# Patient Record
Sex: FEMALE | Race: WHITE | NOT HISPANIC OR LATINO | Employment: OTHER | URBAN - METROPOLITAN AREA
[De-identification: names, ages, dates, MRNs, and addresses within clinical notes are randomized per-mention and may not be internally consistent; named-entity substitution may affect disease eponyms.]

---

## 2022-06-04 DIAGNOSIS — M25.552 LEFT HIP PAIN: Primary | ICD-10-CM

## 2022-06-07 ENCOUNTER — APPOINTMENT (OUTPATIENT)
Dept: RADIOLOGY | Facility: CLINIC | Age: 72
End: 2022-06-07
Payer: MEDICARE

## 2022-06-07 ENCOUNTER — OFFICE VISIT (OUTPATIENT)
Dept: OBGYN CLINIC | Facility: CLINIC | Age: 72
End: 2022-06-07
Payer: MEDICARE

## 2022-06-07 VITALS
HEIGHT: 66 IN | DIASTOLIC BLOOD PRESSURE: 82 MMHG | SYSTOLIC BLOOD PRESSURE: 116 MMHG | WEIGHT: 263.2 LBS | HEART RATE: 98 BPM | BODY MASS INDEX: 42.3 KG/M2

## 2022-06-07 DIAGNOSIS — M16.12 PRIMARY OSTEOARTHRITIS OF ONE HIP, LEFT: Primary | ICD-10-CM

## 2022-06-07 DIAGNOSIS — M25.552 LEFT HIP PAIN: ICD-10-CM

## 2022-06-07 PROCEDURE — 99203 OFFICE O/P NEW LOW 30 MIN: CPT | Performed by: ORTHOPAEDIC SURGERY

## 2022-06-07 PROCEDURE — 73502 X-RAY EXAM HIP UNI 2-3 VIEWS: CPT

## 2022-06-07 RX ORDER — FLUTICASONE FUROATE AND VILANTEROL 200; 25 UG/1; UG/1
1 POWDER RESPIRATORY (INHALATION) DAILY
COMMUNITY

## 2022-06-07 RX ORDER — UBIDECARENONE 30 MG
1 CAPSULE ORAL DAILY
COMMUNITY

## 2022-06-07 RX ORDER — ASPIRIN 81 MG/1
81 TABLET ORAL DAILY
COMMUNITY

## 2022-06-07 RX ORDER — MONTELUKAST SODIUM 10 MG/1
10 TABLET ORAL DAILY PRN
COMMUNITY

## 2022-06-07 RX ORDER — LEVOTHYROXINE SODIUM 175 UG/1
175 TABLET ORAL
COMMUNITY
End: 2022-07-28 | Stop reason: DRUGHIGH

## 2022-06-07 RX ORDER — VERAPAMIL HYDROCHLORIDE 360 MG/1
360 CAPSULE, DELAYED RELEASE PELLETS ORAL DAILY
COMMUNITY

## 2022-06-07 RX ORDER — ALBUTEROL SULFATE 90 UG/1
2 AEROSOL, METERED RESPIRATORY (INHALATION) EVERY 4 HOURS PRN
COMMUNITY

## 2022-06-07 RX ORDER — ATORVASTATIN CALCIUM 10 MG/1
40 TABLET, FILM COATED ORAL DAILY
COMMUNITY

## 2022-06-07 RX ORDER — LOSARTAN POTASSIUM AND HYDROCHLOROTHIAZIDE 25; 100 MG/1; MG/1
1 TABLET ORAL DAILY
COMMUNITY

## 2022-06-07 RX ORDER — GABAPENTIN 100 MG/1
200 CAPSULE ORAL WEEKLY
COMMUNITY

## 2022-06-07 RX ORDER — CLOPIDOGREL BISULFATE 75 MG/1
75 TABLET ORAL DAILY
COMMUNITY

## 2022-06-07 RX ORDER — ALPRAZOLAM 1 MG/1
0.25 TABLET ORAL AS NEEDED
COMMUNITY

## 2022-06-07 RX ORDER — FLUTICASONE PROPIONATE 50 MCG
2 SPRAY, SUSPENSION (ML) NASAL DAILY PRN
COMMUNITY

## 2022-06-07 NOTE — PATIENT INSTRUCTIONS
- Discussed conservative treatment with patient at length  - Begin physical therapy as directed   - Pain management referral placed for cortisone injection under fluoro   - Over the counter analgesics as needed / directed   - Ice / heat as directed  - discussed possibility of surgical intervention in future pending symptoms     - Follow up 2 months

## 2022-06-07 NOTE — PROGRESS NOTES
Orthopaedics Office Visit - 1st Patient Visit    ASSESSMENT/PLAN:    Assessment:   Left hip primary osteoarthritis   Trochanteric Bursitis       Plan:   - Discussed conservative treatment with patient at length  - Begin physical therapy as directed   - Pain management referral placed for cortisone injection under fluoro to intraarticular L hip  - Over the counter analgesics as needed / directed   - Ice / heat as directed  - discussed possibility of surgical intervention in future pending symptoms     - Follow up 2 months       To Do Next Visit:  Evaluate left hip pain     _____________________________________________________  CHIEF COMPLAINT:  Chief Complaint   Patient presents with    Left Hip - Pain         SUBJECTIVE:  Marguerite Soares is a 81 y o  female  presenting the office for initial evaluation of her left hip  Patient states her symptoms have been ongoing for the past few years in duration  Patient states that over the past 6 months her hip pain has gotten progressively worse  Patient was seen at [facility] where a trochanteric bursa injection was administered  Patient states that she received a minimal relief of symptoms during this last injection  Patient states that the pain is predominantly on the lateral aspect of the hip does not extend into her hip  Patient states the pain comes worse with range of motion of the hip weight-bearing and direct contact  Patient states she has tried will laxity him and tramadol past which have provided minimal relief of symptoms  Patient offers no other complaints at this time  PAST MEDICAL HISTORY:  No past medical history on file  PAST SURGICAL HISTORY:  No past surgical history on file  FAMILY HISTORY:  No family history on file  SOCIAL HISTORY:       MEDICATIONS:  No current outpatient medications on file  ALLERGIES:  Not on File    REVIEW OF SYSTEMS:  MSK: Left hip pain   Neuro: WNL   Pertinent items are otherwise noted in HPI    A comprehensive review of systems was otherwise negative  LABS:  HgA1c: No results found for: HGBA1C  BMP: No results found for: GLUCOSE, CALCIUM, NA, K, CO2, CL, BUN, CREATININE  CBC: No components found for: CBC    _____________________________________________________  PHYSICAL EXAMINATION:  Vital signs: Ht 5' 6" (1 676 m)   Wt 119 kg (263 lb 3 2 oz)   BMI 42 48 kg/m²   General: No acute distress, awake and alert  Psychiatric: Mood and affect appear appropriate  HEENT: Trachea Midline, No torticollis, no apparent facial trauma  Cardiovascular: No audible murmurs; Extremities appear perfused  Pulmonary: No audible wheezing or stridor  Skin: No open lesions; see further details (if any) below      MUSCULOSKELETAL EXAMINATION:  Left hip examination:  - Patient sitting comfortably in the office in no acute distress   - no acute visible abnormalities present in the left hip  Extremity appears well-perfused overall   - tenderness to palpation noted over the greater trochanter  No other bony or soft tissue tenderness to palpation noted at this time  - extremity limited range of motion of left hip in all planes of motion secondary to pain  Special tests        -   + STITEOFILO'S   - NV intact    _____________________________________________________  STUDIES REVIEWED:  I personally reviewed the images and interpretation is as follows:  Left hip XR 2 views: Moderate osteoarthritis of the left hip with osteophyte formation present  PROCEDURES PERFORMED:  No procedures were performed at this time  Tian Sheridan PA-C - assisting    Danis Webber MD                  Portions of the record may have been created with voice recognition software  Occasional wrong word or "sound a like" substitutions may have occurred due to the inherent limitations of voice recognition software  Read the chart carefully and recognize, using context, where substitutions have occurred

## 2022-06-08 ENCOUNTER — TELEPHONE (OUTPATIENT)
Dept: OBGYN CLINIC | Facility: HOSPITAL | Age: 72
End: 2022-06-08

## 2022-06-09 ENCOUNTER — TELEPHONE (OUTPATIENT)
Dept: RADIOLOGY | Facility: CLINIC | Age: 72
End: 2022-06-09

## 2022-06-09 NOTE — TELEPHONE ENCOUNTER
S/w pt and scheduled Left hip injection for 6/16/22 230 pm  Gave pre procedure instructions and /vaccine policy, [facility]  Cxl consult for 6/29/22 since this was a direct ref for injection

## 2022-06-13 NOTE — PATIENT INSTRUCTIONS
Peripheral arterial disease with history of revascularization (HCC)  -     VAS lower limb arterial duplex, complete bilateral; Future        -Going for hip injections (unable to walk well right now)  -Check feet daily for any wounds or changes  -Follow heart healthy diet and regular daily walking 30 minutes daily  -Continue with aspirin, Plavix 75, high dose atorvastatin  -Maintain good BP and glucose control  -Heart healthy diet and weight loss  -Continue to avoid tobacco  -Check KALE and follow up in 3 months

## 2022-06-14 ENCOUNTER — OFFICE VISIT (OUTPATIENT)
Dept: VASCULAR SURGERY | Facility: CLINIC | Age: 72
End: 2022-06-14
Payer: MEDICARE

## 2022-06-14 VITALS
TEMPERATURE: 98.4 F | HEART RATE: 100 BPM | DIASTOLIC BLOOD PRESSURE: 80 MMHG | WEIGHT: 263 LBS | HEIGHT: 66 IN | BODY MASS INDEX: 42.27 KG/M2 | SYSTOLIC BLOOD PRESSURE: 110 MMHG

## 2022-06-14 DIAGNOSIS — E66.01 CLASS 3 SEVERE OBESITY WITHOUT SERIOUS COMORBIDITY WITH BODY MASS INDEX (BMI) OF 40.0 TO 44.9 IN ADULT, UNSPECIFIED OBESITY TYPE (HCC): ICD-10-CM

## 2022-06-14 DIAGNOSIS — Z98.890 PERIPHERAL ARTERIAL DISEASE WITH HISTORY OF REVASCULARIZATION (HCC): Primary | ICD-10-CM

## 2022-06-14 DIAGNOSIS — I10 PRIMARY HYPERTENSION: ICD-10-CM

## 2022-06-14 DIAGNOSIS — I73.9 PERIPHERAL ARTERIAL DISEASE WITH HISTORY OF REVASCULARIZATION (HCC): Primary | ICD-10-CM

## 2022-06-14 DIAGNOSIS — Z89.422 ACQUIRED ABSENCE OF OTHER LEFT TOE(S) (HCC): ICD-10-CM

## 2022-06-14 PROBLEM — E66.813 CLASS 3 SEVERE OBESITY WITHOUT SERIOUS COMORBIDITY WITH BODY MASS INDEX (BMI) OF 40.0 TO 44.9 IN ADULT, UNSPECIFIED OBESITY TYPE (HCC): Status: ACTIVE | Noted: 2022-06-14

## 2022-06-14 PROCEDURE — 99204 OFFICE O/P NEW MOD 45 MIN: CPT | Performed by: PHYSICIAN ASSISTANT

## 2022-06-14 NOTE — PROGRESS NOTES
Assessment/Plan:    Peripheral arterial disease with history of revascularization (HCC)  -     VAS lower limb arterial duplex, complete bilateral; Future    -S/P LLE popliteal to PT bypass (7/31/19 followed by PCI x 2)  -asymptomatic PAD; no claudication (her her level), rest pain or wounds  -Transferring vascular care from [place], [place]    Plan:  Patient presents as a new patient [facility]  She recently relocated to the [facility] from [place], [place]  she has a history of left popliteal to posterior tibial artery bypass performed in 2019 with 2 subsequent percutaneous interventions for restenoses  Her last duplex performed in [place], [place] (by report) shows a recurrent area of stenosis of the distal popliteal to posterior tibial bypass with good flow proximally  There is noted to be anterior tibial artery disease  Her examination is stable  There is no easily palpable bypass or PT artery pulse but signals are present  There is a 1+ pedal pulse  Her feet are warm and well perfused  There are no wounds  She feels limited due to left hip pain which she is having dressed separately  She has no claudication at her level of activity  There is no ischemic rest pain or wounds    We will have her enter into the surveillance PAD duplex program   Patient education regarding PAD and symptoms concerning for which she should return sooner were discussed      -Continue with aspirin, Plavix, high dose atorvastatin  -Going for hip injections (unable to walk well right now)  -Check feet daily for any wounds or changes  -Follow heart healthy diet and regular daily walking 30 minutes daily  -Maintain good BP and glucose control  -Heart healthy diet and weight loss  -Continue to avoid tobacco  -Check KALE and follow up in 3 months    Additional diagnoses:    Primary hypertension    Hyperlipidemia    Cardiac murmur    Acquired absence of other left toe(s) ([facility])    Class 3 severe obesity without serious comorbidity with body mass index (BMI) of 40 0 to 44 9 in adult, unspecified obesity type ([facility] [address] )      Subjective:      Patient ID: Jyothi Jonas is a 81 y o  female  Patient w/ known PAD and h/o multiple vascular procedures presents today to establish care with new vascular office  Most recent imaging was KALE of LLE done 1/3/22 @ [address]  Patient denies claudication symptoms/ rest pain  HPI    Jyothi Jonas 72 y/oF obesity (BMI 42 5), liver cirrhosis/JOSEPH, diverticulitis, Hx colonic polyps, asthma, Htn, HLD, hypothyroidism, peripheral arterial disease s/p L popliteal to distal PT artery bypass graft (7/31/2019) s/p bypass graft angioplasty x 2 (1/20/21, 8/12/20) who presents to  vascular for initial consultation  She has been followed by [place] and has now relocated to the [place] [id number] and she is looking to transfer her care to [place] [id number]  Mrs Bozena Davies gives history of left second toe ulceration in 2019 which developed osteo and was amputated  In that setting, she required left lower extremity bypass  Since bypass she is had 2 interventions for recurrent stenosis to the graft and was told that there is "scar tissue " Last duplex in [place] was 01/03/2022  There was noted to be recurrent area of stenosis of the distal popliteal to PT bypass graft with good flow proximally  There was also noted to be anterior tibial artery disease  She has been maintained on aspirin, clopidogrel and high-intensity statin therapy since her bypass  Patient gives no history of claudication  Currently her functional status is somewhat limited due to left hip pain which occurs with certain positions and activity  She is hoping to have her hip injected  We discussed symptoms of claudication rest pain that would be concerning for which she should be seen  We discussed the benefits of a regular progressive walking program   Hopefully, after she has her hip injected she will be able to engage in a walking program   No CP or SOB  She is a lifelong nonsmoker    Family history is noncontributory        -Hx L 2nd toe ulceration, bypass; no claudication  -L hip "bad"; plan for injections        Vascular Procedures:  S/P Left Lower Extremity Angiogram (7/30/2019)  S/P Left Popliteal-Pedal Artery Bypass with GSV and Intraoperative Angiogram (7/31/2019)  S/P I&D Left Lower Extremity Wound (10/31/19)  S/P Left Lower Extremity Angiogram with Angioplasty of bypass graft (8/12/20)  S/P Left Lower Extremity Angiogram with Angioplasty of Left Common Plantar Artery (1/20/2021)  S/P Left Lower Extremity Angioplasty with Left Common Plantar Artery (7/14/2021)      LEFT lower extremity arterial duplex 1/3/22  INDICATION: Other atherosclerosis of autologous vein bypass graft(s) of the extremities, left leg  Previous left popliteal to distal posterior tibial artery bypass graft previously angioplastied  TECHNIQUE: Grayscale, color flow and spectral duplex Doppler ultrasound was performed of the left lower extremity lower extremity  Permanently recorded images were obtained and stored  COMPARISON: 8/25/2021     FINDINGS: There is good inflow at the common femoral artery and good flow through the SFA and popliteal vessel  There is a popliteal to distal posterior tibial artery bypass graft  At the level of the distal anastomosis is is well patent with phasic waveforms  Distally we identify a recurrent stenosis at the level of the distal anastomosis  There continues to be flow with progressive distal disease of the anterior tibial artery  IMPRESSION: There appears to be a recurrent area of stenosis at the distal popliteal to posterior tibial artery bypass graft  Proximally there is good flow  Progressive disease of the anterior tibial artery from proximal to distal  This appears similar             The following portions of the patient's history were reviewed and updated as appropriate: allergies, current medications, past family history, past medical history, past social history, past surgical history and problem list     Review of Systems   Constitutional: Negative  HENT: Negative  Eyes: Negative  Respiratory: Negative  Cardiovascular: Negative  Gastrointestinal: Negative  Endocrine: Negative  Genitourinary: Negative  Musculoskeletal: Positive for arthralgias and back pain  Skin: Negative  Allergic/Immunologic: Negative  Neurological: Negative  Hematological: Negative  Psychiatric/Behavioral: Negative  Objective:    /80 (BP Location: Left arm, Patient Position: Sitting)   Pulse 100   Temp 98 4 °F (36 9 °C) (Tympanic)   Ht 5' 6" (1 676 m)   Wt 119 kg (263 lb)   BMI 42 45 kg/m²       B LE are stable  No wounds  Absent L 2nd toe  Cap refill at 2 seconds  R No pulses in foot  Monophasic to biphasic PT/DP signals  L well-healed incisions over the medial calf and PT  Bypass and PT signals are present  1+ DP pulse  No significant edema  Physical Exam  Vitals and nursing note reviewed  Constitutional:       Appearance: She is well-developed  She is obese  HENT:      Head: Normocephalic and atraumatic  Eyes:      Pupils: Pupils are equal, round, and reactive to light  Neck:      Thyroid: No thyromegaly  Vascular: No JVD  Trachea: Trachea normal    Cardiovascular:      Rate and Rhythm: Normal rate and regular rhythm  Pulses:           Carotid pulses are 2+ on the right side and 2+ on the left side  Radial pulses are 2+ on the right side and 2+ on the left side  Dorsalis pedis pulses are detected w/ Doppler on the right side and 1+ on the left side  Posterior tibial pulses are detected w/ Doppler on the right side and detected w/ Doppler on the left side  Heart sounds: Normal heart sounds, S1 normal and S2 normal  No murmur heard  No friction rub  No gallop  Pulmonary:      Effort: Pulmonary effort is normal  No accessory muscle usage or respiratory distress        Breath sounds: Normal breath sounds  No wheezing or rales  Abdominal:      General: Bowel sounds are normal  There is no distension  Palpations: Abdomen is soft  Tenderness: There is no abdominal tenderness  Musculoskeletal:         General: No deformity  Normal range of motion  Cervical back: Neck supple  Skin:     General: Skin is warm and dry  Capillary Refill: Capillary refill takes 2 to 3 seconds  Findings: No lesion or rash  Nails: There is no clubbing  Neurological:      Mental Status: She is alert and oriented to person, place, and time  Comments: Grossly normal    Psychiatric:         Behavior: Behavior is cooperative  I have reviewed and made appropriate changes to the review of systems input by the medical assistant  Vitals:    06/14/22 1100   BP: 110/80   BP Location: Left arm   Patient Position: Sitting   Pulse: 100   Temp: 98 4 °F (36 9 °C)   TempSrc: Tympanic   Weight: 119 kg (263 lb)   Height: 5' 6" (1 676 m)       Patient Active Problem List   Diagnosis    Primary osteoarthritis of one hip, left       History reviewed  No pertinent surgical history  History reviewed  No pertinent family history      Social History     Socioeconomic History    Marital status: /Civil Union     Spouse name: Not on file    Number of children: Not on file    Years of education: Not on file    Highest education level: Not on file   Occupational History    Not on file   Tobacco Use    Smoking status: Current Every Day Smoker     Types: Cigarettes    Smokeless tobacco: Never Used   Vaping Use    Vaping Use: Never used   Substance and Sexual Activity    Alcohol use: Not on file    Drug use: Not on file    Sexual activity: Not on file   Other Topics Concern    Not on file   Social History Narrative    Not on file     Social Determinants of Health     Financial Resource Strain: Not on file   Food Insecurity: Not on file   Transportation Needs: Not on file Physical Activity: Not on file   Stress: Not on file   Social Connections: Not on file   Intimate Partner Violence: Not on file   Housing Stability: Not on file       Allergies   Allergen Reactions    Sulfamethoxazole-Trimethoprim Rash         Current Outpatient Medications:     albuterol (PROVENTIL HFA,VENTOLIN HFA) 90 mcg/act inhaler, Inhale 2 puffs every 4 (four) hours as needed, Disp: , Rfl:     ALPRAZolam (XANAX) 1 mg tablet, Take 1 mg by mouth as needed, Disp: , Rfl:     aspirin (ECOTRIN LOW STRENGTH) 81 mg EC tablet, Take 81 mg by mouth in the morning, Disp: , Rfl:     atorvastatin (LIPITOR) 10 mg tablet, Take 40 mg by mouth daily, Disp: , Rfl:     clopidogrel (PLAVIX) 75 mg tablet, Take 75 mg by mouth daily, Disp: , Rfl:     fluticasone (FLONASE) 50 mcg/act nasal spray, 2 sprays into each nostril daily as needed, Disp: , Rfl:     fluticasone-vilanterol (BREO ELLIPTA) 200-25 MCG/INH inhaler, Inhale 1 puff daily, Disp: , Rfl:     levothyroxine 175 mcg tablet, Take 175 mcg by mouth, Disp: , Rfl:     losartan-hydrochlorothiazide (HYZAAR) 100-25 MG per tablet, Take 1 tablet by mouth daily, Disp: , Rfl:     montelukast (SINGULAIR) 10 mg tablet, Take 10 mg by mouth daily as needed, Disp: , Rfl:     Multiple Vitamins-Minerals (Multi For Her 50+) TABS, Take 1 tablet by mouth daily, Disp: , Rfl:     verapamil (VERELAN PM) 360 MG 24 hr capsule, Take 360 mg by mouth in the morning, Disp: , Rfl:     gabapentin (NEURONTIN) 100 mg capsule, Take 100 mg by mouth 3 (three) times a day (Patient not taking: No sig reported), Disp: , Rfl:

## 2022-06-16 ENCOUNTER — HOSPITAL ENCOUNTER (OUTPATIENT)
Dept: RADIOLOGY | Facility: CLINIC | Age: 72
Discharge: HOME | End: 2022-06-16
Attending: STUDENT IN AN ORGANIZED HEALTH CARE EDUCATION/TRAINING PROGRAM | Admitting: STUDENT IN AN ORGANIZED HEALTH CARE EDUCATION/TRAINING PROGRAM
Payer: MEDICARE

## 2022-06-16 VITALS
HEART RATE: 84 BPM | DIASTOLIC BLOOD PRESSURE: 89 MMHG | TEMPERATURE: 98.5 F | RESPIRATION RATE: 20 BRPM | OXYGEN SATURATION: 97 % | SYSTOLIC BLOOD PRESSURE: 142 MMHG

## 2022-06-16 DIAGNOSIS — M16.12 PRIMARY OSTEOARTHRITIS OF LEFT HIP: ICD-10-CM

## 2022-06-16 PROCEDURE — 77002 NEEDLE LOCALIZATION BY XRAY: CPT

## 2022-06-16 PROCEDURE — A9585 GADOBUTROL INJECTION: HCPCS | Performed by: STUDENT IN AN ORGANIZED HEALTH CARE EDUCATION/TRAINING PROGRAM

## 2022-06-16 PROCEDURE — 77002 NEEDLE LOCALIZATION BY XRAY: CPT | Performed by: STUDENT IN AN ORGANIZED HEALTH CARE EDUCATION/TRAINING PROGRAM

## 2022-06-16 PROCEDURE — 20610 DRAIN/INJ JOINT/BURSA W/O US: CPT | Performed by: STUDENT IN AN ORGANIZED HEALTH CARE EDUCATION/TRAINING PROGRAM

## 2022-06-16 RX ORDER — BUPIVACAINE HCL/PF 2.5 MG/ML
4 VIAL (ML) INJECTION ONCE
Status: COMPLETED | OUTPATIENT
Start: 2022-06-16 | End: 2022-06-16

## 2022-06-16 RX ORDER — METHYLPREDNISOLONE ACETATE 80 MG/ML
80 INJECTION, SUSPENSION INTRA-ARTICULAR; INTRALESIONAL; INTRAMUSCULAR; PARENTERAL; SOFT TISSUE ONCE
Status: COMPLETED | OUTPATIENT
Start: 2022-06-16 | End: 2022-06-16

## 2022-06-16 RX ADMIN — METHYLPREDNISOLONE ACETATE 80 MG: 80 INJECTION, SUSPENSION INTRA-ARTICULAR; INTRALESIONAL; INTRAMUSCULAR; PARENTERAL; SOFT TISSUE at 14:49

## 2022-06-16 RX ADMIN — GADOBUTROL 1 ML: 604.72 INJECTION INTRAVENOUS at 14:48

## 2022-06-16 RX ADMIN — Medication 4 ML: at 14:49

## 2022-06-16 NOTE — H&P
History of Present Illness: The patient is a 81 y o  female who presents with complaints of left hip and groin pain    Patient Active Problem List   Diagnosis    Primary osteoarthritis of one hip, left    Peripheral arterial disease with history of revascularization ([facility] )    Hypertension    Acquired absence of other left toe(s) ([facility] )    Class 3 severe obesity without serious comorbidity with body mass index (BMI) of 40 0 to 44 9 in adult, unspecified obesity type ([place] [address] )       No past medical history on file  No past surgical history on file        Current Outpatient Medications:     albuterol (PROVENTIL HFA,VENTOLIN HFA) 90 mcg/act inhaler, Inhale 2 puffs every 4 (four) hours as needed, Disp: , Rfl:     ALPRAZolam (XANAX) 1 mg tablet, Take 1 mg by mouth as needed, Disp: , Rfl:     aspirin (ECOTRIN LOW STRENGTH) 81 mg EC tablet, Take 81 mg by mouth in the morning, Disp: , Rfl:     atorvastatin (LIPITOR) 10 mg tablet, Take 40 mg by mouth daily, Disp: , Rfl:     clopidogrel (PLAVIX) 75 mg tablet, Take 75 mg by mouth daily, Disp: , Rfl:     fluticasone (FLONASE) 50 mcg/act nasal spray, 2 sprays into each nostril daily as needed, Disp: , Rfl:     fluticasone-vilanterol (BREO ELLIPTA) 200-25 MCG/INH inhaler, Inhale 1 puff daily, Disp: , Rfl:     gabapentin (NEURONTIN) 100 mg capsule, Take 100 mg by mouth 3 (three) times a day (Patient not taking: No sig reported), Disp: , Rfl:     levothyroxine 175 mcg tablet, Take 175 mcg by mouth, Disp: , Rfl:     losartan-hydrochlorothiazide (HYZAAR) 100-25 MG per tablet, Take 1 tablet by mouth daily, Disp: , Rfl:     montelukast (SINGULAIR) 10 mg tablet, Take 10 mg by mouth daily as needed, Disp: , Rfl:     Multiple Vitamins-Minerals (Multi For Her 50+) TABS, Take 1 tablet by mouth daily, Disp: , Rfl:     verapamil (VERELAN PM) 360 MG 24 hr capsule, Take 360 mg by mouth in the morning, Disp: , Rfl:     Current Facility-Administered Medications:     bupivacaine (PF) (MARCAINE) 0 25 % injection 4 mL, 4 mL, Intra-articular, Once, Radha Bundy MD    Gadobutrol injection (SINGLE-DOSE) SOLN 1 mL, 1 mL, Intravenous, Once, Radha Bundy MD    methylPREDNISolone acetate (DEPO-MEDROL) injection 80 mg, 80 mg, Intra-articular, Once, Radha Bundy MD    Allergies   Allergen Reactions    Sulfamethoxazole-Trimethoprim Rash       Physical Exam:   Vitals:    06/16/22 1426   BP: 134/83   Pulse: 88   Resp: 20   Temp: 98 5 °F (36 9 °C)   SpO2: 98%     General: Awake, Alert, Oriented x 3, Mood and affect appropriate  Respiratory: Respirations even and unlabored  Cardiovascular: Peripheral pulses intact; no edema  Musculoskeletal Exam: left hip pain    ASA Score: 3    Patient/Chart Verification  Patient ID Verified: Verbal  ID Band Applied: No  Consents Confirmed: To be obtained in the Pre-Procedure area  H&P( within 30 days) Verified: To be obtained in the Pre-Procedure area  Interval H&P(within 24 hr) Complete (required for Outpatients and Surgery Admit only): To be obtained in the Pre-Procedure area  Allergies Reviewed: Yes  Anticoag/NSAID held?: No (on plavix/aspirin)  Currently on antibiotics?: No  Pregnancy denied?: NA    Assessment:   1   Primary osteoarthritis of left hip        Plan: Left hip cortisone injection under fluoro per Dr Chaudhari Axon

## 2022-06-16 NOTE — DISCHARGE INSTR - LAB
Steroid Joint Injection   WHAT YOU NEED TO KNOW:   A steroid joint injection is a procedure to inject steroid medicine into a joint  Steroid medicine decreases pain and inflammation  The injection may also contain an anesthetic (numbing medicine) to decrease pain  It may be done to treat conditions such as arthritis, gout, or carpal tunnel syndrome  The injections may be given in your knee, ankle, shoulder, elbow, wrist, ankle or sacroiliac joint  Do not apply heat to any area that is numb  If you have discomfort or soreness at the injection site, you may apply ice today, 20 minutes on and 20 minutes off  Tomorrow you may use ice or warm, moist heat  Do not apply ice or heat directly to the skin  You may have an increase or change in the discomfort for 36-48 hours after your treatment  Apply ice and continue with any pain medicine you have been prescribed  Do not do anything strenuous today  You may shower, but no tub baths or hot tubs today  You may resume your normal activities tomorrow, but do not overdo it  Resume normal activities slowly when you are feeling better  If you experience redness, drainage or swelling at the injection site, or if you develop a fever above 100 degrees, please call [facility] at [phone] or go to the Emergency Room  Continue to take all routine medicines prescribed by your primary care physician unless otherwise instructed by our staff  Most blood thinners should be started again according to your regularly scheduled dosing  If you have any questions, please give our office a call  As no general anesthesia was used in today's procedure, you should not experience any side effects related to anesthesia  If you have a problem specifically related to your procedure, please call our office at [phone]  Problems not related to your procedure should be directed to your primary care physician

## 2022-06-29 ENCOUNTER — EVALUATION (OUTPATIENT)
Dept: PHYSICAL THERAPY | Facility: CLINIC | Age: 72
End: 2022-06-29
Payer: MEDICARE

## 2022-06-29 DIAGNOSIS — M16.12 PRIMARY OSTEOARTHRITIS OF ONE HIP, LEFT: Primary | ICD-10-CM

## 2022-06-29 PROCEDURE — 97162 PT EVAL MOD COMPLEX 30 MIN: CPT

## 2022-06-29 PROCEDURE — 97110 THERAPEUTIC EXERCISES: CPT

## 2022-06-29 NOTE — PROGRESS NOTES
PT Evaluation     Today's date: 2022  Patient name: Daniel Morin  : 1950  MRN: [id number]  Referring provider: MARCELINA Fulton*  Dx:   Encounter Diagnosis     ICD-10-CM    1  Primary osteoarthritis of one hip, left  M16 12 Ambulatory Referral to Physical Therapy                  Assessment  Assessment details: Daniel Morin is a 81 y o  female who presents with signs and symptoms consistent of referring diagnosis with no noted referral from the lumbar spine  Patient presents with pain, decreased strength, decreased ROM, decreased joint mobility and ambulatory dysfunction  Noted difficulty during terminal stance on the LLE indicating diminished mobility  Due to these impairments, Patient has difficulty performing a/iadls, recreational activities and engaging in social activities  Of note, patient is most limited with LE mobility and strength which has led to increased reliance on the hip and is her most likely cause for pain and discomfort  Patient would benefit from a comprehensive exercise program and HEP focusing on improving LE strength, motor control, and functional mobility  Patient would benefit from skilled physical therapy to address the impairments, improve their level of function, and to improve their overall quality of life  PT POC 2x/wk 6 weeks  Thank you for this referral      Impairments: abnormal gait, abnormal muscle firing, abnormal or restricted ROM, activity intolerance, impaired physical strength, lacks appropriate home exercise program, pain with function and weight-bearing intolerance    Symptom irritability: moderateUnderstanding of Dx/Px/POC: good   Prognosis: good    Goals  Short Term Goals: to be achieved by 3 weeks  1) Patient to be independent with basic HEP  2) Decrease pain to 3/10 at its worst   3)  Increase LE strength by 1/2 MMT grade in all deficient planes      Long Term Goals: to be achieved by discharge  1) FOTO equal to or greater than 49   2) Ambulation to improve to maximal level of function  3) Stair negotiation will improve to reciprocal   4) Sit to stand transfers will improve to maximal level of function   5) Patient will be independent with comprehensive HEP  6) Patient will be able to return to gardening without pain  Plan  Plan details: PT POC 2x/wk for  6 weeks  Patient would benefit from: skilled physical therapy  Planned modality interventions: cryotherapy, thermotherapy: hydrocollator packs and TENS  Planned therapy interventions: activity modification, joint mobilization, manual therapy, massage, stretching, strengthening, therapeutic activities, therapeutic exercise, home exercise program, gait training and functional ROM exercises  Frequency: 2x week  Duration in weeks: 6  Plan of Care beginning date: 2022  Plan of Care expiration date: 8/10/2022  Treatment plan discussed with: patient        Subjective Evaluation    History of Present Illness  Mechanism of injury: History of Current Injury: Patient reports that her L hip has been bothersome since the end of   Patient notes that it has gradually gotten worse  She had been seeing an orthopedic group in [place], prior to moving, where she had some shots and medication prescribed which did not offer relief  After patient moved she went to see Dr Isadora Sky where she was referred for a shot deeper "in the joint" which offered a "30%" improvement  Patient was then referred to OPPT  Pain location/Descriptors: Side of the L hip and sporadically around the lower back   Aggravating factors: Standing for long periods of time, walking up stairs, walking longer distances, twisting   Easing factors: Injections, rest, and ice   Imaging: Yes  Special Questions: Jerris Doing denies a new onset of Bladder incontinence, Bowel dysfunction, Tingling, Numbness and Saddle anesthesia     Patient goals:  "walk around comfortably"  Hobbies/Interest: gardening   Occupation: Retired     Pain  Current pain ratin  At best pain ratin  At worst pain ratin  Quality: sharp and dull ache    Patient Goals  Patient goals for therapy: decreased pain, independence with ADLs/IADLs, increased strength, return to sport/leisure activities and increased motion          Objective     Palpation   Left   No palpable tenderness to the erector spinae and quadratus lumborum  Right   No palpable tenderness to the erector spinae and quadratus lumborum  Tenderness     Lumbar Spine  No tenderness in the spinous process, left transverse process and right transverse process  Left Hip   Tenderness in the PSIS  Right Hip   No tenderness in the PSIS  Active Range of Motion     Lumbar   Flexion: Active lumbar flexion: 75   with pain  Extension: Active lumbar extension: 50   with pain  Left lateral flexion: Active left lumbar lateral flexion: 50  Right lateral flexion: Active right lumbar lateral flexion: 50   with pain  Left rotation: Active left lumbar rotation: 50   with pain  Right rotation: Active right lumbar rotation: 50  Strength/Myotome Testing     Left Hip   Planes of Motion   Flexion: 3+  Extension: 4  Abduction: 3+  External rotation: 4+  Internal rotation: 4+    Right Hip   Planes of Motion   Flexion: 4-  Extension: 4  Abduction: 4-  External rotation: 5  Internal rotation: 5    Left Knee   Flexion: 4  Extension: 5    Right Knee   Flexion: 5  Extension: 5    Tests     Lumbar     Left   Negative passive SLR  Right   Negative passive SLR  Left Hip   Positive MAX and scour  Negative FADIR  Right Hip   Negative MAX, FADIR and scour  Ambulation     Observational Gait   Gait: antalgic   Decreased left stance time  Additional Observational Gait Details  Pain noted during hip extension on LLE     Functional Assessment      Squat    Left within functional limits and right within functional limits  Forward Step Up 6"   Left Leg  Ipsilateral trunk side bending  No pain       Right Leg  Within functional limits  No pain  Forward Step Down 6"   Left Leg  Within functional limits  Right Leg  Within functional limits                Diagnosis: L hip OA    Precautions: PAD, diminished sensation    POC Expires: 7/27   Re-evaluation Date: 8/10   FOTO Scores/Date: 52 (6/29)   Visit Count 1       Manuals 6/29       LAD        Distraction with IR/ER                                Ther Ex        Bike        Treadmill        Repeated hip extensions        Glute bridges        Clamshells         Piriformis stretch        MAX stretch        Modified uriel stretch        Side steps        Hip 3 way                 Prone quad stretch        Neuro Re-Ed        Bent over hip extensions         SLR w TrA brace                                                                                                       Ther Act              Step ups  Knee drive            Resisted fwd walking          Retro walking             Modalities

## 2022-07-06 ENCOUNTER — APPOINTMENT (OUTPATIENT)
Dept: PHYSICAL THERAPY | Facility: CLINIC | Age: 72
End: 2022-07-06
Payer: MEDICARE

## 2022-07-11 ENCOUNTER — APPOINTMENT (OUTPATIENT)
Dept: PHYSICAL THERAPY | Facility: CLINIC | Age: 72
End: 2022-07-11
Payer: MEDICARE

## 2022-07-14 ENCOUNTER — APPOINTMENT (OUTPATIENT)
Dept: PHYSICAL THERAPY | Facility: CLINIC | Age: 72
End: 2022-07-14
Payer: MEDICARE

## 2022-07-18 ENCOUNTER — APPOINTMENT (OUTPATIENT)
Dept: PHYSICAL THERAPY | Facility: CLINIC | Age: 72
End: 2022-07-18
Payer: MEDICARE

## 2022-07-20 ENCOUNTER — HOSPITAL ENCOUNTER (OUTPATIENT)
Dept: NON INVASIVE DIAGNOSTICS | Facility: CLINIC | Age: 72
Discharge: HOME | End: 2022-07-20
Payer: MEDICARE

## 2022-07-20 DIAGNOSIS — I73.9 PERIPHERAL ARTERIAL DISEASE WITH HISTORY OF REVASCULARIZATION (HCC): ICD-10-CM

## 2022-07-20 DIAGNOSIS — Z98.890 PERIPHERAL ARTERIAL DISEASE WITH HISTORY OF REVASCULARIZATION (HCC): ICD-10-CM

## 2022-07-20 PROCEDURE — 93922 UPR/L XTREMITY ART 2 LEVELS: CPT | Performed by: SURGERY

## 2022-07-20 PROCEDURE — 93925 LOWER EXTREMITY STUDY: CPT

## 2022-07-20 PROCEDURE — 93925 LOWER EXTREMITY STUDY: CPT | Performed by: SURGERY

## 2022-07-20 PROCEDURE — 93923 UPR/LXTR ART STDY 3+ LVLS: CPT

## 2022-07-21 ENCOUNTER — OFFICE VISIT (OUTPATIENT)
Dept: PHYSICAL THERAPY | Facility: CLINIC | Age: 72
End: 2022-07-21
Payer: MEDICARE

## 2022-07-21 DIAGNOSIS — M16.12 PRIMARY OSTEOARTHRITIS OF ONE HIP, LEFT: Primary | ICD-10-CM

## 2022-07-21 PROCEDURE — 97110 THERAPEUTIC EXERCISES: CPT

## 2022-07-21 PROCEDURE — 97140 MANUAL THERAPY 1/> REGIONS: CPT

## 2022-07-21 NOTE — PROGRESS NOTES
Daily Note     Today's date: 2022  Patient name: Mark Nicolas  : 1950  MRN: [id number]  Referring provider: MARCELINA Edouard*  Dx:   Encounter Diagnosis     ICD-10-CM    1  Primary osteoarthritis of one hip, left  M16 12                   Subjective: Patient reports that her hip has been getting increasingly sore over the last week as she fears that her hip injection is wearing off  Objective: See treatment diary below      Assessment: Tolerated treatment well  Progressed hip mobility and strengthening this date  Noted subjective reports of relief in L hip seen after manuals performed by therapist this date  Mild increases in discomfort noted with hip 3 way activities in standing  Patient compensates with ER of hip most notably during abduction due to increased discomfort and poor muscle strength  Progress as tolerated  Patient would benefit from continued PT      Plan: Continue per plan of care        Diagnosis: L hip OA    Precautions: PAD, diminished sensation    POC Expires:    Re-evaluation Date: 8/10   FOTO Scores/Date: 52 ()   Visit Count 1 2      Manuals       LAD  EB      Distraction with IR/ER  EB                              Ther Ex        HEP creation   EB      Bike  5'       Treadmill        Repeated hip extensions HEP       Glute bridges HEP 2x10 strap around knees       Clamshells   2x15 L only       Piriformis stretch        MAX stretch        Modified uriel stretch  10x10" L only       Adduction squeezes  20x       Side steps        Hip 3 way   10x ea direction ea foot       Hamstring stretch  10x10" L only       Prone quad stretch        Neuro Re-Ed        Bent over hip extensions         SLR w TrA brace                                                                                                       Ther Act              Step ups  Knee drive            Resisted fwd walking          Retro walking             Modalities

## 2022-07-25 ENCOUNTER — OFFICE VISIT (OUTPATIENT)
Dept: PHYSICAL THERAPY | Facility: CLINIC | Age: 72
End: 2022-07-25
Payer: MEDICARE

## 2022-07-25 DIAGNOSIS — M16.12 PRIMARY OSTEOARTHRITIS OF ONE HIP, LEFT: Primary | ICD-10-CM

## 2022-07-25 PROCEDURE — 97112 NEUROMUSCULAR REEDUCATION: CPT

## 2022-07-25 PROCEDURE — 97110 THERAPEUTIC EXERCISES: CPT

## 2022-07-25 PROCEDURE — 97140 MANUAL THERAPY 1/> REGIONS: CPT

## 2022-07-25 NOTE — PROGRESS NOTES
Daily Note     Today's date: 2022  Patient name: Demetrius Vazquez  : 1950  MRN: [id number]  Referring provider: MARCELINA Parada*  Dx:   Encounter Diagnosis     ICD-10-CM    1  Primary osteoarthritis of one hip, left  M16 12                   Subjective: Patient reports that her hip was sore after the session last week  Notes that her pain is at about a "2" this date  Objective: See treatment diary below      Assessment: Tolerated treatment well  Session focused on continuing to improve patient mobility and LE strength  Able to tolerate stretching exercises better this date  After manuals and stretching performed, patient reports that pain decreased to a "1" with less stiffness  Showed patient alternative stretching techniques due to being unable to complete hamstring stretch given to patient last session  Able to perform step ups with cueing to engage glutes  No increased p! throughout standing activity  Provided patient with updated HEP  Progress as tolerated  Patient would benefit from continued PT      Plan: Continue per plan of care        Diagnosis: L hip OA    Precautions: PAD, diminished sensation    POC Expires:    Re-evaluation Date: 8/10   FOTO Scores/Date: 52 ()   Visit Count 1 2 3     Manuals      LAD  EB EB      Distraction with IR/ER  EB EB                             Ther Ex        HEP creation   EB      Bike  5'  6'     Treadmill        Repeated hip extensions HEP       Glute bridges HEP 2x10 strap around knees       Clamshells   2x15 L only       Piriformis stretch   10x10" L only      MAX stretch        Modified uriel stretch  10x10" L only       Adduction squeezes  20x       Side steps        Hip 3 way   10x ea direction ea foot       Step ups   fwd 4" 2x12 and side to side 2x12 knee drive      Hamstring stretch  10x10" L only  10x10" L only      Prone quad stretch   10x10" L only     Neuro Re-Ed        Bent over hip extensions    2x10 ea leg SLR w quad set   2x10 L only                                                                                                     Ther Act                Knee drive          Resisted fwd walking          Retro walking             Modalities

## 2022-07-26 ENCOUNTER — OFFICE VISIT (OUTPATIENT)
Dept: FAMILY MEDICINE CLINIC | Facility: CLINIC | Age: 72
End: 2022-07-26
Payer: MEDICARE

## 2022-07-26 ENCOUNTER — APPOINTMENT (OUTPATIENT)
Dept: LAB | Facility: HOSPITAL | Age: 72
End: 2022-07-26
Payer: MEDICARE

## 2022-07-26 ENCOUNTER — TELEPHONE (OUTPATIENT)
Dept: ADMINISTRATIVE | Facility: OTHER | Age: 72
End: 2022-07-26

## 2022-07-26 VITALS
HEIGHT: 66 IN | SYSTOLIC BLOOD PRESSURE: 128 MMHG | WEIGHT: 253 LBS | DIASTOLIC BLOOD PRESSURE: 74 MMHG | HEART RATE: 108 BPM | OXYGEN SATURATION: 97 % | TEMPERATURE: 98.3 F | BODY MASS INDEX: 40.66 KG/M2

## 2022-07-26 DIAGNOSIS — K75.81 LIVER CIRRHOSIS SECONDARY TO NASH (NONALCOHOLIC STEATOHEPATITIS) (HCC): ICD-10-CM

## 2022-07-26 DIAGNOSIS — Z12.31 ENCOUNTER FOR SCREENING MAMMOGRAM FOR BREAST CANCER: ICD-10-CM

## 2022-07-26 DIAGNOSIS — I73.9 PERIPHERAL ARTERIAL DISEASE WITH HISTORY OF REVASCULARIZATION (HCC): ICD-10-CM

## 2022-07-26 DIAGNOSIS — Z98.890 PERIPHERAL ARTERIAL DISEASE WITH HISTORY OF REVASCULARIZATION (HCC): ICD-10-CM

## 2022-07-26 DIAGNOSIS — R53.83 FATIGUE, UNSPECIFIED TYPE: Primary | ICD-10-CM

## 2022-07-26 DIAGNOSIS — R53.83 FATIGUE, UNSPECIFIED TYPE: ICD-10-CM

## 2022-07-26 DIAGNOSIS — Z76.89 ESTABLISHING CARE WITH NEW DOCTOR, ENCOUNTER FOR: ICD-10-CM

## 2022-07-26 DIAGNOSIS — K74.60 LIVER CIRRHOSIS SECONDARY TO NASH (NONALCOHOLIC STEATOHEPATITIS) (HCC): ICD-10-CM

## 2022-07-26 DIAGNOSIS — I10 PRIMARY HYPERTENSION: ICD-10-CM

## 2022-07-26 PROBLEM — K57.92 DIVERTICULITIS: Status: ACTIVE | Noted: 2019-09-18

## 2022-07-26 PROBLEM — E03.9 ACQUIRED HYPOTHYROIDISM: Status: ACTIVE | Noted: 2022-01-17

## 2022-07-26 PROBLEM — I70.409 ATHEROSCLEROSIS OF AUTOLOGOUS VEIN BYPASS GRAFT OF EXTREMITY (HCC): Status: ACTIVE | Noted: 2020-07-27

## 2022-07-26 PROBLEM — Z87.19 HISTORY OF DIVERTICULITIS: Status: ACTIVE | Noted: 2017-07-27

## 2022-07-26 LAB
ALBUMIN SERPL BCP-MCNC: 3.7 G/DL (ref 3.5–5)
ALP SERPL-CCNC: 73 U/L (ref 46–116)
ALT SERPL W P-5'-P-CCNC: 24 U/L (ref 12–78)
ANION GAP SERPL CALCULATED.3IONS-SCNC: 10 MMOL/L (ref 4–13)
AST SERPL W P-5'-P-CCNC: 20 U/L (ref 5–45)
BASOPHILS # BLD AUTO: 0.03 THOUSANDS/ΜL (ref 0–0.1)
BASOPHILS NFR BLD AUTO: 1 % (ref 0–1)
BILIRUB SERPL-MCNC: 0.66 MG/DL (ref 0.2–1)
BUN SERPL-MCNC: 30 MG/DL (ref 5–25)
CALCIUM SERPL-MCNC: 9.9 MG/DL (ref 8.3–10.1)
CHLORIDE SERPL-SCNC: 103 MMOL/L (ref 96–108)
CO2 SERPL-SCNC: 27 MMOL/L (ref 21–32)
CREAT SERPL-MCNC: 1.44 MG/DL (ref 0.6–1.3)
EOSINOPHIL # BLD AUTO: 0.11 THOUSAND/ΜL (ref 0–0.61)
EOSINOPHIL NFR BLD AUTO: 2 % (ref 0–6)
ERYTHROCYTE [DISTWIDTH] IN BLOOD BY AUTOMATED COUNT: 13.5 % (ref 11.6–15.1)
GFR SERPL CREATININE-BSD FRML MDRD: 36 ML/MIN/1.73SQ M
GLUCOSE SERPL-MCNC: 113 MG/DL (ref 65–140)
HCT VFR BLD AUTO: 37.4 % (ref 34.8–46.1)
HGB BLD-MCNC: 12.3 G/DL (ref 11.5–15.4)
IMM GRANULOCYTES # BLD AUTO: 0.04 THOUSAND/UL (ref 0–0.2)
IMM GRANULOCYTES NFR BLD AUTO: 1 % (ref 0–2)
LYMPHOCYTES # BLD AUTO: 0.98 THOUSANDS/ΜL (ref 0.6–4.47)
LYMPHOCYTES NFR BLD AUTO: 16 % (ref 14–44)
MCH RBC QN AUTO: 32.5 PG (ref 26.8–34.3)
MCHC RBC AUTO-ENTMCNC: 32.9 G/DL (ref 31.4–37.4)
MCV RBC AUTO: 99 FL (ref 82–98)
MONOCYTES # BLD AUTO: 0.46 THOUSAND/ΜL (ref 0.17–1.22)
MONOCYTES NFR BLD AUTO: 8 % (ref 4–12)
NEUTROPHILS # BLD AUTO: 4.46 THOUSANDS/ΜL (ref 1.85–7.62)
NEUTS SEG NFR BLD AUTO: 72 % (ref 43–75)
NRBC BLD AUTO-RTO: 0 /100 WBCS
PLATELET # BLD AUTO: 235 THOUSANDS/UL (ref 149–390)
PMV BLD AUTO: 10.2 FL (ref 8.9–12.7)
POTASSIUM SERPL-SCNC: 3.7 MMOL/L (ref 3.5–5.3)
PROT SERPL-MCNC: 8.2 G/DL (ref 6.4–8.4)
RBC # BLD AUTO: 3.78 MILLION/UL (ref 3.81–5.12)
SODIUM SERPL-SCNC: 140 MMOL/L (ref 135–147)
T4 FREE SERPL-MCNC: 1.54 NG/DL (ref 0.76–1.46)
TSH SERPL DL<=0.05 MIU/L-ACNC: 0.08 UIU/ML (ref 0.45–4.5)
WBC # BLD AUTO: 6.08 THOUSAND/UL (ref 4.31–10.16)

## 2022-07-26 PROCEDURE — 80053 COMPREHEN METABOLIC PANEL: CPT

## 2022-07-26 PROCEDURE — 84443 ASSAY THYROID STIM HORMONE: CPT

## 2022-07-26 PROCEDURE — 99204 OFFICE O/P NEW MOD 45 MIN: CPT | Performed by: NURSE PRACTITIONER

## 2022-07-26 PROCEDURE — 36415 COLL VENOUS BLD VENIPUNCTURE: CPT

## 2022-07-26 PROCEDURE — 85025 COMPLETE CBC W/AUTO DIFF WBC: CPT

## 2022-07-26 PROCEDURE — 86618 LYME DISEASE ANTIBODY: CPT

## 2022-07-26 PROCEDURE — 84439 ASSAY OF FREE THYROXINE: CPT

## 2022-07-26 RX ORDER — LIDOCAINE 5 %
KIT TOPICAL
COMMUNITY

## 2022-07-26 RX ORDER — AMMONIUM LACTATE 12 G/100G
CREAM TOPICAL AS NEEDED
COMMUNITY

## 2022-07-26 RX ORDER — OMEGA-3 FATTY ACIDS/FISH OIL 300-1000MG
400 CAPSULE ORAL AS NEEDED
COMMUNITY
End: 2022-09-21 | Stop reason: ALTCHOICE

## 2022-07-26 NOTE — ASSESSMENT & PLAN NOTE
Blood pressure managed in office today  Blood work from January from previous provider reviewed  Discussed adhering to heart-healthy diet, maintaining a healthy weight  To continue on current medications

## 2022-07-26 NOTE — PATIENT INSTRUCTIONS
Fatigue   AMBULATORY CARE:   Fatigue  is mental and physical exhaustion that does not get better with rest  Fatigue may make daily activities difficult or cause extreme sleepiness  It is normal to feel tired sometimes, but long-term fatigue may be a sign of serious illness  Seek care immediately if:   You have chest pain  You have difficulty breathing  Contact your healthcare provider if:   You have a cough that gets worse, or does not go away  You see blood in your urine or bowel movement  You have numbness or tingling around your mouth or in an arm or leg  You faint, feel dizzy, or have vision changes  You have swelling in your lymph nodes  You are a woman and have vaginal bleeding that is not normal for you, or is not expected  You lose weight without trying, or you have trouble eating  You feel weak or have muscle pain  You have pain or swelling in your joints  You have questions or concerns about your condition or care  Manage fatigue:   Keep a fatigue diary  Include anything that makes you feel more tired or less tired  Bring the diary with you to follow-up visits with your provider  Exercise as directed  Exercise can help you feel more alert  Exercise can also help you manage stress or relieve depression  Try to get at least 30 minutes of exercise most days of the week  Keep a regular sleep schedule  Go to bed and wake up at the same times every day  Limit naps to 1 hour each day  A nap can improve fatigue, but a long nap may make it harder to go to sleep at night  Plan and limit your activities  Limit the number of activities such as shopping and cleaning you do each day  If possible, try to spread out your trips throughout the week  Plan ahead so you are not rushing to get something done  Only do activities that you have the energy to complete  Take breaks between activities  Ask for help if you need it   Another person may be able to drive you or help with daily activities  Eat a variety of healthy foods  Healthy foods include fruits, vegetables, whole-grain breads, low-fat dairy products, beans, lean meats, and fish  Good nutrition can help manage fatigue  Limit caffeine and alcohol  These can make it difficult to fall or stay asleep  Women should limit alcohol to 1 drink a day  Men should limit alcohol to 2 drinks a day  A drink of alcohol is 12 ounces of beer, 5 ounces of wine, or 1½ ounces of liquor  Ask our healthcare provider how much caffeine is safe for you  Do not smoke  Nicotine and other chemicals in cigarettes and cigars can cause lung damage and increase fatigue  Ask your healthcare provider for information if you currently smoke and need help to quit  E-cigarettes or smokeless tobacco still contain nicotine  Talk to your healthcare provider before you use these products  Follow up with your healthcare provider as directed: You may need more tests  Your healthcare provider may refer you to a specialist  Write down your questions so you remember to ask them during your visits  © Copyright [employer] 2022 Information is for End User's use only and may not be sold, redistributed or otherwise used for commercial purposes  All illustrations and images included in CareNotes® are the copyrighted property of [employer]  or [facility] Kike Wells   The above information is an  only  It is not intended as medical advice for individual conditions or treatments  Talk to your doctor, nurse or pharmacist before following any medical regimen to see if it is safe and effective for you

## 2022-07-26 NOTE — PROGRESS NOTES
Assessment/Plan:    Liver cirrhosis secondary to JOSEPH (nonalcoholic steatohepatitis) ([facility] )  [facility]  Is seeking local gastroenterologist to transfer care  Advised should be avoiding acetaminophen-containing products, alcohol, and adhere to low-fat diet  Patient denies symptoms, will give referral      Hypertension  Blood pressure managed in office today  Blood work from January from previous provider reviewed  Discussed adhering to heart-healthy diet, maintaining a healthy weight  To continue on current medications  Diagnoses and all orders for this visit:    Fatigue, unspecified type  Comments:  Advised self-care with stress managment, adequate sleep and nutrition, physical activity as tolerated  Will obtain blood work, return in 2 weeks for f/u  Orders:  -     CBC and differential; Future  -     Comprehensive metabolic panel; Future  -     TSH, 3rd generation with Free T4 reflex; Future  -     Lyme Antibody Profile with reflex to WB; Future    Primary hypertension  -     Ambulatory Referral to Cardiology; Future    Liver cirrhosis secondary to JOSEPH (nonalcoholic steatohepatitis) ([facility] )  -     Ambulatory Referral to Gastroenterology; Future    Peripheral arterial disease with history of revascularization [facility])  -     Ambulatory Referral to Cardiology; Future    Establishing care with new doctor, encounter for    Encounter for screening mammogram for breast cancer  -     Mammo screening bilateral w 3d & cad; Future    Other orders  -     Turmeric (QC TUMERIC COMPLEX PO); Take by mouth  -     Lidocaine-Adhesive Sheets (LidoPure Patch) 5 % KIT; Apply topically  -     ammonium lactate (LAC-HYDRIN) 12 % cream; Apply topically as needed for dry skin  -     Ibuprofen 200 MG CAPS; Take 400 mg by mouth as needed          Subjective:      Patient ID: Michelle De La O is a 81 y o  female  Patient presents to office for establishment of care  Moved to area from [place], [place]    Previous primary provider was Dr Styles William  Last visit was via telehealth in Spring,but last in-person visit and blood work was in January  Since moving to [facility], patient has established with vascula for PAD and ortho related to hip arthritis, is about to start PT  Patient seeking referrals to GI for liver cirrhosis secondary to JOSEPH and is also seeking cardiology referral   Patient with history of hypertension and asthma, controlled with medications  Recent blood work reviewed with patient  Patient arrives with complaints of 3 episodes of fatigue, fever, body aches, headache, and neck pain that began last month  States each episode self-resolved within a few hours  Latest episode was a week ago  Patient states covid tests have been negative  Denies recent illness, denies sick contacts  Patient denies polyarthralgia, chest pain, palpitations, SOB, rash, recent travel  Denies abdominal pain, n/v, urinary symptoms  The following portions of the patient's history were reviewed and updated as appropriate: allergies, current medications, past family history, past medical history, past social history, past surgical history and problem listll  Review of Systems   Constitutional: Positive for fatigue and fever (intermittent x's 3 episodes)  Negative for chills and unexpected weight change  HENT: Negative for congestion, sore throat and trouble swallowing  Eyes: Negative for photophobia and visual disturbance  Respiratory: Negative for cough, chest tightness and shortness of breath  Cardiovascular: Negative for chest pain and palpitations  Gastrointestinal: Negative for nausea and vomiting  Genitourinary: Negative for decreased urine volume, dysuria, frequency and urgency  Musculoskeletal: Positive for neck pain (x's 3 episodes)  Negative for arthralgias, myalgias and neck stiffness  Skin: Negative for color change and rash     Neurological: Positive for light-headedness and headaches (x's 3 episodes, now resolved)  Negative for dizziness, syncope, speech difficulty, weakness and numbness  Hematological: Negative for adenopathy  Does not bruise/bleed easily  Psychiatric/Behavioral: Negative for confusion  Objective:      /74   Pulse (!) 108   Temp 98 3 °F (36 8 °C)   Ht 5' 6" (1 676 m)   Wt 115 kg (253 lb)   SpO2 97%   BMI 40 84 kg/m²          Physical Exam  Constitutional:       General: She is not in acute distress  Appearance: She is obese  She is not ill-appearing  HENT:      Head: Normocephalic and atraumatic  Mouth/Throat:      Mouth: Mucous membranes are moist       Pharynx: Oropharynx is clear  Eyes:      Extraocular Movements: Extraocular movements intact  Pupils: Pupils are equal, round, and reactive to light  Cardiovascular:      Rate and Rhythm: Regular rhythm  Tachycardia present  Heart sounds: No murmur heard  Pulmonary:      Effort: Pulmonary effort is normal  No respiratory distress  Breath sounds: Normal breath sounds  No decreased breath sounds, wheezing, rhonchi or rales  Chest:      Chest wall: No tenderness  Abdominal:      General: Bowel sounds are normal       Palpations: Abdomen is soft  Tenderness: There is no abdominal tenderness  Musculoskeletal:         General: Normal range of motion  Cervical back: Normal range of motion and neck supple  Right lower leg: No tenderness  Left lower leg: No tenderness  Left Lower Extremity: 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

## 2022-07-26 NOTE — ASSESSMENT & PLAN NOTE
[facility]  Is seeking local gastroenterologist to transfer care  Advised should be avoiding acetaminophen-containing products, alcohol, and adhere to low-fat diet    Patient denies symptoms, will give referral

## 2022-07-27 LAB — B BURGDOR IGG+IGM SER-ACNC: 0.7 AI

## 2022-07-28 ENCOUNTER — APPOINTMENT (OUTPATIENT)
Dept: PHYSICAL THERAPY | Facility: CLINIC | Age: 72
End: 2022-07-28
Payer: MEDICARE

## 2022-07-28 DIAGNOSIS — E03.9 ACQUIRED HYPOTHYROIDISM: Primary | ICD-10-CM

## 2022-07-28 RX ORDER — LEVOTHYROXINE SODIUM 0.15 MG/1
150 TABLET ORAL
Qty: 90 TABLET | Refills: 0 | Status: SHIPPED | OUTPATIENT
Start: 2022-07-28 | End: 2022-07-28 | Stop reason: SDUPTHER

## 2022-07-28 RX ORDER — LEVOTHYROXINE SODIUM 0.15 MG/1
150 TABLET ORAL
Qty: 90 TABLET | Refills: 0 | Status: SHIPPED | OUTPATIENT
Start: 2022-07-28 | End: 2022-09-29 | Stop reason: SDUPTHER

## 2022-07-28 NOTE — TELEPHONE ENCOUNTER
Patient's fever came back today  She is requesting you look at her lab results to see if that may correspond with it

## 2022-08-02 ENCOUNTER — OFFICE VISIT (OUTPATIENT)
Dept: PHYSICAL THERAPY | Facility: CLINIC | Age: 72
End: 2022-08-02
Payer: MEDICARE

## 2022-08-02 DIAGNOSIS — M16.12 PRIMARY OSTEOARTHRITIS OF ONE HIP, LEFT: Primary | ICD-10-CM

## 2022-08-02 PROCEDURE — 97112 NEUROMUSCULAR REEDUCATION: CPT

## 2022-08-02 PROCEDURE — 97110 THERAPEUTIC EXERCISES: CPT

## 2022-08-02 NOTE — PROGRESS NOTES
Daily Note     Today's date: 2022  Patient name: Raul Ramirez  : 1950  MRN: [id number]  Referring provider: MARCELINA Herndon*  Dx:   Encounter Diagnosis     ICD-10-CM    1  Primary osteoarthritis of one hip, left  M16 12                   Subjective: Pt reports her L hip has been sore  States following her LV she had increase pain/hip soreness along L ant hip/groin for roughly 5 days  Objective: See treatment diary below  Assessment: Tolerated treatment fair  Program modified slightly today, d/t subjective comments made at start of treatment  Is very challenged with supine SLR, requiring short rest breaks between each set  Slight increased pain in L hip during both fwd and lateral step ups; additional reps held once onset of increase in symptoms reported  Patient demonstrated fatigue post treatment and would benefit from continued PT to further improve strength, reduce pain, and maximize overall function  Plan: Continue per plan of care        Diagnosis: L hip OA    Precautions: PAD, diminished sensation    POC Expires:    Re-evaluation Date: 8/10   FOTO Scores/Date: 52 ()   Visit Count 1 2 3 4    Manuals     LAD  EB EB  [employer]    Distraction with IR/ER  EB EB                             Ther Ex        HEP creation   EB      Bike  5'  6' 6'    Treadmill        Repeated hip extensions HEP       Glute bridges HEP 2x10 strap around knees       Clamshells   2x15 L only       Piriformis stretch   10x10" L only      MAX stretch        Modified uriel stretch  10x10" L only       Adduction squeezes  20x       Side steps        Hip 3 way   10x ea direction ea foot       Step ups   fwd 4" 2x12 and side to side 2x12 knee drive  4" step  Fwd [id number] b/l,   1x6 b/l    Side to side  1x10      Hamstring stretch  10x10" L only  10x10" L only  10x10" L only    Prone quad stretch   10x10" L only 10x10" L only    Neuro Re-Ed        Bent over hip extensions 2x10 ea leg  2x10 ea leg    SLR w quad set   2x10 L only  3x5 L only                                                                                                   Ther Act                Knee drive          Resisted fwd walking          Retro walking             Modalities

## 2022-08-04 ENCOUNTER — EVALUATION (OUTPATIENT)
Dept: PHYSICAL THERAPY | Facility: CLINIC | Age: 72
End: 2022-08-04
Payer: MEDICARE

## 2022-08-04 ENCOUNTER — RA CDI HCC (OUTPATIENT)
Dept: OTHER | Facility: HOSPITAL | Age: 72
End: 2022-08-04

## 2022-08-04 DIAGNOSIS — M16.12 PRIMARY OSTEOARTHRITIS OF ONE HIP, LEFT: Primary | ICD-10-CM

## 2022-08-04 PROCEDURE — 97110 THERAPEUTIC EXERCISES: CPT

## 2022-08-04 PROCEDURE — 97112 NEUROMUSCULAR REEDUCATION: CPT

## 2022-08-04 PROCEDURE — 97140 MANUAL THERAPY 1/> REGIONS: CPT

## 2022-08-04 NOTE — PROGRESS NOTES
[address]  coding opportunities       Chart reviewed, no opportunity found: CHART REVIEWED, NO OPPORTUNITY FOUND        Patients Insurance     Medicare Insurance: Medicare

## 2022-08-04 NOTE — PROGRESS NOTES
PT Re-Evaluation     Today's date: 2022  Patient name: Godwin Brittle  : 1950  MRN: [id number]  Referring provider: MARCELINA Moreno*  Dx:   Encounter Diagnosis     ICD-10-CM    1  Primary osteoarthritis of one hip, left  M16 12                   Assessment  Assessment details: Godwin Brittle is a 81 y o  female who presents with signs and symptoms consistent of referring diagnosis with no noted referral from the lumbar spine  Patient has made fair progress since initiating OPPT  Patient has noted improvements in lumbar ROM and hip strength which has led to improved tolerance to exercise  After 5 visits, patient Geo Bowens has decreased since IE which is not consistent with her objective findings  Patient does however continue to present with pain, decreased strength, decreased ROM, decreased joint mobility and ambulatory dysfunction  Continued impaired terminal stance on the LLE when moving into hip extension indicating diminished mobility  Due to these impairments, Patient has difficulty performing a/iadls, recreational activities and engaging in social activities  Patient would continue to benefit from a comprehensive exercise program and HEP focusing on improving LE strength, motor control, and functional mobility  Patient would benefit from skilled physical therapy to address the impairments, improve their level of function, and to improve their overall quality of life  PT POC 2x/wk 4 weeks  Thank you for this referral      Impairments: abnormal gait, abnormal muscle firing, abnormal or restricted ROM, activity intolerance, impaired physical strength, lacks appropriate home exercise program, pain with function and weight-bearing intolerance    Symptom irritability: moderateUnderstanding of Dx/Px/POC: good   Prognosis: good    Goals  Short Term Goals: to be achieved by 3 weeks  1) Patient to be independent with basic HEP  - met  2) Decrease pain to 3/10 at its worst - not met   3)  Increase LE strength by 1/2 MMT grade in all deficient planes  - progressing    Long Term Goals: to be achieved by discharge  1) FOTO equal to or greater than 49 - not met   2) Ambulation to improve to maximal level of function- progressing  3) Stair negotiation will improve to reciprocal - progressing  4) Sit to stand transfers will improve to maximal level of function- progressing   5) Patient will be independent with comprehensive HEP  - progressing  6) Patient will be able to return to gardening without pain  - progressing      Plan  Plan details: PT POC 2x/wk for 4 weeks  Patient would benefit from: skilled physical therapy  Planned modality interventions: cryotherapy, thermotherapy: hydrocollator packs and TENS  Planned therapy interventions: activity modification, joint mobilization, manual therapy, massage, stretching, strengthening, therapeutic activities, therapeutic exercise, home exercise program, gait training and functional ROM exercises  Frequency: 2x week  Duration in weeks: 4  Plan of Care beginning date: 6/29/2022  Plan of Care expiration date: 8/25/2022  Treatment plan discussed with: patient        Subjective Evaluation    History of Present Illness  Mechanism of injury: History of Current Injury: Patient reports that her L hip has been bothersome since the end of 2021  Patient notes that it has gradually gotten worse  She had been seeing an orthopedic group in [place], prior to moving, where she had some shots and medication prescribed which did not offer relief  After patient moved she went to see Dr Carley Santamaria where she was referred for a shot deeper "in the joint" which offered a "30%" improvement  Patient was then referred to OPPT     Pain location/Descriptors: Side of the L hip and sporadically around the lower back   Aggravating factors: Standing for long periods of time, walking up stairs, walking longer distances, twisting   Easing factors: Injections, rest, and ice   Imaging: Yes  Special Questions: Jerardo grossman a new onset of Bladder incontinence, Bowel dysfunction, Tingling, Numbness and Saddle anesthesia   Patient goals:  "walk around comfortably"  Hobbies/Interest: gardening   Occupation: Retired     Pain  Current pain ratin  At best pain ratin  At worst pain ratin  Quality: sharp and dull ache    Patient Goals  Patient goals for therapy: decreased pain, independence with ADLs/IADLs, increased strength, return to sport/leisure activities and increased motion      Daphne Danya reports a perceived improvement, although not much  Functional status measure is now 38  Patient notes pain has increased since beginning outpatient PT with pain now at about a 6  Patient notes that she felt as though she has "regressed backwards " She has felt as though she has gotten stronger and more mobile although her pain has been increasing  Patient notes that walking up and down stairs and walking long distances are the most bothersome for her to complete  Overall, patient has made steady progress toward goals and would benefit from additional PT at this time  Objective     Palpation   Left   No palpable tenderness to the erector spinae and quadratus lumborum  Right   No palpable tenderness to the erector spinae and quadratus lumborum  Tenderness     Lumbar Spine  No tenderness in the spinous process, left transverse process and right transverse process  Left Hip   No tenderness in the PSIS  Right Hip   No tenderness in the PSIS  Active Range of Motion     Lumbar   Flexion: Active lumbar flexion: 100  Extension: Active lumbar extension: 100  Left lateral flexion: Active left lumbar lateral flexion: 100  Right lateral flexion: Active right lumbar lateral flexion: 100  Left rotation: Active left lumbar rotation: 100  Right rotation: Active right lumbar rotation: 100       Strength/Myotome Testing     Left Hip   Planes of Motion   Flexion: 3+  Extension: 4+  Abduction: 4-  External rotation: 4+  Internal rotation: 4+    Right Hip   Planes of Motion   Flexion: 4  Extension: 5  Abduction: 4  External rotation: 5  Internal rotation: 5    Left Knee   Flexion: 5  Extension: 5    Right Knee   Flexion: 5  Extension: 5    Tests     Lumbar     Left   Negative passive SLR  Right   Negative passive SLR  Left Hip   Positive MAX and scour  Negative FADIR  Right Hip   Negative MAX, FADIR and scour  Ambulation     Observational Gait   Gait: antalgic   Decreased left stance time  Additional Observational Gait Details  Pain noted during hip extension on LLE     Functional Assessment      Squat    Left within functional limits and right within functional limits  Forward Step Up 6"   Left Leg  No pain and no ipsilateral trunk side bending  Right Leg  Within functional limits  No pain  Forward Step Down 6"   Left Leg  Within functional limits and pain  Right Leg  Within functional limits                   Diagnosis: L hip OA    Precautions: PAD, diminished sensation    POC Expires: 7/27   Re-evaluation Date: 8/10   FOTO Scores/Date: 52 (6/29)   Visit Count 1 2 3 4 5   Manuals 6/29 7/21 7/25 8/2 8/4   LAD  EB EB  Gentle traction [employer]    Distraction with IR/ER  EB EB     Re-eval      EB                   Ther Ex        HEP creation   EB      Bike  5'  6' 6' 6'   Treadmill        Repeated hip extensions HEP    25x    Glute bridges HEP 2x10 strap around knees       Clamshells   2x15 L only       Piriformis stretch   10x10" L only      MAX stretch        Modified uriel stretch  10x10" L only       Adduction squeezes  20x       Side steps        Hip 3 way   10x ea direction ea foot       Step ups   fwd 4" 2x12 and side to side 2x12 knee drive  4" step  Fwd 8S12 b/l,   1x6 b/l    Side to side  1x10      Hamstring stretch  10x10" L only  10x10" L only  10x10" L only    Prone quad stretch   10x10" L only 10x10" L only    Neuro Re-Ed        Bent over hip extensions    2x10 ea leg  2x10 ea leg    SLR w quad set   2x10 L only  3x5 L only    SL balance      3x30" L only   Fire hydrants      2x10 L                                                                                   Ther Act                Knee drive          Resisted fwd walking          Retro walking             Modalities

## 2022-08-09 ENCOUNTER — OFFICE VISIT (OUTPATIENT)
Dept: OBGYN CLINIC | Facility: CLINIC | Age: 72
End: 2022-08-09
Payer: MEDICARE

## 2022-08-09 ENCOUNTER — APPOINTMENT (OUTPATIENT)
Dept: PHYSICAL THERAPY | Facility: CLINIC | Age: 72
End: 2022-08-09
Payer: MEDICARE

## 2022-08-09 VITALS
HEIGHT: 66 IN | SYSTOLIC BLOOD PRESSURE: 109 MMHG | WEIGHT: 253.2 LBS | BODY MASS INDEX: 40.69 KG/M2 | HEART RATE: 88 BPM | DIASTOLIC BLOOD PRESSURE: 71 MMHG

## 2022-08-09 DIAGNOSIS — Z01.818 PRE-OP TESTING: Primary | ICD-10-CM

## 2022-08-09 DIAGNOSIS — M16.12 PRIMARY OSTEOARTHRITIS OF ONE HIP, LEFT: Primary | ICD-10-CM

## 2022-08-09 DIAGNOSIS — M25.552 LEFT HIP PAIN: ICD-10-CM

## 2022-08-09 PROCEDURE — 99213 OFFICE O/P EST LOW 20 MIN: CPT | Performed by: ORTHOPAEDIC SURGERY

## 2022-08-09 RX ORDER — CHLORHEXIDINE GLUCONATE 0.12 MG/ML
15 RINSE ORAL ONCE
Status: CANCELLED | OUTPATIENT
Start: 2022-08-09 | End: 2022-08-09

## 2022-08-09 RX ORDER — MULTIVITAMIN
1 TABLET ORAL DAILY
Qty: 30 TABLET | Refills: 0 | Status: SHIPPED | OUTPATIENT
Start: 2022-08-09

## 2022-08-09 RX ORDER — LANOLIN ALCOHOL/MO/W.PET/CERES
400 CREAM (GRAM) TOPICAL DAILY
Qty: 30 TABLET | Refills: 0 | Status: SHIPPED | OUTPATIENT
Start: 2022-08-09

## 2022-08-09 RX ORDER — ASCORBIC ACID 500 MG
500 TABLET ORAL 2 TIMES DAILY
Qty: 60 TABLET | Refills: 1 | Status: SHIPPED | OUTPATIENT
Start: 2022-08-09

## 2022-08-09 RX ORDER — ENOXAPARIN SODIUM 100 MG/ML
40 INJECTION SUBCUTANEOUS DAILY
Qty: 11.2 ML | Refills: 0 | Status: SHIPPED | OUTPATIENT
Start: 2022-08-09 | End: 2022-09-26

## 2022-08-09 RX ORDER — FERROUS SULFATE TAB EC 324 MG (65 MG FE EQUIVALENT) 324 (65 FE) MG
324 TABLET DELAYED RESPONSE ORAL
Qty: 60 TABLET | Refills: 0 | Status: SHIPPED | OUTPATIENT
Start: 2022-08-09

## 2022-08-09 NOTE — PROGRESS NOTES
Orthopaedics Office Visit - Established Patient Visit    ASSESSMENT/PLAN:    Assessment:   Left hip osteoarthritis , refractory to nonop modalities    Plan:   - discussed weight loss of 5 lbs for possible left total hip arthroplasty  - discussed cannot have surgery until 3 months after an injection, earliest date would be 9/7/2022  - risks and benefits were discussed with the patient at length  - Post surgical rehab and recovery time was discussed at length   - Cardiac, Vascular and PCP clearance needed prior to surgical intervention due to asthma, peripheral arterial disease, and cirrohis   - EKG, blood work needed for pre-surgical clearance  - Anterior approach for left total hip arthroplasty at [place] consent obtained  - Talk to PCP about weight management   - Follow-up 2 weeks before surgery for weight check     High risk for complications secondary to cirrhosis, PAD, obesity    To Do Next Visit:  weight check to see if BMI is below 40    _____________________________________________________  CHIEF COMPLAINT:  Chief Complaint   Patient presents with    Left Hip - Follow-up         SUBJECTIVE:  Mic Tierney is a 81 y o  female who presents for a follow-up of left hip osteoarthritis  Patient was last seen on 6/7/2022 where a referral was placed for pain management to perform a cortisone injection of the L hip  Injection was received on 6/16/2022 with 35% pain relief for about 3 weeks and then the injection was no longer beneficial   Pain is managed with lidocaine patch or cream and Ibuprofen 400 mg every morning and PRN following this  Patient has been attending physical therapy with pain during physical therapy but she notes improvement  Patient reports she is no longer having back pain thanks to physical therapy  Patient uses ice as needed  Patient feels as though we need to discuss surgical intervention at this time  Patient has lost 10 lbs recently and is working on losing more weight        PAST MEDICAL HISTORY:  Past Medical History:   Diagnosis Date    Allergic Childhood    Anxiety 2019    Asthma Childhood    Disease of thyroid gland Approx     Diverticulitis of colon     Heart murmur     Hypertension  approx    Obesity Childhood       PAST SURGICAL HISTORY:  Past Surgical History:   Procedure Laterality Date    APPENDECTOMY  About      SECTION   &     COLON SURGERY  About     HYSTERECTOMY  About        FAMILY HISTORY:  Family History   Problem Relation Age of Onset    Depression Mother     Thrombosis Mother    Misha Rosenberg Arthritis Mother     Cancer Mother     Anxiety disorder Mother     Heart disease Father         I    Dementia Brother     Heart disease Brother     Cancer Brother        SOCIAL HISTORY:  Social History     Tobacco Use    Smoking status: Never Smoker    Smokeless tobacco: Never Used   Vaping Use    Vaping Use: Never used   Substance Use Topics    Alcohol use: Not Currently    Drug use: Never       MEDICATIONS:    Current Outpatient Medications:     albuterol (PROVENTIL HFA,VENTOLIN HFA) 90 mcg/act inhaler, Inhale 2 puffs every 4 (four) hours as needed, Disp: , Rfl:     ALPRAZolam (XANAX) 1 mg tablet, Take 0 25 mg by mouth as needed 1/2-1 as needed tid, Disp: , Rfl:     ammonium lactate (LAC-HYDRIN) 12 % cream, Apply topically as needed for dry skin, Disp: , Rfl:     aspirin (ECOTRIN LOW STRENGTH) 81 mg EC tablet, Take 81 mg by mouth in the morning, Disp: , Rfl:     atorvastatin (LIPITOR) 10 mg tablet, Take 40 mg by mouth daily, Disp: , Rfl:     clopidogrel (PLAVIX) 75 mg tablet, Take 75 mg by mouth daily, Disp: , Rfl:     fluticasone (FLONASE) 50 mcg/act nasal spray, 2 sprays into each nostril daily as needed, Disp: , Rfl:     fluticasone-vilanterol (BREO ELLIPTA) 200-25 MCG/INH inhaler, Inhale 1 puff daily, Disp: , Rfl:     gabapentin (NEURONTIN) 100 mg capsule, Take 200 mg by mouth once a week, Disp: , Rfl:     Ibuprofen 200 MG CAPS, Take 400 mg by mouth as needed, Disp: , Rfl:     levothyroxine (Synthroid) 150 mcg tablet, Take 1 tablet (150 mcg total) by mouth daily in the early morning, Disp: 90 tablet, Rfl: 0    Lidocaine-Adhesive Sheets (LidoPure Patch) 5 % KIT, Apply topically, Disp: , Rfl:     losartan-hydrochlorothiazide (HYZAAR) 100-25 MG per tablet, Take 1 tablet by mouth daily, Disp: , Rfl:     montelukast (SINGULAIR) 10 mg tablet, Take 10 mg by mouth daily as needed, Disp: , Rfl:     Multiple Vitamins-Minerals (Multi For Her 50+) TABS, Take 1 tablet by mouth daily, Disp: , Rfl:     Turmeric (QC TUMERIC COMPLEX PO), Take by mouth, Disp: , Rfl:     verapamil (VERELAN PM) 360 MG 24 hr capsule, Take 360 mg by mouth in the morning, Disp: , Rfl:     ALLERGIES:  Allergies   Allergen Reactions    Sulfamethoxazole-Trimethoprim Rash       REVIEW OF SYSTEMS:  MSK: left hip osteoarthritis  Neuro: WNL  Pertinent items are otherwise noted in HPI  A comprehensive review of systems was otherwise negative  LABS:  HgA1c: No results found for: HGBA1C  BMP:   Lab Results   Component Value Date    CALCIUM 9 9 07/26/2022    K 3 7 07/26/2022    CO2 27 07/26/2022     07/26/2022    BUN 30 (H) 07/26/2022    CREATININE 1 44 (H) 07/26/2022     CBC: No components found for: CBC    _____________________________________________________  PHYSICAL EXAMINATION:  Vital signs: /71   Pulse 88   Ht 5' 6" (1 676 m)   Wt 115 kg (253 lb 3 2 oz)   BMI 40 87 kg/m²   General: No acute distress, awake and alert  Psychiatric: Mood and affect appear appropriate  HEENT: Trachea Midline, No torticollis, no apparent facial trauma  Cardiovascular: No audible murmurs;  Extremities appear perfused  Pulmonary: No audible wheezing or stridor  Skin: No open lesions; see further details (if any) below    MUSCULOSKELETAL EXAMINATION:  Extremities:      Left Hip:  Patient is sitting comfortably in the exam chair with hip flexed to 90 degrees  Pain with internal rotation   Maintains external rotation  TTP over greater trochanter     able to perform straight leg raise  Able to perform seated hip flexion  Unable to resist hip flexion  Sensation normal to L2-S1 dermatomes   Extremity warm and well perfused       _____________________________________________________  STUDIES REVIEWED:  I personally reviewed the images and interpretation is as follows:  None performed today    PROCEDURES PERFORMED:  None performed today     Star Menendez   Scribe Attestation    I,:  Star Menendez am acting as a scribe while in the presence of the attending physician :       I,:  Jenni Diaz MD personally performed the services described in this documentation    as scribed in my presence :

## 2022-08-10 ENCOUNTER — OFFICE VISIT (OUTPATIENT)
Dept: FAMILY MEDICINE CLINIC | Facility: CLINIC | Age: 72
End: 2022-08-10
Payer: MEDICARE

## 2022-08-10 VITALS
WEIGHT: 252 LBS | SYSTOLIC BLOOD PRESSURE: 122 MMHG | HEIGHT: 66 IN | OXYGEN SATURATION: 97 % | BODY MASS INDEX: 40.5 KG/M2 | DIASTOLIC BLOOD PRESSURE: 80 MMHG | TEMPERATURE: 99.9 F | HEART RATE: 103 BPM

## 2022-08-10 DIAGNOSIS — E03.9 ACQUIRED HYPOTHYROIDISM: ICD-10-CM

## 2022-08-10 DIAGNOSIS — N18.32 STAGE 3B CHRONIC KIDNEY DISEASE (HCC): Primary | ICD-10-CM

## 2022-08-10 DIAGNOSIS — G47.30 SLEEP APNEA, UNSPECIFIED TYPE: ICD-10-CM

## 2022-08-10 PROCEDURE — 99214 OFFICE O/P EST MOD 30 MIN: CPT | Performed by: NURSE PRACTITIONER

## 2022-08-10 NOTE — ASSESSMENT & PLAN NOTE
Lab Results   Component Value Date    EGFR 36 07/26/2022    CREATININE 1 44 (H) 07/26/2022     Recent CMPs reviewed with patient  GFR trending down form 50's to current value of 36  Patient states she was not hydrated during recent blood work  Patient to repeat CMP  Advised to stop Ibuprofen/NSAIDS, increase hydration  Will re-eval after CMP

## 2022-08-10 NOTE — ASSESSMENT & PLAN NOTE
Levothyroxine recently decreased from 175 mcg to 150 mcg  Patient denies symptoms  Advised to repeat TSH in 6-8 weeks

## 2022-08-10 NOTE — PROGRESS NOTES
Assessment/Plan:    Stage 3b chronic kidney disease [facility])  Lab Results   Component Value Date    EGFR 36 07/26/2022    CREATININE 1 44 (H) 07/26/2022     Recent CMPs reviewed with patient  GFR trending down form 50's to current value of 36  Patient states she was not hydrated during recent blood work  Patient to repeat CMP  Advised to stop Ibuprofen/NSAIDS, increase hydration  Will re-eval after CMP  Acquired hypothyroidism  Levothyroxine recently decreased from 175 mcg to 150 mcg  Patient denies symptoms  Advised to repeat TSH in 6-8 weeks  Diagnoses and all orders for this visit:    Stage 3b chronic kidney disease ([facility] [address] )    Acquired hypothyroidism  -     TSH, 3rd generation with Free T4 reflex; Future  -     T3, free; Future    Sleep apnea, unspecified type  -     Ambulatory Referral to Sleep Medicine; Future          Subjective:      Patient ID: Godwin Brittle is a 81 y o  female  Patient presents for follow-up after blood work  During previous visit, patient noted elevated temps, hot flashes, headaches  Blood work ordered  Recent TSH 0 076, advised to decrease levothyroxine from 175 mcg to 150 mcg  Patient states symptoms have decreased since starting lower dose  CMP reviewed, observed declining GFR  Patient states she was not hydrated during recent blood work  Also states she has been taking Ibuprofen daily for her left hip pain  Has surgery scheduled for 10/6  Patient denies decreased urine production, flank pain, hematuria  Denies increases in blood pressure  The following portions of the patient's history were reviewed and updated as appropriate: allergies, current medications, past family history, past medical history, past social history, past surgical history and problem list     Review of Systems   Constitutional: Negative for chills, fatigue and fever  HENT: Negative for congestion, ear pain and sore throat  Eyes: Negative for photophobia and visual disturbance  Respiratory: Negative for cough, chest tightness and shortness of breath  Cardiovascular: Negative for chest pain and palpitations  Gastrointestinal: Negative for nausea and vomiting  Genitourinary: Negative for decreased urine volume, flank pain, hematuria and urgency  Musculoskeletal: Positive for arthralgias (left hip)  Negative for myalgias  Skin: Negative for color change and rash  Neurological: Negative for dizziness, weakness, light-headedness and headaches  Hematological: Does not bruise/bleed easily  Psychiatric/Behavioral: Negative for confusion  Objective:      /80 (BP Location: Left arm, Patient Position: Sitting, Cuff Size: Large)   Pulse 103   Temp 99 9 °F (37 7 °C) (Tympanic)   Ht 5' 6" (1 676 m)   Wt 114 kg (252 lb)   SpO2 97%   BMI 40 67 kg/m²          Physical Exam  Vitals reviewed  Constitutional:       General: She is not in acute distress  Appearance: She is obese  She is not ill-appearing  HENT:      Head: Normocephalic and atraumatic  Right Ear: Tympanic membrane, ear canal and external ear normal       Left Ear: Tympanic membrane, ear canal and external ear normal       Nose: Nose normal       Mouth/Throat:      Mouth: Mucous membranes are moist       Pharynx: Oropharynx is clear  Eyes:      Conjunctiva/sclera: Conjunctivae normal       Pupils: Pupils are equal, round, and reactive to light  Neck:      Vascular: No carotid bruit  Cardiovascular:      Rate and Rhythm: Normal rate and regular rhythm  Pulses: Normal pulses  Heart sounds: Normal heart sounds  Pulmonary:      Effort: Pulmonary effort is normal       Breath sounds: Normal breath sounds  Abdominal:      General: Bowel sounds are normal       Palpations: Abdomen is soft  Tenderness: There is no abdominal tenderness  Musculoskeletal:         General: Normal range of motion  Cervical back: Normal range of motion and neck supple        Right lower leg: No edema  Left lower leg: No edema  Skin:     General: Skin is warm and dry  Capillary Refill: Capillary refill takes less than 2 seconds  Neurological:      General: No focal deficit present  Mental Status: She is alert and oriented to person, place, and time     Psychiatric:         Mood and Affect: Mood normal          Behavior: Behavior normal

## 2022-08-10 NOTE — PATIENT INSTRUCTIONS
Weight Management   AMBULATORY CARE:   Why it is important to manage your weight:  Being overweight increases your risk of health conditions such as heart disease, high blood pressure, type 2 diabetes, and certain types of cancer  It can also increase your risk for osteoarthritis, sleep apnea, and other respiratory problems  Aim for a slow, steady weight loss  Even a small amount of weight loss can lower your risk of health problems  Risks of being overweight:  Extra weight can cause many health problems, including the following:  Diabetes (high blood sugar level)    High blood pressure or high cholesterol    Heart disease    Stroke    Gallbladder or liver disease    Cancer of the colon, breast, prostate, liver, or kidney    Sleep apnea    Arthritis or gout    Screening  is done to check for health conditions before you have signs or symptoms  If you are 28to 79years old, your blood sugar level may be checked every 3 years for signs of prediabetes or diabetes  Your healthcare provider will check your blood pressure at each visit  High blood pressure can lead to a stroke or other problems  Your provider may check for signs of heart disease, cancer, or other health problems  How to lose weight safely:  A safe and healthy way to lose weight is to eat fewer calories and get regular exercise  You can lose up about 1 pound a week by decreasing the number of calories you eat by 500 calories each day  You can decrease calories by eating smaller portion sizes or by cutting out high-calorie foods  Read labels to find out how many calories are in the foods you eat  You can also burn calories with exercise such as walking, swimming, or biking  You will be more likely to keep weight off if you make these changes part of your lifestyle  Exercise at least 30 minutes per day on most days of the week   You can also fit in more physical activity by taking the stairs instead of the elevator or parking farther away from stores  Ask your healthcare provider about the best exercise plan for you  Healthy meal plan for weight management:  A healthy meal plan includes a variety of foods, contains fewer calories, and helps you stay healthy  A healthy meal plan includes the following:     Eat whole-grain foods more often  A healthy meal plan should contain fiber  Fiber is the part of grains, fruits, and vegetables that is not broken down by your body  Whole-grain foods are healthy and provide extra fiber in your diet  Some examples of whole-grain foods are whole-wheat breads and pastas, oatmeal, brown rice, and bulgur  Eat a variety of vegetables every day  Include dark, leafy greens such as spinach, kale, stephen greens, and mustard greens  Eat yellow and orange vegetables such as carrots, sweet potatoes, and winter squash  Eat a variety of fruits every day  Choose fresh or canned fruit (canned in its own juice or light syrup) instead of juice  Fruit juice has very little or no fiber  Eat low-fat dairy foods  Drink fat-free (skim) milk or 1% milk  Eat fat-free yogurt and low-fat cottage cheese  Try low-fat cheeses such as mozzarella and other reduced-fat cheeses  Choose meat and other protein foods that are low in fat  Choose beans or other legumes such as split peas or lentils  Choose fish, skinless poultry (chicken or turkey), or lean cuts of red meat (beef or pork)  Before you cook meat or poultry, cut off any visible fat  Use less fat and oil  Try baking foods instead of frying them  Add less fat, such as margarine, sour cream, regular salad dressing and mayonnaise to foods  Eat fewer high-fat foods  Some examples of high-fat foods include french fries, doughnuts, ice cream, and cakes  Eat fewer sweets  Limit foods and drinks that are high in sugar  This includes candy, cookies, regular soda, and sweetened drinks  Ways to decrease calories:   Eat smaller portions       Use a small plate with smaller servings  Do not eat second helpings  When you eat at a restaurant, ask for a box and place half of your meal in the box before you eat  Share an entrée with someone else  Replace high-calorie snacks with healthy, low-calorie snacks  Choose fresh fruit, vegetables, fat-free rice cakes, or air-popped popcorn instead of potato chips, nuts, or chocolate  Choose water or calorie-free drinks instead of soda or sweetened drinks  Do not shop for groceries when you are hungry  You may be more likely to make unhealthy food choices  Take a grocery list of healthy foods and shop after you have eaten  Eat regular meals  Do not skip meals  Skipping meals can lead to overeating later in the day  This can make it harder for you to lose weight  Eat a healthy snack in place of a meal if you do not have time to eat a regular meal  Talk with a dietitian to help you create a meal plan and schedule that is right for you  Other things to consider as you try to lose weight:   Be aware of situations that may give you the urge to overeat, such as eating while watching television  Find ways to avoid these situations  For example, read a book, go for a walk, or do crafts  Meet with a weight loss support group or friends who are also trying to lose weight  This may help you stay motivated to continue working on your weight loss goals  © Copyright [employer] 2022 Information is for End User's use only and may not be sold, redistributed or otherwise used for commercial purposes  All illustrations and images included in CareNotes® are the copyrighted property of [employer]  or [employer]   The above information is an  only  It is not intended as medical advice for individual conditions or treatments  Talk to your doctor, nurse or pharmacist before following any medical regimen to see if it is safe and effective for you

## 2022-08-11 ENCOUNTER — TELEPHONE (OUTPATIENT)
Dept: PULMONOLOGY | Facility: CLINIC | Age: 72
End: 2022-08-11

## 2022-08-11 ENCOUNTER — OFFICE VISIT (OUTPATIENT)
Dept: PHYSICAL THERAPY | Facility: CLINIC | Age: 72
End: 2022-08-11
Payer: MEDICARE

## 2022-08-11 DIAGNOSIS — M16.12 PRIMARY OSTEOARTHRITIS OF ONE HIP, LEFT: Primary | ICD-10-CM

## 2022-08-11 PROCEDURE — 97110 THERAPEUTIC EXERCISES: CPT

## 2022-08-11 PROCEDURE — 97112 NEUROMUSCULAR REEDUCATION: CPT

## 2022-08-11 NOTE — PROGRESS NOTES
Daily Note     Today's date: 2022  Patient name: Ad Sun  : 1950  MRN: [id number]  Referring provider: MARCELINA Barrios*  Dx:   Encounter Diagnosis     ICD-10-CM    1  Primary osteoarthritis of one hip, left  M16 12                   Subjective: Patient reports seeing Dr Paradise Jimenez earlier this week and scheduled a hip replacement for this coming October  Patient would still like to continue with PT in order to maintain and improve her overall strength levels prior to surgery  This date she notes her hip is sore from doing a lot of activity  Objective: See treatment diary below      Assessment: Tolerated treatment well  Positive benefit noted with manual techniques as patient reported diminished soreness and pain  Able to progress standing level activity to good tolerance this date  Mild hip discomfort noted throughout session although improved from beginning of session  Mild deficits noted in SLS and hip extensor motor control noted  Fatigue t/o session requiring rest breaks  Educated patient to continue to improve tolerance to activity through standing exercise  Progress as able  Patient would benefit from continued PT      Plan: Continue per plan of care          Diagnosis: L hip OA    Precautions: PAD, diminished sensation    POC Expires:    Re-evaluation Date: 8/10   FOTO Scores/Date: 52 ()   Visit Count 6 2 3 4 5   Manuals    LAD EB EB EB  Gentle traction [employer]    Distraction with IR/ER  EB EB     Re-eval      EB                   Ther Ex        HEP creation   EB      Bike 6' 5'  6' 6' 6'   Treadmill        Repeated hip extensions     25x    Glute bridges  2x10 strap around knees       Clamshells   2x15 L only       Piriformis stretch   10x10" L only      MAX stretch        Modified uriel stretch 10x10" L only  10x10" L only       Adduction squeezes  20x       Side steps 4 laps rtb around knees        Hip 3 way   10x ea direction ea foot       Monster walks  4 laps rtb around knees        Sit to stands 2x12 band around knees       Step ups   fwd 4" 2x12 and side to side 2x12 knee drive  4" step  Fwd 1V98 b/l,   1x6 b/l    Side to side  1x10      TRX Squat 3x10 strap        Hamstring stretch  10x10" L only  10x10" L only  10x10" L only    Prone quad stretch   10x10" L only 10x10" L only    Neuro Re-Ed        Bent over hip extensions  2x10 ea side knee bent   2x10 ea leg  2x10 ea leg    SLR w quad set   2x10 L only  3x5 L only    SL balance  4x30" L only UE support blue foam     3x30" L only   Fire hydrants      2x10 L                                                                                  Ther Act                        Resisted fwd walking          Retro walking            Modalities

## 2022-08-15 ENCOUNTER — TELEPHONE (OUTPATIENT)
Dept: FAMILY MEDICINE CLINIC | Facility: CLINIC | Age: 72
End: 2022-08-15

## 2022-08-15 ENCOUNTER — TELEPHONE (OUTPATIENT)
Dept: OTHER | Facility: OTHER | Age: 72
End: 2022-08-15

## 2022-08-15 NOTE — TELEPHONE ENCOUNTER
Patient called saying that she have to canceled her appointment because she is not feeling well and will call back the office to reschedule her appointment

## 2022-08-15 NOTE — TELEPHONE ENCOUNTER
Patient explained that she was having intermittent fevers and Janae Bob had changed her meds for thyroid and she was not having fevers but they started yesterday with 103 then this morning it was 100  She doesn't know  if her medicine needs more time  She states she has no other problems

## 2022-08-16 ENCOUNTER — TELEPHONE (OUTPATIENT)
Dept: PULMONOLOGY | Facility: CLINIC | Age: 72
End: 2022-08-16

## 2022-08-16 NOTE — TELEPHONE ENCOUNTER
Called pt to schedule a np ss consult with dr Gil Campos from referral from dr Delmy Mackay but pt stated she already had a sleep study elsewhere prescribed by her pcp and from that study she has a cpap machine    She is not sure why she would need another consult/study/etc    Pt requested delete referral and she will explain full situation to dr Delmy Mackay and send reports to her

## 2022-08-17 ENCOUNTER — APPOINTMENT (OUTPATIENT)
Dept: PHYSICAL THERAPY | Facility: CLINIC | Age: 72
End: 2022-08-17
Payer: MEDICARE

## 2022-08-19 ENCOUNTER — APPOINTMENT (OUTPATIENT)
Dept: PHYSICAL THERAPY | Facility: CLINIC | Age: 72
End: 2022-08-19
Payer: MEDICARE

## 2022-08-22 ENCOUNTER — TELEPHONE (OUTPATIENT)
Dept: VASCULAR SURGERY | Facility: CLINIC | Age: 72
End: 2022-08-22

## 2022-08-22 NOTE — TELEPHONE ENCOUNTER
Reviewed  Does ortho have a requested timeframe that they would want her to hold blood thinners prior to procedure? Do they need her to d/c both ASA and plavix?

## 2022-08-22 NOTE — TELEPHONE ENCOUNTER
Pt currently scheduled for L total hip arthroplasty 10/6/22  Pt called the office today asking for recommendations regarding plavix and ASA hold  She said the ortho office advised her to contact us for instructions  Informed her I would need to send a message to our triage provider and we will call her back

## 2022-08-23 ENCOUNTER — APPOINTMENT (OUTPATIENT)
Dept: PHYSICAL THERAPY | Facility: CLINIC | Age: 72
End: 2022-08-23
Payer: MEDICARE

## 2022-08-23 DIAGNOSIS — M16.12 PRIMARY OSTEOARTHRITIS OF ONE HIP, LEFT: Primary | ICD-10-CM

## 2022-08-23 NOTE — PROGRESS NOTES
Daily Note     Today's date: 2022  Patient name: Gabriel Jacques  : 1950  MRN: [id number]  Referring provider: MARCELINA Caballero*  Dx:   Encounter Diagnosis     ICD-10-CM    1  Primary osteoarthritis of one hip, left  M16 12                   Subjective: ***      Objective: See treatment diary below      Assessment: Tolerated treatment {Tolerated treatment :[id number]}   Patient {assessment:[id number]}      Plan: {PLAN:[id number]}       Diagnosis: L hip OA    Precautions: PAD, diminished sensation    POC Expires:    Re-evaluation Date: 8/10   FOTO Scores/Date: 52 ()   Visit Count 6 7 3 4 5   Manuals    LAD EB  EB  G[employer] traction [employer]    Distraction with IR/ER   EB     Re-eval      EB                   Ther Ex        HEP creation         Bike 6' 6'  6' 6' 6'   Treadmill        Repeated hip extensions     25x    Glute bridges        Clamshells         Piriformis stretch   10x10" L only      MAX stretch        Modified uriel stretch 10x10" L only  10x10" L only       Adduction squeezes        Side steps 4 laps rtb around knees  4 laps rtb around knees       Hip 3 way         Monster walks  4 laps rtb around knees  4 laps rtb around knees       Sit to stands 2x12 band around knees 2x12 band around knees      Step ups   fwd 4" 2x12 and side to side 2x12 knee drive  4" step  Fwd 8Z96 b/l,   1x6 b/l    Side to side  1x10      TRX Squat 3x10 strap  3x10 strap       Hamstring stretch  10x10" L only  10x10" L only  10x10" L only    Prone quad stretch   10x10" L only 10x10" L only    Neuro Re-Ed        Bent over hip extensions  2x10 ea side knee bent  2x10 ea side knee bent  2x10 ea leg  2x10 ea leg    SLR w quad set   2x10 L only  3x5 L only    SL balance  4x30" L only UE support blue foam  4x30" L only UE support blue foam    3x30" L only   Fire hydrants      2x10 L                                                                                  Ther Act Resisted fwd walking          Retro walking            Modalities

## 2022-08-23 NOTE — TELEPHONE ENCOUNTER
KEADR Colon PA-C 1 hour ago (1:37 PM)     CG    Hey there,     Typically like to have patient is on Plavix for 5 days preoperatively and aspirin 24 hours prior  Let me know what your thoughts are on the matter         Thanks     Message text

## 2022-08-23 NOTE — TELEPHONE ENCOUNTER
Spoke with pt  Made her aware to hold Plavix 5 days preoperatively and ASA 24 hours prior  Pt verbalized understanding

## 2022-08-25 ENCOUNTER — APPOINTMENT (OUTPATIENT)
Dept: PHYSICAL THERAPY | Facility: CLINIC | Age: 72
End: 2022-08-25
Payer: MEDICARE

## 2022-08-26 DIAGNOSIS — I73.9 PERIPHERAL ARTERIAL DISEASE WITH HISTORY OF REVASCULARIZATION (HCC): Primary | ICD-10-CM

## 2022-08-26 DIAGNOSIS — Z98.890 PERIPHERAL ARTERIAL DISEASE WITH HISTORY OF REVASCULARIZATION (HCC): Primary | ICD-10-CM

## 2022-08-26 RX ORDER — CLOPIDOGREL BISULFATE 75 MG/1
75 TABLET ORAL DAILY
Qty: 90 TABLET | Refills: 0 | Status: SHIPPED | OUTPATIENT
Start: 2022-08-26

## 2022-08-29 ENCOUNTER — TELEPHONE (OUTPATIENT)
Dept: OBGYN CLINIC | Facility: CLINIC | Age: 72
End: 2022-08-29

## 2022-08-29 ENCOUNTER — APPOINTMENT (OUTPATIENT)
Dept: PHYSICAL THERAPY | Facility: CLINIC | Age: 72
End: 2022-08-29
Payer: MEDICARE

## 2022-08-29 NOTE — TELEPHONE ENCOUNTER
Called patient in regards to needing to move surgery date from 10/6/22 to 10/5/22 left message for patient to please call me back

## 2022-08-29 NOTE — TELEPHONE ENCOUNTER
Patient called back I had made her aware that dr Heike Ham days have changed in [facility] and her new surgery date is 10/5/22 patient states that is fine     I will move her surgery from 10/6/22 to 10/5/22

## 2022-08-30 RX ORDER — ATORVASTATIN CALCIUM 10 MG/1
40 TABLET, FILM COATED ORAL DAILY
Status: CANCELLED | OUTPATIENT
Start: 2022-08-30

## 2022-08-31 ENCOUNTER — ANESTHESIA EVENT (OUTPATIENT)
Dept: PERIOP | Facility: HOSPITAL | Age: 72
DRG: 470 | End: 2022-08-31
Payer: MEDICARE

## 2022-08-31 ENCOUNTER — TELEPHONE (OUTPATIENT)
Dept: OBGYN CLINIC | Facility: HOSPITAL | Age: 72
End: 2022-08-31

## 2022-08-31 ENCOUNTER — APPOINTMENT (OUTPATIENT)
Dept: PHYSICAL THERAPY | Facility: CLINIC | Age: 72
End: 2022-08-31
Payer: MEDICARE

## 2022-08-31 NOTE — TELEPHONE ENCOUNTER
Dr Jayro Quiñones  RE appointment   Harsh Aguiar [phone]    Patient called in to speak with Surgical Coordinator regarding the reschedule of her appointment from 9/22 to 9/20    Please return call to patient at above number

## 2022-09-06 RX ORDER — ATORVASTATIN CALCIUM 40 MG/1
40 TABLET, FILM COATED ORAL DAILY
Qty: 7 TABLET | Refills: 0 | Status: SHIPPED | OUTPATIENT
Start: 2022-09-06 | End: 2022-09-07 | Stop reason: DRUGHIGH

## 2022-09-06 NOTE — TELEPHONE ENCOUNTER
She needs a week called in today to [facility] and tomorrow I will send you a message for a 90 day to [facility]

## 2022-09-07 ENCOUNTER — TELEPHONE (OUTPATIENT)
Dept: OBGYN CLINIC | Facility: CLINIC | Age: 72
End: 2022-09-07

## 2022-09-07 ENCOUNTER — TELEPHONE (OUTPATIENT)
Dept: OBGYN CLINIC | Facility: HOSPITAL | Age: 72
End: 2022-09-07

## 2022-09-07 DIAGNOSIS — Z98.890 PERIPHERAL ARTERIAL DISEASE WITH HISTORY OF REVASCULARIZATION (HCC): ICD-10-CM

## 2022-09-07 DIAGNOSIS — M16.12 PRIMARY OSTEOARTHRITIS OF ONE HIP, LEFT: Primary | ICD-10-CM

## 2022-09-07 DIAGNOSIS — I73.9 PERIPHERAL ARTERIAL DISEASE WITH HISTORY OF REVASCULARIZATION (HCC): ICD-10-CM

## 2022-09-07 RX ORDER — ATORVASTATIN CALCIUM 40 MG/1
40 TABLET, FILM COATED ORAL DAILY
Qty: 90 TABLET | Refills: 1 | Status: SHIPPED | OUTPATIENT
Start: 2022-09-07

## 2022-09-07 NOTE — TELEPHONE ENCOUNTER
Called patient to ask her to have her pre op testing done ASAP so we can decide if she needs nephrology clearance or not patient states she will go Friday for testing

## 2022-09-09 ENCOUNTER — OFFICE VISIT (OUTPATIENT)
Dept: LAB | Facility: HOSPITAL | Age: 72
End: 2022-09-09
Payer: MEDICARE

## 2022-09-09 ENCOUNTER — LAB REQUISITION (OUTPATIENT)
Dept: LAB | Facility: HOSPITAL | Age: 72
End: 2022-09-09
Payer: MEDICARE

## 2022-09-09 ENCOUNTER — APPOINTMENT (OUTPATIENT)
Dept: LAB | Facility: HOSPITAL | Age: 72
End: 2022-09-09
Payer: MEDICARE

## 2022-09-09 ENCOUNTER — HOSPITAL ENCOUNTER (OUTPATIENT)
Dept: RADIOLOGY | Facility: HOSPITAL | Age: 72
End: 2022-09-09
Payer: MEDICARE

## 2022-09-09 DIAGNOSIS — M16.12 PRIMARY OSTEOARTHRITIS OF ONE HIP, LEFT: ICD-10-CM

## 2022-09-09 DIAGNOSIS — E03.9 ACQUIRED HYPOTHYROIDISM: Primary | ICD-10-CM

## 2022-09-09 DIAGNOSIS — Z01.818 PRE-OP TESTING: ICD-10-CM

## 2022-09-09 DIAGNOSIS — Z01.818 ENCOUNTER FOR OTHER PREPROCEDURAL EXAMINATION: ICD-10-CM

## 2022-09-09 LAB
ABO GROUP BLD: NORMAL
ALBUMIN SERPL BCP-MCNC: 4 G/DL (ref 3.5–5)
ALP SERPL-CCNC: 68 U/L (ref 46–116)
ALT SERPL W P-5'-P-CCNC: 26 U/L (ref 12–78)
ANION GAP SERPL CALCULATED.3IONS-SCNC: 13 MMOL/L (ref 4–13)
APTT PPP: 24 SECONDS (ref 23–37)
AST SERPL W P-5'-P-CCNC: 19 U/L (ref 5–45)
BASOPHILS # BLD AUTO: 0.03 THOUSANDS/ΜL (ref 0–0.1)
BASOPHILS NFR BLD AUTO: 1 % (ref 0–1)
BILIRUB SERPL-MCNC: 0.67 MG/DL (ref 0.2–1)
BLD GP AB SCN SERPL QL: NEGATIVE
BUN SERPL-MCNC: 26 MG/DL (ref 5–25)
CALCIUM SERPL-MCNC: 10.3 MG/DL (ref 8.3–10.1)
CHLORIDE SERPL-SCNC: 102 MMOL/L (ref 96–108)
CO2 SERPL-SCNC: 25 MMOL/L (ref 21–32)
CREAT SERPL-MCNC: 1.39 MG/DL (ref 0.6–1.3)
CRP SERPL QL: <3 MG/L
EOSINOPHIL # BLD AUTO: 0.12 THOUSAND/ΜL (ref 0–0.61)
EOSINOPHIL NFR BLD AUTO: 2 % (ref 0–6)
ERYTHROCYTE [DISTWIDTH] IN BLOOD BY AUTOMATED COUNT: 15.1 % (ref 11.6–15.1)
EST. AVERAGE GLUCOSE BLD GHB EST-MCNC: 103 MG/DL
FERRITIN SERPL-MCNC: 339 NG/ML (ref 8–388)
GFR SERPL CREATININE-BSD FRML MDRD: 38 ML/MIN/1.73SQ M
GLUCOSE SERPL-MCNC: 106 MG/DL (ref 65–140)
HBA1C MFR BLD: 5.2 %
HCT VFR BLD AUTO: 35.5 % (ref 34.8–46.1)
HGB BLD-MCNC: 11.7 G/DL (ref 11.5–15.4)
IMM GRANULOCYTES # BLD AUTO: 0.01 THOUSAND/UL (ref 0–0.2)
IMM GRANULOCYTES NFR BLD AUTO: 0 % (ref 0–2)
INR PPP: 1.05 (ref 0.84–1.19)
IRON SATN MFR SERPL: 25 % (ref 15–50)
IRON SERPL-MCNC: 86 UG/DL (ref 50–170)
LYMPHOCYTES # BLD AUTO: 1.37 THOUSANDS/ΜL (ref 0.6–4.47)
LYMPHOCYTES NFR BLD AUTO: 25 % (ref 14–44)
MCH RBC QN AUTO: 31.5 PG (ref 26.8–34.3)
MCHC RBC AUTO-ENTMCNC: 33 G/DL (ref 31.4–37.4)
MCV RBC AUTO: 95 FL (ref 82–98)
MONOCYTES # BLD AUTO: 0.31 THOUSAND/ΜL (ref 0.17–1.22)
MONOCYTES NFR BLD AUTO: 6 % (ref 4–12)
NEUTROPHILS # BLD AUTO: 3.56 THOUSANDS/ΜL (ref 1.85–7.62)
NEUTS SEG NFR BLD AUTO: 66 % (ref 43–75)
NRBC BLD AUTO-RTO: 0 /100 WBCS
PLATELET # BLD AUTO: 191 THOUSANDS/UL (ref 149–390)
PMV BLD AUTO: 11.2 FL (ref 8.9–12.7)
POTASSIUM SERPL-SCNC: 3.4 MMOL/L (ref 3.5–5.3)
PROT SERPL-MCNC: 7.8 G/DL (ref 6.4–8.4)
PROTHROMBIN TIME: 13.5 SECONDS (ref 11.6–14.5)
RBC # BLD AUTO: 3.72 MILLION/UL (ref 3.81–5.12)
RETICS # AUTO: NORMAL 10*3/UL (ref 14097–95744)
RETICS # CALC: 1.82 % (ref 0.37–1.87)
RH BLD: NEGATIVE
SODIUM SERPL-SCNC: 140 MMOL/L (ref 135–147)
SPECIMEN EXPIRATION DATE: NORMAL
T3FREE SERPL-MCNC: 2.88 PG/ML (ref 2.3–4.2)
T4 FREE SERPL-MCNC: 1.75 NG/DL (ref 0.76–1.46)
TIBC SERPL-MCNC: 340 UG/DL (ref 250–450)
TSH SERPL DL<=0.05 MIU/L-ACNC: 0.05 UIU/ML (ref 0.45–4.5)
WBC # BLD AUTO: 5.4 THOUSAND/UL (ref 4.31–10.16)

## 2022-09-09 PROCEDURE — 84443 ASSAY THYROID STIM HORMONE: CPT

## 2022-09-09 PROCEDURE — 84481 FREE ASSAY (FT-3): CPT

## 2022-09-09 PROCEDURE — 71046 X-RAY EXAM CHEST 2 VIEWS: CPT

## 2022-09-09 PROCEDURE — 84439 ASSAY OF FREE THYROXINE: CPT

## 2022-09-09 PROCEDURE — 83550 IRON BINDING TEST: CPT

## 2022-09-09 PROCEDURE — 82728 ASSAY OF FERRITIN: CPT

## 2022-09-09 PROCEDURE — 85610 PROTHROMBIN TIME: CPT

## 2022-09-09 PROCEDURE — 86850 RBC ANTIBODY SCREEN: CPT | Performed by: ORTHOPAEDIC SURGERY

## 2022-09-09 PROCEDURE — 83036 HEMOGLOBIN GLYCOSYLATED A1C: CPT

## 2022-09-09 PROCEDURE — 85045 AUTOMATED RETICULOCYTE COUNT: CPT

## 2022-09-09 PROCEDURE — 83540 ASSAY OF IRON: CPT

## 2022-09-09 PROCEDURE — 80053 COMPREHEN METABOLIC PANEL: CPT

## 2022-09-09 PROCEDURE — 85025 COMPLETE CBC W/AUTO DIFF WBC: CPT

## 2022-09-09 PROCEDURE — 86900 BLOOD TYPING SEROLOGIC ABO: CPT | Performed by: ORTHOPAEDIC SURGERY

## 2022-09-09 PROCEDURE — 85730 THROMBOPLASTIN TIME PARTIAL: CPT

## 2022-09-09 PROCEDURE — 86140 C-REACTIVE PROTEIN: CPT

## 2022-09-09 PROCEDURE — 93005 ELECTROCARDIOGRAM TRACING: CPT

## 2022-09-09 PROCEDURE — 86901 BLOOD TYPING SEROLOGIC RH(D): CPT | Performed by: ORTHOPAEDIC SURGERY

## 2022-09-09 PROCEDURE — 36415 COLL VENOUS BLD VENIPUNCTURE: CPT

## 2022-09-09 NOTE — TELEPHONE ENCOUNTER
Attempted # for [facility] without success  Then called pt, she answered and provided phone to [facility] at lab  Informed that it is OK for them to draw surgeon ordered labs today as w/in 60d of surgery

## 2022-09-09 NOTE — TELEPHONE ENCOUNTER
Jorge at Lab needing call back, asking if patient does labs today is it too early for sx  [phone]

## 2022-09-11 LAB
ATRIAL RATE: 78 BPM
P AXIS: 21 DEGREES
PR INTERVAL: 196 MS
QRS AXIS: -13 DEGREES
QRSD INTERVAL: 152 MS
QT INTERVAL: 420 MS
QTC INTERVAL: 478 MS
T WAVE AXIS: -12 DEGREES
VENTRICULAR RATE: 78 BPM

## 2022-09-11 PROCEDURE — 93010 ELECTROCARDIOGRAM REPORT: CPT | Performed by: INTERNAL MEDICINE

## 2022-09-13 DIAGNOSIS — M16.12 PRIMARY OSTEOARTHRITIS OF ONE HIP, LEFT: Primary | ICD-10-CM

## 2022-09-14 NOTE — PATIENT INSTRUCTIONS
Contact surgical nurse  navigator with any questions regarding preoperative plan or schedule  Follow up visit with [facility] medical team 1 week after discharge from surgery    Stop all over the counter supplements, herbal, naturopathic  medications for 1 week prior to surgery UNLESS prescribed by your surgeon  Hold NSAIDS (i e  advil, alleve, motrin, ibuprofen, celebrex) minimum 7 days prior to surgery  Hold Asprin minimum 7 days prior to surgery  Recommend using Tylenol ( acetaminophen ) 500mg every eight hours during the first week post discharge in conjunction with any additional pain medicine prescribed by your surgeon  Use bowel medicines prescribed by your surgeon ( colace) daily post op during the first 1-2 weeks to avoid post operative constipation issues  Call [phone] with any post discharge concerns or medical issues  Hold hyzaar the morning of surgery  Hold  plavix 5 days prior to surgery with last dose being 10/1

## 2022-09-14 NOTE — PROGRESS NOTES
Internal Medicine Pre-Operative Evaluation:     Reason for Visit: Pre-operative Evaluation for Risk Stratification and Optimization    Patient ID: Brad Paz is a 81 y o  female  Surgery: Arthroplasty of left Hip  Referring Provider: Dr Hiro Mandel      Primary osteoarthritis left Hip   Failed conservative measures   Electing to undergo total joint arthroplasty    Pre-operative Optimization for planned surgery   Patient is surgically optimized for planned procedure   Patient meets preoperative quality goals as noted below   Recommendations as listed in PLAN section below  Js Carson [address] surgical nurse  navigator with any questions regarding preoperative plan or schedule     Follow up visit with [facility] medical team 1 week after discharge from surgery as scheduled    PAD  · S/p LLE popliteal bypass 2019 with 2 subsequent stent/pta  · Cont ASA/Plavix/statin  · Will hold both ASA/Plavix 5 days prior to surgery    Abnormal EKG  · Unchanged from previous EKG 2019  · Never had any cardiac symptoms or work up    JOSEPH  · Looking to establish care here with GI  · Up to date with EGD/colonoscopy  · Avoid high doses tylenol    CKD   Level 3   Baseline 1 2-1 4   gfr worse from 52's down to 29's   Has been using NSAIDS for pain   Avoid nephrotoxic medications   Avoid hypotension in the post operative setting   Monitor bmp    HTN   Stable   Cont current medications as directed   Monitor post operative BP    Hold hyzaar the day prior to surgery and the morning of surgery    Hypothyroid  · Recent decrease in synthroid from 175 to 150mcg  · F/u PCP as directed    Asthma  · Continue current inhalers  · No recent flares    H/o sigmoid diverticulitis  · S/p resection  · Then had incarcerated umbilical hernia repair 7812            Patient Active Problem List   Diagnosis    Primary osteoarthritis of one hip, left    Peripheral arterial disease with history of revascularization ([facility] [address] [facility]    Hypertension    Acquired absence of other left toe(s) (HCC)    Class 3 severe obesity without serious comorbidity with body mass index (BMI) of 40 0 to 44 9 in adult, unspecified obesity type ([facility] [address] )    Liver cirrhosis secondary to JOSEPH (nonalcoholic steatohepatitis) [facility]    Acquired hypothyroidism    Diverticulitis    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)    History of diverticulitis    Stage 3b chronic kidney disease ([facility] )        Plan:     1  Further preoperative workup as follows:   - none no further testing required may proceed with surgery    2  Medication Management/Recommendations:   - continue all current medicines through morning of surgery with sip of water except the following:  - hold ACE / ARB specifically  24 hours prior to surgery  - hold diuretic / water pill  24 hours prior to surgery  - hold aspirin 7 days prior to surgery  - Regarding anti-platelet agents: plavix last dose 10/1   - avoid use of NSAID such as motrin, advil, aleve for 7 days prior to surgery  - hold all OTC herbal or nutritional supplements 7 days before surgery    3  Patient requires further consultation with:   nephrology    4  Discharge Planning / Barriers to Discharge  none identified - patients has post discharge therapy plan in place, transportation arranged for discharge day, adequate family support at home to assist with discharge to home  Recommendations to Proceed withSurgery    No contraindications to planned surgery      Subjective:           History of Present Illness:     Noel Baldwin is a 81 y o  female who presents to the office today for a preoperative consultation at the request of surgeon  The patient understands this is an elective procedure and not emergent  They are electing to undergo planned procedure with an understanding that all surgery has inherent risk  They have worked with their surgeon and failed conservative treatment measures   Today they present for preoperative risk assessment and recommendations for optimization in preparation for surgery  Pt seen in surgical optimization center for upcoming proposed surgery  They have failed previous conservative measures and have elected surgical intervention  Pt labs were reviewed in detail  Her thyroid levels were abnormal however there is note by her PCP that an adjustment was made in her dose  Her gfr seems to have worsened from 50's to the 30's, she had been taking NSAIDS for pain as she is unable to take tylenol d/t JOSEPH  She will need nephrology consultation prior to surgery, which she has scheduled  Upon interview questioning patient is able to perform greater than 4 METs workload in daily life without any reported cardio-pulmonary symptoms  She has a h/o PAD including L popliteal bypass in 2019, d/t poor healing wound and osteomyelitis of the 2nd toe with amputation  She then required 2 more angioplasties/stents d/t recurrent instent stenosis  She remains on both ASA/Plavix and statin therapy for same  She has established here with vascular surgery in [facility]  She previously lived in [place]  She was instructed to hold her plavix x 5 days with the last dose being 10/1/2022  Her EKG was abnormal and there was none to compare to however her previous EKG is similar from 9/2021    She has a h/o JOSEPH, she is up to date with EGD  ROS: No TIA's or unusual headaches, no dysphagia  No prolonged cough  No dyspnea or chest pain on exertion  No abdominal pain, change in bowel habits, black or bloody stools  No urinary tract or BPH symptoms  Positive reported pain in arthritic joint  Positive difficulty with gait  No skin rashes or issues              Objective:      /78   Pulse 100   Ht 5' 6" (1 676 m)   Wt 107 kg (236 lb 4 8 oz)   SpO2 97%   BMI 38 14 kg/m²       General Appearance: no distress, conversive  HEENT: PERRLA, conjuctiva normal; oropharynx clear; mucous membranes moist;   Neck:  Supple, no lymphadenopathy or thyromegaly  Lungs: CTA, normal respiratory effort, no retractions, expiratory effort normal  CV: regular rate and rhythm , PMI normal   ABD: soft non tender, no masses , no hepatic or splenomegaly  EXT: DP pulses intact, no lymphadenopathy, no edema  Skin: normal turgor, normal texture, no rash  Psych: affect normal, mood normal  Neuro: AAOx3        The following portions of the patient's history were reviewed and updated as appropriate: allergies, current medications, past family history, past medical history, past social history, past surgical history and problem list      Past History:       Past Medical History:   Diagnosis Date    Allergic Childhood    Anxiety     Asthma Childhood    Disease of thyroid gland Approx     Diverticulitis of colon     Heart murmur     Hypertension  approx    Obesity Childhood    Past Surgical History:   Procedure Laterality Date    APPENDECTOMY  About      SECTION   &     COLON SURGERY  About     HYSTERECTOMY  About           Social History     Tobacco Use    Smoking status: Never Smoker    Smokeless tobacco: Never Used   Vaping Use    Vaping Use: Never used   Substance Use Topics    Alcohol use: Not Currently    Drug use: Never     Family History   Problem Relation Age of Onset    Depression Mother     Thrombosis Mother     Arthritis Mother     Cancer Mother     Anxiety disorder Mother     Heart disease Father         I    Dementia Brother     Heart disease Brother     Cancer Brother           Allergies:      Allergies   Allergen Reactions    Sulfamethoxazole-Trimethoprim Rash        Current Medications:     Current Outpatient Medications   Medication Instructions    albuterol (PROVENTIL HFA,VENTOLIN HFA) 90 mcg/act inhaler 2 puffs, Inhalation, Every 4 hours PRN    ALPRAZolam (XANAX) 0 25 mg, Oral, As needed, 1/2-1 as needed tid    ammonium lactate (LAC-HYDRIN) 12 % cream Topical, As needed  ascorbic acid (VITAMIN C) 500 mg, Oral, 2 times daily    aspirin (ECOTRIN LOW STRENGTH) 81 mg, Oral, Daily    atorvastatin (LIPITOR) 40 mg, Oral, Daily    clopidogrel (PLAVIX) 75 mg, Oral, Daily    enoxaparin (LOVENOX) 40 mg, Subcutaneous, Daily    ferrous sulfate 324 mg, Oral, 2 times daily before meals    fluticasone (FLONASE) 50 mcg/act nasal spray 2 sprays, Nasal, Daily PRN    fluticasone-vilanterol (BREO ELLIPTA) 200-25 MCG/INH inhaler 1 puff, Inhalation, Daily    folic acid (FOLVITE) 404 mcg, Oral, Daily    gabapentin (NEURONTIN) 200 mg, Oral, Weekly    Ibuprofen 400 mg, Oral, As needed    levothyroxine (SYNTHROID) 150 mcg, Oral, Daily (early morning)    Lidocaine-Adhesive Sheets (LidoPure Patch) 5 % KIT Apply externally    losartan-hydrochlorothiazide (HYZAAR) 100-25 MG per tablet 1 tablet, Oral, Daily    montelukast (SINGULAIR) 10 mg, Oral, Daily PRN    Multiple Vitamin (multivitamin) tablet 1 tablet, Oral, Daily    Multiple Vitamins-Minerals (Multi For Her 50+) TABS 1 tablet, Oral, Daily    Turmeric (QC TUMERIC COMPLEX PO) Oral    verapamil (VERELAN PM) 360 mg, Oral, Daily             PRE-OP WORKSHEET DATA    Assessment of Pre-Operative Risks     MLJ Quality Hard Stops:  BMI (<40) : Estimated body mass index is 38 14 kg/m² as calculated from the following:    Height as of this encounter: 5' 6" (1 676 m)  Weight as of this encounter: 107 kg (236 lb 4 8 oz)  Hgb ( >11): Lab Results   Component Value Date    HGB 11 7 09/09/2022     HbA1c (<7 0) :   Lab Results   Component Value Date    HGBA1C 5 2 09/09/2022     GFR (>60) (Less then 45 = Nephrology consult):  CrCl cannot be calculated (Patient's most recent lab result is older than the maximum 7 days allowed  )        Active Decompensated Chronic Conditions which would delay surgery  No acutely decompensated medical issues such as recent CVA, MI, new onset arrhythmia, severe aortic stenosis, CHF, uncontrolled COPD       Exercise Capacity: (if less the 4 mets consider functional assessment via cardiac stress testing or consultation)    · Able to walk 2 blocks without symptoms?: Yes  · Able to walk 1 flights without symptoms?: Yes    Assessment of intra and post operative respiratory, hemodynamic and thrombotic risks     Prior Anesthesia Reactions: No     Personal history of venous thromboembolic disease? No    History of steroid use > 5 mg for >2 weeks within last year? No    Cardiac Risk Estimation: per the Revised Cardiac Risk Index (Circ  100:1043, 1999),     The patient's risk factors for cardiac complications include :  none    Noel Baldwin has a in hospital cardiac risk of RCI RISK CLASS I (0 risk factors, risk of major cardiac compl  appr  0 5%) based on RCRI calculator          Pre-Op Data Reviewed:       Laboratory Results: I have personally reviewed the pertinent laboratory results/reports     EKG:I have personally reviewed pertinent reports    I personally reviewed and interpreted available tracings in the electronic medical record    OLD RECORDS: reviewed old records in the chart review section if EHR on day of visit      Previous cardiopulmonary studies within the past year:  · Echocardiogram: no  · Cardiac Catheterization: no  · Stress Test: no      Time of visit including pre-visit chart review, visit and post-visit coordination of plan and care , review of pre-surgical lab work, preparation and time spent documenting note in electronic medical record, time spent face-to-face in physical examination answering patient questions: 60 minutes             [address] [place] [address]

## 2022-09-20 ENCOUNTER — OFFICE VISIT (OUTPATIENT)
Dept: INTERNAL MEDICINE CLINIC | Facility: CLINIC | Age: 72
End: 2022-09-20
Payer: MEDICARE

## 2022-09-20 VITALS
DIASTOLIC BLOOD PRESSURE: 78 MMHG | SYSTOLIC BLOOD PRESSURE: 126 MMHG | BODY MASS INDEX: 37.97 KG/M2 | WEIGHT: 236.3 LBS | HEART RATE: 100 BPM | OXYGEN SATURATION: 97 % | HEIGHT: 66 IN

## 2022-09-20 DIAGNOSIS — I15.9 SECONDARY HYPERTENSION: ICD-10-CM

## 2022-09-20 DIAGNOSIS — M16.12 PRIMARY OSTEOARTHRITIS OF ONE HIP, LEFT: Primary | ICD-10-CM

## 2022-09-20 DIAGNOSIS — Z98.890 PERIPHERAL ARTERIAL DISEASE WITH HISTORY OF REVASCULARIZATION (HCC): ICD-10-CM

## 2022-09-20 DIAGNOSIS — E66.01 CLASS 3 SEVERE OBESITY WITHOUT SERIOUS COMORBIDITY WITH BODY MASS INDEX (BMI) OF 40.0 TO 44.9 IN ADULT, UNSPECIFIED OBESITY TYPE (HCC): ICD-10-CM

## 2022-09-20 DIAGNOSIS — K75.81 LIVER CIRRHOSIS SECONDARY TO NASH (NONALCOHOLIC STEATOHEPATITIS) (HCC): ICD-10-CM

## 2022-09-20 DIAGNOSIS — E03.9 ACQUIRED HYPOTHYROIDISM: ICD-10-CM

## 2022-09-20 DIAGNOSIS — I73.9 PERIPHERAL ARTERIAL DISEASE WITH HISTORY OF REVASCULARIZATION (HCC): ICD-10-CM

## 2022-09-20 DIAGNOSIS — N18.32 STAGE 3B CHRONIC KIDNEY DISEASE (HCC): ICD-10-CM

## 2022-09-20 DIAGNOSIS — K74.60 LIVER CIRRHOSIS SECONDARY TO NASH (NONALCOHOLIC STEATOHEPATITIS) (HCC): ICD-10-CM

## 2022-09-20 PROCEDURE — 99205 OFFICE O/P NEW HI 60 MIN: CPT | Performed by: INTERNAL MEDICINE

## 2022-09-20 NOTE — TELEPHONE ENCOUNTER
Preoperative Elective Admission Assessment    Living Situation:    Who does pt live with: relative, son and his family  What kind of home: multi-level  How do they enter the home: front  How many levels in home: 3, stays on lower level   # of steps to enter home: 8  # of steps to second floor: n/a  Are there handrails: Yes  Are there landings: No  Sleeping arrangement: first/entry floor  Where is Bathroom: entry level  Where is the tub or shower: walk in shower with grab bars and shower chair  Dogs or ther pets: n/a     First Floor Setup:   Is there a bathroom: Yes  Where would pt sleep: bed     DME: grab bars and cane     Patient's Current Level of Function: Ambulates: Independently, Ambulates with cane and ADLs: Independent    Post-op Caregiver: relative, daughter in law  Caregiver Name and phone number for Inpatient discharge needs: Clifton Daley, [phone]  Currently receive any HHC/aides/community supports: No     Post-op Transport: relative  To/from hospital: relative  To/from PT 2-3x/week: relative  Uses community transport now: No     Outpatient Physical Therapy Site:  Site: [facility] PT  pre and post-op appts scheduled? No, sent to       Medication Management: self and pillbox  Preferred Pharmacy for Post-op Medications[facility]  Blood Management Vitamin Regimen: Pt confirms taking as prescribed  Post-op anticoagulant: prescribed preoperatively - patient educated to  at pharmacy and have at home prior to surgery, ready for after surgery use only     DC Plan: Pt plans to be discharged home      Barriers to DC identified preoperatively: none identified    BMI: 40 67    [employer]: declined enrollment    Patient Education:  Pt educated on post-op pain, early mobilization (POD0), Average inpt LOS, OP PT goal   Patient educated that our goal is to appropriately discharge patient based off their post-op function while striving to maintain maximal independence   The goal is to discharge patient to home and for them to attend outpatient physical therapy      Assigned to care team? Yes

## 2022-09-21 ENCOUNTER — TELEMEDICINE (OUTPATIENT)
Dept: FAMILY MEDICINE CLINIC | Facility: CLINIC | Age: 72
End: 2022-09-21
Payer: MEDICARE

## 2022-09-21 ENCOUNTER — TELEPHONE (OUTPATIENT)
Dept: ADMINISTRATIVE | Facility: OTHER | Age: 72
End: 2022-09-21

## 2022-09-21 VITALS — BODY MASS INDEX: 37.93 KG/M2 | WEIGHT: 236 LBS | HEIGHT: 66 IN

## 2022-09-21 DIAGNOSIS — E03.9 ACQUIRED HYPOTHYROIDISM: Primary | ICD-10-CM

## 2022-09-21 DIAGNOSIS — N18.32 STAGE 3B CHRONIC KIDNEY DISEASE (HCC): ICD-10-CM

## 2022-09-21 DIAGNOSIS — M16.12 PRIMARY OSTEOARTHRITIS OF ONE HIP, LEFT: ICD-10-CM

## 2022-09-21 PROCEDURE — 99213 OFFICE O/P EST LOW 20 MIN: CPT | Performed by: NURSE PRACTITIONER

## 2022-09-21 RX ORDER — CYCLOSPORINE 0.5 MG/ML
EMULSION OPHTHALMIC
COMMUNITY
Start: 2022-08-21

## 2022-09-21 NOTE — TELEPHONE ENCOUNTER
----- Message from Mark Anthony Headley sent at 9/21/2022 10:59 AM EDT -----  Regarding: care gap request  09/21/22 10:59 AM    Hello, our patient attached above has had Medicare AWV completed/performed   Please assist in updating the patient chart by making an External outreach to Zechariah Gaspar located in [facility] The date of service is 01/2022    Thank you,  Mark Anthony YEN [address] [facility]

## 2022-09-21 NOTE — PATIENT INSTRUCTIONS
Levothyroxine (By mouth)   Levothyroxine ([id number]-BRI-[id number])  Treats hypothyroidism, an enlarged thyroid gland, and thyroid cancer  Brand Name(s): Euthyrox, Levoxyl, Synthroid, Thyquidity, Tirosint, Tirosint-Sol, Unithroid   There may be other brand names for this medicine  When This Medicine Should Not Be Used: This medicine is not right for everyone  Do not use it if you had an allergic reaction to levothyroxine or glycerol, or if you have an uncorrected adrenal gland problem or a recent heart attack  How to Use This Medicine:   Capsule, Liquid, Tablet  Take your medicine as directed  Your dose may need to be changed several times to find what works best for you  You may have to take this medicine for 4 to 8 weeks before your symptoms start to get better  Read and follow the patient instructions that come with this medicine  Talk to your doctor or pharmacist if you have any questions  Take this medicine in the morning on an empty stomach, at least 30 to 60 minutes before eating breakfast   Capsule: Swallow whole  Do not cut, chew, or crush it  Oral liquid: This medicine may be mixed with water or be given directly into the mouth  If mixed with water: Squeeze the contents of 1 single unit-dose ampule into a glass or cup containing water  Stir and drink it right away  Add some water to the glass or cup and drink the water  This will help get all of the medicine out of the glass or cup  Do not mix this medicine with any other liquid except water  Do not store the mixture for later use  If taken without water: Squeeze the medicine directly into the mouth or into a spoon and swallow it right away  Tablet: If you are giving this medicine to a baby or child who cannot swallow the tablet whole, crush the tablet and mix it in 1 to 2 teaspoons (5 to 10 milliliters) of water  Give the mixture right away using a spoon or dropper  Do not mix the tablet with any other liquid except water   Do not store the mixture for future use  Missed dose: Take a dose as soon as you remember  If it is almost time for your next dose, wait until then and take a regular dose  Do not take extra medicine to make up for a missed dose  Store the medicine in a closed container at room temperature, away from heat, moisture, and direct light  Use the oral liquid within 15 days after opening the pouch  Keep the ampules in the pouch until you are ready to use them  Drugs and Foods to Avoid:   Ask your doctor or pharmacist before using any other medicine, including over-the-counter medicines, vitamins, and herbal products  Some foods and medicines can affect how levothyroxine works  Tell your doctor if you are using any of the following:  Amiodarone, asparaginase, carbamazepine, clofibrate, dexamethasone, digoxin, 5-fluorouracil, furosemide, heroin, imatinib, ketamine, methadone, mitotane, nicotinic acid, phenobarbital, phenytoin, rifampin, tamoxifen  Beta-blocker medicine  Birth control pills (including estrogen)  Blood thinner (including heparin, warfarin)  Insulin or diabetes medicine  Medicine to treat depression (including amitriptyline, maprotiline)  NSAIDs  Salicylates  Steroids (including dexamethasone, androgens, anabolic steroids)  If you are using kayexalate, lanthanum, orlistat, sevelamer, sucralfate, antacids (including aluminum or magnesium hydroxide, simethicone), medicine to lower cholesterol (including cholestyramine, colesevelam, colestipol), stomach medicine (including lansoprazole, omeprazole, pantoprazole), or any medicine that contains calcium or iron, take them at least 4 hours before or 4 hours after you take levothyroxine  Cottonseed meal, dietary fiber, soybean flour (infant formula), or walnuts may decrease the absorption of this medicine  Talk with your doctor if you have questions  Do not eat grapefruit or drink grapefruit juice while you are using this medicine    Warnings While Using This Medicine:   Tell your doctor if you are pregnant or breastfeeding, or if you have kidney disease, liver disease, heart or blood vessel disease, anemia, blood clotting problems, diabetes, osteoporosis, pituitary gland problems, or adrenal gland problems  Tell your doctor if you have recently received radiation treatment with iodine  This medicine may cause the following problems:  Heart problems, including angina (chest pain), heart rhythm problems  Myxedema coma, which may be life-threatening  Low bone density  This medicine should not be used to treat obesity or as part of a treatment plan for a weight control program   Tell any doctor or dentist who treats you that you are taking this medicine  Do not stop using this medicine suddenly  Your doctor will need to slowly decrease your dose before you stop it completely  Your doctor will do lab tests at regular visits to check on the effects of this medicine  Keep all appointments  Keep all medicine out of the reach of children  Never share your medicine with anyone  Possible Side Effects While Using This Medicine:   Call your doctor right away if you notice any of these side effects:   Allergic reaction: Itching or hives, swelling in your face or hands, swelling or tingling in your mouth or throat, chest tightness, trouble breathing  Chest pain that may spread, trouble breathing, unusual sweating, fainting  Confusion, swelling of the face, tongue, or lower legs, feeling cold, unusual tiredness or weakness  Dizziness, lightheadedness, stomach pain  Fast, pounding, or uneven heartbeat  Seizures or tremors  Severe headache, blurred or double vision, nausea, vomiting (in children)  Walking with a limp, knee or hip pain (in children)  If you notice these less serious side effects, talk with your doctor:   Appetite or weight changes  Changes in your menstrual periods  Diarrhea  Hair loss  Muscle spasm or weakness  Nervousness, sensitivity to heat  Trouble sleeping  If you notice other side effects that you think are caused by this medicine, tell your doctor  Call your doctor for medical advice about side effects  You may report side effects to FDA at 0-923-JMU-0539    © Copyright [facility] 2022 Information is for End User's use only and may not be sold, redistributed or otherwise used for commercial purposes  The above information is an  only  It is not intended as medical advice for individual conditions or treatments  Talk to your doctor, nurse or pharmacist before following any medical regimen to see if it is safe and effective for you

## 2022-09-21 NOTE — PROGRESS NOTES
Virtual Regular Visit    Verification of patient location:    Patient is located in the following state in which I hold an active license [place]      Assessment/Plan:    Problem List Items Addressed This Visit        Endocrine    Acquired hypothyroidism - Primary     Reviewed recent TSH/T4 with patient  TSH now 0 046 from 0 076  Was discovered that patient is taking levothyroxine with other medications in the morning  Educated patient on proper way to take levothyroxine; first medication in the morning on empty stomach with a full glass of water, waiting 30-60 minutes before eating or other medications  Will not decrease dose at this time, will re-check TSH in 6-8 weeks  Relevant Orders    TSH, 3rd generation with Free T4 reflex       Musculoskeletal and Integument    Primary osteoarthritis of one hip, left     Scheduled for left hip arthroplasty on 10/5 with Dr Dyllan Rodriguez  Medical clearance completed by Dr Pancho Del Cid  Genitourinary    Stage 3b chronic kidney disease ([facility] [address] )     Lab Results   Component Value Date    EGFR 38 09/09/2022    EGFR 36 07/26/2022    CREATININE 1 39 (H) 09/09/2022    CREATININE 1 44 (H) 07/26/2022      Recent CMP reviewed  As per patient has increased hydration, stopped using NSAIDS  Denies decreased urine production  Discussed maintaining hydration, BP control, decreased sodium production  Has f/u with nephrology 9/26                    Reason for visit is   Chief Complaint   Patient presents with    Follow-up     Thyroid medication     Virtual Regular Visit        Encounter provider SLY Varela    Provider located at [facility]  [address] [facility]  [address] [place] [id number] [phone]      Recent Visits  No visits were found meeting these conditions    Showing recent visits within past 7 days and meeting all other requirements  Today's Visits  Date Type Provider Dept   09/21/22 [address] [place] [address], SLY Boyd [address] today's visits and meeting all other requirements  Future Appointments  No visits were found meeting these conditions  Showing future appointments within next 150 days and meeting all other requirements       The patient was identified by name and date of birth  Isiah Lopez was informed that this is a telemedicine visit and that the visit is being conducted through [facility] and patient was informed this is a secure, HIPAA-complaint platform  She agrees to proceed     My office door was closed  No one else was in the room  She acknowledged consent and understanding of privacy and security of the video platform  The patient has agreed to participate and understands they can discontinue the visit at any time  Patient is aware this is a billable service  Subjective  Isiah Lopez is a 81 y o  female   Patient presents via video for evaluation of recent blood work  TSH reviewed after decreasing dose from 175 mcg to 150 mcg  TSH now 0 046 from 0 076  Patient denies symptoms  Upon questioning patient, was made aware she takes her levothyroxine not on an empty stomach and with other medications  Otherwise, does not offer any complaints at this time         Past Medical History:   Diagnosis Date    Allergic Childhood    Anxiety     Asthma Childhood    Disease of thyroid gland Approx     Diverticulitis of colon     Heart murmur     Hypertension  approx    Obesity Childhood       Past Surgical History:   Procedure Laterality Date    APPENDECTOMY  About      SECTION   &     COLON SURGERY  About     HYSTERECTOMY  About        Current Outpatient Medications   Medication Sig Dispense Refill    albuterol (PROVENTIL HFA,VENTOLIN HFA) 90 mcg/act inhaler Inhale 2 puffs every 4 (four) hours as needed      ALPRAZolam (XANAX) 1 mg tablet Take 0 25 mg by mouth as needed 1/2-1 as needed tid      ammonium lactate (LAC-HYDRIN) 12 % cream Apply topically as needed for dry skin      ascorbic acid (VITAMIN C) 500 MG tablet Take 1 tablet (500 mg total) by mouth 2 (two) times a day 60 tablet 1    aspirin (ECOTRIN LOW STRENGTH) 81 mg EC tablet Take 81 mg by mouth in the morning      atorvastatin (LIPITOR) 40 mg tablet Take 1 tablet (40 mg total) by mouth daily 90 tablet 1    clopidogrel (PLAVIX) 75 mg tablet Take 1 tablet (75 mg total) by mouth daily 90 tablet 0    enoxaparin (LOVENOX) 40 mg/0 4 mL Inject 0 4 mL (40 mg total) under the skin daily for 28 days 11 2 mL 0    ferrous sulfate 324 (65 Fe) mg Take 1 tablet (324 mg total) by mouth 2 (two) times a day before meals 60 tablet 0    fluticasone (FLONASE) 50 mcg/act nasal spray 2 sprays into each nostril daily as needed      fluticasone-vilanterol (BREO ELLIPTA) 200-25 MCG/INH inhaler Inhale 1 puff daily      folic acid (FOLVITE) 247 mcg tablet Take 1 tablet (400 mcg total) by mouth daily 30 tablet 0    gabapentin (NEURONTIN) 100 mg capsule Take 200 mg by mouth once a week      levothyroxine (Synthroid) 150 mcg tablet Take 1 tablet (150 mcg total) by mouth daily in the early morning 90 tablet 0    Lidocaine-Adhesive Sheets (LidoPure Patch) 5 % KIT Apply topically      losartan-hydrochlorothiazide (HYZAAR) 100-25 MG per tablet Take 1 tablet by mouth daily      montelukast (SINGULAIR) 10 mg tablet Take 10 mg by mouth daily as needed      Multiple Vitamin (multivitamin) tablet Take 1 tablet by mouth daily 30 tablet 0    Multiple Vitamins-Minerals (Multi For Her 50+) TABS Take 1 tablet by mouth daily      verapamil (VERELAN PM) 360 MG 24 hr capsule Take 360 mg by mouth in the morning      Restasis 0 05 % ophthalmic emulsion        No current facility-administered medications for this visit          Allergies   Allergen Reactions    Sulfamethoxazole-Trimethoprim Rash       Review of Systems   Constitutional: Negative for chills, fatigue and fever  HENT: Negative for ear pain and trouble swallowing  Eyes: Negative for photophobia and visual disturbance  Respiratory: Negative for cough and shortness of breath  Cardiovascular: Negative for chest pain and palpitations  Gastrointestinal: Negative for abdominal pain, constipation and diarrhea  Genitourinary: Negative for decreased urine volume  Musculoskeletal: Negative for back pain and myalgias  Skin: Negative for color change and rash  Neurological: Negative for dizziness, weakness, light-headedness, numbness and headaches  Psychiatric/Behavioral: Negative for confusion  Video Exam    Vitals:    09/21/22 1044   Weight: 107 kg (236 lb)   Height: 5' 6" (1 676 m)       Physical Exam  Constitutional:       General: She is not in acute distress  Appearance: Normal appearance  She is not ill-appearing  HENT:      Head: Normocephalic  Right Ear: External ear normal       Left Ear: External ear normal    Pulmonary:      Effort: Pulmonary effort is normal  No respiratory distress  Musculoskeletal:         General: Normal range of motion  Cervical back: Normal range of motion  Skin:     Coloration: Skin is not pale  Neurological:      General: No focal deficit present  Mental Status: She is alert and oriented to person, place, and time     Psychiatric:         Mood and Affect: Mood normal          Behavior: Behavior normal           I spent 8 minutes directly with the patient during this visit

## 2022-09-21 NOTE — ASSESSMENT & PLAN NOTE
Lab Results   Component Value Date    EGFR 38 09/09/2022    EGFR 36 07/26/2022    CREATININE 1 39 (H) 09/09/2022    CREATININE 1 44 (H) 07/26/2022      Recent CMP reviewed  As per patient has increased hydration, stopped using NSAIDS  Denies decreased urine production  Discussed maintaining hydration, BP control, decreased sodium production    Has f/u with nephrology 9/26

## 2022-09-21 NOTE — ASSESSMENT & PLAN NOTE
Reviewed recent TSH/T4 with patient  TSH now 0 046 from 0 076  Was discovered that patient is taking levothyroxine with other medications in the morning  Educated patient on proper way to take levothyroxine; first medication in the morning on empty stomach with a full glass of water, waiting 30-60 minutes before eating or other medications  Will not decrease dose at this time, will re-check TSH in 6-8 weeks

## 2022-09-21 NOTE — ASSESSMENT & PLAN NOTE
Scheduled for left hip arthroplasty on 10/5 with Dr Rito Alejandre  Medical clearance completed by Dr Benjie Marie

## 2022-09-22 ENCOUNTER — APPOINTMENT (OUTPATIENT)
Dept: PREADMISSION TESTING | Facility: HOSPITAL | Age: 72
DRG: 470 | End: 2022-09-22
Payer: MEDICARE

## 2022-09-22 RX ORDER — POLYETHYLENE GLYCOL 3350 17 G/17G
17 POWDER, FOR SOLUTION ORAL DAILY
COMMUNITY

## 2022-09-22 NOTE — PRE-PROCEDURE INSTRUCTIONS
Pre-Surgery Instructions:   Medication Instructions   • albuterol (PROVENTIL HFA,VENTOLIN HFA) 90 mcg/act inhaler Use day of surgery if needed and bring with you     • ALPRAZolam Osie Cools) 1 mg tablet May use day of surgery if needed     • ammonium lactate (LAC-HYDRIN) 12 % cream Hold day of surgery     • ascorbic acid (VITAMIN C) 500 MG tablet Hold day of surgery     • aspirin (ECOTRIN LOW STRENGTH) 81 mg EC tablet Pt has received instructions to hold starting 5 days before procedure  • atorvastatin (LIPITOR) 40 mg tablet Normally takes at night  • clopidogrel (PLAVIX) 75 mg tablet Pt has received instructions to hold starting 5 days before procedure  • ferrous sulfate 324 (65 Fe) mg Hold day of surgery     • fluticasone (FLONASE) 50 mcg/act nasal spray May use day of surgery if needed     • fluticasone-vilanterol (BREO ELLIPTA) 200-25 MCG/INH inhaler Take this medication day of surgery if normally taken in the morning  • folic acid (FOLVITE) 119 mcg tablet Hold day of surgery     • gabapentin (NEURONTIN) 100 mg capsule Take if normally taken this day of the week  • levothyroxine (Synthroid) 150 mcg tablet Take this medication day of surgery if normally taken in the morning  • Lidocaine-Adhesive Sheets (LidoPure Patch) 5 % KIT Hold day of surgery     • losartan-hydrochlorothiazide (HYZAAR) 100-25 MG per tablet Hold day of surgery     • montelukast (SINGULAIR) 10 mg tablet Hold day of surgery     • Multiple Vitamin (multivitamin) tablet Hold day of surgery     • polyethylene glycol (MIRALAX) 17 g packet Hold day of surgery     • Restasis 0 05 % ophthalmic emulsion May use day of surgery if needed     • verapamil (VERELAN PM) 360 MG 24 hr capsule Take this medication day of surgery if normally taken in the morning  My Surgical Experience    The following information was developed to assist you to prepare for your operation      What do I need to do before coming to the hospital?  • Arrange for a responsible person to drive you to and from the hospital   • Arrange care for your children at home  Children are not allowed in the recovery areas of the hospital  • Plan to wear clothing that is easy to put on and take off  If you are having shoulder surgery, wear a shirt that buttons or zippers in the front  Bathing  o Shower the evening before and the morning of your surgery with an antibacterial soap  Please refer to the Pre Op Showering Instructions for Surgery Patients Sheet   o Remove nail polish and all body piercing jewelry  o Do not shave any body part for at least 24 hours before surgery-this includes face, arms, legs and upper body  Food  o Nothing to eat or drink after midnight the night before your surgery  This includes candy and chewing gum  o Exception: If your surgery is after 12:00pm (noon), you may have clear liquids such as 7-Up®, ginger ale, apple or cranberry juice, Jell-O®, water, or clear broth until 8:00 am  o Do not drink milk or juice with pulp on the morning before surgery  o Do not drink alcohol 24 hours before surgery  Medicine  o Follow instructions you received from your surgeon about which medicines you may take on the day of surgery  o If instructed to take medicine on the morning of surgery, take pills with just a small sip of water  Call your prescribing doctor for specific infroamtion on what to do if you take insulin    What should I bring to the hospital?    Bring:  • Crutches or a walker, if you have them, for foot or knee surgery  • A list of the daily medicines, vitamins, minerals, herbals and nutritional supplements you take   Include the dosages of medicines and the time you take them each day  • Glasses, dentures or hearing aids  • Minimal clothing; you will be wearing hospital sleepwear  • Photo ID; required to verify your identity  • If you have a Living Will or Power of , bring a copy of the documents  • If you have an ostomy, bring an extra pouch and any supplies you use    Do not bring  • Medicines or inhalers  • Money, valuables or jewelry    What other information should I know about the day of surgery? • Notify your surgeons if you develop a cold, sore throat, cough, fever, rash or any other illness  • Report to the Ambulatory Surgical/Same Day Surgery Unit  • You will be instructed to stop at Registration only if you have not been pre-registered  • Inform your  fi they do not stay that they will be asked by the staff to leave a phone number where they can be reached  • Be available to be reached before surgery  In the event the operating room schedule changes, you may be asked to come in earlier or later than expected    *It is important to tell your doctor and others involved in your health care if you are taking or have been taking any non-prescription drugs, vitamins, minerals, herbals or other nutritional supplements  Any of these may interact with some food or medicines and cause a reaction    See Geriatric Assessment below       • Cognitive Assessment:   • CAM:   • TUG <15 sec:  • Falls (last 6 months):   • Hand  score:  -Attempt 1:  -Attempt 2:  -Attempt 3:  • Rosalino Total Score:   21  • PHQ- 9 Depression Scale:   2  • Nutrition Assessment Score:   11  • METS:   9 89  • Health goals:  -What are your overall health goals? (quit smoking, wt  loss, rest, decrease stress)     "Return to walking normally, increasing speed of walking and overall improve health wise"    -What brings you strength? (family, friends, Scientologist, health)    From family and friends      -What activities are important to you? (exercise, reading, travel, work)    "Keeping a tidy house, gardening, having enough energy to keep up with my grandchild "         Pt has been losing weight on her own  Overall has lost 26lb     Pt also has increased stress due to her  being " "very ill"

## 2022-09-26 ENCOUNTER — CONSULT (OUTPATIENT)
Dept: NEPHROLOGY | Facility: CLINIC | Age: 72
End: 2022-09-26
Payer: MEDICARE

## 2022-09-26 ENCOUNTER — APPOINTMENT (OUTPATIENT)
Dept: LAB | Facility: HOSPITAL | Age: 72
End: 2022-09-26
Payer: MEDICARE

## 2022-09-26 VITALS
DIASTOLIC BLOOD PRESSURE: 80 MMHG | OXYGEN SATURATION: 98 % | WEIGHT: 238.4 LBS | SYSTOLIC BLOOD PRESSURE: 132 MMHG | TEMPERATURE: 97.7 F | BODY MASS INDEX: 38.31 KG/M2 | HEART RATE: 98 BPM | HEIGHT: 66 IN | RESPIRATION RATE: 16 BRPM

## 2022-09-26 DIAGNOSIS — N18.31 STAGE 3A CHRONIC KIDNEY DISEASE (HCC): ICD-10-CM

## 2022-09-26 DIAGNOSIS — M16.12 PRIMARY OSTEOARTHRITIS OF ONE HIP, LEFT: ICD-10-CM

## 2022-09-26 DIAGNOSIS — N18.31 STAGE 3A CHRONIC KIDNEY DISEASE (HCC): Primary | ICD-10-CM

## 2022-09-26 LAB
25(OH)D3 SERPL-MCNC: 35.3 NG/ML (ref 30–100)
ALBUMIN SERPL BCP-MCNC: 4 G/DL (ref 3.5–5)
ALP SERPL-CCNC: 67 U/L (ref 46–116)
ALT SERPL W P-5'-P-CCNC: 20 U/L (ref 12–78)
ANION GAP SERPL CALCULATED.3IONS-SCNC: 9 MMOL/L (ref 4–13)
AST SERPL W P-5'-P-CCNC: 16 U/L (ref 5–45)
BACTERIA UR QL AUTO: ABNORMAL /HPF
BASOPHILS # BLD AUTO: 0.03 THOUSANDS/ΜL (ref 0–0.1)
BASOPHILS NFR BLD AUTO: 1 % (ref 0–1)
BILIRUB SERPL-MCNC: 0.68 MG/DL (ref 0.2–1)
BILIRUB UR QL STRIP: NEGATIVE
BUN SERPL-MCNC: 21 MG/DL (ref 5–25)
CALCIUM SERPL-MCNC: 9.9 MG/DL (ref 8.3–10.1)
CHLORIDE SERPL-SCNC: 104 MMOL/L (ref 96–108)
CLARITY UR: CLEAR
CO2 SERPL-SCNC: 27 MMOL/L (ref 21–32)
COLOR UR: YELLOW
CREAT SERPL-MCNC: 1.19 MG/DL (ref 0.6–1.3)
CREAT UR-MCNC: 92.4 MG/DL
EOSINOPHIL # BLD AUTO: 0.1 THOUSAND/ΜL (ref 0–0.61)
EOSINOPHIL NFR BLD AUTO: 2 % (ref 0–6)
ERYTHROCYTE [DISTWIDTH] IN BLOOD BY AUTOMATED COUNT: 15.2 % (ref 11.6–15.1)
GFR SERPL CREATININE-BSD FRML MDRD: 46 ML/MIN/1.73SQ M
GLUCOSE SERPL-MCNC: 112 MG/DL (ref 65–140)
GLUCOSE UR STRIP-MCNC: NEGATIVE MG/DL
HCT VFR BLD AUTO: 39.5 % (ref 34.8–46.1)
HGB BLD-MCNC: 12.5 G/DL (ref 11.5–15.4)
HGB UR QL STRIP.AUTO: NEGATIVE
IMM GRANULOCYTES # BLD AUTO: 0.01 THOUSAND/UL (ref 0–0.2)
IMM GRANULOCYTES NFR BLD AUTO: 0 % (ref 0–2)
KETONES UR STRIP-MCNC: NEGATIVE MG/DL
LEUKOCYTE ESTERASE UR QL STRIP: ABNORMAL
LYMPHOCYTES # BLD AUTO: 1.56 THOUSANDS/ΜL (ref 0.6–4.47)
LYMPHOCYTES NFR BLD AUTO: 28 % (ref 14–44)
MCH RBC QN AUTO: 31.1 PG (ref 26.8–34.3)
MCHC RBC AUTO-ENTMCNC: 31.6 G/DL (ref 31.4–37.4)
MCV RBC AUTO: 98 FL (ref 82–98)
MONOCYTES # BLD AUTO: 0.35 THOUSAND/ΜL (ref 0.17–1.22)
MONOCYTES NFR BLD AUTO: 6 % (ref 4–12)
NEUTROPHILS # BLD AUTO: 3.49 THOUSANDS/ΜL (ref 1.85–7.62)
NEUTS SEG NFR BLD AUTO: 63 % (ref 43–75)
NITRITE UR QL STRIP: POSITIVE
NON-SQ EPI CELLS URNS QL MICRO: ABNORMAL /HPF
NRBC BLD AUTO-RTO: 0 /100 WBCS
PH UR STRIP.AUTO: 6 [PH]
PHOSPHATE SERPL-MCNC: 3.2 MG/DL (ref 2.3–4.1)
PLATELET # BLD AUTO: 165 THOUSANDS/UL (ref 149–390)
PMV BLD AUTO: 11 FL (ref 8.9–12.7)
POTASSIUM SERPL-SCNC: 3.9 MMOL/L (ref 3.5–5.3)
PROT SERPL-MCNC: 7.8 G/DL (ref 6.4–8.4)
PROT UR STRIP-MCNC: NEGATIVE MG/DL
PROT UR-MCNC: 9 MG/DL
PROT/CREAT UR: 0.1 MG/G{CREAT} (ref 0–0.1)
PTH-INTACT SERPL-MCNC: 51.1 PG/ML (ref 18.4–80.1)
RBC # BLD AUTO: 4.02 MILLION/UL (ref 3.81–5.12)
RBC #/AREA URNS AUTO: ABNORMAL /HPF
SODIUM SERPL-SCNC: 140 MMOL/L (ref 135–147)
SP GR UR STRIP.AUTO: 1.01 (ref 1–1.03)
UROBILINOGEN UR QL STRIP.AUTO: 0.2 E.U./DL
WBC # BLD AUTO: 5.54 THOUSAND/UL (ref 4.31–10.16)
WBC #/AREA URNS AUTO: ABNORMAL /HPF

## 2022-09-26 PROCEDURE — 80053 COMPREHEN METABOLIC PANEL: CPT | Performed by: INTERNAL MEDICINE

## 2022-09-26 PROCEDURE — 83970 ASSAY OF PARATHORMONE: CPT | Performed by: INTERNAL MEDICINE

## 2022-09-26 PROCEDURE — 85025 COMPLETE CBC W/AUTO DIFF WBC: CPT | Performed by: INTERNAL MEDICINE

## 2022-09-26 PROCEDURE — 84165 PROTEIN E-PHORESIS SERUM: CPT

## 2022-09-26 PROCEDURE — 84156 ASSAY OF PROTEIN URINE: CPT | Performed by: INTERNAL MEDICINE

## 2022-09-26 PROCEDURE — 82570 ASSAY OF URINE CREATININE: CPT | Performed by: INTERNAL MEDICINE

## 2022-09-26 PROCEDURE — 36415 COLL VENOUS BLD VENIPUNCTURE: CPT | Performed by: INTERNAL MEDICINE

## 2022-09-26 PROCEDURE — 83521 IG LIGHT CHAINS FREE EACH: CPT

## 2022-09-26 PROCEDURE — 84100 ASSAY OF PHOSPHORUS: CPT | Performed by: INTERNAL MEDICINE

## 2022-09-26 PROCEDURE — 81001 URINALYSIS AUTO W/SCOPE: CPT | Performed by: INTERNAL MEDICINE

## 2022-09-26 PROCEDURE — 99205 OFFICE O/P NEW HI 60 MIN: CPT | Performed by: INTERNAL MEDICINE

## 2022-09-26 PROCEDURE — 82306 VITAMIN D 25 HYDROXY: CPT | Performed by: INTERNAL MEDICINE

## 2022-09-26 NOTE — PATIENT INSTRUCTIONS
Do not take Hyzaar on the day of surgery or the day prior    Target fluid intake is 64 oz daily    Labs today and prior to next appointment in 4 months

## 2022-09-26 NOTE — LETTER
September 26, 2022     [address], [address] [address]    Patient: Kenney Nava   YOB: 1950   Date of Visit: 9/26/2022       Dear Dr Tiffany Roberts:    Thank you for referring Kenney Nava to me for evaluation  Below are my notes for this consultation  If you have questions, please do not hesitate to call me  I look forward to following your patient along with you  Sincerely,        Rashmi Kraft MD        CC: No Recipients  Rashmi Kraft MD  9/26/2022  2:19 PM  Incomplete  NEPHROLOGY OFFICE CONSULT  Kenney Nava 81 y o  female MRN: [id number]    Encounter: [id number] DATE: 9/26/2022    REASON FOR VISIT: Kenney Nava is a 81 y  o female who was referred by [facility] for evaluation  Luke sU HPI:    This is a 81 y o  F with PMH of CKD IIIb, chronic NSAIDs use, DJD, JOSEPH, obesity, PAD s/p fem pop bypass, dyslipidemia who is referred to Renal clinic by her Orthopedics physician for Renal clearance prior to undergoing right hip replacement  Patient was made aware elevated creatinine this past summer  However, upon review of patient's labs in the [facility], noted eGFR in 46s with S creatinine of 1 08 in Jan 2022  Patient admitted to be consuming NSAIDs on a daily basis and only stopped using them this past summer when informed on elevated renal parameters  Admits to normal BP readings when checked while on Hyzaar  Admits to 48-64 oz of water daily  Patient is scheduled for right hip replacement next week           PAST MEDICAL HISTORY:  Past Medical History:   Diagnosis Date    Allergic Childhood    Anxiety 2019    Arthritis     Asthma Childhood    CPAP (continuous positive airway pressure) dependence     pt states was tested for VILMA, and ruled that she doesnt have it, however oxygen levels drop during sleep so CPAP was recommended,is not yet using it    Disease of thyroid gland Approx 2000    Diverticulitis of colon 2000    Heart murmur     Hyperlipidemia     Hypertension  approx    Obesity Childhood    PAD (peripheral artery disease) ([facility] [address] )        PAST SURGICAL HISTORY:  Past Surgical History:   Procedure Laterality Date    APPENDECTOMY  About      SECTION   &     COLON SURGERY  About     HYSTERECTOMY  About        SOCIAL HISTORY:  Social History     Substance and Sexual Activity   Alcohol Use Not Currently     Social History     Substance and Sexual Activity   Drug Use Never     Social History     Tobacco Use   Smoking Status Never Smoker   Smokeless Tobacco Never Used       FAMILY HISTORY:  Family History   Problem Relation Age of Onset    Depression Mother     Thrombosis Mother     Arthritis Mother     Cancer Mother     Anxiety disorder Mother     Heart disease Father         I    Cancer Brother     Dementia Brother     Heart disease Brother        ALLERGY:  Allergies   Allergen Reactions    Sulfamethoxazole-Trimethoprim Rash       MEDICATIONS:    Current Outpatient Medications:     albuterol (PROVENTIL HFA,VENTOLIN HFA) 90 mcg/act inhaler, Inhale 2 puffs every 4 (four) hours as needed, Disp: , Rfl:     ALPRAZolam (XANAX) 1 mg tablet, Take 0 25 mg by mouth as needed 1/2-1 as needed tid, Disp: , Rfl:     ammonium lactate (LAC-HYDRIN) 12 % cream, Apply topically as needed for dry skin, Disp: , Rfl:     ascorbic acid (VITAMIN C) 500 MG tablet, Take 1 tablet (500 mg total) by mouth 2 (two) times a day, Disp: 60 tablet, Rfl: 1    aspirin (ECOTRIN LOW STRENGTH) 81 mg EC tablet, Take 81 mg by mouth in the morning, Disp: , Rfl:     atorvastatin (LIPITOR) 40 mg tablet, Take 1 tablet (40 mg total) by mouth daily, Disp: 90 tablet, Rfl: 1    clopidogrel (PLAVIX) 75 mg tablet, Take 1 tablet (75 mg total) by mouth daily, Disp: 90 tablet, Rfl: 0    enoxaparin (LOVENOX) 40 mg/0 4 mL, Inject 0 4 mL (40 mg total) under the skin daily for 28 days, Disp: 11 2 mL, Rfl: 0    ferrous sulfate 324 (65 Fe) mg, Take 1 tablet (324 mg total) by mouth 2 (two) times a day before meals, Disp: 60 tablet, Rfl: 0    fluticasone (FLONASE) 50 mcg/act nasal spray, 2 sprays into each nostril daily as needed, Disp: , Rfl:     fluticasone-vilanterol (BREO ELLIPTA) 200-25 MCG/INH inhaler, Inhale 1 puff daily, Disp: , Rfl:     folic acid (FOLVITE) 019 mcg tablet, Take 1 tablet (400 mcg total) by mouth daily, Disp: 30 tablet, Rfl: 0    gabapentin (NEURONTIN) 100 mg capsule, Take 200 mg by mouth once a week, Disp: , Rfl:     levothyroxine (Synthroid) 150 mcg tablet, Take 1 tablet (150 mcg total) by mouth daily in the early morning, Disp: 90 tablet, Rfl: 0    Lidocaine-Adhesive Sheets (LidoPure Patch) 5 % KIT, Apply topically, Disp: , Rfl:     losartan-hydrochlorothiazide (HYZAAR) 100-25 MG per tablet, Take 1 tablet by mouth daily, Disp: , Rfl:     montelukast (SINGULAIR) 10 mg tablet, Take 10 mg by mouth daily as needed, Disp: , Rfl:     Multiple Vitamin (multivitamin) tablet, Take 1 tablet by mouth daily, Disp: 30 tablet, Rfl: 0    polyethylene glycol (MIRALAX) 17 g packet, Take 17 g by mouth daily, Disp: , Rfl:     Restasis 0 05 % ophthalmic emulsion, , Disp: , Rfl:     verapamil (VERELAN PM) 360 MG 24 hr capsule, Take 360 mg by mouth in the morning, Disp: , Rfl:     REVIEW OF SYSTEMS:    Review of Systems   Constitutional: Negative  HENT: Negative  Eyes: Negative  Respiratory: Negative  Cardiovascular: Negative  Gastrointestinal: Negative  Endocrine: Negative  Genitourinary: Negative  Musculoskeletal: Positive for arthralgias  Skin: Negative  Allergic/Immunologic: Negative  Neurological: Negative  Hematological: Negative  All other systems reviewed and are negative        PHYSICAL EXAM:  Vitals:    09/26/22 1325   BP: 132/80   BP Location: Left arm   Patient Position: Sitting   Cuff Size: Large   Pulse: 98   Resp: 16   Temp: 97 7 °F (36 5 °C)   TempSrc: Temporal   SpO2: 98%   Weight: 108 kg (238 lb 6 4 oz) Height: 5' 6" (1 676 m)     Body mass index is 38 48 kg/m²  Physical Exam  Constitutional:       Appearance: She is well-developed  She is obese  HENT:      Head: Normocephalic and atraumatic  Eyes:      Pupils: Pupils are equal, round, and reactive to light  Cardiovascular:      Rate and Rhythm: Normal rate and regular rhythm  Heart sounds: Normal heart sounds  Pulmonary:      Effort: Pulmonary effort is normal    Abdominal:      General: Bowel sounds are normal       Palpations: Abdomen is soft  Musculoskeletal:         General: Normal range of motion  Cervical back: Neck supple  Skin:     General: Skin is warm  Neurological:      Mental Status: She is alert and oriented to person, place, and time  LAB RESULTS:  Results for orders placed or performed in visit on 09/09/22   Type and screen   Result Value Ref Range    ABO Grouping AB     Rh Factor Negative     Antibody Screen Negative     Specimen Expiration Date [id number]          ASSESSMENT and PLAN:  Debra Antoine was seen today for medical clearance  Diagnoses and all orders for this visit:    Stage 3a chronic kidney disease ([facility] [address] )  -     Albumin; Standing  -     Calcium; Standing  -     CBC and differential; Standing  -     Comprehensive metabolic panel; Standing  -     Phosphorus; Standing  -     Protein / creatinine ratio, urine; Standing  -     PTH, intact; Standing  -     Urinalysis with microscopic; Standing  -     Vitamin D 25 hydroxy; Standing  -     US kidney and bladder; Future  -     Immunoglobulin free LT chains blood; Future  -     Protein electrophoresis, serum;  Future  -     Albumin  -     Calcium  -     CBC and differential  -     Comprehensive metabolic panel  -     Phosphorus  -     Protein / creatinine ratio, urine  -     PTH, intact  -     Urinalysis with microscopic  -     Vitamin D 25 hydroxy    Primary osteoarthritis of one hip, left  -     Ambulatory Referral to Nephrology      81 F with PMH of hypertension, DJD, CKD IIIb, dyslipidemia, obesity, JOSEPH who presents to renal clinic for clearance prior to right hip replacement  1) Renal optimization prior to hip replacement: patient to undergo non cardiac surgery and hip replacement next week  Baseline Creatinine appears to be around 1 3 mg/dl with eGFR in 30s  Noted progression of CKD over the last year especially while on Losartan and NSAIDs taken together resulting in loss of autoregulation and hence ATN  Patient to hold Hyzaar for at least 24 hours before the surgery  Ingestion of 16 oz of water on the night prior to surgery  Avoidance of intra op hypotension recommended  Avoidance of IV Toradol or other PO NSAIDs in the daniel op setting  Otherwise patient is cleared from my end to undergo right hip replacement next week  2) Chronic Kidney Disease stage IIIb: Etiology likely chronic NSAIDs use, hypertension induced nephrosclerosis + age related nephron loss  Baseline Creatinine is 1 2-1 3 mg/dl  Obtain Renal US to evaluate renal parenchyma    3) Hypertension due to CKD: BP is acceptable while on Hyzaar and Verapamil  Low salt diet recommended    4) Secondary hyperparathyroidism of renal origin: PTHi, 25 OH Vitamin D, Calcium and Phosphorus levels will be checked    5) Anemia due to CKD: Hemoglobin is on target at 11 7 g/dl    I have spent 57 minutes with Patient  today in which greater than 50% of this time was spent in counseling/coordination of care regarding Diagnostic results, Prognosis, Risks and benefits of tx options, Intructions for management, Importance of tx compliance, Risk factor reductions and Impressions  Larry Anne

## 2022-09-26 NOTE — PROGRESS NOTES
NEPHROLOGY OFFICE CONSULT  Avinash Medrano 81 y o  female MRN: [id number]    Encounter: [id number] DATE: 2022    REASON FOR VISIT: Avinash Medrano is a 81 y  o female who was referred by [facility] for evaluation  Ye Welch HPI:    This is a 81 y o  F with PMH of CKD IIIb, chronic NSAIDs use, DJD, JOSEPH, obesity, PAD s/p fem pop bypass, dyslipidemia who is referred to Renal clinic by her Orthopedics physician for Renal clearance prior to undergoing right hip replacement  Patient was made aware elevated creatinine this past summer  However, upon review of patient's labs in the [facility], noted eGFR in 46s with S creatinine of 1 08 in 2022  Patient admitted to be consuming NSAIDs on a daily basis and only stopped using them this past summer when informed on elevated renal parameters  Admits to normal BP readings when checked while on Hyzaar  Admits to 48-64 oz of water daily  Patient is scheduled for right hip replacement next week           PAST MEDICAL HISTORY:  Past Medical History:   Diagnosis Date    Allergic Childhood    Anxiety 2019    Arthritis     Asthma Childhood    CPAP (continuous positive airway pressure) dependence     pt states was tested for VILMA, and ruled that she doesnt have it, however oxygen levels drop during sleep so CPAP was recommended,is not yet using it    Disease of thyroid gland Approx     Diverticulitis of colon     Heart murmur     Hyperlipidemia     Hypertension  approx    Obesity Childhood    PAD (peripheral artery disease) ([address] )        PAST SURGICAL HISTORY:  Past Surgical History:   Procedure Laterality Date    APPENDECTOMY  About      SECTION   & 18    COLON SURGERY  About     HYSTERECTOMY  About        SOCIAL HISTORY:  Social History     Substance and Sexual Activity   Alcohol Use Not Currently     Social History     Substance and Sexual Activity   Drug Use Never     Social History     Tobacco Use   Smoking Status Never Smoker Smokeless Tobacco Never Used       FAMILY HISTORY:  Family History   Problem Relation Age of Onset    Depression Mother     Thrombosis Mother     Arthritis Mother     Cancer Mother     Anxiety disorder Mother     Heart disease Father         I    Cancer Brother     Dementia Brother     Heart disease Brother        ALLERGY:  Allergies   Allergen Reactions    Sulfamethoxazole-Trimethoprim Rash       MEDICATIONS:    Current Outpatient Medications:     albuterol (PROVENTIL HFA,VENTOLIN HFA) 90 mcg/act inhaler, Inhale 2 puffs every 4 (four) hours as needed, Disp: , Rfl:     ALPRAZolam (XANAX) 1 mg tablet, Take 0 25 mg by mouth as needed 1/2-1 as needed tid, Disp: , Rfl:     ammonium lactate (LAC-HYDRIN) 12 % cream, Apply topically as needed for dry skin, Disp: , Rfl:     ascorbic acid (VITAMIN C) 500 MG tablet, Take 1 tablet (500 mg total) by mouth 2 (two) times a day, Disp: 60 tablet, Rfl: 1    aspirin (ECOTRIN LOW STRENGTH) 81 mg EC tablet, Take 81 mg by mouth in the morning, Disp: , Rfl:     atorvastatin (LIPITOR) 40 mg tablet, Take 1 tablet (40 mg total) by mouth daily, Disp: 90 tablet, Rfl: 1    clopidogrel (PLAVIX) 75 mg tablet, Take 1 tablet (75 mg total) by mouth daily, Disp: 90 tablet, Rfl: 0    enoxaparin (LOVENOX) 40 mg/0 4 mL, Inject 0 4 mL (40 mg total) under the skin daily for 28 days, Disp: 11 2 mL, Rfl: 0    ferrous sulfate 324 (65 Fe) mg, Take 1 tablet (324 mg total) by mouth 2 (two) times a day before meals, Disp: 60 tablet, Rfl: 0    fluticasone (FLONASE) 50 mcg/act nasal spray, 2 sprays into each nostril daily as needed, Disp: , Rfl:     fluticasone-vilanterol (BREO ELLIPTA) 200-25 MCG/INH inhaler, Inhale 1 puff daily, Disp: , Rfl:     folic acid (FOLVITE) 428 mcg tablet, Take 1 tablet (400 mcg total) by mouth daily, Disp: 30 tablet, Rfl: 0    gabapentin (NEURONTIN) 100 mg capsule, Take 200 mg by mouth once a week, Disp: , Rfl:     levothyroxine (Synthroid) 150 mcg tablet, Take 1 tablet (150 mcg total) by mouth daily in the early morning, Disp: 90 tablet, Rfl: 0    Lidocaine-Adhesive Sheets (LidoPure Patch) 5 % KIT, Apply topically, Disp: , Rfl:     losartan-hydrochlorothiazide (HYZAAR) 100-25 MG per tablet, Take 1 tablet by mouth daily, Disp: , Rfl:     montelukast (SINGULAIR) 10 mg tablet, Take 10 mg by mouth daily as needed, Disp: , Rfl:     Multiple Vitamin (multivitamin) tablet, Take 1 tablet by mouth daily, Disp: 30 tablet, Rfl: 0    polyethylene glycol (MIRALAX) 17 g packet, Take 17 g by mouth daily, Disp: , Rfl:     Restasis 0 05 % ophthalmic emulsion, , Disp: , Rfl:     verapamil (VERELAN PM) 360 MG 24 hr capsule, Take 360 mg by mouth in the morning, Disp: , Rfl:     REVIEW OF SYSTEMS:    Review of Systems   Constitutional: Negative  HENT: Negative  Eyes: Negative  Respiratory: Negative  Cardiovascular: Negative  Gastrointestinal: Negative  Endocrine: Negative  Genitourinary: Negative  Musculoskeletal: Positive for arthralgias  Skin: Negative  Allergic/Immunologic: Negative  Neurological: Negative  Hematological: Negative  All other systems reviewed and are negative  PHYSICAL EXAM:  Vitals:    09/26/22 1325   BP: 132/80   BP Location: Left arm   Patient Position: Sitting   Cuff Size: Large   Pulse: 98   Resp: 16   Temp: 97 7 °F (36 5 °C)   TempSrc: Temporal   SpO2: 98%   Weight: 108 kg (238 lb 6 4 oz)   Height: 5' 6" (1 676 m)     Body mass index is 38 48 kg/m²  Physical Exam  Constitutional:       Appearance: She is well-developed  She is obese  HENT:      Head: Normocephalic and atraumatic  Eyes:      Pupils: Pupils are equal, round, and reactive to light  Cardiovascular:      Rate and Rhythm: Normal rate and regular rhythm  Heart sounds: Normal heart sounds  Pulmonary:      Effort: Pulmonary effort is normal    Abdominal:      General: Bowel sounds are normal       Palpations: Abdomen is soft  Musculoskeletal:         General: Normal range of motion  Cervical back: Neck supple  Skin:     General: Skin is warm  Neurological:      Mental Status: She is alert and oriented to person, place, and time  LAB RESULTS:  Results for orders placed or performed in visit on 09/09/22   Type and screen   Result Value Ref Range    ABO Grouping AB     Rh Factor Negative     Antibody Screen Negative     Specimen Expiration Date [id number]          ASSESSMENT and PLAN:  Maricarmen Chinchilla was seen today for medical clearance  Diagnoses and all orders for this visit:    Stage 3a chronic kidney disease ([facility] [address] )  -     Albumin; Standing  -     Calcium; Standing  -     CBC and differential; Standing  -     Comprehensive metabolic panel; Standing  -     Phosphorus; Standing  -     Protein / creatinine ratio, urine; Standing  -     PTH, intact; Standing  -     Urinalysis with microscopic; Standing  -     Vitamin D 25 hydroxy; Standing  -     US kidney and bladder; Future  -     Immunoglobulin free LT chains blood; Future  -     Protein electrophoresis, serum; Future  -     Albumin  -     Calcium  -     CBC and differential  -     Comprehensive metabolic panel  -     Phosphorus  -     Protein / creatinine ratio, urine  -     PTH, intact  -     Urinalysis with microscopic  -     Vitamin D 25 hydroxy    Primary osteoarthritis of one hip, left  -     Ambulatory Referral to Nephrology      81 F with PMH of hypertension, DJD, CKD IIIb, dyslipidemia, obesity, JOSEPH who presents to renal clinic for clearance prior to right hip replacement  1) Renal optimization prior to hip replacement: patient to undergo non cardiac surgery and hip replacement next week  Baseline Creatinine appears to be around 1 3 mg/dl with eGFR in 30s  Noted progression of CKD over the last year especially while on Losartan and NSAIDs taken together resulting in loss of autoregulation and hence ATN    Patient to hold Hyzaar for at least 24 hours before the surgery  Ingestion of 16 oz of water on the night prior to surgery  Avoidance of intra op hypotension recommended  Avoidance of IV Toradol or other PO NSAIDs in the daniel op setting  Otherwise patient is cleared from my end to undergo right hip replacement next week  2) Chronic Kidney Disease stage IIIb: Etiology likely chronic NSAIDs use, hypertension induced nephrosclerosis + age related nephron loss  Baseline Creatinine is 1 2-1 3 mg/dl  Obtain Renal US to evaluate renal parenchyma    3) Hypertension due to CKD: BP is acceptable while on Hyzaar and Verapamil  Low salt diet recommended    4) Secondary hyperparathyroidism of renal origin: PTHi, 25 OH Vitamin D, Calcium and Phosphorus levels will be checked    5) Anemia due to CKD: Hemoglobin is on target at 11 7 g/dl    I have spent 57 minutes with Patient  today in which greater than 50% of this time was spent in counseling/coordination of care regarding Diagnostic results, Prognosis, Risks and benefits of tx options, Intructions for management, Importance of tx compliance, Risk factor reductions and Impressions  Laure Rico

## 2022-09-27 ENCOUNTER — TELEPHONE (OUTPATIENT)
Dept: ADMINISTRATIVE | Facility: OTHER | Age: 72
End: 2022-09-27

## 2022-09-27 DIAGNOSIS — N30.00 ACUTE CYSTITIS WITHOUT HEMATURIA: Primary | ICD-10-CM

## 2022-09-27 LAB
KAPPA LC FREE SER-MCNC: 32.7 MG/L (ref 3.3–19.4)
KAPPA LC FREE/LAMBDA FREE SER: 1.65 {RATIO} (ref 0.26–1.65)
LAMBDA LC FREE SERPL-MCNC: 19.8 MG/L (ref 5.7–26.3)

## 2022-09-27 RX ORDER — CEPHALEXIN 250 MG/1
250 CAPSULE ORAL EVERY 12 HOURS SCHEDULED
Qty: 6 CAPSULE | Refills: 0 | Status: SHIPPED | OUTPATIENT
Start: 2022-09-27 | End: 2022-09-30

## 2022-09-27 NOTE — TELEPHONE ENCOUNTER
----- Message from Hope Donohue sent at 9/27/2022  9:36 AM EDT -----  Regarding: care gap request  09/27/22 9:36 AM    Hello, our patient attached above has had Medicare AWV completed/performed   Please assist in updating the patient chart by making an External outreach to [facility] located in [facility] The date of service is 01/2022      Thank you,  Hope Else  [facility] [id number] [facility]

## 2022-09-29 DIAGNOSIS — E03.9 ACQUIRED HYPOTHYROIDISM: ICD-10-CM

## 2022-09-30 RX ORDER — LEVOTHYROXINE SODIUM 0.15 MG/1
150 TABLET ORAL
Qty: 90 TABLET | Refills: 0 | Status: SHIPPED | OUTPATIENT
Start: 2022-09-30

## 2022-10-03 DIAGNOSIS — F41.9 ANXIETY: Primary | ICD-10-CM

## 2022-10-03 NOTE — TELEPHONE ENCOUNTER
T/c from pt -   Pt Is scheduled on 10/05/2022 for a sx  PT AWARE OF YOU ARE OUT OF THE OFFICE TODAY  Does she needs to be evaluated in office for her anxiety, stress and depression ? Pt had Xanax in the past - and is asking if you could sent some refill in for her - pt is having a difficult family time (her  left her and some other family concerns)  PDMP REVIEWED : YES   PAIN MANAGEMENT ON FILE : YES  08/09/2022       1 [id number] 04/21/2022 04/19/2022 traMADol HCL (Tablet) 90 0 30 100 MG 30 0 [facility]  Medicare 00 / 0 [place]    1 [id number] 04/13/2022 04/13/2022 traMADol HCL (Tablet) 30 0 10 50 MG 15 0 [facility]  Medicare 00 / 0 [place]    1 [id number] 03/03/2022 03/03/2022 traMADol HCL (Tablet) 28 0 7 50 MG 20 0 [facility]   Medicare 00 / 0 [place]    1 [id number] 01/10/2022 01/10/2022 ALPRAZolam (Tablet) 30 0 10 0 25 MG NA [facility]             Please advise

## 2022-10-03 NOTE — TELEPHONE ENCOUNTER
Upon review of the In Basket request and the patient's chart, initial outreach has been made via telephone call, please see Contacts section for details       Thank you  Celia Pike

## 2022-10-04 ENCOUNTER — OFFICE VISIT (OUTPATIENT)
Dept: VASCULAR SURGERY | Facility: CLINIC | Age: 72
End: 2022-10-04
Payer: MEDICARE

## 2022-10-04 VITALS
DIASTOLIC BLOOD PRESSURE: 76 MMHG | WEIGHT: 225 LBS | HEART RATE: 98 BPM | HEIGHT: 66 IN | TEMPERATURE: 98.2 F | SYSTOLIC BLOOD PRESSURE: 100 MMHG | BODY MASS INDEX: 36.16 KG/M2

## 2022-10-04 DIAGNOSIS — I73.9 PERIPHERAL ARTERIAL DISEASE WITH HISTORY OF REVASCULARIZATION (HCC): Primary | ICD-10-CM

## 2022-10-04 DIAGNOSIS — N18.32 STAGE 3B CHRONIC KIDNEY DISEASE (HCC): ICD-10-CM

## 2022-10-04 DIAGNOSIS — E66.01 CLASS 3 SEVERE OBESITY WITHOUT SERIOUS COMORBIDITY WITH BODY MASS INDEX (BMI) OF 40.0 TO 44.9 IN ADULT, UNSPECIFIED OBESITY TYPE (HCC): ICD-10-CM

## 2022-10-04 DIAGNOSIS — I15.9 SECONDARY HYPERTENSION: ICD-10-CM

## 2022-10-04 DIAGNOSIS — I70.403: ICD-10-CM

## 2022-10-04 DIAGNOSIS — Z98.890 PERIPHERAL ARTERIAL DISEASE WITH HISTORY OF REVASCULARIZATION (HCC): Primary | ICD-10-CM

## 2022-10-04 PROCEDURE — 99214 OFFICE O/P EST MOD 30 MIN: CPT | Performed by: PHYSICIAN ASSISTANT

## 2022-10-04 RX ORDER — ALPRAZOLAM 0.25 MG/1
0.25 TABLET ORAL 3 TIMES DAILY PRN
Qty: 30 TABLET | Refills: 0 | Status: SHIPPED | OUTPATIENT
Start: 2022-10-04

## 2022-10-04 NOTE — TELEPHONE ENCOUNTER
If she is having increase in symptoms related to anxiety/depression, then she needs to come to office for an evaluation     Thank you

## 2022-10-04 NOTE — PROGRESS NOTES
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 (HCC)  -     VAS lower limb arterial duplex, complete bilateral; Future  -     VAS ABDOMINAL AORTA/ILIACS; WITH MICHELE'S; Future  -status post left popliteal to distal posterior tibial bypass graft (ankle) 2019 at outside hospital  -significant bilateral lower extremity arterial disease  -limited by L hip pain    -KALE 7/20/22     R 0 44/49/31; no evidence of LE arterial occlusive disease in fem-pop arteries; distal PT artery  L MICHELE not done/ 120/77; AK pop-distal posterior bypass graft patent with >75% stenosis at distal anastomosis     Plan:  Patient is planning to undergo total hip replacement tomorrow  She has no new or unstable leg or foot symptoms  She is holding aspirin and Plavix for planned surgery  After surgery, she will be on enoxaparin 40 mg subcutaneously once daily  Given her significant lower extremity arterial disease and distal left lower extremity bypass, it is recommended that she resume antiplatelets as soon as safely possible after surgery  If orthopedic surgeon has concerns about dual antiplatelet therapy, would at least resume clopidogrel as soon as possible to maintain bypass patency  Limit time off of antiplatelets     -continue with medical therapy as discussed  -activity as tolerated; cardiac healthy diet and weight loss  -wear good protective footwear; skin care to feet; check feet daily for any wounds or changes  -follow-up in 3 months for re-evaluation or sooner if needed       Additional diagnoses:     Aortic stenosis  -asymptomatic  -mild on ZACHARY 2019 at outside facility  -recommend eventual cardiology follow-up    Class 3 severe obesity without serious comorbidity with body mass index (BMI) of 40 0 to 44 9 in adult, unspecified obesity type ([address] )    Secondary hypertension    Stage 3b chronic kidney disease ([address] ) Subjective:      Patient ID: Yeyo Bates is a 81 y o  female  Patient w/ previous h/o L popliteal -> posterior tibial bypass artery bypass in 2019 at outside facility presents today to review KALE done 7/20  Patient has difficulty walking secondary to hip pain  She is scheduled for L hip replacement surgery tomorrow, 10/5/22  HPI    Yeyo Bates 72 y/oF obesity (BMI 42 5), liver cirrhosis/JOSEPH, diverticulitis, Hx colonic polyps, asthma, Htn, HLD, hypothyroidism, peripheral arterial disease s/p L popliteal to distal PT artery bypass graft (7/31/2019) s/p bypass graft angioplasty x 2 (1/20/21, 8/12/20) who presents to  vascular for initial consultation  She has been followed by [place] and has now relocated to the [place] [address] and she is looking to transfer her care to [place] [id number]  Mrs Ree Butler gives history of left second toe ulceration in 2019 which developed osteo and was amputated  In that setting, she required left lower extremity bypass  Since bypass she is had 2 interventions for recurrent stenosis to the graft and was told that there is "scar tissue " Last duplex in [place] was 01/03/2022  There was noted to be recurrent area of stenosis of the distal popliteal to PT bypass graft with good flow proximally  There was also noted to be anterior tibial artery disease  She has been maintained on aspirin, clopidogrel and high-intensity statin therapy since her bypass  Patient gives no history of claudication  Currently her functional status is somewhat limited due to left hip pain which occurs with certain positions and activity  She is hoping to have her hip injected  We discussed symptoms of claudication rest pain that would be concerning for which she should be seen  We discussed the benefits of a regular progressive walking program   Hopefully, after she has her hip injected she will be able to engage in a walking program   No CP or SOB  She is a lifelong nonsmoker  Family history is noncontributory  10/4/22:   Patient was seen 3 months ago for initial consult and transfer of care  She returns to review recent vascular testing  She is scheduled for total hip replacement tomorrow  No new symptoms since she was last seen  She reports that she is had a lot of pain in the left hip at rest and with activity which is been holding her back from walking  At her level of activity she is had no thigh or calf claudication  She has no foot pain  She is no ischemic rest pain or foot wounds  She reports chronic neuropathy affecting both heels when she has a sheet over the feet which improves by removing the sheet  She is taking gabapentin for neuropathic pain  We reviewed her KALE which shows a stenosis beyond the left femoral to distal PT bypass which was seen back in January at other facility  The right lower extremity MICHELE is low at 0 44 with metatarsal pressure 49 and great toe pressure 31 which are below the level of healing  The left metatarsal pressure is 120 great toe pressure is 77 which are within the healing range  KALE 7/20/22   FINDINGS:     Right                                   Impression  PSV (cm/s)    Common Femoral Artery                                      107    Prox Profunda                                               65    Prox SFA                                                    61    Mid SFA                                                     54    Dist SFA                                                    78    Proximal Pop                                                47    Distal Pop                                                  49    Dist Post Tibial                        Occluded             0    Dist  Ant   Tibial                                           41    Dist Peroneal                                               48       Left                                    Impression  PSV (cm/s)    Inflow Anastomosis (Pop-Distal Graft)                       88    Proximal Graft (Pop-Distal Graft)                           52    Middle Graft (Pop-Distal Graft)                             63    Distal Graft (Pop-Distal Graft)                             59    Outflow Anastomosis (Pop-Distal Graft)  >75%               311    Common Femoral Artery                                       67    Prox Profunda                                               44    Prox SFA                                                    55    Mid SFA                                                     53    Dist SFA                                                    57    Dist Post Tibial                                           119            CONCLUSION:     Impression:  RIGHT LOWER LIMB:  No evidence of significant lower extremity arterial occlusive disease in the  femoral and popliteal arteries  The distal posterior tibial artery is occluded  Ankle/Brachial index:  0 44  which is in the ischemic disease category  PVR/ PPG tracings are dampened  Metatarsal pressure of 49mmHg  Great toe pressure of 31mmHg, within the healing range  LEFT LOWER LIMB:  The above knee popliteal artery to distal posterior bypass graft is patent with  a >75% stenosis of the distal anastomosis  Ankle/Brachial index:  deferred due to distal graft placement  PVR/ PPG tracings are dampened  Metatarsal pressure of 120mmHg  Great toe pressure of 77mmHg, within the healing range  KALE L LE 1/3/22 (outside facility, report only)  FINDINGS: There is good inflow at the common femoral artery and good flow through the SFA and popliteal vessel  There is a popliteal to distal posterior tibial artery bypass graft  At the level of the distal anastomosis is is well patent with phasic waveforms  Distally we identify a recurrent stenosis at the level of the distal anastomosis  There continues to be flow with progressive distal disease of the anterior tibial artery     IMPRESSION: There appears to be a recurrent area of stenosis at the distal popliteal to posterior tibial artery bypass graft  Proximally there is good flow  Progressive disease of the anterior tibial artery from proximal to distal  This appears similar  KALE 8/25/21 (outside facility, report only)  FINDINGS: There is good multiphasic inflow through the common femoral and femoral arteries  There is a graft from the popliteal artery to the distal posterior tibial artery  Previous imaging had shown a developing stenosis at the distal aspect  Currently there are good waveforms and velocities throughout the graft with no evidence of stenosis identified  Significant trifurcation disease persists  The following portions of the patient's history were reviewed and updated as appropriate: allergies, current medications, past family history, past medical history, past social history, past surgical history and problem list     Review of Systems   Constitutional: Negative  HENT: Negative  Eyes: Negative  Respiratory: Negative  Cardiovascular: Negative  Gastrointestinal: Negative  Endocrine: Negative  Genitourinary: Negative  Musculoskeletal: Positive for arthralgias and gait problem  Skin: Negative  Allergic/Immunologic: Negative  Hematological: Negative  Psychiatric/Behavioral: Negative  Objective:    /76 (BP Location: Right arm, Patient Position: Sitting)   Pulse 98   Temp 98 2 °F (36 8 °C) (Tympanic)   Ht 5' 6" (1 676 m)   Wt 102 kg (225 lb)   BMI 36 32 kg/m²      Physical Exam  Vitals and nursing note reviewed  Constitutional:       Appearance: She is well-developed  She is obese  HENT:      Head: Normocephalic and atraumatic  Eyes:      Pupils: Pupils are equal, round, and reactive to light  Neck:      Thyroid: No thyromegaly  Vascular: No JVD  Trachea: Trachea normal    Cardiovascular:      Rate and Rhythm: Normal rate and regular rhythm        Pulses:           Carotid pulses are 2+ on the right side and 2+ on the left side  Radial pulses are 2+ on the right side and 2+ on the left side  Dorsalis pedis pulses are detected w/ Doppler on the right side and detected w/ Doppler on the left side  Posterior tibial pulses are detected w/ Doppler on the left side  Heart sounds: Normal heart sounds, S1 normal and S2 normal  No murmur heard  No friction rub  No gallop  Comments:   Unable to palpate femoral pulses which may be body habitus  No significant LE edema  Feet are warm and well-perfused  Cap refill is about 2 secs  No wounds  L 2nd toe absent  Distal LLE bypass at the PT with good biphasic signal  Faint L DP pulse  Pulmonary:      Effort: Pulmonary effort is normal  No accessory muscle usage or respiratory distress  Breath sounds: Normal breath sounds  No wheezing or rales  Abdominal:      General: Bowel sounds are normal  There is no distension  Palpations: Abdomen is soft  Tenderness: There is no abdominal tenderness  Comments: Obese  Non-palp, non-tender Ao   Musculoskeletal:         General: No deformity  Normal range of motion  Cervical back: Neck supple  Right lower leg: Edema present  Left lower leg: Edema present  Skin:     General: Skin is warm and dry  Findings: No lesion or rash  Nails: There is no clubbing  Neurological:      Mental Status: She is alert and oriented to person, place, and time  Comments: Grossly normal    Psychiatric:         Behavior: Behavior is cooperative  I have reviewed and made appropriate changes to the review of systems input by the medical assistant      Vitals:    10/04/22 1000   BP: 100/76   BP Location: Right arm   Patient Position: Sitting   Pulse: 98   Temp: 98 2 °F (36 8 °C)   TempSrc: Tympanic   Weight: 102 kg (225 lb)   Height: 5' 6" (1 676 m)       Patient Active Problem List   Diagnosis    Primary osteoarthritis of one hip, left    Peripheral arterial disease with history of revascularization ([place] [address] )    Hypertension    Acquired absence of other left toe(s) ([place] [address] )    Class 3 severe obesity without serious comorbidity with body mass index (BMI) of 40 0 to 44 9 in adult, unspecified obesity type ([place] [address] )    Liver cirrhosis secondary to JOSEPH (nonalcoholic steatohepatitis) ([place] [address] )    Acquired hypothyroidism    Diverticulitis    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 ([place] [address] )    History of diverticulitis    Stage 3b chronic kidney disease ([place] [address] )       Past Surgical History:   Procedure Laterality Date    APPENDECTOMY  About      SECTION   &     COLON SURGERY  About     HYSTERECTOMY  About        Family History   Problem Relation Age of Onset    Depression Mother     Thrombosis Mother    Sahil Daubs Arthritis Mother     Cancer Mother     Anxiety disorder Mother     Heart disease Father         I    Cancer Brother     Dementia Brother     Heart disease Brother        Social History     Socioeconomic History    Marital status: /Civil Union     Spouse name: Not on file    Number of children: Not on file    Years of education: Not on file    Highest education level: Not on file   Occupational History    Not on file   Tobacco Use    Smoking status: Never Smoker    Smokeless tobacco: Never Used   Vaping Use    Vaping Use: Never used   Substance and Sexual Activity    Alcohol use: Not Currently    Drug use: Never    Sexual activity: Not Currently     Partners: Female     Birth control/protection: Diaphragm, None   Other Topics Concern    Not on file   Social History Narrative    Not on file     Social Determinants of Health     Financial Resource Strain: Not on file   Food Insecurity: Not on file   Transportation Needs: Not on file   Physical Activity: Not on file   Stress: Not on file   Social Connections: Not on file   Intimate Partner Violence: Not on file   Housing Stability: Not on file       Allergies Allergen Reactions    Sulfamethoxazole-Trimethoprim Rash         Current Outpatient Medications:     albuterol (PROVENTIL HFA,VENTOLIN HFA) 90 mcg/act inhaler, Inhale 2 puffs every 4 (four) hours as needed, Disp: , Rfl:     ALPRAZolam (XANAX) 0 25 mg tablet, Take 1 tablet (0 25 mg total) by mouth 3 (three) times a day as needed for anxiety 1/2-1 as needed tid, Disp: 30 tablet, Rfl: 0    ammonium lactate (LAC-HYDRIN) 12 % cream, Apply topically as needed for dry skin, Disp: , Rfl:     ascorbic acid (VITAMIN C) 500 MG tablet, Take 1 tablet (500 mg total) by mouth 2 (two) times a day, Disp: 60 tablet, Rfl: 1    aspirin (ECOTRIN LOW STRENGTH) 81 mg EC tablet, Take 81 mg by mouth in the morning, Disp: , Rfl:     atorvastatin (LIPITOR) 40 mg tablet, Take 1 tablet (40 mg total) by mouth daily, Disp: 90 tablet, Rfl: 1    clopidogrel (PLAVIX) 75 mg tablet, Take 1 tablet (75 mg total) by mouth daily, Disp: 90 tablet, Rfl: 0    ferrous sulfate 324 (65 Fe) mg, Take 1 tablet (324 mg total) by mouth 2 (two) times a day before meals, Disp: 60 tablet, Rfl: 0    fluticasone (FLONASE) 50 mcg/act nasal spray, 2 sprays into each nostril daily as needed, Disp: , Rfl:     fluticasone-vilanterol (BREO ELLIPTA) 200-25 MCG/INH inhaler, Inhale 1 puff daily, Disp: , Rfl:     folic acid (FOLVITE) 522 mcg tablet, Take 1 tablet (400 mcg total) by mouth daily, Disp: 30 tablet, Rfl: 0    gabapentin (NEURONTIN) 100 mg capsule, Take 200 mg by mouth once a week, Disp: , Rfl:     levothyroxine (Synthroid) 150 mcg tablet, Take 1 tablet (150 mcg total) by mouth daily in the early morning, Disp: 90 tablet, Rfl: 0    Lidocaine-Adhesive Sheets (LidoPure Patch) 5 % KIT, Apply topically, Disp: , Rfl:     losartan-hydrochlorothiazide (HYZAAR) 100-25 MG per tablet, Take 1 tablet by mouth daily, Disp: , Rfl:     montelukast (SINGULAIR) 10 mg tablet, Take 10 mg by mouth daily as needed, Disp: , Rfl:     Multiple Vitamin (multivitamin) tablet, Take 1 tablet by mouth daily, Disp: 30 tablet, Rfl: 0    polyethylene glycol (MIRALAX) 17 g packet, Take 17 g by mouth daily, Disp: , Rfl:     Restasis 0 05 % ophthalmic emulsion, , Disp: , Rfl:     verapamil (VERELAN PM) 360 MG 24 hr capsule, Take 360 mg by mouth in the morning, Disp: , Rfl:     enoxaparin (LOVENOX) 40 mg/0 4 mL, Inject 0 4 mL (40 mg total) under the skin daily for 28 days (Patient not taking: Reported on 10/4/2022), Disp: 11 2 mL, Rfl: 0

## 2022-10-04 NOTE — PATIENT INSTRUCTIONS
Peripheral arterial disease  -status post left popliteal to distal posterior tibial bypass graft (ankle) 2019 at outside hospital  -significant bilateral lower extremity arterial disease  -limited by L hip pain    -right MICHELE is low at 0 44 with great toe pressure below healing at 31  -left lower extremity bypass is patent with distal stenosis    Plan:     Patient is planning to undergo total hip replacement tomorrow  She is holding aspirin and Plavix for planned surgery  After surgery, she will be on enoxaparin 40 mg subcutaneously once daily  Given her lower extremity arterial disease and distal bypass, recommend resuming antiplatelets as soon as safely possible after surgery    If orthopedic surgeon has concerns about dual antiplatelet therapy, would recommend at least resuming clopidogrel as soon as possible     -continue with medical therapy as discussed  -activity as tolerated  -wear good protective footwear; skin care to feet; check feet daily for any wounds or changes  -follow-up in 3 months for re-evaluation or sooner if needed        Aortic stenosis  -asymptomatic  -mild on ZACHARY 2019 at outside facility  -recommend eventual cardiology follow-up

## 2022-10-05 ENCOUNTER — HOSPITAL ENCOUNTER (OUTPATIENT)
Dept: RADIOLOGY | Facility: HOSPITAL | Age: 72
Discharge: HOME | DRG: 470 | End: 2022-10-05
Payer: MEDICARE

## 2022-10-05 ENCOUNTER — HOSPITAL ENCOUNTER (INPATIENT)
Facility: HOSPITAL | Age: 72
LOS: 1 days | Discharge: HOME | DRG: 470 | End: 2022-10-07
Attending: ORTHOPAEDIC SURGERY | Admitting: ORTHOPAEDIC SURGERY
Payer: MEDICARE

## 2022-10-05 ENCOUNTER — ANESTHESIA (OUTPATIENT)
Dept: PERIOP | Facility: HOSPITAL | Age: 72
DRG: 470 | End: 2022-10-05
Payer: MEDICARE

## 2022-10-05 ENCOUNTER — ANESTHESIA (OUTPATIENT)
Dept: ANESTHESIOLOGY | Facility: HOSPITAL | Age: 72
End: 2022-10-05

## 2022-10-05 ENCOUNTER — ANESTHESIA EVENT (OUTPATIENT)
Dept: ANESTHESIOLOGY | Facility: HOSPITAL | Age: 72
End: 2022-10-05

## 2022-10-05 DIAGNOSIS — E03.9 ACQUIRED HYPOTHYROIDISM: ICD-10-CM

## 2022-10-05 DIAGNOSIS — I73.9 PERIPHERAL ARTERIAL DISEASE WITH HISTORY OF REVASCULARIZATION (HCC): ICD-10-CM

## 2022-10-05 DIAGNOSIS — M16.12 PRIMARY OSTEOARTHRITIS OF ONE HIP, LEFT: ICD-10-CM

## 2022-10-05 DIAGNOSIS — K74.60 LIVER CIRRHOSIS SECONDARY TO NASH (NONALCOHOLIC STEATOHEPATITIS) (HCC): ICD-10-CM

## 2022-10-05 DIAGNOSIS — Z96.642 STATUS POST TOTAL HIP REPLACEMENT, LEFT: Primary | ICD-10-CM

## 2022-10-05 DIAGNOSIS — N18.32 STAGE 3B CHRONIC KIDNEY DISEASE (HCC): ICD-10-CM

## 2022-10-05 DIAGNOSIS — Z98.890 PERIPHERAL ARTERIAL DISEASE WITH HISTORY OF REVASCULARIZATION (HCC): ICD-10-CM

## 2022-10-05 DIAGNOSIS — K75.81 LIVER CIRRHOSIS SECONDARY TO NASH (NONALCOHOLIC STEATOHEPATITIS) (HCC): ICD-10-CM

## 2022-10-05 LAB
GLUCOSE SERPL-MCNC: 160 MG/DL (ref 65–140)
PROT SERPL-MCNC: 7.3 G/DL (ref 6.4–8.2)

## 2022-10-05 PROCEDURE — 73502 X-RAY EXAM HIP UNI 2-3 VIEWS: CPT

## 2022-10-05 PROCEDURE — 99223 1ST HOSP IP/OBS HIGH 75: CPT | Performed by: INTERNAL MEDICINE

## 2022-10-05 PROCEDURE — 82948 REAGENT STRIP/BLOOD GLUCOSE: CPT

## 2022-10-05 PROCEDURE — C1776 JOINT DEVICE (IMPLANTABLE): HCPCS | Performed by: ORTHOPAEDIC SURGERY

## 2022-10-05 PROCEDURE — NC001 PR NO CHARGE: Performed by: ORTHOPAEDIC SURGERY

## 2022-10-05 PROCEDURE — NC001 PR NO CHARGE: Performed by: PHYSICIAN ASSISTANT

## 2022-10-05 PROCEDURE — 27130 TOTAL HIP ARTHROPLASTY: CPT | Performed by: ORTHOPAEDIC SURGERY

## 2022-10-05 PROCEDURE — C1713 ANCHOR/SCREW BN/BN,TIS/BN: HCPCS | Performed by: ORTHOPAEDIC SURGERY

## 2022-10-05 PROCEDURE — 27130 TOTAL HIP ARTHROPLASTY: CPT | Performed by: PHYSICIAN ASSISTANT

## 2022-10-05 PROCEDURE — 0SRB02A REPLACEMENT OF LEFT HIP JOINT WITH METAL ON POLYETHYLENE SYNTHETIC SUBSTITUTE, UNCEMENTED, OPEN APPROACH: ICD-10-PCS | Performed by: ORTHOPAEDIC SURGERY

## 2022-10-05 DEVICE — PINNACLE HIP SOLUTIONS ALTRX POLYETHYLENE ACETABULAR LINER NEUTRAL 36MM ID 52MM OD
Type: IMPLANTABLE DEVICE | Site: HIP | Status: FUNCTIONAL
Brand: PINNACLE ALTRX

## 2022-10-05 DEVICE — M-SPEC METAL FEMORAL HEAD 12/14 TAPER DIAMETER 36MM +1.5: Type: IMPLANTABLE DEVICE | Site: HIP | Status: FUNCTIONAL

## 2022-10-05 DEVICE — PINNACLE POROCOAT ACETABULAR SHELL SECTOR II 52MM OD
Type: IMPLANTABLE DEVICE | Site: HIP | Status: FUNCTIONAL
Brand: PINNACLE POROCOAT

## 2022-10-05 DEVICE — ACTIS DUOFIX HIP PROSTHESIS (FEMORAL STEM 12/14 TAPER CEMENTLESS SIZE 6 HIGH COLLAR)  CE
Type: IMPLANTABLE DEVICE | Site: HIP | Status: FUNCTIONAL
Brand: ACTIS

## 2022-10-05 DEVICE — PINNACLE CANCELLOUS BONE SCREW 6.5MM X 25MM
Type: IMPLANTABLE DEVICE | Site: HIP | Status: FUNCTIONAL
Brand: PINNACLE

## 2022-10-05 RX ORDER — GABAPENTIN 100 MG/1
200 CAPSULE ORAL WEEKLY
Status: DISCONTINUED | OUTPATIENT
Start: 2022-10-05 | End: 2022-10-07 | Stop reason: HOSPADM

## 2022-10-05 RX ORDER — AMMONIUM LACTATE 12 G/100G
CREAM TOPICAL 2 TIMES DAILY PRN
Status: DISCONTINUED | OUTPATIENT
Start: 2022-10-05 | End: 2022-10-07 | Stop reason: HOSPADM

## 2022-10-05 RX ORDER — CEFAZOLIN SODIUM 1 G/3ML
INJECTION, POWDER, FOR SOLUTION INTRAMUSCULAR; INTRAVENOUS AS NEEDED
Status: DISCONTINUED | OUTPATIENT
Start: 2022-10-05 | End: 2022-10-05

## 2022-10-05 RX ORDER — NEOSTIGMINE METHYLSULFATE 1 MG/ML
INJECTION INTRAVENOUS AS NEEDED
Status: DISCONTINUED | OUTPATIENT
Start: 2022-10-05 | End: 2022-10-05

## 2022-10-05 RX ORDER — FENTANYL CITRATE/PF 50 MCG/ML
25 SYRINGE (ML) INJECTION
Status: DISCONTINUED | OUTPATIENT
Start: 2022-10-05 | End: 2022-10-05 | Stop reason: HOSPADM

## 2022-10-05 RX ORDER — MIDAZOLAM HYDROCHLORIDE 2 MG/2ML
INJECTION, SOLUTION INTRAMUSCULAR; INTRAVENOUS AS NEEDED
Status: DISCONTINUED | OUTPATIENT
Start: 2022-10-05 | End: 2022-10-05

## 2022-10-05 RX ORDER — ENOXAPARIN SODIUM 100 MG/ML
40 INJECTION SUBCUTANEOUS DAILY
Status: DISCONTINUED | OUTPATIENT
Start: 2022-10-06 | End: 2022-10-07 | Stop reason: HOSPADM

## 2022-10-05 RX ORDER — FENTANYL CITRATE 50 UG/ML
INJECTION, SOLUTION INTRAMUSCULAR; INTRAVENOUS AS NEEDED
Status: DISCONTINUED | OUTPATIENT
Start: 2022-10-05 | End: 2022-10-05

## 2022-10-05 RX ORDER — ASPIRIN 81 MG/1
81 TABLET ORAL DAILY
Status: DISCONTINUED | OUTPATIENT
Start: 2022-10-05 | End: 2022-10-07 | Stop reason: HOSPADM

## 2022-10-05 RX ORDER — DEXAMETHASONE SODIUM PHOSPHATE 4 MG/ML
8 INJECTION, SOLUTION INTRA-ARTICULAR; INTRALESIONAL; INTRAMUSCULAR; INTRAVENOUS; SOFT TISSUE ONCE AS NEEDED
Status: DISCONTINUED | OUTPATIENT
Start: 2022-10-05 | End: 2022-10-05 | Stop reason: HOSPADM

## 2022-10-05 RX ORDER — FERROUS SULFATE 325(65) MG
325 TABLET ORAL 2 TIMES DAILY WITH MEALS
Status: DISCONTINUED | OUTPATIENT
Start: 2022-10-05 | End: 2022-10-07 | Stop reason: HOSPADM

## 2022-10-05 RX ORDER — ALPRAZOLAM 0.25 MG/1
0.25 TABLET ORAL 3 TIMES DAILY PRN
Status: DISCONTINUED | OUTPATIENT
Start: 2022-10-05 | End: 2022-10-07 | Stop reason: HOSPADM

## 2022-10-05 RX ORDER — LANOLIN ALCOHOL/MO/W.PET/CERES
400 CREAM (GRAM) TOPICAL DAILY
Status: DISCONTINUED | OUTPATIENT
Start: 2022-10-06 | End: 2022-10-07 | Stop reason: HOSPADM

## 2022-10-05 RX ORDER — ASCORBIC ACID 500 MG
500 TABLET ORAL 2 TIMES DAILY
Status: DISCONTINUED | OUTPATIENT
Start: 2022-10-05 | End: 2022-10-07 | Stop reason: HOSPADM

## 2022-10-05 RX ORDER — CYCLOSPORINE 0.5 MG/ML
1 EMULSION OPHTHALMIC 2 TIMES DAILY
Status: DISCONTINUED | OUTPATIENT
Start: 2022-10-05 | End: 2022-10-07 | Stop reason: HOSPADM

## 2022-10-05 RX ORDER — HYDROMORPHONE HCL/PF 1 MG/ML
0.5 SYRINGE (ML) INJECTION EVERY 2 HOUR PRN
Status: DISCONTINUED | OUTPATIENT
Start: 2022-10-05 | End: 2022-10-07 | Stop reason: HOSPADM

## 2022-10-05 RX ORDER — LEVOTHYROXINE SODIUM 0.07 MG/1
150 TABLET ORAL
Status: DISCONTINUED | OUTPATIENT
Start: 2022-10-06 | End: 2022-10-07 | Stop reason: HOSPADM

## 2022-10-05 RX ORDER — KETAMINE HCL IN NACL, ISO-OSM 100MG/10ML
SYRINGE (ML) INJECTION AS NEEDED
Status: DISCONTINUED | OUTPATIENT
Start: 2022-10-05 | End: 2022-10-05

## 2022-10-05 RX ORDER — POLYETHYLENE GLYCOL 3350 17 G/17G
17 POWDER, FOR SOLUTION ORAL DAILY
Status: DISCONTINUED | OUTPATIENT
Start: 2022-10-06 | End: 2022-10-07 | Stop reason: HOSPADM

## 2022-10-05 RX ORDER — ALBUTEROL SULFATE 90 UG/1
2 AEROSOL, METERED RESPIRATORY (INHALATION) EVERY 4 HOURS PRN
Status: DISCONTINUED | OUTPATIENT
Start: 2022-10-05 | End: 2022-10-07 | Stop reason: HOSPADM

## 2022-10-05 RX ORDER — CEFAZOLIN SODIUM 2 G/50ML
2000 SOLUTION INTRAVENOUS EVERY 8 HOURS
Status: COMPLETED | OUTPATIENT
Start: 2022-10-05 | End: 2022-10-06

## 2022-10-05 RX ORDER — DOCUSATE SODIUM 100 MG/1
100 CAPSULE, LIQUID FILLED ORAL 2 TIMES DAILY
Status: DISCONTINUED | OUTPATIENT
Start: 2022-10-05 | End: 2022-10-07 | Stop reason: HOSPADM

## 2022-10-05 RX ORDER — SODIUM CHLORIDE, SODIUM LACTATE, POTASSIUM CHLORIDE, CALCIUM CHLORIDE 600; 310; 30; 20 MG/100ML; MG/100ML; MG/100ML; MG/100ML
100 INJECTION, SOLUTION INTRAVENOUS CONTINUOUS
Status: DISCONTINUED | OUTPATIENT
Start: 2022-10-05 | End: 2022-10-07 | Stop reason: HOSPADM

## 2022-10-05 RX ORDER — EPHEDRINE SULFATE 50 MG/ML
INJECTION INTRAVENOUS AS NEEDED
Status: DISCONTINUED | OUTPATIENT
Start: 2022-10-05 | End: 2022-10-05

## 2022-10-05 RX ORDER — MONTELUKAST SODIUM 10 MG/1
10 TABLET ORAL DAILY PRN
Status: DISCONTINUED | OUTPATIENT
Start: 2022-10-05 | End: 2022-10-07 | Stop reason: HOSPADM

## 2022-10-05 RX ORDER — OXYCODONE HYDROCHLORIDE 10 MG/1
10 TABLET ORAL EVERY 4 HOURS PRN
Status: DISCONTINUED | OUTPATIENT
Start: 2022-10-05 | End: 2022-10-07 | Stop reason: HOSPADM

## 2022-10-05 RX ORDER — VERAPAMIL HYDROCHLORIDE 120 MG/1
360 TABLET, FILM COATED ORAL
Status: DISCONTINUED | OUTPATIENT
Start: 2022-10-06 | End: 2022-10-05

## 2022-10-05 RX ORDER — MAGNESIUM HYDROXIDE 1200 MG/15ML
LIQUID ORAL AS NEEDED
Status: DISCONTINUED | OUTPATIENT
Start: 2022-10-05 | End: 2022-10-05 | Stop reason: HOSPADM

## 2022-10-05 RX ORDER — ONDANSETRON 2 MG/ML
INJECTION INTRAMUSCULAR; INTRAVENOUS AS NEEDED
Status: DISCONTINUED | OUTPATIENT
Start: 2022-10-05 | End: 2022-10-05

## 2022-10-05 RX ORDER — VERAPAMIL HYDROCHLORIDE 120 MG/1
360 TABLET, FILM COATED ORAL
Status: DISCONTINUED | OUTPATIENT
Start: 2022-10-06 | End: 2022-10-05 | Stop reason: SDUPTHER

## 2022-10-05 RX ORDER — GLYCOPYRROLATE 0.2 MG/ML
INJECTION INTRAMUSCULAR; INTRAVENOUS AS NEEDED
Status: DISCONTINUED | OUTPATIENT
Start: 2022-10-05 | End: 2022-10-05

## 2022-10-05 RX ORDER — CEFAZOLIN SODIUM 2 G/50ML
2000 SOLUTION INTRAVENOUS ONCE
Status: DISCONTINUED | OUTPATIENT
Start: 2022-10-05 | End: 2022-10-05 | Stop reason: HOSPADM

## 2022-10-05 RX ORDER — ROCURONIUM BROMIDE 10 MG/ML
INJECTION, SOLUTION INTRAVENOUS AS NEEDED
Status: DISCONTINUED | OUTPATIENT
Start: 2022-10-05 | End: 2022-10-05

## 2022-10-05 RX ORDER — ATORVASTATIN CALCIUM 40 MG/1
40 TABLET, FILM COATED ORAL DAILY
Status: DISCONTINUED | OUTPATIENT
Start: 2022-10-06 | End: 2022-10-07 | Stop reason: HOSPADM

## 2022-10-05 RX ORDER — FLUTICASONE PROPIONATE 50 MCG
2 SPRAY, SUSPENSION (ML) NASAL DAILY PRN
Status: DISCONTINUED | OUTPATIENT
Start: 2022-10-05 | End: 2022-10-07 | Stop reason: HOSPADM

## 2022-10-05 RX ORDER — CLOPIDOGREL BISULFATE 75 MG/1
75 TABLET ORAL DAILY
Status: DISCONTINUED | OUTPATIENT
Start: 2022-10-06 | End: 2022-10-07 | Stop reason: HOSPADM

## 2022-10-05 RX ORDER — ONDANSETRON 2 MG/ML
4 INJECTION INTRAMUSCULAR; INTRAVENOUS ONCE AS NEEDED
Status: DISCONTINUED | OUTPATIENT
Start: 2022-10-05 | End: 2022-10-05 | Stop reason: HOSPADM

## 2022-10-05 RX ORDER — CHLORHEXIDINE GLUCONATE 0.12 MG/ML
15 RINSE ORAL ONCE
Status: COMPLETED | OUTPATIENT
Start: 2022-10-05 | End: 2022-10-05

## 2022-10-05 RX ORDER — ACETAMINOPHEN 325 MG/1
650 TABLET ORAL EVERY 6 HOURS PRN
Status: DISCONTINUED | OUTPATIENT
Start: 2022-10-05 | End: 2022-10-07 | Stop reason: HOSPADM

## 2022-10-05 RX ORDER — SODIUM CHLORIDE, SODIUM LACTATE, POTASSIUM CHLORIDE, CALCIUM CHLORIDE 600; 310; 30; 20 MG/100ML; MG/100ML; MG/100ML; MG/100ML
20 INJECTION, SOLUTION INTRAVENOUS CONTINUOUS
Status: DISCONTINUED | OUTPATIENT
Start: 2022-10-05 | End: 2022-10-05

## 2022-10-05 RX ORDER — HYDROMORPHONE HCL 110MG/55ML
PATIENT CONTROLLED ANALGESIA SYRINGE INTRAVENOUS AS NEEDED
Status: DISCONTINUED | OUTPATIENT
Start: 2022-10-05 | End: 2022-10-05

## 2022-10-05 RX ORDER — HYDROMORPHONE HCL/PF 1 MG/ML
0.4 SYRINGE (ML) INJECTION
Status: DISCONTINUED | OUTPATIENT
Start: 2022-10-05 | End: 2022-10-05 | Stop reason: HOSPADM

## 2022-10-05 RX ORDER — PROPOFOL 10 MG/ML
INJECTION, EMULSION INTRAVENOUS AS NEEDED
Status: DISCONTINUED | OUTPATIENT
Start: 2022-10-05 | End: 2022-10-05

## 2022-10-05 RX ORDER — OXYCODONE HYDROCHLORIDE 5 MG/1
5 TABLET ORAL EVERY 4 HOURS PRN
Status: DISCONTINUED | OUTPATIENT
Start: 2022-10-05 | End: 2022-10-07 | Stop reason: HOSPADM

## 2022-10-05 RX ADMIN — CEFAZOLIN SODIUM 2000 MG: 2 SOLUTION INTRAVENOUS at 22:54

## 2022-10-05 RX ADMIN — HYDROMORPHONE HYDROCHLORIDE 0.5 MG: 2 INJECTION, SOLUTION INTRAMUSCULAR; INTRAVENOUS; SUBCUTANEOUS at 17:01

## 2022-10-05 RX ADMIN — ONDANSETRON 4 MG: 2 INJECTION INTRAMUSCULAR; INTRAVENOUS at 13:18

## 2022-10-05 RX ADMIN — FENTANYL CITRATE 50 MCG: 50 INJECTION INTRAMUSCULAR; INTRAVENOUS at 16:21

## 2022-10-05 RX ADMIN — CHLORHEXIDINE GLUCONATE 15 ML: 1.2 SOLUTION ORAL at 10:56

## 2022-10-05 RX ADMIN — SODIUM CHLORIDE, SODIUM LACTATE, POTASSIUM CHLORIDE, AND CALCIUM CHLORIDE 20 ML/HR: .6; .31; .03; .02 INJECTION, SOLUTION INTRAVENOUS at 12:05

## 2022-10-05 RX ADMIN — EPHEDRINE SULFATE 10 MG: 50 INJECTION INTRAVENOUS at 13:54

## 2022-10-05 RX ADMIN — HYDROMORPHONE HYDROCHLORIDE 0.5 MG: 2 INJECTION, SOLUTION INTRAMUSCULAR; INTRAVENOUS; SUBCUTANEOUS at 16:38

## 2022-10-05 RX ADMIN — PROPOFOL 200 MG: 10 INJECTION, EMULSION INTRAVENOUS at 13:18

## 2022-10-05 RX ADMIN — NEOSTIGMINE METHYLSULFATE 4 MG: 1 INJECTION INTRAVENOUS at 16:38

## 2022-10-05 RX ADMIN — ROCURONIUM BROMIDE 10 MG: 50 INJECTION INTRAVENOUS at 13:44

## 2022-10-05 RX ADMIN — MIDAZOLAM 2 MG: 1 INJECTION INTRAMUSCULAR; INTRAVENOUS at 13:14

## 2022-10-05 RX ADMIN — HYDROMORPHONE HYDROCHLORIDE 0.5 MG: 1 INJECTION, SOLUTION INTRAMUSCULAR; INTRAVENOUS; SUBCUTANEOUS at 22:54

## 2022-10-05 RX ADMIN — GLYCOPYRROLATE 0.8 MG: 0.2 INJECTION, SOLUTION INTRAMUSCULAR; INTRAVENOUS at 16:38

## 2022-10-05 RX ADMIN — EPHEDRINE SULFATE 5 MG: 50 INJECTION INTRAVENOUS at 13:51

## 2022-10-05 RX ADMIN — ROCURONIUM BROMIDE 10 MG: 50 INJECTION INTRAVENOUS at 15:17

## 2022-10-05 RX ADMIN — FENTANYL CITRATE 50 MCG: 50 INJECTION INTRAMUSCULAR; INTRAVENOUS at 13:55

## 2022-10-05 RX ADMIN — TRANEXAMIC ACID 1000 MG: 100 INJECTION, SOLUTION INTRAVENOUS at 13:24

## 2022-10-05 RX ADMIN — HYDROMORPHONE HYDROCHLORIDE 0.5 MG: 1 INJECTION, SOLUTION INTRAMUSCULAR; INTRAVENOUS; SUBCUTANEOUS at 18:22

## 2022-10-05 RX ADMIN — SODIUM CHLORIDE, SODIUM LACTATE, POTASSIUM CHLORIDE, AND CALCIUM CHLORIDE: .6; .31; .03; .02 INJECTION, SOLUTION INTRAVENOUS at 13:24

## 2022-10-05 RX ADMIN — CEFAZOLIN 2000 MG: 1 INJECTION, POWDER, FOR SOLUTION INTRAMUSCULAR; INTRAVENOUS at 13:24

## 2022-10-05 RX ADMIN — Medication 25 MCG: at 17:26

## 2022-10-05 RX ADMIN — ROCURONIUM BROMIDE 10 MG: 50 INJECTION INTRAVENOUS at 14:51

## 2022-10-05 RX ADMIN — FENTANYL CITRATE 50 MCG: 50 INJECTION INTRAMUSCULAR; INTRAVENOUS at 14:51

## 2022-10-05 RX ADMIN — Medication 30 MG: at 13:30

## 2022-10-05 RX ADMIN — Medication 25 MCG: at 17:33

## 2022-10-05 RX ADMIN — Medication 25 MCG: at 17:42

## 2022-10-05 RX ADMIN — ROCURONIUM BROMIDE 50 MG: 50 INJECTION INTRAVENOUS at 13:18

## 2022-10-05 RX ADMIN — SODIUM CHLORIDE, SODIUM LACTATE, POTASSIUM CHLORIDE, AND CALCIUM CHLORIDE: .6; .31; .03; .02 INJECTION, SOLUTION INTRAVENOUS at 13:17

## 2022-10-05 RX ADMIN — FENTANYL CITRATE 50 MCG: 50 INJECTION INTRAMUSCULAR; INTRAVENOUS at 13:18

## 2022-10-05 RX ADMIN — OXYCODONE HYDROCHLORIDE 5 MG: 5 TABLET ORAL at 19:52

## 2022-10-05 RX ADMIN — ROCURONIUM BROMIDE 10 MG: 50 INJECTION INTRAVENOUS at 14:26

## 2022-10-05 RX ADMIN — PHENYLEPHRINE HYDROCHLORIDE 20 MCG/MIN: 10 INJECTION INTRAVENOUS at 13:51

## 2022-10-05 NOTE — CONSULTS
Internal Medicine  Consultation Note    Patient: Laurie Walter  Age/sex: 81 y o  female  Medical Record #: [id number]    Assessment/Plan    Status Post left Total HIP ARTHROPLASTY  • Continue post op pain control measures as prescribed  • Follow bowel regimen to help decrease narcotic induced constipation  •  Follow post operative hemoglobin with serial CBC and treat accordingly  • Monitor WBC and fever curve post op while encouraging use of incentive spirometer  • DVT prophylaxis in place and reviewed  HTN  • Hold hyzaar in the post operative setting to avoid hypotension/HELEN  • OK to resume on dc  • Add hydralazine as needed for SBP >160  • Monitor trend    CKD  • Level 3  • Baseline 1 2-1 4  • Avoid nephrotoxic medications  • Avoid hypotension in the post operative setting  • Monitor bmp    Hypothyroid  • Cont levothyroxine as prescribed    PAD  · S/p stents to LLE  · Resume ASA/Plavix  · Cont statin                  PRE-OP HGB LEVEL: 12 5    Subjective/ HPI: Laurie Walter was seen and examined  Hx of HIP pain failed out patient conservative measures  Elected to undergo total HIP arthroplasty We are asked to see patient for post op management of underlying medical co-morbidities as outlined above  Pt did well intra and post operatively with good hemodynamics  Pt currently comfortable and without any reported post op nausea  ROS:   A 10 point ROS was performed; negative except as noted above       Social History:    Substance Use History:   Social History     Substance and Sexual Activity   Alcohol Use Not Currently     Social History     Tobacco Use   Smoking Status Never Smoker   Smokeless Tobacco Never Used     Social History     Substance and Sexual Activity   Drug Use Never       Family History:    Family History   Problem Relation Age of Onset   • Depression Mother    • Thrombosis Mother    • Arthritis Mother    • Cancer Mother    • Anxiety disorder Mother    • Heart disease Father         I • Cancer Brother    • Dementia Brother    • Heart disease Brother          Review of Scheduled Meds:  Current Facility-Administered Medications   Medication Dose Route Frequency Provider Last Rate   • acetaminophen  650 mg Oral Q6H PRN James Stone PA-C     • albuterol  2 puff Inhalation Q4H PRN James Stone PA-C     • ALPRAZolam  0 25 mg Oral TID PRN James Stone PA-C     • ammonium lactate   Topical BID PRN James Stone PA-C     • ascorbic acid  500 mg Oral BID James Stone PA-C     • aspirin  81 mg Oral Daily James Stone PA-C     • [START ON 10/6/2022] atorvastatin  40 mg Oral Daily James Stone PA-C     • cefazolin  2,000 mg Intravenous Q8H Grey Blanco PA-C     • [START ON 10/6/2022] clopidogrel  75 mg Oral Daily James Stone PA-C     • cycloSPORINE  1 drop Both Eyes BID James Stone PA-C     • docusate sodium  100 mg Oral BID James Stone PA-C     • [START ON 10/6/2022] enoxaparin  40 mg Subcutaneous Daily James Stone PA-C     • ferrous sulfate  325 mg Oral BID With Meals James Stone PA-C     • fluticasone  2 spray Nasal Daily PRN James Stone PA-C     • [START ON 10/6/2022] fluticasone-vilanterol  1 puff Inhalation Daily James Stone PA-C     • [START ON 19/2/3051] folic acid  683 mcg Oral Daily James Stone PA-C     • gabapentin  200 mg Oral Weekly James Stone PA-C     • HYDROmorphone  0 5 mg Intravenous Q2H PRN James Stone PA-C     • lactated ringers  1,000 mL Intravenous Once PRN James Stone PA-C      And   • lactated ringers  1,000 mL Intravenous Once PRN James Stone PA-C     • lactated ringers  100 mL/hr Intravenous Continuous Grey Blanco PA-C 100 mL/hr (10/05/22 1827)   • [START ON 10/6/2022] levothyroxine  150 mcg Oral Early Morning Grey Blanco PA-C     • montelukast  10 mg Oral Daily PRN James Stone PA-C     • [START ON 10/6/2022] multivitamin stress formula  1 tablet Oral Daily Asmita Jefferson PA-C     • oxyCODONE  10 mg Oral Q4H PRN Asmita Jefferson PA-C     • oxyCODONE  5 mg Oral Q4H PRN Asmita Jefferson PA-C     • [START ON 10/6/2022] polyethylene glycol  17 g Oral Daily Asmita Jefferson PA-C     • sodium chloride  1,000 mL Intravenous Once PRN Asmita Jefferson PA-C      And   • sodium chloride  1,000 mL Intravenous Once PRN Asmita Jefferson PA-C     • [START ON 10/6/2022] verapamil  360 mg Oral Early Morning Asmita Jefferson PA-C         Labs: Invalid input(s): LABGLOM, CMP               Results from last 7 days   Lab Units 10/05/22  1711   POC GLUCOSE mg/dl 160*       No results found for: Andriette Fey, WOUNDCULT, SPUTUMCULTUR    Input and Output Summary (last 24 hours): Intake/Output Summary (Last 24 hours) at 10/5/2022 2027  Last data filed at 10/5/2022 1627  Gross per 24 hour   Intake 1400 ml   Output 300 ml   Net 1100 ml       Imaging:     No orders to display       *Labs /Radiology studiesLabs reviewed  *Medications reviewed and reconciled as needed  *Please refer to order section for additional ordered labs studies  *Case discussed with primary attending during morning huddle case rounds    Vitals:   Temp (24hrs), Av 7 °F (36 5 °C), Min:97 2 °F (36 2 °C), Max:98 1 °F (36 7 °C)    Temp:  [97 2 °F (36 2 °C)-98 1 °F (36 7 °C)] 97 6 °F (36 4 °C)  HR:  [] 79  Resp:  [12-17] 17  BP: (110-143)/(59-82) 124/70  SpO2:  [96 %-100 %] 99 %  Body mass index is 37 77 kg/m²       Physical Exam:   General Appearance: no distress, conversive  HEENT: PERRLA, conjuctiva normal; oropharynx clear; mucous membranes moist;   Neck:  Supple, no lymphadenopathy or thyromegaly  Lungs: CTA, normal respiratory effort, no retractions, expiratory effort normal  CV: regular rate and rhythm , PMI normal   ABD: soft non tender, no masses , no hepatic or splenomegaly  EXT: DP pulses intact, no lymphadenopathy, no edema ;  left HIP dressing in place  Skin: normal turgor, normal texture, no rash  Psych: affect normal, mood normal  Neuro: AAOx3          Invasive Devices  Report    Peripheral Intravenous Line  Duration           Peripheral IV 10/05/22 Dorsal (posterior); Right Forearm <1 day    Peripheral IV 10/05/22 Left Arm <1 day                   Code Status: No Order  Current Length of Stay: 0 day(s)    Total floor / unit time spent today 1 hour  Coordination of patient's care was performed in conjunction with primary service  Time invested included review of patient's labs, vitals, and management of their comorbidities with continued monitoring, examination of patient as well as answering patient questions, documenting her findings and creating progress note in electronic medical record,  ordering appropriate diagnostic testing  ** Please Note: Fluency Direct voice to text software may have been used in the creation of this document   Audio transcription errors may occur**

## 2022-10-05 NOTE — H&P
Orthopaedics Office Visit - Established Patient Visit     ASSESSMENT/PLAN:     Assessment:   Left hip osteoarthritis , refractory to nonop modalities       Plan:   - Discussed conservative and surgical intervention with patient at length  Patient opted for surgical intervention at this time   - Risks and benefits were discussed with patient at length  Procedure being performed:   left anterior total hip arthroplasty    informed consent was obtained at this time  - Cardiac, Vascular and PCP clearance needed prior to surgical intervention due to asthma, peripheral arterial disease, and cirrohis   - EKG, blood work needed for pre-surgical clearance  - Talk to PCP about weight management   - Follow-up 2 weeks post op, sutures out, XR       High risk for complications secondary to cirrhosis, PAD, obesity         To Do Next Visit:  weight check to see if BMI is below 40     _____________________________________________________  CHIEF COMPLAINT:      Chief Complaint   Patient presents with   • Left Hip - Follow-up            SUBJECTIVE:  Michelle East is a 81 y o  female who presents for a follow-up of left hip osteoarthritis  Patient was last seen on 6/7/2022 where a referral was placed for pain management to perform a cortisone injection of the L hip  Injection was received on 6/16/2022 with 35% pain relief for about 3 weeks and then the injection was no longer beneficial   Pain is managed with lidocaine patch or cream and Ibuprofen 400 mg every morning and PRN following this  Patient has been attending physical therapy with pain during physical therapy but she notes improvement  Patient reports she is no longer having back pain thanks to physical therapy  Patient uses ice as needed  Patient feels as though we need to discuss surgical intervention at this time   Patient has lost 10 lbs recently and is working on losing more weight        PAST MEDICAL HISTORY:  Medical History        Past Medical History:   Diagnosis Date   • Allergic Childhood   • Anxiety    • Asthma Childhood   • Disease of thyroid gland Approx    • Diverticulitis of colon    • Heart murmur     • Hypertension  approx   • Obesity Childhood            PAST SURGICAL HISTORY:  Surgical History         Past Surgical History:   Procedure Laterality Date   • APPENDECTOMY   About    •  SECTION    &    • COLON SURGERY   About    • HYSTERECTOMY   About             FAMILY HISTORY:  Family History         Family History   Problem Relation Age of Onset   • Depression Mother     • Thrombosis Mother     • Arthritis Mother     • Cancer Mother     • Anxiety disorder Mother     • Heart disease Father           I   • Dementia Brother     • Heart disease Brother     • Cancer Brother              SOCIAL HISTORY:  Social History           Tobacco Use   • Smoking status: Never Smoker   • Smokeless tobacco: Never Used   Vaping Use   • Vaping Use: Never used   Substance Use Topics   • Alcohol use: Not Currently   • Drug use: Never         MEDICATIONS:     Current Outpatient Medications:   •  albuterol (PROVENTIL HFA,VENTOLIN HFA) 90 mcg/act inhaler, Inhale 2 puffs every 4 (four) hours as needed, Disp: , Rfl:   •  ALPRAZolam (XANAX) 1 mg tablet, Take 0 25 mg by mouth as needed 1/2-1 as needed tid, Disp: , Rfl:   •  ammonium lactate (LAC-HYDRIN) 12 % cream, Apply topically as needed for dry skin, Disp: , Rfl:   •  aspirin (ECOTRIN LOW STRENGTH) 81 mg EC tablet, Take 81 mg by mouth in the morning, Disp: , Rfl:   •  atorvastatin (LIPITOR) 10 mg tablet, Take 40 mg by mouth daily, Disp: , Rfl:   •  clopidogrel (PLAVIX) 75 mg tablet, Take 75 mg by mouth daily, Disp: , Rfl:   •  fluticasone (FLONASE) 50 mcg/act nasal spray, 2 sprays into each nostril daily as needed, Disp: , Rfl:   •  fluticasone-vilanterol (BREO ELLIPTA) 200-25 MCG/INH inhaler, Inhale 1 puff daily, Disp: , Rfl:   •  gabapentin (NEURONTIN) 100 mg capsule, Take 200 mg by mouth once a week, Disp: , Rfl:   •  Ibuprofen 200 MG CAPS, Take 400 mg by mouth as needed, Disp: , Rfl:   •  levothyroxine (Synthroid) 150 mcg tablet, Take 1 tablet (150 mcg total) by mouth daily in the early morning, Disp: 90 tablet, Rfl: 0  •  Lidocaine-Adhesive Sheets (LidoPure Patch) 5 % KIT, Apply topically, Disp: , Rfl:   •  losartan-hydrochlorothiazide (HYZAAR) 100-25 MG per tablet, Take 1 tablet by mouth daily, Disp: , Rfl:   •  montelukast (SINGULAIR) 10 mg tablet, Take 10 mg by mouth daily as needed, Disp: , Rfl:   •  Multiple Vitamins-Minerals (Multi For Her 50+) TABS, Take 1 tablet by mouth daily, Disp: , Rfl:   •  Turmeric (QC TUMERIC COMPLEX PO), Take by mouth, Disp: , Rfl:   •  verapamil (VERELAN PM) 360 MG 24 hr capsule, Take 360 mg by mouth in the morning, Disp: , Rfl:      ALLERGIES:       Allergies   Allergen Reactions   • Sulfamethoxazole-Trimethoprim Rash         REVIEW OF SYSTEMS:  MSK: left hip osteoarthritis  Neuro: WNL  Pertinent items are otherwise noted in HPI  A comprehensive review of systems was otherwise negative      LABS:  HgA1c: No results found for: HGBA1C  BMP:         Lab Results   Component Value Date     CALCIUM 9 9 07/26/2022     K 3 7 07/26/2022     CO2 27 07/26/2022      07/26/2022     BUN 30 (H) 07/26/2022     CREATININE 1 44 (H) 07/26/2022      CBC: No components found for: CBC     _____________________________________________________  PHYSICAL EXAMINATION:  /82   Pulse 90   Temp 98 °F (36 7 °C) (Tympanic)   Resp 16   Ht 5' 6" (1 676 m)   Wt 106 kg (234 lb)   SpO2 98%   BMI 37 77 kg/m²   General: No acute distress, awake and alert  Psychiatric: Mood and affect appear appropriate  HEENT: Trachea Midline, No torticollis, no apparent facial trauma  Cardiovascular: No audible murmurs;  Extremities appear perfused  Pulmonary: No audible wheezing or stridor  Skin: No open lesions; see further details (if any) below     MUSCULOSKELETAL EXAMINATION:  Extremities:       Left Hip:  Patient is sitting comfortably in the exam chair with hip flexed to 90 degrees  Pain with internal rotation   Maintains external rotation  TTP over greater trochanter     able to perform straight leg raise  Able to perform seated hip flexion  Unable to resist hip flexion  Sensation normal to L2-S1 dermatomes   Extremity warm and well perfused         _____________________________________________________  STUDIES REVIEWED:  I personally reviewed the images and interpretation is as follows:  None performed today     PROCEDURES PERFORMED:

## 2022-10-05 NOTE — ANESTHESIA PREPROCEDURE EVALUATION
Procedure:  ARTHROPLASTY LEFT HIP TOTAL ANTERIOR (Left Hip)    Relevant Problems   CARDIO   (+) Hypertension      ENDO   (+) Acquired hypothyroidism      GI/HEPATIC   (+) Liver cirrhosis secondary to JOSEPH (nonalcoholic steatohepatitis) (HCC)      /RENAL   (+) Stage 3b chronic kidney disease (HCC)      MUSCULOSKELETAL   (+) Primary osteoarthritis of one hip, left      NEURO/PSYCH   (+) History of diverticulitis      HLD  Anxiety  PVD s/p LLE bypass       Physical Exam    Airway    Mallampati score:  I  TM Distance: >3 FB  Neck ROM: full     Dental   No notable dental hx     Cardiovascular      Pulmonary      Other Findings       Latest Reference Range & Units 09/26/22 15:01   Sodium 135 - 147 mmol/L 140   Potassium 3 5 - 5 3 mmol/L 3 9   Chloride 96 - 108 mmol/L 104   CO2 21 - 32 mmol/L 27   Anion Gap 4 - 13 mmol/L 9   BUN 5 - 25 mg/dL 21   Creatinine 0 60 - 1 30 mg/dL 1 19   Glucose, Random 65 - 140 mg/dL 112   Calcium 8 3 - 10 1 mg/dL 9 9   AST 5 - 45 U/L 16   ALT 12 - 78 U/L 20   Alkaline Phosphatase 46 - 116 U/L 67   Total Protein 6 4 - 8 4 g/dL 7 8   Albumin 3 5 - 5 0 g/dL 4 0   TOTAL BILIRUBIN 0 20 - 1 00 mg/dL 0 68   eGFR ml/min/1 73sq m 46   Phosphorus 2 3 - 4 1 mg/dL 3 2          Latest Reference Range & Units 09/26/22 15:01   WBC 4 31 - 10 16 Thousand/uL 5 54   Red Blood Cell Count 3 81 - 5 12 Million/uL 4 02   Hemoglobin 11 5 - 15 4 g/dL 12 5   HCT 34 8 - 46 1 % 39 5   MCV 82 - 98 fL 98   MCH 26 8 - 34 3 pg 31 1   MCHC 31 4 - 37 4 g/dL 31 6   RDW 11 6 - 15 1 % 15 2 (H)   Platelet Count 156 - 390 Thousands/uL 165   MPV 8 9 - 12 7 fL 11 0   nRBC /100 WBCs 0        Latest Reference Range & Units 09/09/22 15:45   PROTIME 11 6 - 14 5 seconds 13 5   POCT INR 0 84 - 1 19  1 05   PTT 23 - 37 seconds 24     EKG (9/2022)  Normal sinus rhythm  Possible Left atrial enlargement  Right bundle branch block  Inferior infarct , age undetermined  Cannot rule out Anterior infarct , age undetermined  No previous ECGs available      Anesthesia Plan  ASA Score- 3     Anesthesia Type- general with ASA Monitors  Additional Monitors:   Airway Plan: ETT  Comment: Npo after MN    Medical clearance reviewed: abnormal EKG but unchanged from 2019  Patient at high risk for HELEN due to decrease in GFR from 50's to 30's  Has not established GI care yet but stable and up-to-date on EGD/colonoscopy     Renal clearance reviewed  Plan Factors-Exercise tolerance (METS): <4 METS  Chart reviewed  Patient summary reviewed  Patient is not a current smoker  Obstructive sleep apnea risk education given perioperatively  Induction- intravenous  Postoperative Plan- Plan for postoperative opioid use  Planned trial extubation    Informed Consent- Anesthetic plan and risks discussed with patient  I personally reviewed this patient with the CRNA  Discussed and agreed on the Anesthesia Plan with the CRNA  Karrie Nissen

## 2022-10-05 NOTE — ANESTHESIA POSTPROCEDURE EVALUATION
Post-Op Assessment Note    CV Status:  Stable    Pain management: adequate     Mental Status:  Alert and awake   Hydration Status:  Euvolemic   PONV Controlled:  Controlled   Airway Patency:  Patent   Two or more mitigation strategies used for obstructive sleep apnea   Post Op Vitals Reviewed: Yes      Staff: Anesthesiologist, CRNA         No complications documented      BP   143/79   Temp   98   9Pulse  105   Resp   14   SpO2   98

## 2022-10-05 NOTE — OP NOTE
OPERATIVE REPORT  PATIENT NAME: Gabriel Jacques    :  1950  MRN: [id number]  Pt Location: [facility] OR ROOM 18    SURGERY DATE: 10/5/2022    Surgeon(s) and Role:     * Neto Mccormick MD - Primary     * Iveth Gomes PA-C - Assisting    Preop Diagnosis:  Primary osteoarthritis of one hip, left [M16 12]    Post-Op Diagnosis Codes:     * Primary osteoarthritis of one hip, left [M16 12]    Procedure(s) (LRB):  ARTHROPLASTY LEFT HIP TOTAL ANTERIOR (Left)    Procedure:  L anterior CAROLIN (CPT 35849)    Specimen(s):  * No specimens in log *    Estimated Blood Loss:   200 cc    Drains:  [REMOVED] Urethral Catheter Latex;Straight-tip 14 Fr  (Removed)   Number of days: 0       Anesthesia Type:   General    Operative Indications:  L hip OA refractory to nonop modalities    Operative Findings:  See below    Complications:   None    Implants: Depuy 52 mm Kake sector cup, 6 5 mm cannulated screw x1, neutral poly liner, 36 mm + 1 5 mm hi offset head/neck, Actis size 6 stem    Procedure and Technique:  Patient's operative site, laterality, procedure, consent were verified in the preoperative area and the patient was transitioned to the operating room  General anesthesia was provided by the anesthesia team, bilateral lower extremities were padded and wrapped, and the patient was transitioned to the [facility] table  Verification of preoperative images were obtained  The operative hip was prepped and draped in the usual sterile fashion  After time-out for safety, incision over the anterior aspect of the hip was made and standard anterior approach to the hip took place, taking care to protect the lateral femoral cutaneous nerve and cauterizing the lateral femoral circumflex artery branches  Once at the level of the capsule, capsule was incised  Retractors were then placed within the capsule and intracapsular tissue was excised    Using fluoroscopic imaging, the level of the neck cut was confirmed as a fingerbreadth above the lesser trochanter  The neck cut was then made with the saw and completed with osteotome, the femoral head was then removed  Labrum and pericapsular tissue was then removed with Bovie electrocautery  Acetabular reaming then took place, initially with medialization then reaming at the proper abduction angle and anteversion while position was verified under fluoroscopic imaging  The trial cup was then found to have satisfactory , irrigation with pulse lavage with sterile saline took place, and a Depuy Westerlo cup as listed above was then implanted with mallet blows and found to have satisfactory fit and stability  Drilling then took place in standard fashion at the posterior superior aspect of the hip for one screw  The neutral liner was then placed and impacted with gentle mallet blows  A tonsil was used to verify proper locking mechanism engagement, the liner was unable to be disengaged with pulling  The leg was extended and rotated to allow access to the femoral canal   Remainder of capsular release took place, taking care not to detach tendons from greater trochanter  Retractors were then placed, box osteotome was used at the native anteversion of the femur  Broaching then took place and press fit was found to be obtained at size listed above for the Actis stem  Calcar planer was then used to remove remaining bone  Trial implants were then trialed until hip stability was maintained  Limb lengths were found to be satisfactory and stability satisfactory with hip at neutral in external rotation, and in extension with external rotation  Trial components were then removed, the wound was irrigated with saline pulse lavage and final implants consisted of those listed above, the femoral stem was then implanted and advanced with gentle mallet blows with the impactor  The trunnion was cleaned and final head was placed and reduced into the acetabulum    Irrigation then took place with sterile saline and Irrisept irrigation which was eventually washed out of the wound  Available capsular tissue was repaired with ethibond suture  A portion of the rectus femoris that was released during the case was repaired with ethibond suture  The fascia was then repaired with #1 stratafix suture in a running fashion  Subcutaneous layer closure took place with 2-0 Vicryl suture in interrupted fashion  Skin layer closure took place with Monocryl, exofin dressing, steri strips, and mepilex with silver dressing  All final counts, including but not limited to instrument, sponge, needle counts were correct  I was present for the entire procedure  The patient was awakened, clinical leg lengths appeared grossly equal, and patient was transitioned to the PACU in stable condition  There were no immediate complications  There was no qualified resident available for the procedure  Critical assistance from Otilio Zeng PA-C was required for all components of the procedure including but not limited to positioning, soft tissue retraction, proper rotation of leg for anterior approach, closure  This was particularly important given the anterior approach as well as BMI of 37 77  The patient will be weight-bearing as tolerated on the operative extremity  They will perform physical therapy to restore joint function  Deep vein thrombosis prophylaxis will be in the form of Lovenox for 4 weeks    Follow-up in clinic in 2 weeks for incision check    Patient Disposition:  PACU         SIGNATURE: Chavez Carrera MD  DATE: October 5, 2022  TIME: 5:09 PM

## 2022-10-05 NOTE — ANESTHESIA PREPROCEDURE EVALUATION
Procedure:  ARTHROPLASTY LEFT HIP TOTAL ANTERIOR (Left Hip)    Relevant Problems   CARDIO   (+) Hypertension      ENDO   (+) Acquired hypothyroidism      GI/HEPATIC   (+) Liver cirrhosis secondary to JOSEPH (nonalcoholic steatohepatitis) (HCC)      /RENAL   (+) Stage 3b chronic kidney disease (HCC)      MUSCULOSKELETAL   (+) Primary osteoarthritis of one hip, left      HLD  Anxiety  PVD s/p LLE bypass           Latest Reference Range & Units 09/26/22 15:01   Sodium 135 - 147 mmol/L 140   Potassium 3 5 - 5 3 mmol/L 3 9   Chloride 96 - 108 mmol/L 104   CO2 21 - 32 mmol/L 27   Anion Gap 4 - 13 mmol/L 9   BUN 5 - 25 mg/dL 21   Creatinine 0 60 - 1 30 mg/dL 1 19   Glucose, Random 65 - 140 mg/dL 112   Calcium 8 3 - 10 1 mg/dL 9 9   AST 5 - 45 U/L 16   ALT 12 - 78 U/L 20   Alkaline Phosphatase 46 - 116 U/L 67   Total Protein 6 4 - 8 4 g/dL 7 8   Albumin 3 5 - 5 0 g/dL 4 0   TOTAL BILIRUBIN 0 20 - 1 00 mg/dL 0 68   eGFR ml/min/1 73sq m 46   Phosphorus 2 3 - 4 1 mg/dL 3 2          Latest Reference Range & Units 09/26/22 15:01   WBC 4 31 - 10 16 Thousand/uL 5 54   Red Blood Cell Count 3 81 - 5 12 Million/uL 4 02   Hemoglobin 11 5 - 15 4 g/dL 12 5   HCT 34 8 - 46 1 % 39 5   MCV 82 - 98 fL 98   MCH 26 8 - 34 3 pg 31 1   MCHC 31 4 - 37 4 g/dL 31 6   RDW 11 6 - 15 1 % 15 2 (H)   Platelet Count 442 - 390 Thousands/uL 165   MPV 8 9 - 12 7 fL 11 0   nRBC /100 WBCs 0        Latest Reference Range & Units 09/09/22 15:45   PROTIME 11 6 - 14 5 seconds 13 5   POCT INR 0 84 - 1 19  1 05   PTT 23 - 37 seconds 24     EKG (9/2022)  Normal sinus rhythm  Possible Left atrial enlargement  Right bundle branch block  Inferior infarct , age undetermined  Cannot rule out Anterior infarct , age undetermined  No previous ECGs available      Anesthesia Plan  ASA Score- 3     Anesthesia Type- general with ASA Monitors  Additional Monitors:   Airway Plan: ETT      Comment: Npo after MN    Medical clearance reviewed: abnormal EKG but unchanged from 2019  Patient at high risk for HELEN due to decrease in GFR from 50's to 30's  Has not established GI care yet but stable and up-to-date on EGD/colonoscopy     Renal clearance reviewed  Plan Factors-Exercise tolerance (METS): <4 METS  Chart reviewed  Patient summary reviewed  Induction- intravenous  Postoperative Plan-     Informed Consent- Anesthetic plan and risks discussed with patient  I personally reviewed this patient with the CRNA  Discussed and agreed on the Anesthesia Plan with the CRNA  Wanda Woodall

## 2022-10-06 LAB
ANION GAP SERPL CALCULATED.3IONS-SCNC: 5 MMOL/L (ref 4–13)
BUN SERPL-MCNC: 18 MG/DL (ref 5–25)
CALCIUM SERPL-MCNC: 8.6 MG/DL (ref 8.3–10.1)
CHLORIDE SERPL-SCNC: 105 MMOL/L (ref 96–108)
CO2 SERPL-SCNC: 26 MMOL/L (ref 21–32)
CREAT SERPL-MCNC: 1.09 MG/DL (ref 0.6–1.3)
DME PARACHUTE DELIVERY DATE REQUESTED: NORMAL
DME PARACHUTE DELIVERY NOTE: NORMAL
DME PARACHUTE ITEM DESCRIPTION: NORMAL
DME PARACHUTE ORDER STATUS: NORMAL
DME PARACHUTE SUPPLIER NAME: NORMAL
DME PARACHUTE SUPPLIER PHONE: NORMAL
ERYTHROCYTE [DISTWIDTH] IN BLOOD BY AUTOMATED COUNT: 14.7 % (ref 11.6–15.1)
GFR SERPL CREATININE-BSD FRML MDRD: 51 ML/MIN/1.73SQ M
GLUCOSE SERPL-MCNC: 145 MG/DL (ref 65–140)
HCT VFR BLD AUTO: 30 % (ref 34.8–46.1)
HGB BLD-MCNC: 9.6 G/DL (ref 11.5–15.4)
MCH RBC QN AUTO: 31.5 PG (ref 26.8–34.3)
MCHC RBC AUTO-ENTMCNC: 32 G/DL (ref 31.4–37.4)
MCV RBC AUTO: 98 FL (ref 82–98)
PLATELET # BLD AUTO: 138 THOUSANDS/UL (ref 149–390)
PMV BLD AUTO: 11.1 FL (ref 8.9–12.7)
POTASSIUM SERPL-SCNC: 4.1 MMOL/L (ref 3.5–5.3)
RBC # BLD AUTO: 3.05 MILLION/UL (ref 3.81–5.12)
SODIUM SERPL-SCNC: 136 MMOL/L (ref 135–147)
WBC # BLD AUTO: 8.57 THOUSAND/UL (ref 4.31–10.16)

## 2022-10-06 PROCEDURE — 97166 OT EVAL MOD COMPLEX 45 MIN: CPT

## 2022-10-06 PROCEDURE — 99215 OFFICE O/P EST HI 40 MIN: CPT | Performed by: INTERNAL MEDICINE

## 2022-10-06 PROCEDURE — 85027 COMPLETE CBC AUTOMATED: CPT | Performed by: PHYSICIAN ASSISTANT

## 2022-10-06 PROCEDURE — 97116 GAIT TRAINING THERAPY: CPT

## 2022-10-06 PROCEDURE — 99232 SBSQ HOSP IP/OBS MODERATE 35: CPT | Performed by: INTERNAL MEDICINE

## 2022-10-06 PROCEDURE — 97530 THERAPEUTIC ACTIVITIES: CPT

## 2022-10-06 PROCEDURE — 80048 BASIC METABOLIC PNL TOTAL CA: CPT | Performed by: PHYSICIAN ASSISTANT

## 2022-10-06 PROCEDURE — 97163 PT EVAL HIGH COMPLEX 45 MIN: CPT

## 2022-10-06 RX ADMIN — FERROUS SULFATE TAB 325 MG (65 MG ELEMENTAL FE) 325 MG: 325 (65 FE) TAB at 17:13

## 2022-10-06 RX ADMIN — CEFAZOLIN SODIUM 2000 MG: 2 SOLUTION INTRAVENOUS at 08:57

## 2022-10-06 RX ADMIN — VERAPAMIL HYDROCHLORIDE 360 MG: 180 TABLET ORAL at 08:51

## 2022-10-06 RX ADMIN — OXYCODONE HYDROCHLORIDE 10 MG: 10 TABLET ORAL at 23:22

## 2022-10-06 RX ADMIN — B-COMPLEX W/ C & FOLIC ACID TAB 1 TABLET: TAB at 08:50

## 2022-10-06 RX ADMIN — ENOXAPARIN SODIUM 40 MG: 40 INJECTION SUBCUTANEOUS at 05:49

## 2022-10-06 RX ADMIN — OXYCODONE HYDROCHLORIDE 10 MG: 10 TABLET ORAL at 06:28

## 2022-10-06 RX ADMIN — DOCUSATE SODIUM 100 MG: 100 CAPSULE, LIQUID FILLED ORAL at 17:13

## 2022-10-06 RX ADMIN — ASPIRIN 81 MG: 81 TABLET, COATED ORAL at 08:50

## 2022-10-06 RX ADMIN — OXYCODONE HYDROCHLORIDE AND ACETAMINOPHEN 500 MG: 500 TABLET ORAL at 08:50

## 2022-10-06 RX ADMIN — OXYCODONE HYDROCHLORIDE 10 MG: 10 TABLET ORAL at 17:13

## 2022-10-06 RX ADMIN — DOCUSATE SODIUM 100 MG: 100 CAPSULE, LIQUID FILLED ORAL at 08:50

## 2022-10-06 RX ADMIN — ATORVASTATIN CALCIUM 40 MG: 40 TABLET, FILM COATED ORAL at 08:50

## 2022-10-06 RX ADMIN — HYDROMORPHONE HYDROCHLORIDE 0.5 MG: 1 INJECTION, SOLUTION INTRAMUSCULAR; INTRAVENOUS; SUBCUTANEOUS at 08:57

## 2022-10-06 RX ADMIN — LEVOTHYROXINE SODIUM 150 MCG: 75 TABLET ORAL at 05:49

## 2022-10-06 RX ADMIN — OXYCODONE HYDROCHLORIDE AND ACETAMINOPHEN 500 MG: 500 TABLET ORAL at 17:12

## 2022-10-06 RX ADMIN — OXYCODONE HYDROCHLORIDE 10 MG: 10 TABLET ORAL at 13:06

## 2022-10-06 RX ADMIN — IRON SUCROSE 200 MG: 20 INJECTION, SOLUTION INTRAVENOUS at 12:08

## 2022-10-06 RX ADMIN — FERROUS SULFATE TAB 325 MG (65 MG ELEMENTAL FE) 325 MG: 325 (65 FE) TAB at 08:57

## 2022-10-06 RX ADMIN — FOLIC ACID TAB 400 MCG 400 MCG: 400 TAB at 08:50

## 2022-10-06 RX ADMIN — FLUTICASONE FUROATE AND VILANTEROL TRIFENATATE 1 PUFF: 200; 25 POWDER RESPIRATORY (INHALATION) at 08:52

## 2022-10-06 NOTE — PHYSICAL THERAPY NOTE
PHYSICAL THERAPY EVALUATION  NAME:  Michelle De La O  DATE: 10/06/22    AGE:   81 y o  Mrn:   [id number]  ADMIT DX:  Primary osteoarthritis of one hip, left [M16 12]    Past Medical History:   Diagnosis Date    Allergic Childhood    Anxiety 2019    Arthritis     Asthma Childhood    CPAP (continuous positive airway pressure) dependence     pt states was tested for VILMA, and ruled that she doesnt have it, however oxygen levels drop during sleep so CPAP was recommended,is not yet using it    Disease of thyroid gland Approx 2000    Diverticulitis of colon 2000    Heart murmur     Hyperlipidemia     Hypertension 1995 approx    Obesity Childhood    PAD (peripheral artery disease) ([address] )        Past Surgical History:   Procedure Laterality Date    APPENDECTOMY  About 2000    [address]  About 2000    HYSTERECTOMY  About 2000    HI TOTAL HIP ARTHROPLASTY Left 10/5/2022    Procedure: ARTHROPLASTY LEFT HIP TOTAL ANTERIOR;  Surgeon: Mindy Alfaro MD;  [facility];  Service: Orthopedics       Length Of Stay: 0    PHYSICAL THERAPY EVALUATION:        10/06/22 0830   Note Type   Note type Evaluation   Pain Assessment   Pain Assessment Tool 0-10   Pain Score 8   Pain Location/Orientation Orientation: Left; Location: Hip   Pain Onset/Description Onset: Ongoing;Frequency: Constant/Continuous; Descriptor: Aching   Effect of Pain on Daily Activities Increased pain with activity   Patient's Stated Pain Goal No pain   Hospital Pain Intervention(s) Ambulation/increased activity;Repositioned   Restrictions/Precautions   Weight Bearing Precautions Per Order Yes   LLE Weight Bearing Per Order WBAT   Other Precautions Chair Alarm; Bed Alarm;Multiple lines; Fall Risk;Pain   Home Living   Type of [address] One level;Stairs to enter with rails  ([address])   Home Equipment Walker;Cane   Additional Comments Patient reports living in a 1st floor apartment of her daughter's home    Patient states her family is able to assist her if needed   Prior Function   Level of [place] Independent with ADLs and functional mobility   Lives With Family   Receives Help From Family   ADL Assistance Independent   Falls in the last 6 months 0   Comments Patient reports the use of a single-point cane for ambulation prior to admission   General   Family/Caregiver Present No   Cognition   Overall Cognitive Status WFL   Arousal/Participation Alert   Orientation Level Oriented X4   Memory Within functional limits   Following Commands Follows one step commands without difficulty   RUE Assessment   RUE Assessment WFL   LUE Assessment   LUE Assessment WFL   RLE Assessment   RLE Assessment WFL   Strength RLE   RLE Overall Strength 4/5   LLE Assessment   LLE Assessment X  (AROM limited by pain)   Strength LLE   LLE Overall Strength 3-/5   Bed Mobility   Supine to Sit 4  Minimal assistance   Additional items Assist x 1; Increased time required;Verbal cues   Transfers   Sit to Stand 4  Minimal assistance   Additional items Assist x 1; Increased time required;Verbal cues   Stand to Sit 4  Minimal assistance   Additional items Assist x 1; Increased time required;Verbal cues   Additional Comments VC and TC needed for hand placement during transfers   Ambulation/Elevation   Gait pattern Short stride; Foward flexed; Inconsistent paulo; Excessively slow;Decreased L stance   Gait Assistance 4  Minimal assist   Assistive Device Rolling walker   Distance 12ft  (limited by pain, fatigue, dizziness)   Balance   Static Sitting Fair -   Static Standing Poor +   Ambulatory Poor +   Endurance Deficit   Endurance Deficit Yes   Endurance Deficit Description fatigue, pain   Activity Tolerance   Activity Tolerance Patient limited by fatigue;Patient limited by pain   Medical Staff [address] [facility] ,    Nurse Made Aware Patient appropriate to be seen and mobilized per nursing   Assessment   Prognosis Good   Problem List Decreased strength;Decreased range of motion;Decreased endurance; Impaired balance;Decreased mobility;Pain   Assessment Pt is 81 y o  female seen for PT evaluation at [facility] on 10/5/2022  Two pt identifiers were used to confirm  Pt presented for scheduled ARTHROPLASTY LEFT HIP TOTAL ANTERIOR (Left) which was performed on 10/05/2022    Pt  with a primary dx of:  Primary osteoarthritis of left hip s/p ARTHROPLASTY LEFT HIP TOTAL ANTERIOR (Left)  PT now consulted for assessment of mobility and d/c needs  Pt with Activity as tolerated orders  Pts current co morbidities affecting treatment include: Anxiety, asthma, disease of thyroid gland, HTN, and personal factors including steps to enter home  Pts current clinical presentation is Unstable/ Unpredictable (high complexity) due to Ongoing medical management for primary dx, Increased reliance on more restrictive AD compared to baseline, Decreased activity tolerance compared to baseline, Fall risk, Increased assistance needed from caregiver at current time, Continuous pulse oximetry monitoring , s/p surgical intervention    Upon evaluation, pt currently is requiring Min Ax1 for bed mobility; Min Ax1 for transfers and Min Ax1 for ambulation w/ RW   Patient reported increased dizziness with ambulation  Pt returned to seated position and BP was taken: 111/72  Dara Soulier Pt presents at PT eval functioning below baseline and currently w/ overall mobility deficits 2* to: BLE weakness, decreased ROM, impaired balance, decreased endurance, gait deviations, pain, decreased activity tolerance compared to baseline, fall risk  Pt currently at a fall risk 2* to impairments listed above  Based on the aforementioned PT evaluation, pt will continue to benefit from skilled Acute PT interventions to address stated impairments; to maximize functional mobility; for ongoing pt/ family training; and DME needs  At conclusion of PT session pt returned back in chair with phone and call bell within reach   Pt denies any further questions at this time  PT is currently recommending Home with increased family support and Outpatient PT  PT will continue to follow during hospital stay  Goals   Patient Goals " to have less pain"   STG Expiration Date 10/16/22   Short Term Goal #1 In 10 days pt will complete: 1) Bed mobility skills with mod I to increase safety and independence as well as decrease caregiver burden  2) Functional transfers with mod I to promote increased independence, safety, and QOL  3) Ambulate 150' using least restrictive AD with mod I without LOB and stable vitals so that pt can negotiate previous living environment safely and promote independence with functional mobility and return to PLOF  4) Stair training up/ down 8 step/s using rail/s with mod I so that pt can enter/negotiate previous living environment safely and decrease fall risk  5) Improve balance grades by 1/2 grade to increase safety with all mobility and decrease fall risk  6) Improve BLE strength by 1/2 grade to help increase overall functional mobility and decrease fall risk  Plan   Treatment/Interventions Functional transfer training;LE strengthening/ROM; Elevations; Therapeutic exercise; Endurance training;Patient/family training;Equipment eval/education; Bed mobility;Gait training;Spoke to nursing;OT   PT Frequency Twice a day; Other (Comment)  (7x a week, BID)   Recommendation   PT Discharge Recommendation Home with outpatient rehabilitation   Equipment Recommended [address] Recommended Wheeled walker   Additional Comments Pt requires additional gait training and stair training prior to discharge   Antonette 8 in Bed Without Bedrails 3   Lying on Back to Sitting on Edge of Flat Bed 3   Moving Bed to Chair 3   Standing Up From Chair 3   Walk in Room 3   Climb 3-5 Stairs 2   Basic Mobility Inpatient Raw Score 17   Basic Mobility Standardized Score 39 67   Highest Level Of Mobility   -[facility] Goal 5: Stand one or more mins   -HLM Achieved 6: Walk 10 steps or more   Modified [place] Scale   Modified Josselyn Scale 4   Barthel Index   Feeding 10   Bathing 0   Grooming Score 5   Dressing Score 5   Bladder Score 10   Bowels Score 10   Toilet Use Score 5   Transfers (Bed/Chair) Score 10   Mobility (Level Surface) Score 0   Stairs Score 0   Barthel Index Score 55   Portions of the documentation may have been created using voice recognition software  Occasional wrong word or sound alike substitutions may have occurred due to the inherent limitations of the voice recognition software  Read the chart carefully and recognize, using context, where substitutions have occurred      Colton Hernandes DPT

## 2022-10-06 NOTE — OCCUPATIONAL THERAPY NOTE
Occupational Therapy Evaluation     Patient Name: Leah CISNEROS Date: 10/6/2022  Problem List  Active Problems:    * No active hospital problems  *    Past Medical History  Past Medical History:   Diagnosis Date    Allergic Childhood    Anxiety 2019    Arthritis     Asthma Childhood    CPAP (continuous positive airway pressure) dependence     pt states was tested for VILMA, and ruled that she doesnt have it, however oxygen levels drop during sleep so CPAP was recommended,is not yet using it    Disease of thyroid gland Approx 2000    Diverticulitis of colon 2000    Heart murmur     Hyperlipidemia     Hypertension 1995 approx    Obesity Childhood    PAD (peripheral artery disease) ([address] )      Past Surgical History  Past Surgical History:   Procedure Laterality Date    APPENDECTOMY  About 2000    [address]  About 2000    HYSTERECTOMY  About 2000    SD TOTAL HIP ARTHROPLASTY Left 10/5/2022    Procedure: ARTHROPLASTY LEFT HIP TOTAL ANTERIOR;  Surgeon: Katina Grimes MD;  [facility];  Service: Orthopedics         10/06/22 0815   OT Last Visit   OT Visit Date 10/06/22   Note Type   Note type Evaluation   Restrictions/Precautions   Weight Bearing Precautions Per Order Yes   RUE Weight Bearing Per Order WBAT   LUE Weight Bearing Per Order WBAT   RLE Weight Bearing Per Order WBAT   LLE Weight Bearing Per Order WBAT   Other Precautions WBS;Pain   Pain Assessment   Pain Assessment Tool 0-10   Pain Score 7   Pain Location/Orientation Orientation: Left; Location: Hip   Hospital Pain Intervention(s) Repositioned; Ambulation/increased activity; Emotional support;Relaxation technique   Home Living   Type of [address] One level;Stairs to enter with rails   Bathroom Shower/Tub Tub/shower unit   Bathroom Toilet Standard   Home Equipment Walker;Cane   Additional Comments reports she lives in first level apt in families home - dtr/eddie live upstairs   Prior Function   Level of [place] Independent with ADLs and functional mobility   Lives With [facility] Help From Neighbor;Family   ADL Assistance Independent   IADLs Independent   Falls in the last 6 months 0   Vocational Retired   [address] and mobility with spc - i iadls   Reciprocal Relationships supportive family who are able to assist prn - dtr homeschools children and is available during the day   Service to Others retired   Intrinsic Gratification mostly sedentary   Subjective   Subjective c/o pain   ADL   Eating Assistance 7  Independent   Grooming Assistance 5  [address] [facility] 5  [address] [facility] 5  [address] [facility] 4  [address] [facility]  5  Supervision/Setup   Additional Comments reports she has [address] at home that she can use to assist with LB adls   Bed Mobility   Supine to Sit 4  Minimal assistance   Transfers   Sit to Stand 4  Minimal assistance   Stand to Sit 4  Minimal assistance   Stand pivot 4  Minimal assistance   Functional Mobility   Functional Mobility 4  Minimal assistance   Additional items Rolling walker   Balance   Static Sitting Fair   Dynamic Sitting Fair -   Static Standing Fair -   Dynamic Standing Poor +   Ambulatory Poor +   Activity Tolerance   Activity Tolerance Patient limited by fatigue;Patient limited by pain   Medical Staff Made Aware CHER [place]   RUE Assessment   RUE Assessment WFL   LUE Assessment   LUE Assessment WFL   Cognition   Overall Cognitive Status WFL   Assessment   Limitation Decreased ADL status; Decreased endurance;Decreased self-care trans;Decreased high-level ADLs   Prognosis Good   Assessment Pt is a 81 y o  female who was [facility] on 10/5/2022 with OA L hip s/p L anterior CAROLIN    Pt's problem list also includes PMH of HTN, obesity, previous surgery and anxiety, allergies, arthritis, asthma, CPAP dependence, diverticulitis, heart murmur, HLD, PAD  At baseline pt was completing adls and mobility independently - I iadls   Pt lives in apt on first floor of families home (son/dil live upstairs)  Currently pt requires min assist for overall ADLS and min assist for functional mobility/transfers  Pt currently presents with impairments in the following categories -steps to enter environment, difficulty performing ADLS, difficulty performing IADLS  and environment activity tolerance, endurance, standing balance/tolerance and sitting balance/tolerance  These impairments, as well as pt's fatigue, pain, hip precautions, orthopedic restricitions , WBS , risk for falls and home environment  limit pt's ability to safely engage in all baseline areas of occupation, includingbathing, dressing, toileting, functional mobility/transfers, community mobility, laundry , driving, house maintenance, meal prep, cleaning, social participation  and leisure activities however reports family is supportive and able to provide assist prn - would benefit from [facility] for home -  From OT standpoint, recommend home with family support and outpt therapy upon D/C   No further acute OT needs indicated at this time- anticipate d/c home when medically cleared and cleared by PT - d/c from caseload   Goals   Patient Goals have less pain   Plan   OT Frequency Eval only   Recommendation   OT Discharge Recommendation Home with outpatient rehabilitation   Equipment Recommended Bedside commode   Commode Type Standard   AM-PAC Daily Activity Inpatient   Lower Body Dressing 3   Bathing 3   Toileting 3   Upper Body Dressing 4   Grooming 4   Eating 4   Daily Activity Raw Score 21   Daily Activity Standardized Score (Calc for Raw Score >=11) 44 27   AM-PAC Applied Cognition Inpatient   Following a Speech/Presentation 4   Understanding Ordinary Conversation 4   Taking Medications 4   Remembering Where Things Are Placed or Put Away 4   Remembering List of 4-5 Errands 4   Taking Care of Complicated Tasks 4   Applied Cognition Raw Score 24   Applied Cognition Standardized Score 62 21     The patient's raw score on the AM-PAC Daily Activity inpatient short form is 21, standardized score is 44 27, greater than 39 4  Patients at this level are likely to benefit from discharge to home  Please refer to the recommendation of the Occupational Therapist for safe discharge planning      Documentation Completed By:    PARESH Wilhelm/L  [employer] Certified - [id number]

## 2022-10-06 NOTE — CONSULTS
Consultation - Nephrology   Noel Baldwin 81 y o  female MRN: [id number]  Unit/Bed#: -01 Encounter: [id number]      ASSESSMENT / PLAN:     CKD stage 3 a/B with fluctuating creatinine  · Secondary to nonsteroidal usage in the setting of angiotensin receptor blockade  · Creatinine has improved and remained stable postoperatively  · The patient knows to a friend from nonsteroidal usage  · Follow-up with Dr Lennox Suarez in the office    Hypertension  · Blood pressure is currently controlled  · Would resume her usual antihypertensive regimen on discharge    Volume status  · Acceptable without evidence of volume overload    Status post left CAROLIN        History of Present Illness   Physician Requesting Consult: Zoe Finney MD  Reason for Consult / Principal Problem -  CKD stage IIIB and daniel op management of this   HPI- Noel Baldwin is a 81 y o  y o  female who we are asked to see because of CKD stage IIIB and daniel up at age min of this  The patient is status post a left elective CAROLIN on 10/05  She does have a history of stage III CKD with a fluctuating creatinine  She had seen Dr Lennox Suarez in the office for evaluation of this and her elevated creatinine was felt to be due to hypertension, daily nonsteroidal use in the setting of angiotensin receptor blockade and age-related nephron loss  Her most recent creatinine prior to this admission was 1 39 and in the past her creatinine has been running 0 92-1 44 dating back to July of 2021  The patient currently is refraining from using nonsteroidals  She denies any recent difficulty urinating, dysuria, gross hematuria, peripheral edema, skin rash, sore throat  Aside from some hip pain, she otherwise has felt well postoperatively  History obtained from chart review and the patient    Consults    Review of Systems   Constitutional: Negative for appetite change, chills, fatigue and fever  HENT: Negative for sinus pressure      Eyes: Negative for redness and visual disturbance  Respiratory: Negative for cough, shortness of breath and wheezing  Cardiovascular: Negative for chest pain, palpitations and leg swelling  Gastrointestinal: Negative for abdominal pain, constipation, diarrhea, nausea and vomiting  Endocrine: Negative for polyuria  Genitourinary: Negative for decreased urine volume, difficulty urinating, dysuria, flank pain, frequency, hematuria and urgency  Musculoskeletal: Negative for arthralgias, back pain and joint swelling         + hip pain   Skin: Negative for rash  Neurological: Negative for dizziness, syncope and headaches  Hematological: Does not bruise/bleed easily  Psychiatric/Behavioral: Negative for confusion and sleep disturbance         Historical Information   Patient Active Problem List   Diagnosis   • Primary osteoarthritis of one hip, left   • Peripheral arterial disease with history of revascularization ([facility] )   • Hypertension   • Acquired absence of other left toe(s) (HCC)   • Class 3 severe obesity without serious comorbidity with body mass index (BMI) of 40 0 to 44 9 in adult, unspecified obesity type ([facility] )   • Liver cirrhosis secondary to JOSEPH (nonalcoholic steatohepatitis) ([facility] )   • Acquired hypothyroidism   • Diverticulitis   • 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 ([facility] )   • History of diverticulitis   • Stage 3b chronic kidney disease ([facility] )     Past Medical History:   Diagnosis Date   • Allergic Childhood   • Anxiety 2019   • Arthritis    • Asthma Childhood   • CPAP (continuous positive airway pressure) dependence     pt states was tested for VILMA, and ruled that she doesnt have it, however oxygen levels drop during sleep so CPAP was recommended,is not yet using it   • Disease of thyroid gland Approx 2000   • Diverticulitis of colon 2000   • Heart murmur    • Hyperlipidemia    • Hypertension 1995 approx   • Obesity Childhood   • PAD (peripheral artery disease) ([facility] )      Past Surgical History: Procedure Laterality Date   • APPENDECTOMY  About    •  SECTION   &    • COLON SURGERY  About    • HYSTERECTOMY  About      Social History   Social History     Substance and Sexual Activity   Alcohol Use Not Currently     Social History     Substance and Sexual Activity   Drug Use Never     Social History     Tobacco Use   Smoking Status Never Smoker   Smokeless Tobacco Never Used     Family History   Problem Relation Age of Onset   • Depression Mother    • Thrombosis Mother    • Arthritis Mother    • Cancer Mother    • Anxiety disorder Mother    • Heart disease Father         I   • Cancer Brother    • Dementia Brother    • Heart disease Brother        Meds/Allergies   all current active meds have been reviewed, current meds:   Current Facility-Administered Medications   Medication Dose Route Frequency   • acetaminophen (TYLENOL) tablet 650 mg  650 mg Oral Q6H PRN   • albuterol (PROVENTIL HFA,VENTOLIN HFA) inhaler 2 puff  2 puff Inhalation Q4H PRN   • ALPRAZolam (XANAX) tablet 0 25 mg  0 25 mg Oral TID PRN   • ammonium lactate (LAC-HYDRIN) 12 % cream   Topical BID PRN   • ascorbic acid (VITAMIN C) tablet 500 mg  500 mg Oral BID   • aspirin (ECOTRIN LOW STRENGTH) EC tablet 81 mg  81 mg Oral Daily   • atorvastatin (LIPITOR) tablet 40 mg  40 mg Oral Daily   • clopidogrel (PLAVIX) tablet 75 mg  75 mg Oral Daily   • cycloSPORINE (RESTASIS) 0 05 % ophthalmic emulsion 1 drop  1 drop Both Eyes BID   • docusate sodium (COLACE) capsule 100 mg  100 mg Oral BID   • enoxaparin (LOVENOX) subcutaneous injection 40 mg  40 mg Subcutaneous Daily   • ferrous sulfate tablet 325 mg  325 mg Oral BID With Meals   • fluticasone (FLONASE) 50 mcg/act nasal spray 2 spray  2 spray Nasal Daily PRN   • fluticasone-vilanterol (BREO ELLIPTA) 200-25 MCG/INH inhaler 1 puff  1 puff Inhalation Daily   • folic acid (FOLVITE) tablet 400 mcg  400 mcg Oral Daily   • gabapentin (NEURONTIN) capsule 200 mg  200 mg Oral Weekly • HYDROmorphone (DILAUDID) injection 0 5 mg  0 5 mg Intravenous Q2H PRN   • iron sucrose (VENOFER) 200 mg in sodium chloride 0 9 % 100 mL IVPB  200 mg Intravenous Daily   • lactated ringers bolus 1,000 mL  1,000 mL Intravenous Once PRN    And   • lactated ringers bolus 1,000 mL  1,000 mL Intravenous Once PRN   • lactated ringers infusion  100 mL/hr Intravenous Continuous   • levothyroxine tablet 150 mcg  150 mcg Oral Early Morning   • montelukast (SINGULAIR) tablet 10 mg  10 mg Oral Daily PRN   • multivitamin stress formula tablet 1 tablet  1 tablet Oral Daily   • oxyCODONE (ROXICODONE) immediate release tablet 10 mg  10 mg Oral Q4H PRN   • oxyCODONE (ROXICODONE) IR tablet 5 mg  5 mg Oral Q4H PRN   • polyethylene glycol (MIRALAX) packet 17 g  17 g Oral Daily   • sodium chloride 0 9 % bolus 1,000 mL  1,000 mL Intravenous Once PRN    And   • sodium chloride 0 9 % bolus 1,000 mL  1,000 mL Intravenous Once PRN   • verapamil (CALAN-SR) CR tablet 360 mg  360 mg Oral Daily    and PTA meds:   Prior to Admission Medications   Prescriptions Last Dose Informant Patient Reported? Taking?    ALPRAZolam (XANAX) 0 25 mg tablet 10/5/2022 at 0730 Self No Yes   Sig: Take 1 tablet (0 25 mg total) by mouth 3 (three) times a day as needed for anxiety 1/2-1 as needed tid   Lidocaine-Adhesive Sheets (LidoPure Patch) 5 % KIT More than a month at Unknown time Self Yes No   Sig: Apply topically   Multiple Vitamin (multivitamin) tablet 10/4/2022 at 2030 Self No Yes   Sig: Take 1 tablet by mouth daily   Restasis 0 05 % ophthalmic emulsion 10/5/2022 at 0600 Self Yes Yes   albuterol (PROVENTIL HFA,VENTOLIN HFA) 90 mcg/act inhaler 10/5/2022 at 0730 Self Yes Yes   Sig: Inhale 2 puffs every 4 (four) hours as needed   ammonium lactate (LAC-HYDRIN) 12 % cream 10/4/2022 at Unknown time Self Yes Yes   Sig: Apply topically as needed for dry skin   ascorbic acid (VITAMIN C) 500 MG tablet 10/4/2022 at 2030 Self No Yes   Sig: Take 1 tablet (500 mg total) by mouth 2 (two) times a day   aspirin (ECOTRIN LOW STRENGTH) 81 mg EC tablet 2022 Self Yes Yes   Sig: Take 81 mg by mouth in the morning   atorvastatin (LIPITOR) 40 mg tablet 10/4/2022 at 2030 Self No Yes   Sig: Take 1 tablet (40 mg total) by mouth daily   clopidogrel (PLAVIX) 75 mg tablet 2022 Self No Yes   Sig: Take 1 tablet (75 mg total) by mouth daily   enoxaparin (LOVENOX) 40 mg/0 4 mL  Self No No   Sig: Inject 0 4 mL (40 mg total) under the skin daily for 28 days   Patient not taking: Reported on 10/4/2022   ferrous sulfate 324 (65 Fe) mg 10/4/2022 at 2030 Self No Yes   Sig: Take 1 tablet (324 mg total) by mouth 2 (two) times a day before meals   fluticasone (FLONASE) 50 mcg/act nasal spray 10/5/2022 at 0730 Self Yes Yes   Si sprays into each nostril daily as needed   fluticasone-vilanterol (BREO ELLIPTA) 200-25 MCG/INH inhaler 10/5/2022 at 0730 Self Yes Yes   Sig: Inhale 1 puff daily   folic acid (FOLVITE) 165 mcg tablet 10/4/2022 at 2030 Self No Yes   Sig: Take 1 tablet (400 mcg total) by mouth daily   gabapentin (NEURONTIN) 100 mg capsule 10/4/2022 at 2030 Self Yes Yes   Sig: Take 200 mg by mouth once a week   levothyroxine (Synthroid) 150 mcg tablet 10/5/2022 at 600 Self No Yes   Sig: Take 1 tablet (150 mcg total) by mouth daily in the early morning   losartan-hydrochlorothiazide (HYZAAR) 100-25 MG per tablet 10/4/2022 at [address] Yes Yes   Sig: Take 1 tablet by mouth daily   montelukast (SINGULAIR) 10 mg tablet 10/4/2022 at 0830 Self Yes Yes   Sig: Take 10 mg by mouth daily as needed   polyethylene glycol (MIRALAX) 17 g packet 10/4/2022 at 2100 Self Yes Yes   Sig: Take 17 g by mouth daily   verapamil (VERELAN PM) 360 MG 24 hr capsule 10/5/2022 at 0730 Self Yes Yes   Sig: Take 360 mg by mouth in the morning      Facility-Administered Medications: None       Scheduled Meds:  Current Facility-Administered Medications   Medication Dose Route Frequency Provider Last Rate   • acetaminophen  650 mg Oral Q6H PRN Jessica Hammond PA-C     • albuterol  2 puff Inhalation Q4H PRN Jessica Hammond PA-C     • ALPRAZolam  0 25 mg Oral TID PRN Jessica Hammond PA-C     • ammonium lactate   Topical BID PRN Jessica Hammond PA-C     • ascorbic acid  500 mg Oral BID Jessica Hammond PA-C     • aspirin  81 mg Oral Daily Wayne Decant, CRNP     • atorvastatin  40 mg Oral Daily Jessica Hammond PA-C     • clopidogrel  75 mg Oral Daily Jessica Hammond PA-C     • cycloSPORINE  1 drop Both Eyes BID Jessica Hammond PA-C     • docusate sodium  100 mg Oral BID Jessica Hammond PA-C     • enoxaparin  40 mg Subcutaneous Daily Jessica Hammond PA-C     • ferrous sulfate  325 mg Oral BID With Meals Jessica Hammond PA-C     • fluticasone  2 spray Nasal Daily PRN Jessica Hammond PA-C     • fluticasone-vilanterol  1 puff Inhalation Daily Jessica Hammond PA-C     • folic acid  816 mcg Oral Daily Jessica Hammond PA-C     • gabapentin  200 mg Oral Weekly Jessica Hammond PA-C     • HYDROmorphone  0 5 mg Intravenous Q2H PRN Jessica Hammond PA-C     • iron sucrose  200 mg Intravenous Daily Deann Davidson PA-C     • lactated ringers  1,000 mL Intravenous Once PRN Jessica Hammond PA-C      And   • lactated ringers  1,000 mL Intravenous Once PRN Jessica Hammond PA-C     • lactated ringers  100 mL/hr Intravenous Continuous Jessica Hammond PA-C Stopped (10/06/22 0550)   • levothyroxine  150 mcg Oral Early Morning Jessica Hammond PA-C     • montelukast  10 mg Oral Daily PRN Jessica Hammond PA-C     • multivitamin stress formula  1 tablet Oral Daily Jessica Hammond PA-C     • oxyCODONE  10 mg Oral Q4H PRN Jessica Hammond PA-C     • oxyCODONE  5 mg Oral Q4H PRN Jessica Hammond PA-C     • polyethylene glycol  17 g Oral Daily Grey Blanco PA-C     • sodium chloride  1,000 mL Intravenous Once PRN Jessica Hammond PA-C      And   • sodium chloride 1,000 mL Intravenous Once PRN Jerardo Jackson PA-C     • verapamil  360 mg Oral Daily Deann Davidsno PA-C         PRN Meds: •  acetaminophen  •  albuterol  •  ALPRAZolam  •  ammonium lactate  •  fluticasone  •  HYDROmorphone  •  lactated ringers **AND** lactated ringers  •  montelukast  •  oxyCODONE  •  oxyCODONE  •  sodium chloride **AND** sodium chloride    Continuous Infusions:lactated ringers, 100 mL/hr, Last Rate: Stopped (10/06/22 0550)        Allergies   Allergen Reactions   • Sulfamethoxazole-Trimethoprim Rash             /60   Pulse 81   Temp 98 3 °F (36 8 °C)   Resp 17   Ht 5' 6" (1 676 m)   Wt 106 kg (234 lb)   SpO2 95%   BMI 37 77 kg/m²   Temp (24hrs), Av 7 °F (36 5 °C), Min:97 2 °F (36 2 °C), Max:98 3 °F (36 8 °C)      Objective     Intake/Output Summary (Last 24 hours) at 10/6/2022 1110  Last data filed at 10/6/2022 0612  Gross per 24 hour   Intake 2538 33 ml   Output 575 ml   Net 1963 33 ml       I/O last 24 hours:   In: 2538 3 [I V :2538 3]  Out: 575 [Urine:575]      Current Weight: Weight - Scale: 106 kg (234 lb)  First Weight: Weight - Scale: 107 kg (236 lb)    PHYSICAL EXAM:     General -      the patient was awake, alert, pleasant, cooperative and in no apparent distress  HENT  -        normocephalic/atraumatic, mucous membranes were moist  Eyes    -        extraocular movements were intact, no overt scleral icterus was noted  Neck    -        no overt jugular venous distention was noted, supple, no overt thyromegaly noted, trachea see midline  Chest  -         clear to auscultation percussion with adequate inspiratory effort, no rales/record wheezing noted, no respiratory accessory muscle use noted  CVS  -           S1-S2, no pericardial friction rub S3 were appreciated, there was a systolic murmur noted  Abdomen -    soft, nontender, no rebound or guarding was noted  Extremities-   no edema noted, no cyanosis noted  Skin   -           no hives noted  Neuro   - alert and oriented  Psych   -        mood affect were appropriate      Invasive Devices  Report    Peripheral Intravenous Line  Duration           Peripheral IV 10/05/22 Dorsal (posterior); Right Forearm <1 day    Peripheral IV 10/05/22 Left Arm <1 day                Lab Results:    Results from last 7 days   Lab Units 10/06/22  0620   WBC Thousand/uL 8 57   HEMOGLOBIN g/dL 9 6*   HEMATOCRIT % 30 0*   PLATELETS Thousands/uL 138*   POTASSIUM mmol/L 4 1   CHLORIDE mmol/L 105   CO2 mmol/L 26   BUN mg/dL 18   CREATININE mg/dL 1 09   CALCIUM mg/dL 8 6       CUTURES:  No results found for: Aarti Scale      Pertinent studies were reviewed          Portions of the record may have been created with voice recognition software  Occasional wrong word or "sound a like" substitutions may have occurred due to the inherent limitations of voice recognition software  Read the chart carefully and recognize, using context, where substitutions have occurred  If you have any questions, please contact the dictating provider

## 2022-10-06 NOTE — PLAN OF CARE
Problem: PHYSICAL THERAPY ADULT  Goal: Performs mobility at highest level of function for planned discharge setting  See evaluation for individualized goals  Description: Treatment/Interventions: Functional transfer training, LE strengthening/ROM, Elevations, Therapeutic exercise, Endurance training, Patient/family training, Equipment eval/education, Bed mobility, Gait training, Spoke to nursing, OT  Equipment Recommended: Erin Johnson       See flowsheet documentation for full assessment, interventions and recommendations  Note: Prognosis: Good  Problem List: Decreased strength, Decreased range of motion, Decreased endurance, Impaired balance, Decreased mobility, Pain  Assessment: Pt is 81 y o  female seen for PT evaluation at [facility] on 10/5/2022  Two pt identifiers were used to confirm  Pt presented for scheduled ARTHROPLASTY LEFT HIP TOTAL ANTERIOR (Left) which was performed on 10/05/2022    Pt  with a primary dx of:  Primary osteoarthritis of left hip s/p ARTHROPLASTY LEFT HIP TOTAL ANTERIOR (Left)  PT now consulted for assessment of mobility and d/c needs  Pt with Activity as tolerated orders  Pts current co morbidities affecting treatment include: Anxiety, asthma, disease of thyroid gland, HTN, and personal factors including steps to enter home  Pts current clinical presentation is Unstable/ Unpredictable (high complexity) due to Ongoing medical management for primary dx, Increased reliance on more restrictive AD compared to baseline, Decreased activity tolerance compared to baseline, Fall risk, Increased assistance needed from caregiver at current time, Continuous pulse oximetry monitoring , s/p surgical intervention    Upon evaluation, pt currently is requiring Min Ax1 for bed mobility; Min Ax1 for transfers and Min Ax1 for ambulation w/ RW   Patient reported increased dizziness with ambulation  Pt returned to seated position and BP was taken: 111/72  Luke Us  Pt presents at PT eval functioning below baseline and currently w/ overall mobility deficits 2* to: BLE weakness, decreased ROM, impaired balance, decreased endurance, gait deviations, pain, decreased activity tolerance compared to baseline, fall risk  Pt currently at a fall risk 2* to impairments listed above  Based on the aforementioned PT evaluation, pt will continue to benefit from skilled Acute PT interventions to address stated impairments; to maximize functional mobility; for ongoing pt/ family training; and DME needs  At conclusion of PT session pt returned back in chair with phone and call bell within reach  Pt denies any further questions at this time  PT is currently recommending Home with increased family support and Outpatient PT  PT will continue to follow during hospital stay  PT Discharge Recommendation: Home with outpatient rehabilitation    See flowsheet documentation for full assessment

## 2022-10-06 NOTE — PHYSICAL THERAPY NOTE
Physical Therapy Progress Note     10/06/22 1450   PT Last Visit   PT Visit Date 10/06/22   Note Type   Note Type BID visit/treatment   Pain Assessment   Pain Assessment Tool 0-10   Pain Score 7   Pain Location/Orientation Orientation: Left; Location: Hip   Hospital Pain Intervention(s) Cold applied;Repositioned; Ambulation/increased activity; Elevated; Rest   Restrictions/Precautions   LLE Weight Bearing Per Order WBAT   Other Precautions WBS;THR;Pain; Fall Risk   Subjective   Subjective Pt encountered seated in recliner, pleasant and agreeable to treatment  Reports feeling better overall this PM, but still endorses pain with activity as well as increasing dizziness at conclusion of each gait/stair trial requiring seated rest to recover  Transfers   Sit to Stand 5  Supervision   Additional items Assist x 1; Armrests; Increased time required   Stand to Sit 5  Supervision   Additional items Assist x 1; Armrests; Increased time required   Toilet transfer 5  Supervision   Additional items Assist x 1; Increased time required;Raised toilet seat   Ambulation/Elevation   Gait pattern Step to;Short stride; Foward flexed; Inconsistent paulo;Decreased L stance;Decreased foot clearance; Antalgic; Improper Weight shift  (excessive L toe out)   Gait Assistance 4  Minimal assist   Additional items Assist x 1   Assistive Device Rolling walker   Distance 15', 10',  50'   Stair Management Assistance 3  Moderate assist   Additional items Assist x 1;Verbal cues  (for 1st step, then min A)   Stair Management Technique Two rails; Step to pattern; Foreward   Number of Stairs 3  (trainers)   Balance   Static Sitting Fair   Static Standing Fair -   Ambulatory Poor +   Activity Tolerance   Activity Tolerance Patient tolerated treatment well;Patient limited by pain; Patient limited by fatigue   Nurse Made Aware [id number] Martha Soni RN   Assessment   Prognosis Good   Problem List Decreased strength;Decreased range of motion;Decreased endurance; Impaired balance;Decreased mobility;Pain   Assessment Pt demsontrated improved endurance this PM, but remains well below baseline due to pain, LLE weakness & complaints of dizziness with activity  Pt was able to demosntrate improved endurance, pace & LLE advancement as session progresed, but required seated rests periodically due to complaints of dizziness  She did trial steps & initially required mod A due to unexpected pain, but then completed task with improved sequencing  Unable to trial furhter repetitions or progress to unilateral rail due to onset of dizziness  Anticipate pt will be able to conintue progression in upcoming treatments so long as her subjective symptoms reside  BP did drop from baseline 110s-120s SBP down to 105 SBP when assessed after 1st onset, but furhter trials resulted in stable BPs taken slightly longer after pt seated as BP cuff not immediately present  Anticipate pt will be able to clear steps & increase ambulatory tolerance tomorrow & will be appropriate to d/c home at that time  Ortho resident Ofe Fuentes MD informed of pt status post session  Goals   Patient Goals to feel better & go home safely   STG Expiration Date 10/16/22   PT Treatment Day 1   Plan   Treatment/Interventions LE strengthening/ROM; Functional transfer training;Elevations; Therapeutic exercise; Endurance training;Equipment eval/education;Patient/family training;Gait training;Bed mobility   Progress Progressing toward goals   PT Frequency Twice a day   Recommendation   PT Discharge Recommendation Home with outpatient rehabilitation   Equipment Recommended   (pt owns RW &  SPC, BSC delivered to room prior to session)   AM-PAC Basic Mobility Inpatient   Turning in Bed Without Bedrails 3   Lying on Back to Sitting on Edge of Flat Bed 3   Moving Bed to Chair 3   Standing Up From Chair 3   Walk in Room 3   Climb 3-5 Stairs 3   Basic Mobility Inpatient Raw Score 18   Basic Mobility Standardized Score 41 05   Highest Level Of Mobility   JH-HLM Goal 6: Walk 10 steps or more   JH-HLM Achieved 7: Walk 25 feet or more     Fanta Kate    An [facility] Basic Mobility Standardized Score of 40 78 suggests pt would benefit from post acute rehab

## 2022-10-06 NOTE — PROGRESS NOTES
Progress Note - Orthopedics   Hugo Ganser 81 y o  female MRN: [id number]  Unit/Bed#: -01      Subjective:    81 y  o female POD 1 s/p L CAROLIN  No acute events, no new complaints  Patient doing well  Pain well controlled  Denies fevers, chills, CP, SOB, N/V, numbness or tingling  Patient reports no issues with urination or bowel movements  Patient states she is doing well with minor pain      Labs:  0   Lab Value Date/Time    HCT 39 5 09/26/2022 1501    HCT 35 5 09/09/2022 1545    HCT 37 4 07/26/2022 1633    HGB 12 5 09/26/2022 1501    HGB 11 7 09/09/2022 1545    HGB 12 3 07/26/2022 1633    INR 1 05 09/09/2022 1545    WBC 5 54 09/26/2022 1501    WBC 5 40 09/09/2022 1545    WBC 6 08 07/26/2022 1633    CRP <3 0 09/09/2022 1545       Meds:    Current Facility-Administered Medications:   •  acetaminophen (TYLENOL) tablet 650 mg, 650 mg, Oral, Q6H PRN, Melba Griffiths PA-C  •  albuterol (PROVENTIL HFA,VENTOLIN HFA) inhaler 2 puff, 2 puff, Inhalation, Q4H PRN, Melba Griffiths PA-C  •  ALPRAZolam [place]) tablet 0 25 mg, 0 25 mg, Oral, TID PRN, Melba Griffiths PA-C  •  ammonium lactate (LAC-HYDRIN) 12 % cream, , Topical, BID PRN, Melba Griffiths PA-C  •  ascorbic acid (VITAMIN C) tablet 500 mg, 500 mg, Oral, BID, Grey Blanco PA-C  •  aspirin (ECOTRIN LOW STRENGTH) EC tablet 81 mg, 81 mg, Oral, Daily, SLY Griffin  •  [START ON 10/6/2022] atorvastatin (LIPITOR) tablet 40 mg, 40 mg, Oral, Daily, Grey Blanco PA-C  •  ceFAZolin (ANCEF) IVPB (premix in dextrose) 2,000 mg 50 mL, 2,000 mg, Intravenous, Q8H, Melba Griffiths PA-C, Last Rate: 100 mL/hr at 10/05/22 2254, 2,000 mg at 10/05/22 2254  •  [START ON 10/6/2022] clopidogrel (PLAVIX) tablet 75 mg, 75 mg, Oral, Daily, Grey Blanco PA-C  •  cycloSPORINE (RESTASIS) 0 05 % ophthalmic emulsion 1 drop, 1 drop, Both Eyes, BID, Grey Blanco PA-C  •  docusate sodium (COLACE) capsule 100 mg, 100 mg, Oral, BID, Syliva Sine, KEDAR  •  [START ON 10/6/2022] enoxaparin (LOVENOX) subcutaneous injection 40 mg, 40 mg, Subcutaneous, Daily, German Blanco PA-C  •  ferrous sulfate tablet 325 mg, 325 mg, Oral, BID With Meals, Tushar Saeed PA-C  •  fluticasone (FLONASE) 50 mcg/act nasal spray 2 spray, 2 spray, Nasal, Daily PRN, Tushar Saeed PA-C  •  [START ON 10/6/2022] fluticasone-vilanterol (BREO ELLIPTA) 200-25 MCG/INH inhaler 1 puff, 1 puff, Inhalation, Daily, Tushar Saeed PA-C  •  [START ON 60/7/0405] folic acid (FOLVITE) tablet 400 mcg, 400 mcg, Oral, Daily, Grey Blanco PA-C  •  gabapentin (NEURONTIN) capsule 200 mg, 200 mg, Oral, Weekly, Tushar Saeed PA-C  •  HYDROmorphone (DILAUDID) injection 0 5 mg, 0 5 mg, Intravenous, Q2H PRN, Tushar Saeed PA-C, 0 5 mg at 10/05/22 2254  •  lactated ringers bolus 1,000 mL, 1,000 mL, Intravenous, Once PRN **AND** lactated ringers bolus 1,000 mL, 1,000 mL, Intravenous, Once PRN, Tushar Saeed PA-C  •  lactated ringers infusion, 100 mL/hr, Intravenous, Continuous, Tushar Saeed PA-C, Last Rate: 100 mL/hr at 10/05/22 1827, 100 mL/hr at 10/05/22 1827  •  [START ON 10/6/2022] levothyroxine tablet 150 mcg, 150 mcg, Oral, Early Morning, Grey Blanco PA-C  •  montelukast (SINGULAIR) tablet 10 mg, 10 mg, Oral, Daily PRN, Tushar Saeed PA-C  •  [START ON 10/6/2022] multivitamin stress formula tablet 1 tablet, 1 tablet, Oral, Daily, Tushar Saeed PA-C  •  oxyCODONE (ROXICODONE) immediate release tablet 10 mg, 10 mg, Oral, Q4H PRN, Tushar Saeed PA-C  •  oxyCODONE (ROXICODONE) IR tablet 5 mg, 5 mg, Oral, Q4H PRN, Tushar Saeed PA-C, 5 mg at 10/1952  •  [START ON 10/6/2022] polyethylene glycol (MIRALAX) packet 17 g, 17 g, Oral, Daily, Grey Blanco PA-C  •  sodium chloride 0 9 % bolus 1,000 mL, 1,000 mL, Intravenous, Once PRN **AND** sodium chloride 0 9 % bolus 1,000 mL, 1,000 mL, Intravenous, Once PRN, German Pisano KEDAR Blanco  •  [START ON 10/6/2022] verapamil (CALAN-SR) CR tablet 360 mg, 360 mg, Oral, Daily, SLY Drake    Blood Culture:   No results found for: BLOODCX    Wound Culture:   No results found for: WOUNDCULT    Ins and Outs:  I/O last 24 hours: In: 1400 [I V :1400]  Out: 300 [Urine:300]          Physical:  Vitals:    10/05/22 1943   BP: 124/70   Pulse: 79   Resp: 17   Temp: 97 6 °F (36 4 °C)   SpO2: 99%     Musculoskeletal: left Lower Extremity    · Skin warm, dry  No erythema or ecchymosis  · Dressing intact  · TTP daniel incisionally  · Sensation intact to saphenous, sural, tibial, superficial peroneal nerve, and deep peroneal  · Motor intact to +FHL/EHL, +ankle dorsi/plantar flexion  · 2+ DP pulse, symmetric bilaterally  · Digits warm and well perfused  · Capillary refill < 2 seconds    Assessment:    72 y  o female POD 1 s/p L CAROLIN   Patient doing well, will benefit from physical therapy       Plan:  · WBAT LLE   · PT/OT  · Pain control  · DVT ppx LVX   · Dispo: Ortho will follow    Moe aSlmon MD

## 2022-10-06 NOTE — PROGRESS NOTES
Internal Medicine Progress Note  Patient: Ad Sun  Age/sex: 81 y o  female  Medical Record #: [id number]      ASSESSMENT/PLAN: (Interval History)  Ad Sun is seen and examined and management for following issues:    Status Post left Total HIP ARTHROPLASTY  • Pain controlled  • Continue encourage incentive spirometry; monitor fever curve  • DVT prophylaxis in place and reviewed  Results from last 7 days   Lab Units 10/06/22  0620   WBC Thousand/uL 8 57   HEMOGLOBIN g/dL 9 6*   HEMATOCRIT % 30 0*   PLATELETS Thousands/uL 138*       Operative acute blood loss anemia  · Decrease in hgb levels from preop labs results; suspect due to post operation extravasation losses along with potential dilutional effects of fluids  · Initiate surgery optimization venofer protocol/transfusion  · Transfuse PRBC if hgb less then 8 0 (per ortho protocol)  or symptomatic  · Monitor follow up CBC post intervention to trend effect    HTN  · Hold hyzaar in the post operative setting to avoid hypotension/HELEN  · OK to resume on dc  · Add hydralazine as needed for SBP >160  · Monitor trend     CKD  · Level 3  · Baseline 1 2-1 4  · Avoid nephrotoxic medications  · Avoid hypotension in the post operative setting  · Monitor bmp     Hypothyroid  · Cont levothyroxine as prescribed     PAD  · S/p stents to LLE  · Resume ASA/Plavix  · Cont statin      The above assessment and plan was reviewed and updated as determined by my evaluation of the patient on 10/6/2022      Labs:   Results from last 7 days   Lab Units 10/06/22  0620   WBC Thousand/uL 8 57   HEMOGLOBIN g/dL 9 6*   HEMATOCRIT % 30 0*   PLATELETS Thousands/uL 138*     Results from last 7 days   Lab Units 10/06/22  0620   SODIUM mmol/L 136   POTASSIUM mmol/L 4 1   CHLORIDE mmol/L 105   CO2 mmol/L 26   BUN mg/dL 18   CREATININE mg/dL 1 09   CALCIUM mg/dL 8 6             Results from last 7 days   Lab Units 10/05/22  1711   POC GLUCOSE mg/dl 160*       Review of Scheduled Meds:  Current Facility-Administered Medications   Medication Dose Route Frequency Provider Last Rate   • acetaminophen  650 mg Oral Q6H PRN Chelsea Pac, PA-C     • albuterol  2 puff Inhalation Q4H PRN Chelsea Pac, PA-C     • ALPRAZolam  0 25 mg Oral TID PRN Chelsea Pac, PA-C     • ammonium lactate   Topical BID PRN Chelsea Pac, PA-C     • ascorbic acid  500 mg Oral BID Chelsea Pac, PA-C     • aspirin  81 mg Oral Daily SLY Salter     • atorvastatin  40 mg Oral Daily Chelsea Jt, PA-C     • cefazolin  2,000 mg Intravenous Q8H Grey Blanco PA-C 2,000 mg (10/05/22 8994)   • clopidogrel  75 mg Oral Daily Chelsea Jt, PA-C     • cycloSPORINE  1 drop Both Eyes BID Chelsea Waters, PA-C     • docusate sodium  100 mg Oral BID Chelsea Pac, PA-C     • enoxaparin  40 mg Subcutaneous Daily Chelsea Waters, PA-C     • ferrous sulfate  325 mg Oral BID With Meals Chelsea Waters, PA-C     • fluticasone  2 spray Nasal Daily PRN Chelsea Pac, PA-C     • fluticasone-vilanterol  1 puff Inhalation Daily Chelsea Jt, PA-C     • folic acid  280 mcg Oral Daily Chelsea Pac, PA-C     • gabapentin  200 mg Oral Weekly Chelsea Pac, PA-C     • HYDROmorphone  0 5 mg Intravenous Q2H PRN Chelsea Pac, PA-C     • lactated ringers  1,000 mL Intravenous Once PRN Chelsea Pac, PA-C      And   • lactated ringers  1,000 mL Intravenous Once PRN Chelsea Pac, PA-C     • lactated ringers  100 mL/hr Intravenous Continuous Chelsea Pac, PA-C Stopped (10/06/22 9719)   • levothyroxine  150 mcg Oral Early Morning Grey Blanco PA-C     • montelukast  10 mg Oral Daily PRN Chelsea Pac, PA-C     • multivitamin stress formula  1 tablet Oral Daily Chelsea Pac, PA-C     • oxyCODONE  10 mg Oral Q4H PRN Chelsea Pac, PA-C     • oxyCODONE  5 mg Oral Q4H PRN Chelsea Pac, PA-C     • polyethylene glycol  17 g Oral Daily Tushar Saeed PA-C     • sodium chloride  1,000 mL Intravenous Once PRN Tushar Saeed PA-C      And   • sodium chloride  1,000 mL Intravenous Once PRN Tushar Saeed PA-C     • verapamil  360 mg Oral Daily Deann Davidson PA-C         Subjective/ HPI: Patient seen and examined  Patients overnight issues or events were reviewed with nursing or staff during rounds or morning huddle session  New or overnight issues include the following:     Pt reports poor sleep  She denies any other overnight events or reported nursing issues      ROS:   A 10 point ROS was performed; negative except as noted above  Imaging:     No orders to display       *Labs /Radiology studies Reviewed  *Medications  reviewed and reconciled as needed  *Please refer to order section for additional ordered labs studies  *Case discussed with primary attending during morning huddle case rounds    Physical Examination:  Vitals:   Vitals:    10/05/22 1943 10/06/22 0008 10/06/22 0308 10/06/22 0719   BP: 124/70 116/62 112/60 112/60   Pulse: 79 79 85 81   Resp: 17      Temp: 97 6 °F (36 4 °C) 97 5 °F (36 4 °C) 97 7 °F (36 5 °C) 98 3 °F (36 8 °C)   TempSrc:       SpO2: 99% 97% 97% 95%   Weight:       Height:           General Appearance: no distress, conversive  HEENT: PERRLA, conjuctiva normal; oropharynx clear; mucous membranes moist;   Neck:  Supple, no lymphadenopathy or thyromegaly  Lungs: CTA, normal respiratory effort, no retractions, expiratory effort normal  CV: regular rate and rhythm , PMI normal   ABD: soft non tender, no masses , no hepatic or splenomegaly  EXT: DP pulses intact, no lymphadenopathy, no edema; Lt hip surgical dressing in place  Skin: normal turgor, normal texture, no rash  Psych: affect normal, mood normal  Neuro: AAOx3    : conde removed ; due to void    The above physical exam was reviewed and updated as determined by my evaluation of the patient on 10/6/2022      Invasive Devices  Report Peripheral Intravenous Line  Duration           Peripheral IV 10/05/22 Dorsal (posterior); Right Forearm <1 day    Peripheral IV 10/05/22 Left Arm <1 day                   VTE Pharmacologic Prophylaxis: Enoxaparin  Code Status: No Order  Current Length of Stay: 0 day(s)      Total floor / unit time spent today 30 minutes  Coordination of patient's care was performed in conjunction with primary service  Time invested included review of patient's labs, vitals, and management of their comorbidities with continued monitoring, examination of patient as well as answering patient questions, documenting her findings and creating progress note in electronic medical record,  ordering appropriate diagnostic testing  ** Please Note:  voice to text software may have been used in the creation of this document   Although proof errors in transcription or interpretation are a potential of such software**

## 2022-10-06 NOTE — DISCHARGE SUMMARY
ORTHOPEDICS DISCHARGE SUMMARY   Tresa Neri 81 y o  female MRN: [id number]  Unit/Bed#: -44      Attending Physician: Hollis Cerna    Admitting diagnosis: Primary osteoarthritis of one hip, left [M16 12]    Discharge diagnosis: Primary osteoarthritis of one hip, left [M16 12]    Date of admission: 10/5/2022    Date of discharge: 10/06/22    Procedure: L CAROLIN    HPI  This is a 81y o  year old female that presented to the office with signs and symptoms of left hip osteoarthritis  They tried and failed conservative treatment measures and wished to proceed with surgical intervention  The risks, benefits, and complications of the procedure were discussed with the patient and informed consent was obtained  Hospital Course: The patient was admitted to the hospital on 10/5/2022 and underwent an uncomplicated left total hip arthroplasty  They were transferred to the floor after a brief stay in the post-anesthesia care unit  Their pain was well managed with IV and oral pain medications  They began therapy on post operative day #1  Lovenox was also started for DVT prophylaxis 12 hours post op  On discharge date pt was cleared by PT and the medicine team and determined to be safe for discharge  Daily discussion was had with the patient, nursing staff, orthopaedic team, and family members if present  All questions were answered to the patients satisifaction  0   Lab Value Date/Time    HGB 9 6 (L) 10/06/2022 0620    HGB 12 5 09/26/2022 1501    HGB 11 7 09/09/2022 1545    HGB 12 3 07/26/2022 1633         Body mass index is 37 77 kg/m²  moderately obese  Recommend behavior modifications, nutrition and physical activity  Discharge Instructions: The patient was discharged weight bearing as tolerated to the left lower extremity  Lovenox will be continued for 28 days  Continue PT/OT  Take pain medications as instructed  Discharge Medications:   For the complete list of discharge medications, please refer to the patient's medication reconciliation

## 2022-10-06 NOTE — PLAN OF CARE
Problem: PHYSICAL THERAPY ADULT  Goal: Performs mobility at highest level of function for planned discharge setting  See evaluation for individualized goals  Description: Treatment/Interventions: Functional transfer training, LE strengthening/ROM, Elevations, Therapeutic exercise, Endurance training, Patient/family training, Equipment eval/education, Bed mobility, Gait training, Spoke to nursing, OT  Equipment Recommended: She Tovar       See flowsheet documentation for full assessment, interventions and recommendations  Outcome: Progressing  Note: Prognosis: Good  Problem List: Decreased strength, Decreased range of motion, Decreased endurance, Impaired balance, Decreased mobility, Pain  Assessment: Pt demsontrated improved endurance this PM, but remains well below baseline due to pain, LLE weakness & complaints of dizziness with activity  Pt was able to demosntrate improved endurance, pace & LLE advancement as session progresed, but required seated rests periodically due to complaints of dizziness  She did trial steps & initially required mod A due to unexpected pain, but then completed task with improved sequencing  Unable to trial furhter repetitions or progress to unilateral rail due to onset of dizziness  Anticipate pt will be able to conintue progression in upcoming treatments so long as her subjective symptoms reside  BP did drop from baseline 110s-120s SBP down to 105 SBP when assessed after 1st onset, but furhter trials resulted in stable BPs taken slightly longer after pt seated as BP cuff not immediately present  Anticipate pt will be able to clear steps & increase ambulatory tolerance tomorrow & will be appropriate to d/c home at that time  Ortho resident Pattie Pastrana MD informed of pt status post session  PT Discharge Recommendation: Home with outpatient rehabilitation    See flowsheet documentation for full assessment

## 2022-10-07 VITALS
RESPIRATION RATE: 15 BRPM | HEART RATE: 95 BPM | OXYGEN SATURATION: 94 % | TEMPERATURE: 97.4 F | WEIGHT: 234 LBS | BODY MASS INDEX: 37.61 KG/M2 | DIASTOLIC BLOOD PRESSURE: 63 MMHG | HEIGHT: 66 IN | SYSTOLIC BLOOD PRESSURE: 117 MMHG

## 2022-10-07 LAB
ANION GAP SERPL CALCULATED.3IONS-SCNC: 3 MMOL/L (ref 4–13)
BUN SERPL-MCNC: 17 MG/DL (ref 5–25)
CALCIUM SERPL-MCNC: 8.5 MG/DL (ref 8.3–10.1)
CHLORIDE SERPL-SCNC: 104 MMOL/L (ref 96–108)
CO2 SERPL-SCNC: 28 MMOL/L (ref 21–32)
CREAT SERPL-MCNC: 0.92 MG/DL (ref 0.6–1.3)
ERYTHROCYTE [DISTWIDTH] IN BLOOD BY AUTOMATED COUNT: 15.1 % (ref 11.6–15.1)
GFR SERPL CREATININE-BSD FRML MDRD: 63 ML/MIN/1.73SQ M
GLUCOSE SERPL-MCNC: 111 MG/DL (ref 65–140)
HCT VFR BLD AUTO: 24.5 % (ref 34.8–46.1)
HGB BLD-MCNC: 7.9 G/DL (ref 11.5–15.4)
MCH RBC QN AUTO: 31.2 PG (ref 26.8–34.3)
MCHC RBC AUTO-ENTMCNC: 32.2 G/DL (ref 31.4–37.4)
MCV RBC AUTO: 97 FL (ref 82–98)
PLATELET # BLD AUTO: 102 THOUSANDS/UL (ref 149–390)
PMV BLD AUTO: 11.4 FL (ref 8.9–12.7)
POTASSIUM SERPL-SCNC: 3.9 MMOL/L (ref 3.5–5.3)
RBC # BLD AUTO: 2.53 MILLION/UL (ref 3.81–5.12)
SODIUM SERPL-SCNC: 135 MMOL/L (ref 135–147)
WBC # BLD AUTO: 5.72 THOUSAND/UL (ref 4.31–10.16)

## 2022-10-07 PROCEDURE — 80048 BASIC METABOLIC PNL TOTAL CA: CPT | Performed by: PHYSICIAN ASSISTANT

## 2022-10-07 PROCEDURE — NC001 PR NO CHARGE: Performed by: ORTHOPAEDIC SURGERY

## 2022-10-07 PROCEDURE — 97116 GAIT TRAINING THERAPY: CPT

## 2022-10-07 PROCEDURE — 99232 SBSQ HOSP IP/OBS MODERATE 35: CPT | Performed by: INTERNAL MEDICINE

## 2022-10-07 PROCEDURE — 85027 COMPLETE CBC AUTOMATED: CPT | Performed by: PHYSICIAN ASSISTANT

## 2022-10-07 RX ORDER — OXYCODONE HYDROCHLORIDE 5 MG/1
5 TABLET ORAL EVERY 4 HOURS PRN
Qty: 30 TABLET | Refills: 0 | Status: SHIPPED | OUTPATIENT
Start: 2022-10-07

## 2022-10-07 RX ADMIN — FERROUS SULFATE TAB 325 MG (65 MG ELEMENTAL FE) 325 MG: 325 (65 FE) TAB at 07:34

## 2022-10-07 RX ADMIN — OXYCODONE HYDROCHLORIDE 10 MG: 10 TABLET ORAL at 11:44

## 2022-10-07 RX ADMIN — OXYCODONE HYDROCHLORIDE AND ACETAMINOPHEN 500 MG: 500 TABLET ORAL at 07:35

## 2022-10-07 RX ADMIN — LEVOTHYROXINE SODIUM 150 MCG: 75 TABLET ORAL at 06:18

## 2022-10-07 RX ADMIN — FOLIC ACID TAB 400 MCG 400 MCG: 400 TAB at 07:35

## 2022-10-07 RX ADMIN — ATORVASTATIN CALCIUM 40 MG: 40 TABLET, FILM COATED ORAL at 07:35

## 2022-10-07 RX ADMIN — POLYETHYLENE GLYCOL 3350 17 G: 17 POWDER, FOR SOLUTION ORAL at 07:37

## 2022-10-07 RX ADMIN — DOCUSATE SODIUM 100 MG: 100 CAPSULE, LIQUID FILLED ORAL at 07:35

## 2022-10-07 RX ADMIN — IRON SUCROSE 200 MG: 20 INJECTION, SOLUTION INTRAVENOUS at 09:44

## 2022-10-07 RX ADMIN — B-COMPLEX W/ C & FOLIC ACID TAB 1 TABLET: TAB at 07:35

## 2022-10-07 RX ADMIN — ASPIRIN 81 MG: 81 TABLET, COATED ORAL at 07:35

## 2022-10-07 RX ADMIN — FLUTICASONE FUROATE AND VILANTEROL TRIFENATATE 1 PUFF: 200; 25 POWDER RESPIRATORY (INHALATION) at 07:38

## 2022-10-07 RX ADMIN — ENOXAPARIN SODIUM 40 MG: 40 INJECTION SUBCUTANEOUS at 06:18

## 2022-10-07 RX ADMIN — OXYCODONE HYDROCHLORIDE 10 MG: 10 TABLET ORAL at 07:34

## 2022-10-07 NOTE — PLAN OF CARE
Problem: PHYSICAL THERAPY ADULT  Goal: Performs mobility at highest level of function for planned discharge setting  See evaluation for individualized goals  Description: Treatment/Interventions: Functional transfer training, LE strengthening/ROM, Elevations, Therapeutic exercise, Endurance training, Patient/family training, Equipment eval/education, Bed mobility, Gait training, Spoke to nursing, OT  Equipment Recommended: Kandice Innocent       See flowsheet documentation for full assessment, interventions and recommendations  Outcome: Progressing  Note: Prognosis: Excellent  Problem List: Decreased strength, Decreased range of motion, Decreased endurance, Impaired balance, Decreased mobility, Pain  Assessment: Pt performed all transfers & ambulatory tasks on levels in similar fashion compared to yesterday, demonstratign improved pace & LLE advancement overall that allowered her to progress to household distances prior to requiring seated rest to recover  Pt negotiated steps with good carryover of instructions for sequencing & no LOB  Does continue to have difficulty advancing LLE off steps to descend, but may require some facilitation at home, which was demonstrated post trial   Upon return to room, pt given anterior CAROLIN handout regarding precuations & instructed in the same  Pt verbalized understanding & offers no further questions or concrens at this time  She has demonstrated sufficient progress to d/c home with OPPT follow up when medically cleared to d/c home  PT Discharge Recommendation: Home with outpatient rehabilitation    See flowsheet documentation for full assessment

## 2022-10-07 NOTE — PROGRESS NOTES
Progress Note - Orthopedics   Immanuel Wood 81 y o  female MRN: [id number]  Unit/Bed#: -01      Subjective:    81 y  o female POD 2 s/p L CAROLIN  No acute events, no new complaints  Patient doing well  Pain well controlled  Denies fevers, chills, CP, SOB, N/V, numbness or tingling  Patient reports no issues with urination or bowel movements  Patient states she is doing well and working with PT      Labs:  0   Lab Value Date/Time    HCT 30 0 (L) 10/06/2022 0620    HCT 39 5 09/26/2022 1501    HCT 35 5 09/09/2022 1545    HGB 9 6 (L) 10/06/2022 0620    HGB 12 5 09/26/2022 1501    HGB 11 7 09/09/2022 1545    INR 1 05 09/09/2022 1545    WBC 8 57 10/06/2022 0620    WBC 5 54 09/26/2022 1501    WBC 5 40 09/09/2022 1545    CRP <3 0 09/09/2022 1545       Meds:    Current Facility-Administered Medications:   •  acetaminophen (TYLENOL) tablet 650 mg, 650 mg, Oral, Q6H PRN, Fabby Martinez PA-C  •  albuterol (PROVENTIL HFA,VENTOLIN HFA) inhaler 2 puff, 2 puff, Inhalation, Q4H PRN, Fabby Martinez PA-C  •  ALPRAZolam [facility]) tablet 0 25 mg, 0 25 mg, Oral, TID PRN, Fabby Martinez PA-C  •  ammonium lactate (LAC-HYDRIN) 12 % cream, , Topical, BID PRN, Fabby Martinez PA-C  •  ascorbic acid (VITAMIN C) tablet 500 mg, 500 mg, Oral, BID, Fabby Martinez PA-C, 500 mg at 10/06/22 1712  •  aspirin (ECOTRIN LOW STRENGTH) EC tablet 81 mg, 81 mg, Oral, Daily, SLY Daniel, 81 mg at 10/06/22 7291  •  atorvastatin (LIPITOR) tablet 40 mg, 40 mg, Oral, Daily, Fabby Martinez PA-C, 40 mg at 10/06/22 8320  •  clopidogrel (PLAVIX) tablet 75 mg, 75 mg, Oral, Daily, Grey Blanco PA-C  •  cycloSPORINE (RESTASIS) 0 05 % ophthalmic emulsion 1 drop, 1 drop, Both Eyes, BID, Grey Blanco PA-C  •  docusate sodium (COLACE) capsule 100 mg, 100 mg, Oral, BID, Grey Blanco PA-C, 100 mg at 10/06/22 1713  •  enoxaparin (LOVENOX) subcutaneous injection 40 mg, 40 mg, Subcutaneous, Daily, Fabby Martinez PA-C, 40 mg at 10/06/22 0740  •  ferrous sulfate tablet 325 mg, 325 mg, Oral, BID With Meals, Lonia Louder, PA-C, 325 mg at 10/06/22 1713  •  fluticasone (FLONASE) 50 mcg/act nasal spray 2 spray, 2 spray, Nasal, Daily PRN, Lonia Louder, PA-C  •  fluticasone-vilanterol (BREO ELLIPTA) 200-25 MCG/INH inhaler 1 puff, 1 puff, Inhalation, Daily, Lonia Louder, PA-C, 1 puff at 02/30/46 7548  •  folic acid (FOLVITE) tablet 400 mcg, 400 mcg, Oral, Daily, Lonia Louder, PA-C, 400 mcg at 10/06/22 3544  •  gabapentin (NEURONTIN) capsule 200 mg, 200 mg, Oral, Weekly, Lonia Louder, PA-C  •  HYDROmorphone (DILAUDID) injection 0 5 mg, 0 5 mg, Intravenous, Q2H PRN, Lonia Louder, PA-C, 0 5 mg at 10/06/22 0857  •  iron sucrose (VENOFER) 200 mg in sodium chloride 0 9 % 100 mL IVPB, 200 mg, Intravenous, Daily, Deann Davidson, PA-C, 200 mg at 10/06/22 1208  •  lactated ringers infusion, 100 mL/hr, Intravenous, Continuous, Lonia Louder, PA-C, Stopped at 10/06/22 7792  •  levothyroxine tablet 150 mcg, 150 mcg, Oral, Early Morning, Lonia Louder, PA-C, 150 mcg at 10/06/22 0549  •  montelukast (SINGULAIR) tablet 10 mg, 10 mg, Oral, Daily PRN, Lonia Louder, PA-C  •  multivitamin stress formula tablet 1 tablet, 1 tablet, Oral, Daily, Lonia Louder, PA-C, 1 tablet at 10/06/22 1482  •  oxyCODONE (ROXICODONE) immediate release tablet 10 mg, 10 mg, Oral, Q4H PRN, Lonia Louder, PA-C, 10 mg at 10/06/22 2322  •  oxyCODONE (ROXICODONE) IR tablet 5 mg, 5 mg, Oral, Q4H PRN, Lonia Louder, PA-C, 5 mg at 10/1952  •  polyethylene glycol (MIRALAX) packet 17 g, 17 g, Oral, Daily, Grey Blanco PA-C  •  verapamil (CALAN-SR) CR tablet 360 mg, 360 mg, Oral, Daily, Deann Davidson PA-C, 360 mg at 10/06/22 0851    Blood Culture:   No results found for: BLOODCX    Wound Culture:   No results found for: WOUNDCULT    Ins and Outs:  I/O last 24 hours:   In: 1138 3 [I V :1138 3]  Out: 275 [Urine:275]          Physical:  Vitals:    10/07/22 0335   BP: 108/56   Pulse: 82   Resp:    Temp: 98 6 °F (37 °C)   SpO2: 96%     Musculoskeletal: left Lower Extremity  · Skin warm, dry  No erythema or ecchymosis  · Dressing intact  · TTP daniel incisionally  · Sensation intact to saphenous, sural, tibial, superficial peroneal nerve, and deep peroneal  · Motor intact to +FHL/EHL, +ankle dorsi/plantar flexion  · 2+ DP pulse, symmetric bilaterally  · Digits warm and well perfused  · Capillary refill < 2 seconds    Assessment:    72 y  o female POD s/p L CAROLIN  Patient doing well, will benefit from continued physical therapy      Plan:  · WBAT LLE  · PT/OT  · Pain control  · DVT ppx LVX  · Dispo: Ortho will follow    Gunnar Pinzon MD

## 2022-10-07 NOTE — PHYSICAL THERAPY NOTE
Physical Therapy Progress Note     10/07/22 0835   PT Last Visit   PT Visit Date 10/07/22   Note Type   Note Type Treatment   Pain Assessment   Pain Assessment Tool 0-10   Pain Score 7   Pain Location/Orientation Orientation: Left; Location: Hip   Hospital Pain Intervention(s) Cold applied;Repositioned; Ambulation/increased activity; Elevated; Rest   Restrictions/Precautions   LLE Weight Bearing Per Order WBAT   Other Precautions WBS;THR;Pain; Fall Risk   Subjective   Subjective Pt encountered seated in recliner, pleasant and agreeable to treatment  Reports no new complaints & had no instances of dizziness today  Is eager to return home today  Transfers   Sit to Stand 5  Supervision   Additional items Assist x 1   Stand to Sit 5  Supervision   Additional items Assist x 1   Ambulation/Elevation   Gait pattern Step to;Short stride; Foward flexed; Inconsistent paulo;Decreased L stance;Decreased foot clearance; Antalgic; Improper Weight shift   Gait Assistance 5  Supervision   Additional items Assist x 1   Assistive Device Rolling walker   Distance 80'   Stair Management Assistance 4  Minimal assist   Additional items Assist x 1   Stair Management Technique One rail L;Step to pattern; Foreward; With cane   Number of Stairs 5   Balance   Static Sitting Good   Static Standing Fair   Ambulatory Fair -   Activity Tolerance   Activity Tolerance Patient tolerated treatment well;Patient limited by fatigue;Patient limited by pain   Nurse Made Aware yes   Assessment   Prognosis Excellent   Problem List Decreased strength;Decreased range of motion;Decreased endurance; Impaired balance;Decreased mobility;Pain   Assessment Pt performed all transfers & ambulatory tasks on levels in similar fashion compared to yesterday, demonstratign improved pace & LLE advancement overall that allowered her to progress to household distances prior to requiring seated rest to recover    Pt negotiated steps with good carryover of instructions for sequencing & no LOB  Does continue to have difficulty advancing LLE off steps to descend, but may require some facilitation at home, which was demonstrated post trial   Upon return to room, pt given anterior CAROLIN handout regarding precuations & instructed in the same  Pt verbalized understanding & offers no further questions or concrens at this time  She has demonstrated sufficient progress to d/c home with OPPT follow up when medically cleared to d/c home  Goals   Patient Goals to go home   STG Expiration Date 10/16/22   PT Treatment Day 2   Plan   Treatment/Interventions Functional transfer training;LE strengthening/ROM; Elevations; Therapeutic exercise; Endurance training;Patient/family training;Equipment eval/education; Bed mobility;Gait training   Progress Progressing toward goals   PT Frequency Twice a day   Recommendation   PT Discharge Recommendation Home with outpatient rehabilitation   Equipment Recommended Other (Comment)  [facility])   AM-PAC Basic Mobility Inpatient   Turning in Bed Without Bedrails 3   Lying on Back to Sitting on Edge of Flat Bed 3   Moving Bed to Chair 4   Standing Up From Chair 4   Walk in Room 4   Climb 3-5 Stairs 3   Basic Mobility Inpatient Raw Score 21   Basic Mobility Standardized Score 45 55   Highest Level Of Mobility   JH-HLM Goal 6: Walk 10 steps or more   JH-HLM Achieved 7: Walk 25 feet or more     Maggie Nail    An [facility] Basic Mobility Standardized Score of 40 78 suggests pt would benefit from post acute rehab

## 2022-10-07 NOTE — PROGRESS NOTES
Internal Medicine Progress Note  Patient: Ninfa Hinojosa  Age/sex: 81 y o  female  Medical Record #: [id number]      ASSESSMENT/PLAN: (Interval History)  Ninfa Hinojosa is seen and examined and management for following issues:    Status Post left Total HIP ARTHROPLASTY  • Pain controlled  • Continue encourage incentive spirometry; monitor fever curve  • DVT prophylaxis in place and reviewed  Results from last 7 days   Lab Units 10/07/22  0627   WBC Thousand/uL 5 72   HEMOGLOBIN g/dL 7 9*   HEMATOCRIT % 24 5*   PLATELETS Thousands/uL 102*         Operative acute blood loss anemia  · Decrease in hgb levels from preop labs results; suspect due to post operation extravasation losses along with potential dilutional effects of fluids  · S/P venofer transfusion x 2  · Transfuse PRBC if hgb less then 8 0 (per ortho protocol)  or symptomatic  · Monitor follow up CBC post intervention to trend effect    HTN  · Hold hyzaar in the post operative setting to avoid hypotension/HELEN  · OK to resume on dc  · Add hydralazine as needed for SBP >160  · Verapamil held today as pt did not meet the parameter  · Monitor trend     CKD  · Level 3  · Baseline 1 2-1 4  · Avoid nephrotoxic medications  · Avoid hypotension in the post operative setting  · Monitor bmp     Hypothyroid  · Cont levothyroxine as prescribed     PAD  · S/p stents to LLE  · Resume ASA/Plavix  · Cont statin      The above assessment and plan was reviewed and updated as determined by my evaluation of the patient on 10/7/2022      Labs:   Results from last 7 days   Lab Units 10/07/22  0627 10/06/22  0620   WBC Thousand/uL 5 72 8 57   HEMOGLOBIN g/dL 7 9* 9 6*   HEMATOCRIT % 24 5* 30 0*   PLATELETS Thousands/uL 102* 138*     Results from last 7 days   Lab Units 10/07/22  0627 10/06/22  0620   SODIUM mmol/L 135 136   POTASSIUM mmol/L 3 9 4 1   CHLORIDE mmol/L 104 105   CO2 mmol/L 28 26   BUN mg/dL 17 18   CREATININE mg/dL 0 92 1 09   CALCIUM mg/dL 8 5 8 6             Results from last 7 days   Lab Units 10/05/22  1711   POC GLUCOSE mg/dl 160*       Review of Scheduled Meds:  Current Facility-Administered Medications   Medication Dose Route Frequency Provider Last Rate   • acetaminophen  650 mg Oral Q6H PRN Rondal Kanner, PA-C     • albuterol  2 puff Inhalation Q4H PRN Rondal Kanner, PA-C     • ALPRAZolam  0 25 mg Oral TID PRN Rondal Kanner, PA-C     • ammonium lactate   Topical BID PRN Rondal Kanner, PA-C     • ascorbic acid  500 mg Oral BID Rondal Kanner, PA-C     • aspirin  81 mg Oral Daily SLY Taylor     • atorvastatin  40 mg Oral Daily Rondal Kanner, PA-C     • clopidogrel  75 mg Oral Daily Rondal Kanner, PA-C     • cycloSPORINE  1 drop Both Eyes BID Rondal Kanner, PA-C     • docusate sodium  100 mg Oral BID Rondal Kanner, PA-C     • enoxaparin  40 mg Subcutaneous Daily Rondal Kanner, PA-C     • ferrous sulfate  325 mg Oral BID With Meals Seamus Blanco PA-C     • fluticasone  2 spray Nasal Daily PRN Rondal Kanner, PA-C     • fluticasone-vilanterol  1 puff Inhalation Daily Rondal Kanner, PA-C     • folic acid  739 mcg Oral Daily Rondal Kanner, PA-C     • gabapentin  200 mg Oral Weekly Rondal Kanner, PA-C     • HYDROmorphone  0 5 mg Intravenous Q2H PRN Rondal Kanner, PA-C     • iron sucrose  200 mg Intravenous Daily Deann Davidson PA-C     • lactated ringers  100 mL/hr Intravenous Continuous Rondal Kanner, PA-C Stopped (10/06/22 0550)   • levothyroxine  150 mcg Oral Early Morning Rondal Kanner, PA-C     • montelukast  10 mg Oral Daily PRN Rondal Kanner, PA-C     • multivitamin stress formula  1 tablet Oral Daily Rondal Kanner, PA-C     • oxyCODONE  10 mg Oral Q4H PRN Rondal Kanner, PA-C     • oxyCODONE  5 mg Oral Q4H PRN Rondal Kanner, PA-C     • polyethylene glycol  17 g Oral Daily Rondal Kanner, PA-C     • verapamil  360 mg Oral Daily MERCY UnderwoodC Subjective/ HPI: Patient seen and examined  Patients overnight issues or events were reviewed with nursing or staff during rounds or morning huddle session  New or overnight issues include the following:     Pt without any overnight events or reported nursing issues      ROS:   A 10 point ROS was performed; negative except as noted above  Imaging:     No orders to display       *Labs /Radiology studies Reviewed  *Medications  reviewed and reconciled as needed  *Please refer to order section for additional ordered labs studies  *Case discussed with primary attending during morning huddle case rounds    Physical Examination:  Vitals:   Vitals:    10/06/22 1903 10/06/22 2309 10/07/22 0335 10/07/22 0737   BP: 105/57 110/56 108/56 117/63   Pulse: 76 79 82 95   Resp: 15      Temp:  97 7 °F (36 5 °C) 98 6 °F (37 °C) (!) 97 4 °F (36 3 °C)   TempSrc:       SpO2: 90% 96% 96% 94%   Weight:       Height:           General Appearance: no distress, conversive  HEENT: PERRLA, conjuctiva normal; oropharynx clear; mucous membranes moist;   Neck:  Supple, no lymphadenopathy or thyromegaly  Lungs: CTA, normal respiratory effort, no retractions, expiratory effort normal  CV: regular rate and rhythm , PMI normal   ABD: soft non tender, no masses , no hepatic or splenomegaly  EXT: DP pulses intact, no lymphadenopathy, no edema; Lt hip surgical dressing in place  Skin: normal turgor, normal texture, no rash  Psych: affect normal, mood normal  Neuro: AAOx3    : voiding    The above physical exam was reviewed and updated as determined by my evaluation of the patient on 10/7/2022  Invasive Devices  Report    Peripheral Intravenous Line  Duration           Peripheral IV 10/05/22 Dorsal (posterior); Right Forearm 1 day    Peripheral IV 10/05/22 Left Arm 1 day                   VTE Pharmacologic Prophylaxis: Enoxaparin  Code Status: No Order  Current Length of Stay: 0 day(s)      Total floor / unit time spent today 30 minutes Coordination of patient's care was performed in conjunction with primary service  Time invested included review of patient's labs, vitals, and management of their comorbidities with continued monitoring, examination of patient as well as answering patient questions, documenting her findings and creating progress note in electronic medical record,  ordering appropriate diagnostic testing  ** Please Note:  voice to text software may have been used in the creation of this document   Although proof errors in transcription or interpretation are a potential of such software**

## 2022-10-07 NOTE — TELEPHONE ENCOUNTER
As a follow-up, a second attempt has been made for outreach via fax, please see Contacts section for details      Thank you  Leah Mendez

## 2022-10-07 NOTE — PLAN OF CARE
Problem: MOBILITY - ADULT  Goal: Maintain or return to baseline ADL function  Description: INTERVENTIONS:  -  Assess patient's ability to carry out ADLs; assess patient's baseline for ADL function and identify physical deficits which impact ability to perform ADLs (bathing, care of mouth/teeth, toileting, grooming, dressing, etc )  - Assess/evaluate cause of self-care deficits   - Assess range of motion  - Assess patient's mobility; develop plan if impaired  - Assess patient's need for assistive devices and provide as appropriate  - Encourage maximum independence but intervene and supervise when necessary  - Involve family in performance of ADLs  - Assess for home care needs following discharge   - Consider OT consult to assist with ADL evaluation and planning for discharge  - Provide patient education as appropriate  Outcome: Adequate for Discharge  Goal: Maintains/Returns to pre admission functional level  Description: INTERVENTIONS:  - Perform BMAT or MOVE assessment daily    - Set and communicate daily mobility goal to care team and patient/family/caregiver  - Collaborate with rehabilitation services on mobility goals if consulted  - Perform Range of Motion  times a day  - Reposition patient every  hours    - Dangle patient  times a day  - Stand patient  times a day  - Ambulate patient  times a day  - Out of bed to chair  times a day   - Out of bed for meals  times a day  - Out of bed for toileting  - Record patient progress and toleration of activity level   Outcome: Adequate for Discharge     Problem: Potential for Falls  Goal: Patient will remain free of falls  Description: INTERVENTIONS:  - Educate patient/family on patient safety including physical limitations  - Instruct patient to call for assistance with activity   - Consult OT/PT to assist with strengthening/mobility   - Keep Call bell within reach  - Keep bed low and locked with side rails adjusted as appropriate  - Keep care items and personal belongings within reach  - Initiate and maintain comfort rounds  - Make Fall Risk Sign visible to staff  - Offer Toileting every  Hours, in advance of need  - Initiate/Maintain alarm  - Obtain necessary fall risk management equipment:   - Apply yellow socks and bracelet for high fall risk patients  - Consider moving patient to room near nurses station  Outcome: Adequate for Discharge

## 2022-10-07 NOTE — PROGRESS NOTES
Progress Note - Nephrology   Hugo Ganser 81 y o  female MRN: [id number]  Unit/Bed#: -01 Encounter: [id number]      Assessment / Plan:    CKD stage 3 a/B with fluctuating creatinine  · Secondary to nonsteroidal usage in the setting of angiotensin receptor blockade  · Creatinine has improved and remained stable postoperatively  · The patient knows to avoid nonsteroidal usage  · Follow-up with Dr Catalan in the office on discharge    Hypertension  · Blood pressure is currently controlled  · Would resume her usual antihypertensive regimen on discharge    Volume status  · Acceptable without evidence of volume overload    Status post left CAROLIN    + Systolic murmur  · The patient is aware of this and has had an echocardiogram in the past   I did ask her discuss this with her PCP to see for follow-up echocardiogram is needed in the future  + anemia with decrease in hemoglobin/thrombocytopenia  · Per primary service/internal medicine    Nothing to add from a renal standpoint  Will sign off at this time  Please call if we can be of further assistance  Subjective: The patient was seen examined  She denied any shortness of breath, chest pain or pressure, abdominal pain, vomiting, diarrhea, sweats, chills I just sent you an invite for an  meeting      Objective:     Vitals: Blood pressure 117/63, pulse 95, temperature (!) 97 4 °F (36 3 °C), resp  rate 15, height 5' 6" (1 676 m), weight 106 kg (234 lb), SpO2 94 %  ,Body mass index is 37 77 kg/m²  Temp (24hrs), Av 9 °F (36 6 °C), Min:97 4 °F (36 3 °C), Max:98 6 °F (37 °C)      Weight (last 2 days)     Date/Time Weight    10/05/22 1100 106 (234)               No intake or output data in the 24 hours ending 10/07/22 1141  No intake/output data recorded          Physical Exam    /63   Pulse 95   Temp (!) 97 4 °F (36 3 °C)   Resp 15   Ht 5' 6" (1 676 m)   Wt 106 kg (234 lb)   SpO2 94%   BMI 37 77 kg/m²   Vital signs were reviewed  Constitutional:  The patient was awake, alert, pleasant, cooperative in no apparent distress  Cardiovascular:  No overt jugular venous distention was noted, S1-S2, no pericardial friction rub or S3 were appreciated, systolic murmur noted, no peripheral edema noted  Pulmonary:  Adequate inspiratory effort, lungs were clear to auscultation and percussion with no rales/rhonchi wheezing noted  Abdominal/GI:  Soft, nontender, no rebound or guarding was noted  Skin:  No hives noted            Invasive Devices  Report    Peripheral Intravenous Line  Duration           Peripheral IV 10/05/22 Dorsal (posterior); Right Forearm 1 day    Peripheral IV 10/05/22 Left Arm 1 day                Medications:    Scheduled Meds:  Current Facility-Administered Medications   Medication Dose Route Frequency Provider Last Rate   • acetaminophen  650 mg Oral Q6H PRN Paul Bond PA-C     • albuterol  2 puff Inhalation Q4H PRN Paul Bond PA-C     • ALPRAZolam  0 25 mg Oral TID PRN Paul Bond PA-C     • ammonium lactate   Topical BID PRN Paul Bond PA-C     • ascorbic acid  500 mg Oral BID Paul Bond PA-C     • aspirin  81 mg Oral Daily SLY Cole     • atorvastatin  40 mg Oral Daily Paul Bond PA-C     • clopidogrel  75 mg Oral Daily Paul Bond PA-C     • cycloSPORINE  1 drop Both Eyes BID Paul Bond PA-C     • docusate sodium  100 mg Oral BID Paul Bond PA-C     • enoxaparin  40 mg Subcutaneous Daily Paul Bond PA-C     • ferrous sulfate  325 mg Oral BID With Meals Grey Blanco PA-C     • fluticasone  2 spray Nasal Daily PRN Paul Bond PA-C     • fluticasone-vilanterol  1 puff Inhalation Daily Paul Bond PA-C     • folic acid  591 mcg Oral Daily Paul Bond PA-C     • gabapentin  200 mg Oral Weekly Paul Bond PA-C     • HYDROmorphone  0 5 mg Intravenous Q2H PRN Paul Bond PA-C     • lactated ringers  100 mL/hr Intravenous Continuous Fuentese KEDAR Jackson Stopped (10/06/22 0550)   • levothyroxine  150 mcg Oral Early Morning Zollie OhiowaKEDAR     • montelukast  10 mg Oral Daily PRN Zollie OhiowaKEDAR     • multivitamin stress formula  1 tablet Oral Daily Zollie OhiowaKEDAR     • oxyCODONE  10 mg Oral Q4H PRN Zollie OhiowaKEDAR     • oxyCODONE  5 mg Oral Q4H PRN Zollie OhiowaKEDAR     • polyethylene glycol  17 g Oral Daily Zollie OhiowaKEDAR     • verapamil  360 mg Oral Daily Deann Davidson PA-C         PRN Meds: •  acetaminophen  •  albuterol  •  ALPRAZolam  •  ammonium lactate  •  fluticasone  •  HYDROmorphone  •  montelukast  •  oxyCODONE  •  oxyCODONE    Continuous Infusions:lactated ringers, 100 mL/hr, Last Rate: Stopped (10/06/22 0550)            LAB RESULTS:      Results from last 7 days   Lab Units 10/07/22  0627 10/06/22  0620   WBC Thousand/uL 5 72 8 57   HEMOGLOBIN g/dL 7 9* 9 6*   HEMATOCRIT % 24 5* 30 0*   PLATELETS Thousands/uL 102* 138*   POTASSIUM mmol/L 3 9 4 1   CHLORIDE mmol/L 104 105   CO2 mmol/L 28 26   BUN mg/dL 17 18   CREATININE mg/dL 0 92 1 09   CALCIUM mg/dL 8 5 8 6       CUTURES:  No results found for: Mica Sánchez              PLEASE NOTE:  This encounter was completed utilizing the [employer] Direct Speech Voice Recognition Software  Grammatical errors, random word insertions, pronoun errors and incomplete sentences are occasional consequences of the system due to software limitations, ambient noise and hardware issues  These may be missed by proof reading prior to affixing electronic signature  Any questions or concerns about the content, text or information contained within the body of this dictation should be directly addressed to the physician for clarification  Please do not hesitate to call me directly if you have any any questions or concerns

## 2022-10-07 NOTE — TELEPHONE ENCOUNTER
Upon review of the In Basket request we have found this is a duplicate request and no further action is needed  This request is currently being processed and documentation is being completed in the initial request  This message will now be closed  Any additional questions or concerns should be emailed to the Practice Liaisons via the appropriate education email address, please do not reply via In Basket      Thank you  Leah Mendez

## 2022-10-10 ENCOUNTER — TRANSITIONAL CARE MANAGEMENT (OUTPATIENT)
Dept: FAMILY MEDICINE CLINIC | Facility: CLINIC | Age: 72
End: 2022-10-10

## 2022-10-10 ENCOUNTER — TELEPHONE (OUTPATIENT)
Dept: OBGYN CLINIC | Facility: HOSPITAL | Age: 72
End: 2022-10-10

## 2022-10-10 ENCOUNTER — TELEPHONE (OUTPATIENT)
Dept: OBGYN CLINIC | Facility: MEDICAL CENTER | Age: 72
End: 2022-10-10

## 2022-10-10 DIAGNOSIS — M16.12 PRIMARY OSTEOARTHRITIS OF ONE HIP, LEFT: Primary | ICD-10-CM

## 2022-10-10 NOTE — TELEPHONE ENCOUNTER
Caller: [facility]    Doctor: Niya Verma    Reason for call: Calling for the end date of the LOvenox    Call back#:

## 2022-10-10 NOTE — TELEPHONE ENCOUNTER
Pt contacted again for a postoperative follow up call assessment  She reports being "sore, no fever, appetite is going " She states she is getting up and using the BSC, using the RW and reports worsening pain with ambulating, and decreased pain with rest  She reports swelling is the “same” and we discussed icing, she states "redness has gone away”  and she continues to ice  Her dressing is dry with no drainage  She does reports 5/10 pain currently  She did also call in regarding Tiana Ladd today, requesting a script for Home PT  She reports she   Slipped twice coming in the home with the cane and feels "Very unsafe" with heavy operative leg  She was explained how T[facility] Ladd works, and needing an order and referral from the surgeon  We reviewed her AVS medication list  She is taking   Oxycodone 5mg every 5 hours, and tylenol 1000mg TID for pain  She confirmed also taking ASA 81mg Daily , and lovenox for 28 days, she is taking them daily and has all 28 at home  She is also taking Miralax (no BM, passing gas) and will start Colace  She denies chest pain, SOB, dizziness, fever or calf pain  She is also using the incentive spirometer

## 2022-10-10 NOTE — TELEPHONE ENCOUNTER
Caller: Coleen Vela    Doctor: [facility]    Reason for call:     Patient is asking for home therapy to help her at home, she cannot get out of her place, she cannot get down 9 stairs  She is asking for a script for home therapy    (Left hip replacement)    Call back#: [phone]

## 2022-10-11 ENCOUNTER — EPISODE CHANGES (OUTPATIENT)
Dept: CASE MANAGEMENT | Facility: OTHER | Age: 72
End: 2022-10-11

## 2022-10-11 NOTE — TELEPHONE ENCOUNTER
As a final attempt, a third outreach has been made via telephone call  Please see Contacts section for details  This encounter will be closed and completed by end of day  Should we receive the requested information because of previous outreach attempts, the requested patient's chart will be updated appropriately       Thank you  Rubén Rangel

## 2022-10-12 LAB
DME PARACHUTE DELIVERY DATE ACTUAL: NORMAL
DME PARACHUTE DELIVERY DATE REQUESTED: NORMAL
DME PARACHUTE DELIVERY NOTE: NORMAL
DME PARACHUTE ITEM DESCRIPTION: NORMAL
DME PARACHUTE ORDER STATUS: NORMAL
DME PARACHUTE SUPPLIER NAME: NORMAL
DME PARACHUTE SUPPLIER PHONE: NORMAL

## 2022-10-12 NOTE — TELEPHONE ENCOUNTER
Spoke to BABITA, unable to provide service for patient due to service area  Will work with [address] [place] [address] at this time

## 2022-10-12 NOTE — TELEPHONE ENCOUNTER
Spoke to patient  Educated the [address] rehab do not service her area   Will reach out to inpatient Cm to see if they can assist

## 2022-10-12 NOTE — TELEPHONE ENCOUNTER
Spoke to [facility]  They do service the patients area, and can start PT services Friday  They need Home health order faxed (in other orders in Epic) and a face sheet to the following number  Fax# [phone]  Clinical team- can you please fax documents to above number when available today?

## 2022-10-13 ENCOUNTER — PATIENT OUTREACH (OUTPATIENT)
Dept: FAMILY MEDICINE CLINIC | Facility: CLINIC | Age: 72
End: 2022-10-13

## 2022-10-13 ENCOUNTER — TELEPHONE (OUTPATIENT)
Dept: NEPHROLOGY | Facility: CLINIC | Age: 72
End: 2022-10-13

## 2022-10-13 DIAGNOSIS — N30.00 ACUTE CYSTITIS WITHOUT HEMATURIA: Primary | ICD-10-CM

## 2022-10-13 RX ORDER — CEPHALEXIN 500 MG/1
500 CAPSULE ORAL EVERY 12 HOURS SCHEDULED
Qty: 14 CAPSULE | Refills: 0 | Status: SHIPPED | OUTPATIENT
Start: 2022-10-13 | End: 2022-10-20

## 2022-10-13 NOTE — DISCHARGE SUMMARY
ORTHOPEDICS DISCHARGE SUMMARY   Tresa Neri 81 y o  female MRN: [id number]  Unit/Bed#: -01        Attending Physician: Hollis Cerna     Admitting diagnosis: Primary osteoarthritis of one hip, left [M16 12]     Discharge diagnosis: Primary osteoarthritis of one hip, left [M16 12]     Date of admission: 10/5/2022     Date of discharge: 10/07/22     Procedure: L CAROLIN     HPI  This is a 81y o  year old female that presented to the office with signs and symptoms of left hip osteoarthritis  They tried and failed conservative treatment measures and wished to proceed with surgical intervention  The risks, benefits, and complications of the procedure were discussed with the patient and informed consent was obtained   [facility] Course: The patient was admitted to the hospital on 10/5/2022 and underwent an uncomplicated left total hip arthroplasty  They were transferred to the floor after a brief stay in the post-anesthesia care unit  Their pain was well managed with IV and oral pain medications  They began therapy on post operative day #1  Lovenox was also started for DVT prophylaxis 12 hours post op  On discharge date pt was cleared by PT and the medicine team and determined to be safe for discharge  Daily discussion was had with the patient, nursing staff, orthopaedic team, and family members if present  All questions were answered to the patients satisifaction            0   Lab Value Date/Time     HGB 9 6 (L) 10/06/2022 0620     HGB 12 5 09/26/2022 1501     HGB 11 7 09/09/2022 1545     HGB 12 3 07/26/2022 1633            Body mass index is 37 77 kg/m²  moderately obese  Recommend behavior modifications, nutrition and physical activity      Discharge Instructions: The patient was discharged weight bearing as tolerated to the left lower extremity  Lovenox will be continued for 28 days  Continue PT/OT  Take pain medications as instructed       Discharge Medications:   For the complete list of discharge medications, please refer to the patient's medication reconciliation

## 2022-10-13 NOTE — PROGRESS NOTES
OutPatient Care Management Note:    Inbasket notification received for new bundle episode DRG  Major joint replacement of lower extremity with a closure date of  01/05/23  Chart review completed  Patient hospitalized at [facility] from 10/05/22 to 10/07/22 for a planned left total hip arthroplasty done on 10/05/22  Prior to surgery, patient had attempted conservative treatment modalities for osteoarthritis which had failed  Patient was discharged to home on POD#2     Past Medical History: Significant for liver cirrhosis secondary to JOSEPH, hypothyroidism, atheroschlerosis, PAD, HTN, s/p left toe amputation, Obesity with BMI greater than 35, CKD stage 3B, aortic stenosis, and osteoarthritis of left hip  Future appointments:    POST OP with Pool Walker MD  Tuesday Oct 18, 2022 1:15 PM    Telephone outreach attempt made to introduce self and role of outpatient care management, the purpose of this phone call to assess patient's general health and wellbeing, evaluate for any needs with follow-up care or possible barriers to care that may influence adherence to treatment plan  CM explained that support would be telephonic to provide education about chronic illness, medications and identify any SDOH needs   BPCI program also explained  All questions answered to patient's satisfaction  Patient consented to future outreach of CM  Patient reports she has been unable to begin rehab after coming home as there are 9 steps to the outside of her home which patient is unable to navigate at present time  Patient states she will be starting PT/OT tomorrow with [facility]  There are phone notes in the chart from Ortho Surgery Coordinator which also confirm this  Patient requests that I call her on Monday after her PT eval and prior to her next Post Op appointment on Tues  Patient is unsure at this time how she will get out of her home to get to this appointment       Future outreach scheduled for Monday 10/17/22

## 2022-10-13 NOTE — TELEPHONE ENCOUNTER
Good Morning,    Dr Lennox Suarez patient of yours called this morning requesting to speak with you  Patient stated she had Hip Surgery a week ago and she is house bound clearance  Patient stated she did not know when she went for surgery that she had a UTI  Patient stated she is experiencing burning, pressure and urgency to go to the bathroom  Patient would like to know if you can send an RX to the pharmacy      Please send [facility] to [phone]- [facility] [address] @ [phone]    Patient contact number is [phone]

## 2022-10-14 ENCOUNTER — TELEPHONE (OUTPATIENT)
Dept: OBGYN CLINIC | Facility: HOSPITAL | Age: 72
End: 2022-10-14

## 2022-10-14 NOTE — TELEPHONE ENCOUNTER
Son looking for earlier appt for dressing change on 10/18/22  No early appts available  Son took 3:15 appt on 10/18/22

## 2022-10-16 DIAGNOSIS — M16.12 PRIMARY OSTEOARTHRITIS OF ONE HIP, LEFT: Primary | ICD-10-CM

## 2022-10-17 ENCOUNTER — PATIENT OUTREACH (OUTPATIENT)
Dept: FAMILY MEDICINE CLINIC | Facility: CLINIC | Age: 72
End: 2022-10-17

## 2022-10-17 NOTE — PROGRESS NOTES
OutPatient Care Management Note:    Phone call follow up made to patient today  Following is a summary of the call  Asha Alcocer states that she has been doing “well” and that Renae home therapist from [facility] made a home visit on Friday   Per Marbin’s recommendation the single set of steps stairs from patient’s apartment to outside are too steep and too shallow for her to navigate at this time and his recommendation is that patient remain in the main house with her son and daughter-in-law where there are two flights of carpeted stairs that patient can navigate with assist of two  Patient states she was given exercises for strength and mobility which she has been doing daily   Patient states that the exercises "have been helpful and she already sees an improvement" in this short time span  Patient has been ambulating with a walker       Patient admits to having had a fall on Friday afternoon when she was ambulating from the living room to the adjacent kitchen  She reports she felt that her leg was weak and gave out on her before she could get to a chair  She did call out for her DIL who was able to assist patient from the floor  Asha Alcocer shares that she carries her cell phone with her at all times to call to her adult children if she needs assistance  Patient has a follow up appointment tomorrow with her orthopedic provider and she states she feels confident she'll be able to get out of the house with the assistance from her son and daughter-in-law  Her son plans on driving patient to her appointment  Patient does have a folding transport wheelchair in the home and I recommended that patient bring the wheelchair to her appointment tomorrow for ease of maneuverability and ambulation from the vehicle into the office      I also made the recommendation that patient set up “sitting stations” between her living room and kitchen (or other areas of her home) where she can saely position chairs or wheelchairs and where she will be able to sit down momentarily if she feels that her legs are too weak and about to give way   Patient was appreciative of this suggestion and states she will try it  Werner Davidson inquires as to whether  OF [facility] offers a patient driven support group for patients such as herself who have undergone hip replacement   Patient feels she would benefit from talking to patients who are going through the same challenges that she is going through or who have undergone hip surgery in the past and are willing to share their insights and their experience with the procedure and subsequent recovery   I replied that I am not aware of such support group but that I would check with the orthopedic surgical coordinator and I would get back to the patient   In basket message sent to [facility] [employer] [facility]  Patient also mentions that she does not know when the therapist is scheduled to come back to the home  Phone call made to [facility] and was informed that physical therapist plans on making the visit tomorrow and Friday  I made agency aware that patient has an appointment with her orthopedist tomorrow at 3:15  Per agency staff, patient will be contacted tonight about next appointment  Patient was agreeable that I call her next Monday afternoon

## 2022-10-18 ENCOUNTER — APPOINTMENT (OUTPATIENT)
Dept: RADIOLOGY | Facility: CLINIC | Age: 72
End: 2022-10-18
Payer: MEDICARE

## 2022-10-18 ENCOUNTER — OFFICE VISIT (OUTPATIENT)
Dept: OBGYN CLINIC | Facility: CLINIC | Age: 72
End: 2022-10-18

## 2022-10-18 VITALS
BODY MASS INDEX: 37.61 KG/M2 | DIASTOLIC BLOOD PRESSURE: 70 MMHG | HEIGHT: 66 IN | WEIGHT: 234 LBS | SYSTOLIC BLOOD PRESSURE: 112 MMHG | HEART RATE: 98 BPM

## 2022-10-18 DIAGNOSIS — M16.12 PRIMARY OSTEOARTHRITIS OF ONE HIP, LEFT: ICD-10-CM

## 2022-10-18 DIAGNOSIS — Z96.642 S/P TOTAL LEFT HIP ARTHROPLASTY: Primary | ICD-10-CM

## 2022-10-18 PROCEDURE — 99024 POSTOP FOLLOW-UP VISIT: CPT | Performed by: ORTHOPAEDIC SURGERY

## 2022-10-18 PROCEDURE — 73502 X-RAY EXAM HIP UNI 2-3 VIEWS: CPT

## 2022-10-18 RX ORDER — CEPHALEXIN 500 MG/1
CAPSULE ORAL
Qty: 4 CAPSULE | Refills: 1 | Status: SHIPPED | OUTPATIENT
Start: 2022-10-18 | End: 2022-10-18

## 2022-10-18 NOTE — PATIENT INSTRUCTIONS
- Weight bearing as tolerated left lower extremity   - Sutures removed in the office  Patient tolerated well    - Okay To begin showering  Allow water to flow over the incision sites    Do not vigorously scrubbed or soak wounds until otherwise stated   - Continue  physical therapy as directed   - Over the counter analgesics as needed / directed   - Ice / heat as directed   - Please contact orthopedics office prior to dental appointment for antibiotic prescription   - Follow up 4 weeks with repeat XR left hip

## 2022-10-18 NOTE — PROGRESS NOTES
Orthopaedics Office Visit - Post-op Patient Visit    ASSESSMENT/PLAN:    Assessment:   13 days s/p left anterior total hip arthroplasty  Repair of released rectus femoris intraop  Improving hip flexion and knee extension ability but still with some weakness  Healing well overall       Plan:   - Weight bearing as tolerated left lower extremity   - Sutures removed in the office  Patient tolerated well    - Okay To begin showering  Allow water to flow over the incision sites  Do not vigorously scrubbed or soak wounds until otherwise stated   - Continue  physical therapy as directed   - Over the counter analgesics as needed / directed   - Ice / heat as directed   - Please contact orthopedics office prior to dental appointment for antibiotic prescription   - Follow up 4 weeks with repeat XR left hip       To Do Next Visit:  XR left hip     _____________________________________________________  CHIEF COMPLAINT:  Chief Complaint   Patient presents with   • Left Hip - Post-op         SUBJECTIVE:  Mark Nicolas is a 81 y o  female who presents 13 days s/p left anterior total hip arthroplasty  Patient states that her hip is doing well overall  Patient states she has minimal pain in the hip currently  Patient states that she has been participating in physical therapy at home over the past few days with good results  Patient states she has been compliant with anticoagulation  Patient denies any numbness or tingling leg  Patient denies any fevers any chills  Patient denies any shortness of breath chest tightness chest pain  Patient offers no other complaints at this time        SOCIAL HISTORY:  Social History     Tobacco Use   • Smoking status: Never Smoker   • Smokeless tobacco: Never Used   Vaping Use   • Vaping Use: Never used   Substance Use Topics   • Alcohol use: Not Currently   • Drug use: Never       MEDICATIONS:    Current Outpatient Medications:   •  albuterol (PROVENTIL HFA,VENTOLIN HFA) 90 mcg/act inhaler, Inhale 2 puffs every 4 (four) hours as needed, Disp: , Rfl:   •  ALPRAZolam (XANAX) 0 25 mg tablet, Take 1 tablet (0 25 mg total) by mouth 3 (three) times a day as needed for anxiety 1/2-1 as needed tid, Disp: 30 tablet, Rfl: 0  •  ammonium lactate (LAC-HYDRIN) 12 % cream, Apply topically as needed for dry skin, Disp: , Rfl:   •  ascorbic acid (VITAMIN C) 500 MG tablet, Take 1 tablet (500 mg total) by mouth 2 (two) times a day, Disp: 60 tablet, Rfl: 1  •  aspirin (ECOTRIN LOW STRENGTH) 81 mg EC tablet, Take 81 mg by mouth in the morning, Disp: , Rfl:   •  atorvastatin (LIPITOR) 40 mg tablet, Take 1 tablet (40 mg total) by mouth daily, Disp: 90 tablet, Rfl: 1  •  cephalexin (KEFLEX) 500 mg capsule, Take 1 capsule (500 mg total) by mouth every 12 (twelve) hours for 7 days, Disp: 14 capsule, Rfl: 0  •  clopidogrel (PLAVIX) 75 mg tablet, Take 1 tablet (75 mg total) by mouth daily, Disp: 90 tablet, Rfl: 0  •  ferrous sulfate 324 (65 Fe) mg, Take 1 tablet (324 mg total) by mouth 2 (two) times a day before meals, Disp: 60 tablet, Rfl: 0  •  fluticasone (FLONASE) 50 mcg/act nasal spray, 2 sprays into each nostril daily as needed, Disp: , Rfl:   •  fluticasone-vilanterol (BREO ELLIPTA) 200-25 MCG/INH inhaler, Inhale 1 puff daily, Disp: , Rfl:   •  folic acid (FOLVITE) 330 mcg tablet, Take 1 tablet (400 mcg total) by mouth daily, Disp: 30 tablet, Rfl: 0  •  gabapentin (NEURONTIN) 100 mg capsule, Take 200 mg by mouth once a week, Disp: , Rfl:   •  levothyroxine (Synthroid) 150 mcg tablet, Take 1 tablet (150 mcg total) by mouth daily in the early morning, Disp: 90 tablet, Rfl: 0  •  Lidocaine-Adhesive Sheets (LidoPure Patch) 5 % KIT, Apply topically, Disp: , Rfl:   •  losartan-hydrochlorothiazide (HYZAAR) 100-25 MG per tablet, Take 1 tablet by mouth daily, Disp: , Rfl:   •  montelukast (SINGULAIR) 10 mg tablet, Take 10 mg by mouth daily as needed, Disp: , Rfl:   •  Multiple Vitamin (multivitamin) tablet, Take 1 tablet by mouth daily, Disp: 30 tablet, Rfl: 0  •  oxyCODONE (Roxicodone) 5 immediate release tablet, Take 1 tablet (5 mg total) by mouth every 4 (four) hours as needed for moderate pain Max Daily Amount: 30 mg, Disp: 30 tablet, Rfl: 0  •  polyethylene glycol (MIRALAX) 17 g packet, Take 17 g by mouth daily, Disp: , Rfl:   •  Restasis 0 05 % ophthalmic emulsion, , Disp: , Rfl:   •  verapamil (VERELAN PM) 360 MG 24 hr capsule, Take 360 mg by mouth in the morning, Disp: , Rfl:   •  enoxaparin (LOVENOX) 40 mg/0 4 mL, Inject 0 4 mL (40 mg total) under the skin daily for 28 days (Patient not taking: Reported on 10/4/2022), Disp: 11 2 mL, Rfl: 0    REVIEW OF SYSTEMS:  MSK: left hip pain   Neuro: WNL   Pertinent items are otherwise noted in HPI  A comprehensive review of systems was otherwise negative     _____________________________________________________  PHYSICAL EXAMINATION:  Vital signs: /70   Pulse 98   Ht 5' 6" (1 676 m)   Wt 106 kg (234 lb)   BMI 37 77 kg/m²   General: No acute distress, awake and alert  Psychiatric: Mood and affect appear appropriate  HEENT: Trachea Midline, No torticollis, no apparent facial trauma  Cardiovascular: No audible murmurs; Extremities appear perfused  Pulmonary: No audible wheezing or stridor  Skin: No open lesions; see further details (if any) below      MUSCULOSKELETAL EXAMINATION:  Left hip examination:  - Patient sitting comfortably in the office in no acute distress   - healed incision site noted over the anterior aspect of left hip  Extremity appears well-perfused overall   - minimal tenderness palpation noted over the anterior aspect of the hip  No other bony or soft tissue tenderness to palpation noted at this time    Weakness with knee extension from seated position  - NV intact    _____________________________________________________  STUDIES REVIEWED:  I personally reviewed the images and interpretation is as follows:  Left hip XR 3 views:  Status post a left total hip arthroplasty in acceptable anatomic alignment with hardware intact       PROCEDURES PERFORMED:  No procedures were performed at this time  [address]                    Portions of the record may have been created with voice recognition software  Occasional wrong word or "sound a like" substitutions may have occurred due to the inherent limitations of voice recognition software  Read the chart carefully and recognize, using context, where substitutions have occurred

## 2022-10-26 ENCOUNTER — TELEPHONE (OUTPATIENT)
Dept: OBGYN CLINIC | Facility: HOSPITAL | Age: 72
End: 2022-10-26

## 2022-10-26 NOTE — TELEPHONE ENCOUNTER
J surgery 10/5---     Patient is using lovenox  We discussed lovenox injection areas can bruise easily  She was thinking that was the cause but wanted to discuss  pt encouraged to call me with questions, concerns or issues

## 2022-10-26 NOTE — TELEPHONE ENCOUNTER
Caller: Stacy Sanchez    Doctor: Efren Law    Reason for call: patient has noticed a 4 X 4 area of bruising on the left side of her abdomen  The area is not sore, just bruised  Please advise      Call back#: [phone]

## 2022-11-03 ENCOUNTER — PATIENT OUTREACH (OUTPATIENT)
Dept: INTERNAL MEDICINE CLINIC | Facility: CLINIC | Age: 72
End: 2022-11-03

## 2022-11-03 NOTE — PROGRESS NOTES
[facility] Care Management Follow Up Note:  Chart review completed  Patient followed up with [facility] 10/18/22 and PCP on 11/02/22  Telephone call attempted today for follow up  No answer  Left voicemail message with general contact information with name, title, call back phone number office hours when I am available and message encouraging return call to this CM  Will re-set for 30 day outreach  Edward Horta

## 2022-11-14 DIAGNOSIS — F41.9 ANXIETY: ICD-10-CM

## 2022-11-15 RX ORDER — ALPRAZOLAM 0.25 MG/1
0.25 TABLET ORAL 3 TIMES DAILY PRN
Qty: 30 TABLET | Refills: 0 | Status: SHIPPED | OUTPATIENT
Start: 2022-11-15

## 2022-11-18 DIAGNOSIS — Z96.642 S/P TOTAL LEFT HIP ARTHROPLASTY: Primary | ICD-10-CM

## 2022-11-21 DIAGNOSIS — G47.00 INSOMNIA, UNSPECIFIED TYPE: Primary | ICD-10-CM

## 2022-11-21 RX ORDER — TRAZODONE HYDROCHLORIDE 50 MG/1
50 TABLET ORAL
Qty: 30 TABLET | Refills: 0 | Status: SHIPPED | OUTPATIENT
Start: 2022-11-21 | End: 2022-11-30 | Stop reason: SDUPTHER

## 2022-11-22 ENCOUNTER — APPOINTMENT (OUTPATIENT)
Dept: RADIOLOGY | Facility: CLINIC | Age: 72
End: 2022-11-22

## 2022-11-22 ENCOUNTER — HOSPITAL ENCOUNTER (OUTPATIENT)
Dept: MAMMOGRAPHY | Facility: CLINIC | Age: 72
Discharge: HOME | End: 2022-11-22

## 2022-11-22 ENCOUNTER — OFFICE VISIT (OUTPATIENT)
Dept: OBGYN CLINIC | Facility: CLINIC | Age: 72
End: 2022-11-22

## 2022-11-22 VITALS
WEIGHT: 218.2 LBS | BODY MASS INDEX: 35.07 KG/M2 | HEIGHT: 66 IN | SYSTOLIC BLOOD PRESSURE: 122 MMHG | HEART RATE: 101 BPM | DIASTOLIC BLOOD PRESSURE: 82 MMHG

## 2022-11-22 VITALS — WEIGHT: 218 LBS | HEIGHT: 66 IN | BODY MASS INDEX: 35.03 KG/M2

## 2022-11-22 DIAGNOSIS — Z96.642 S/P TOTAL LEFT HIP ARTHROPLASTY: ICD-10-CM

## 2022-11-22 DIAGNOSIS — Z12.31 ENCOUNTER FOR SCREENING MAMMOGRAM FOR BREAST CANCER: ICD-10-CM

## 2022-11-22 DIAGNOSIS — M16.12 PRIMARY OSTEOARTHRITIS OF ONE HIP, LEFT: Primary | ICD-10-CM

## 2022-11-22 NOTE — PROGRESS NOTES
Orthopaedics Office Visit - Post-op Patient Visit    ASSESSMENT/PLAN:    Assessment:   6 weeks s/p left anterior total hip arthroplasty  Repair of released rectus femoris intraop    DOS 10/5/22   5/5 strength left hip; numbness resolving but improved       Plan:   - Weight bearing as tolerated left lower extremity   - Continue physical therapy  - Advised not to drive while actively using oxycodone  Must be able to safely break before returning to road  Recommend practice in empty parking lot to test break reaction    - Over the counter analgesics as needed / directed   - Ice / heat as directed   - Please contact orthopedics office prior to dental appointment for antibiotic prescription   - Follow up 3 months with repeat XR       To Do Next Visit:  XR left hip     _____________________________________________________  CHIEF COMPLAINT:  Chief Complaint   Patient presents with   • Left Hip - Post-op         SUBJECTIVE:  Cirilo John is a 81 y o  female who presents 6 weeks s/p left anterior total hip arthroplasty  Repair of released rectus femoris intraop    DOS 10/5/22  Patient states that her hip is doing well overall  Patient states that she has been weight-bearing as tolerated without any pain  Patient states that she still feels slightly weak in her leg  Patient does admit to having mild numbness in the anterior aspect of the hip but states that this is resolving  Patient denies any new worsening symptoms in her leg  Patient offers no other complaints at this time        SOCIAL HISTORY:  Social History     Tobacco Use   • Smoking status: Never   • Smokeless tobacco: Never   Vaping Use   • Vaping Use: Never used   Substance Use Topics   • Alcohol use: Not Currently   • Drug use: Never       MEDICATIONS:    Current Outpatient Medications:   •  albuterol (PROVENTIL HFA,VENTOLIN HFA) 90 mcg/act inhaler, Inhale 2 puffs every 4 (four) hours as needed, Disp: , Rfl:   •  ALPRAZolam (XANAX) 0 25 mg tablet, Take 1 tablet (0 25 mg total) by mouth 3 (three) times a day as needed for anxiety 1/2-1 as needed tid, Disp: 30 tablet, Rfl: 0  •  ammonium lactate (LAC-HYDRIN) 12 % cream, Apply topically as needed for dry skin, Disp: , Rfl:   •  ascorbic acid (VITAMIN C) 500 MG tablet, Take 1 tablet (500 mg total) by mouth 2 (two) times a day, Disp: 60 tablet, Rfl: 1  •  aspirin (ECOTRIN LOW STRENGTH) 81 mg EC tablet, Take 81 mg by mouth in the morning, Disp: , Rfl:   •  atorvastatin (LIPITOR) 40 mg tablet, Take 1 tablet (40 mg total) by mouth daily, Disp: 90 tablet, Rfl: 1  •  clopidogrel (PLAVIX) 75 mg tablet, Take 1 tablet (75 mg total) by mouth daily, Disp: 90 tablet, Rfl: 0  •  ferrous sulfate 324 (65 Fe) mg, Take 1 tablet (324 mg total) by mouth 2 (two) times a day before meals, Disp: 60 tablet, Rfl: 0  •  fluticasone (FLONASE) 50 mcg/act nasal spray, 2 sprays into each nostril daily as needed, Disp: , Rfl:   •  fluticasone-vilanterol (BREO ELLIPTA) 200-25 MCG/INH inhaler, Inhale 1 puff daily, Disp: , Rfl:   •  folic acid (FOLVITE) 721 mcg tablet, Take 1 tablet (400 mcg total) by mouth daily, Disp: 30 tablet, Rfl: 0  •  gabapentin (NEURONTIN) 100 mg capsule, Take 200 mg by mouth once a week, Disp: , Rfl:   •  levothyroxine (Synthroid) 150 mcg tablet, Take 1 tablet (150 mcg total) by mouth daily in the early morning, Disp: 90 tablet, Rfl: 0  •  Lidocaine-Adhesive Sheets (LidoPure Patch) 5 % KIT, Apply topically, Disp: , Rfl:   •  losartan-hydrochlorothiazide (HYZAAR) 100-25 MG per tablet, Take 1 tablet by mouth daily, Disp: , Rfl:   •  montelukast (SINGULAIR) 10 mg tablet, Take 10 mg by mouth daily as needed, Disp: , Rfl:   •  Multiple Vitamin (multivitamin) tablet, Take 1 tablet by mouth daily, Disp: 30 tablet, Rfl: 0  •  polyethylene glycol (MIRALAX) 17 g packet, Take 17 g by mouth daily, Disp: , Rfl:   •  Restasis 0 05 % ophthalmic emulsion, , Disp: , Rfl:   •  traZODone (DESYREL) 50 mg tablet, Take 1 tablet (50 mg total) by mouth daily at bedtime, Disp: 30 tablet, Rfl: 0  •  verapamil (VERELAN PM) 360 MG 24 hr capsule, Take 360 mg by mouth in the morning, Disp: , Rfl:   •  enoxaparin (LOVENOX) 40 mg/0 4 mL, Inject 0 4 mL (40 mg total) under the skin daily for 28 days (Patient not taking: Reported on 10/4/2022), Disp: 11 2 mL, Rfl: 0  •  oxyCODONE (Roxicodone) 5 immediate release tablet, Take 1 tablet (5 mg total) by mouth every 4 (four) hours as needed for moderate pain Max Daily Amount: 30 mg, Disp: 30 tablet, Rfl: 0    REVIEW OF SYSTEMS:  MSK: left hip pain   Neuro: WNL   Pertinent items are otherwise noted in HPI  A comprehensive review of systems was otherwise negative     _____________________________________________________  PHYSICAL EXAMINATION:  Vital signs: /82   Pulse 101   Ht 5' 6" (1 676 m)   Wt 99 kg (218 lb 3 2 oz)   BMI 35 22 kg/m²   General: No acute distress, awake and alert  Psychiatric: Mood and affect appear appropriate  HEENT: Trachea Midline, No torticollis, no apparent facial trauma  Cardiovascular: No audible murmurs; Extremities appear perfused  Pulmonary: No audible wheezing or stridor  Skin: No open lesions; see further details (if any) below      MUSCULOSKELETAL EXAMINATION:  Left hip examination:  - Patient sitting comfortably in the office in no acute distress   - healed incision noted over the anterior aspect of the hip   - no bony or soft tissue tenderness to palpation noted at this time  - 5/5 strength noted with hip flexion  - NV intact    _____________________________________________________  STUDIES REVIEWED:  I personally reviewed the images and interpretation is as follows:  Left hip XR 3 views:  Status post for left total hip arthroplasty in acceptable alignment with hardware intact        PROCEDURES PERFORMED:  No procedures were performed at this time             Elmer Berger PA-C - assisting  Patsie Blocker Ramski                Portions of the record may have been created with voice recognition software  Occasional wrong word or "sound a like" substitutions may have occurred due to the inherent limitations of voice recognition software  Read the chart carefully and recognize, using context, where substitutions have occurred

## 2022-11-22 NOTE — PATIENT INSTRUCTIONS
- Weight bearing as tolerated left lower extremity   - Continue physical therapy  - Advised not to drive while actively using oxycodone  Must be able to safely break before returning to road   Recommend practice in empty parking lot to test break reaction    - Over the counter analgesics as needed / directed   - Ice / heat as directed   - Please contact orthopedics office prior to dental appointment for antibiotic prescription   - Follow up 3 months with repeat XR

## 2022-11-23 ENCOUNTER — TELEPHONE (OUTPATIENT)
Dept: OBGYN CLINIC | Facility: HOSPITAL | Age: 72
End: 2022-11-23

## 2022-11-23 NOTE — TELEPHONE ENCOUNTER
Caller: Marbin(Physical Therapist)    Doctor: Dr Sandra Vasquez    Reason for call: Physical therapist has stated that the patient canceled her appointment due to the holiday and son is working tomorrow so they are celebrating today      Call back#: [phone]

## 2022-11-27 DIAGNOSIS — Z98.890 PERIPHERAL ARTERIAL DISEASE WITH HISTORY OF REVASCULARIZATION (HCC): ICD-10-CM

## 2022-11-27 DIAGNOSIS — I73.9 PERIPHERAL ARTERIAL DISEASE WITH HISTORY OF REVASCULARIZATION (HCC): ICD-10-CM

## 2022-11-28 RX ORDER — CLOPIDOGREL BISULFATE 75 MG/1
TABLET ORAL
Qty: 90 TABLET | Refills: 3 | Status: SHIPPED | OUTPATIENT
Start: 2022-11-28

## 2022-11-30 ENCOUNTER — TELEMEDICINE (OUTPATIENT)
Dept: FAMILY MEDICINE CLINIC | Facility: CLINIC | Age: 72
End: 2022-11-30

## 2022-11-30 DIAGNOSIS — F41.9 ANXIETY: ICD-10-CM

## 2022-11-30 DIAGNOSIS — G47.00 INSOMNIA, UNSPECIFIED TYPE: ICD-10-CM

## 2022-11-30 RX ORDER — ALPRAZOLAM 0.25 MG/1
0.25 TABLET ORAL 3 TIMES DAILY PRN
Qty: 30 TABLET | Refills: 0 | Status: SHIPPED | OUTPATIENT
Start: 2022-11-30

## 2022-11-30 RX ORDER — TRAZODONE HYDROCHLORIDE 100 MG/1
100 TABLET ORAL
Qty: 30 TABLET | Refills: 0 | Status: SHIPPED | OUTPATIENT
Start: 2022-11-30

## 2022-11-30 NOTE — ASSESSMENT & PLAN NOTE
Discussed management of anxiety including daily medications and Xanax should only be used as a prn basis  Advised to continue with BHT, if she finds herself having worsening anxiety, will need to discuss daily medications  Re-eval in 4 weeks

## 2022-11-30 NOTE — PATIENT INSTRUCTIONS
Tips for Healthy Sleep   AMBULATORY CARE:   What you need to know about healthy sleep:  Healthy sleep, or sleep hygiene, is important to your physical, mental, and emotional health  Sleep affects almost every part of your body, including your brain, heart, metabolism, immune system, and mood  Good sleep habits can help you fall asleep and stay asleep during the night  Poor quality sleep or a long-term lack of sleep increases your risk for certain disorders  These include high blood pressure, cardiovascular disease, obesity, depression, and diabetes  What you need to know about sleep stages: The 2 main kinds of sleep are rapid eye movement (REM) and non-REM  You cycle through REM and non-REM many times during the night  Stage 1 non-REM sleep  is when you first fall asleep  This stage is short  Your brain waves slow and your body relaxes  Stage 2 non-REM sleep  is a period of light sleep before you move into deeper sleep  You spend the most time in this stage during the night  Your body temperature drops and your body relaxes even more  Stage 3 non-REM sleep  is a period of deep sleep that starts after stage 2  This stage is needed to feel refreshed in the morning  REM sleep  starts about 90 minutes after you fall asleep  Your eyes move from side to side  Your heart rate, blood pressure, and breathing become faster  Most of your dreams occur during REM sleep  As you age, you spend less time in REM sleep  How much sleep you need:  Each person needs a different amount of sleep  Your sleep patterns and needs change as you age  In general, school-aged children and teenagers need about 9 to 10 hours of sleep a night  Most adults need about 7 to 9 hours of sleep a night  After age 61 years, sleep may be lighter and for less time, with more periods of wakefulness  Older adults may fall asleep and wake up earlier than they did at a younger age   Medicines or conditions, such as chronic pain, may keep older adults awake  How to know you are getting healthy sleep:   Keep a 2-week log of your sleep  Write down what time you got up, what you did that day, and anything else that could affect your sleep  Keep a record of your sleep patterns, and any sleeping problems you have  Bring the record to your follow-up visits  Ask someone who lives with you if they notice anything about your sleep  For example, maybe you snore loudly or stop breathing for short periods  Tell your healthcare provider if your legs twitch or you feel like you can't keep them still  Your healthcare provider may refer to you a sleep specialist or cognitive behavioral therapy  Call your doctor if:   Someone has told you that you stop breathing when you sleep  Your legs twitch and it keeps you from sleeping  You begin to use drugs or alcohol to fall asleep  You have questions or concerns about your sleep habits  How to improve your sleep:  Your daily routines influence your sleep  Nutrition, medicines, your schedule, and what you do in the evenings can affect your sleep  Do the following to help improve your sleep:  Create a sleep schedule  Go to sleep and wake up at the same time every day  Be consistent, even on weekends or when you travel  Do not go to bed unless you are sleepy  Set up a calming routine before bed  Soak in a warm bath, listen to relaxing music, or read a book  Turn off all electronics at least 30 minutes before bedtime  Try not to watch television or use any electronics in the bedroom  Limit naps to 20 or 30 minutes, earlier in the day  Naps could make it hard for you to fall asleep at bedtime  Keep your bedroom cool, quiet, and dark  Turn on white noise, such as a fan, to help you relax  Get up if you do not fall asleep within 20 minutes  Move to another room and do something relaxing until you become sleepy  Limit caffeine, alcohol, and food to earlier in the day    Only drink caffeine in the morning  Do not drink alcohol within 6 hours of bedtime  Do not eat a heavy meal right before you go to bed  Limit how much liquid you drink in the evenings and right before bed  If you are prescribed diuretics, or water pills, take them early in the day  Exercise regularly  Daily exercise may help you sleep better  Do not exercise within 3 hours of bedtime  Follow up with your doctor as directed:  Bring your sleep log with you  Write down your questions so you remember to ask them during your visits  © Copyright [employer] 2022 Information is for End User's use only and may not be sold, redistributed or otherwise used for commercial purposes  All illustrations and images included in CareNotes® are the copyrighted property of A D A M , Inc  or [facility] Kike Wells   The above information is an  only  It is not intended as medical advice for individual conditions or treatments  Talk to your doctor, nurse or pharmacist before following any medical regimen to see if it is safe and effective for you

## 2022-11-30 NOTE — PROGRESS NOTES
Virtual Regular Visit    Verification of patient location:    Patient is located in the following state in which I hold an active license [place]      Assessment/Plan:    Problem List Items Addressed This Visit        Other    Anxiety     Discussed management of anxiety including daily medications and Xanax should only be used as a prn basis  Advised to continue with BHT, if she finds herself having worsening anxiety, will need to discuss daily medications  Re-eval in 4 weeks  Relevant Medications    ALPRAZolam (XANAX) 0 25 mg tablet   Other Visit Diagnoses     Insomnia, unspecified type        Will increase Trazodone to 100 mg  Discussed sleep hygiene, chamomile tea, lavendar, stress management  Will re-eval in 4 weeks  Relevant Medications    traZODone (DESYREL) 100 mg tablet               Reason for visit is   Chief Complaint   Patient presents with   • Virtual Regular Visit        Encounter provider SLY Martinez    Provider located at [facility] [address] [id number] [phone]      Recent Visits  No visits were found meeting these conditions  Showing recent visits within past 7 days and meeting all other requirements  Today's Visits  Date Type Provider Dept   11/30/22 [address] [facility], SLY Bass [address] today's visits and meeting all other requirements  Future Appointments  No visits were found meeting these conditions  Showing future appointments within next 150 days and meeting all other requirements       The patient was identified by name and date of birth  Eugene White was informed that this is a telemedicine visit and that the visit is being conducted through the [employer]  She agrees to proceed     My office door was closed  No one else was in the room    She acknowledged consent and understanding of privacy and security of the video platform  The patient has agreed to participate and understands they can discontinue the visit at any time  Patient is aware this is a billable service  Subjective  Eunice Reyna is a 81 y o  female    Patient presents to the office for medication management  Patient states she was initiated on Trazodone earlier this month for sleep disturbances  States she has been up to 50 mg, but has not been effective  Patient states she has no trouble falling asleep, but is having a difficult time staying asleep  Patient states she is sleeping 2-3 hours at a time  Patient does not increased stress due to life changes  Patient notes over the past 3 months has been going through martial issues and her and her  are now  after he left her for another woman  Patient also is healing from hip replacement surgery  Is currently living with son and his family  Notes strong familial support  Patient began seeing BHT 2 5 months ago  Has been using Xanax prn, denies abuse of medication  Patient denies SI/HI  Notes problems sleeping, decreased appetite, nausea  Denies drug or alcohol abuse         Past Medical History:   Diagnosis Date   • Allergic Childhood   • Anxiety    • Arthritis    • Asthma Childhood   • CPAP (continuous positive airway pressure) dependence     pt states was tested for VILMA, and ruled that she doesnt have it, however oxygen levels drop during sleep so CPAP was recommended,is not yet using it   • Disease of thyroid gland Approx    • Diverticulitis of colon    • Heart murmur    • Hyperlipidemia    • Hypertension  approx   • Obesity Childhood   • PAD (peripheral artery disease) ([facility] [address] )        Past Surgical History:   Procedure Laterality Date   • APPENDECTOMY  About    •  SECTION   &    • COLON SURGERY  About    • HYSTERECTOMY  About    • LA TOTAL HIP ARTHROPLASTY Left 10/5/2022    Procedure: ARTHROPLASTY LEFT HIP TOTAL ANTERIOR; Surgeon: Kaz Nation MD;  Location: [facility];  Service: Orthopedics       Current Outpatient Medications   Medication Sig Dispense Refill   • albuterol (PROVENTIL HFA,VENTOLIN HFA) 90 mcg/act inhaler Inhale 2 puffs every 4 (four) hours as needed     • ALPRAZolam (XANAX) 0 25 mg tablet Take 1 tablet (0 25 mg total) by mouth 3 (three) times a day as needed for anxiety 1/2-1 as needed tid 30 tablet 0   • ammonium lactate (LAC-HYDRIN) 12 % cream Apply topically as needed for dry skin     • ascorbic acid (VITAMIN C) 500 MG tablet Take 1 tablet (500 mg total) by mouth 2 (two) times a day 60 tablet 1   • aspirin (ECOTRIN LOW STRENGTH) 81 mg EC tablet Take 81 mg by mouth in the morning     • atorvastatin (LIPITOR) 40 mg tablet Take 1 tablet (40 mg total) by mouth daily 90 tablet 1   • clopidogrel (PLAVIX) 75 mg tablet TAKE 1 TABLET DAILY 90 tablet 3   • ferrous sulfate 324 (65 Fe) mg Take 1 tablet (324 mg total) by mouth 2 (two) times a day before meals 60 tablet 0   • fluticasone (FLONASE) 50 mcg/act nasal spray 2 sprays into each nostril daily as needed     • fluticasone-vilanterol (BREO ELLIPTA) 200-25 MCG/INH inhaler Inhale 1 puff daily     • folic acid (FOLVITE) 389 mcg tablet Take 1 tablet (400 mcg total) by mouth daily 30 tablet 0   • gabapentin (NEURONTIN) 100 mg capsule Take 200 mg by mouth once a week     • levothyroxine (Synthroid) 150 mcg tablet Take 1 tablet (150 mcg total) by mouth daily in the early morning 90 tablet 0   • losartan-hydrochlorothiazide (HYZAAR) 100-25 MG per tablet Take 1 tablet by mouth daily     • montelukast (SINGULAIR) 10 mg tablet Take 10 mg by mouth daily as needed     • Multiple Vitamin (multivitamin) tablet Take 1 tablet by mouth daily 30 tablet 0   • polyethylene glycol (MIRALAX) 17 g packet Take 17 g by mouth daily     • Restasis 0 05 % ophthalmic emulsion      • traZODone (DESYREL) 100 mg tablet Take 1 tablet (100 mg total) by mouth daily at bedtime 30 tablet 0   • verapamil (VERELAN PM) 360 MG 24 hr capsule Take 360 mg by mouth in the morning     • Lidocaine-Adhesive Sheets (LidoPure Patch) 5 % KIT Apply topically       No current facility-administered medications for this visit  Allergies   Allergen Reactions   • Sulfamethoxazole-Trimethoprim Rash       Review of Systems   Constitutional: Positive for appetite change  Negative for fatigue and unexpected weight change  HENT: Negative for congestion, ear pain and sore throat  Eyes: Negative for photophobia and visual disturbance  Respiratory: Negative for cough and shortness of breath  Cardiovascular: Negative for chest pain and palpitations  Gastrointestinal: Positive for nausea  Negative for vomiting  Genitourinary: Negative for decreased urine volume  Musculoskeletal: Negative for back pain  Skin: Negative for color change and rash  Neurological: Negative for dizziness, weakness, light-headedness and headaches  Psychiatric/Behavioral: Positive for sleep disturbance  Negative for agitation, self-injury and suicidal ideas  The patient is nervous/anxious  Video Exam    There were no vitals filed for this visit  Physical Exam  Constitutional:       General: She is not in acute distress  Appearance: Normal appearance  She is not ill-appearing  HENT:      Head: Normocephalic  Right Ear: External ear normal       Left Ear: External ear normal    Pulmonary:      Effort: Pulmonary effort is normal  No respiratory distress  Musculoskeletal:         General: Normal range of motion  Cervical back: Normal range of motion  Skin:     Coloration: Skin is not pale  Neurological:      General: No focal deficit present  Mental Status: She is alert and oriented to person, place, and time  Psychiatric:         Attention and Perception: Attention and perception normal  She does not perceive auditory or visual hallucinations           Mood and Affect: Mood normal  Affect is tearful  Behavior: Behavior normal  Behavior is cooperative  Thought Content: Thought content normal  Thought content does not include homicidal or suicidal ideation  Thought content does not include homicidal or suicidal plan            I spent 13 minutes directly with the patient during this visit

## 2022-12-05 ENCOUNTER — PATIENT OUTREACH (OUTPATIENT)
Dept: FAMILY MEDICINE CLINIC | Facility: CLINIC | Age: 72
End: 2022-12-05

## 2022-12-05 NOTE — PROGRESS NOTES
[facility] Management Follow Up Note:  Chart review completed  Patient followed up with [facility] 11/22/22 for 6 week f/u visit  Telephone call attempted today for follow up  No answer  Left voicemail message with general contact information with name, title, call back phone number office hours when I am available and message encouraging return call to this CM  Will re-set for period closure

## 2022-12-08 ENCOUNTER — TELEPHONE (OUTPATIENT)
Dept: OBGYN CLINIC | Facility: HOSPITAL | Age: 72
End: 2022-12-08

## 2022-12-08 DIAGNOSIS — Z96.642 S/P TOTAL LEFT HIP ARTHROPLASTY: Primary | ICD-10-CM

## 2022-12-08 NOTE — TELEPHONE ENCOUNTER
Caller: Elias Treadwell from [facility]    Doctor: Dr Farhat Cardenas    Reason for call: Elias Treadwell calling stating that they have discharged patient from Physical Therapy from their Agency      Call back#: [phone]

## 2022-12-08 NOTE — TELEPHONE ENCOUNTER
Left message for patient  Outpatient physical therapy script placed in system   Patient is to reach out to physical therapy to set up appointment

## 2022-12-09 ENCOUNTER — TELEPHONE (OUTPATIENT)
Dept: FAMILY MEDICINE CLINIC | Facility: CLINIC | Age: 72
End: 2022-12-09

## 2022-12-09 NOTE — TELEPHONE ENCOUNTER
Please inform patient that changes in doses can take 1-2 weeks to be effective, with trazodone, sometimes 6 weeks  If she is having negative side effects, like hypotension, then she should stop it, but the alternate is controlled substances and that comes with it's own side effects  She will need an appointment to discuss further

## 2022-12-09 NOTE — TELEPHONE ENCOUNTER
The Trazodone you prescribed this week for Bryan Young doesn't seem to be working great and its also affected her BP it keeps going down, Is there something else you can prescribe for her that won't effect her BP   Thank you

## 2022-12-13 ENCOUNTER — OFFICE VISIT (OUTPATIENT)
Dept: FAMILY MEDICINE CLINIC | Facility: CLINIC | Age: 72
End: 2022-12-13

## 2022-12-13 ENCOUNTER — APPOINTMENT (OUTPATIENT)
Dept: LAB | Facility: HOSPITAL | Age: 72
End: 2022-12-13

## 2022-12-13 VITALS
HEIGHT: 66 IN | DIASTOLIC BLOOD PRESSURE: 80 MMHG | HEART RATE: 80 BPM | WEIGHT: 221.8 LBS | SYSTOLIC BLOOD PRESSURE: 140 MMHG | BODY MASS INDEX: 35.65 KG/M2 | TEMPERATURE: 98 F | OXYGEN SATURATION: 99 %

## 2022-12-13 DIAGNOSIS — I15.9 SECONDARY HYPERTENSION: ICD-10-CM

## 2022-12-13 DIAGNOSIS — N18.31 STAGE 3A CHRONIC KIDNEY DISEASE (HCC): ICD-10-CM

## 2022-12-13 DIAGNOSIS — F41.8 DEPRESSION WITH ANXIETY: Primary | ICD-10-CM

## 2022-12-13 DIAGNOSIS — G47.09 OTHER INSOMNIA: ICD-10-CM

## 2022-12-13 DIAGNOSIS — E03.9 ACQUIRED HYPOTHYROIDISM: ICD-10-CM

## 2022-12-13 LAB
25(OH)D3 SERPL-MCNC: 30.6 NG/ML (ref 30–100)
ALBUMIN SERPL BCP-MCNC: 4 G/DL (ref 3.5–5)
ALP SERPL-CCNC: 74 U/L (ref 46–116)
ALT SERPL W P-5'-P-CCNC: 21 U/L (ref 12–78)
ANION GAP SERPL CALCULATED.3IONS-SCNC: 5 MMOL/L (ref 4–13)
AST SERPL W P-5'-P-CCNC: 19 U/L (ref 5–45)
BASOPHILS # BLD AUTO: 0.03 THOUSANDS/ÂΜL (ref 0–0.1)
BASOPHILS NFR BLD AUTO: 1 % (ref 0–1)
BILIRUB SERPL-MCNC: 0.71 MG/DL (ref 0.2–1)
BUN SERPL-MCNC: 13 MG/DL (ref 5–25)
CALCIUM SERPL-MCNC: 9.8 MG/DL (ref 8.3–10.1)
CHLORIDE SERPL-SCNC: 111 MMOL/L (ref 96–108)
CO2 SERPL-SCNC: 25 MMOL/L (ref 21–32)
CREAT SERPL-MCNC: 0.97 MG/DL (ref 0.6–1.3)
EOSINOPHIL # BLD AUTO: 0.09 THOUSAND/ÂΜL (ref 0–0.61)
EOSINOPHIL NFR BLD AUTO: 2 % (ref 0–6)
ERYTHROCYTE [DISTWIDTH] IN BLOOD BY AUTOMATED COUNT: 13.3 % (ref 11.6–15.1)
GFR SERPL CREATININE-BSD FRML MDRD: 59 ML/MIN/1.73SQ M
GLUCOSE SERPL-MCNC: 104 MG/DL (ref 65–140)
HCT VFR BLD AUTO: 40 % (ref 34.8–46.1)
HGB BLD-MCNC: 12.9 G/DL (ref 11.5–15.4)
IMM GRANULOCYTES # BLD AUTO: 0.01 THOUSAND/UL (ref 0–0.2)
IMM GRANULOCYTES NFR BLD AUTO: 0 % (ref 0–2)
LYMPHOCYTES # BLD AUTO: 1.41 THOUSANDS/ÂΜL (ref 0.6–4.47)
LYMPHOCYTES NFR BLD AUTO: 26 % (ref 14–44)
MCH RBC QN AUTO: 32.1 PG (ref 26.8–34.3)
MCHC RBC AUTO-ENTMCNC: 32.3 G/DL (ref 31.4–37.4)
MCV RBC AUTO: 100 FL (ref 82–98)
MONOCYTES # BLD AUTO: 0.3 THOUSAND/ÂΜL (ref 0.17–1.22)
MONOCYTES NFR BLD AUTO: 6 % (ref 4–12)
NEUTROPHILS # BLD AUTO: 3.61 THOUSANDS/ÂΜL (ref 1.85–7.62)
NEUTS SEG NFR BLD AUTO: 65 % (ref 43–75)
NRBC BLD AUTO-RTO: 0 /100 WBCS
PHOSPHATE SERPL-MCNC: 2.8 MG/DL (ref 2.3–4.1)
PLATELET # BLD AUTO: 174 THOUSANDS/UL (ref 149–390)
PMV BLD AUTO: 11.2 FL (ref 8.9–12.7)
POTASSIUM SERPL-SCNC: 3.9 MMOL/L (ref 3.5–5.3)
PROT SERPL-MCNC: 7.7 G/DL (ref 6.4–8.4)
PTH-INTACT SERPL-MCNC: 49.9 PG/ML (ref 18.4–80.1)
RBC # BLD AUTO: 4.02 MILLION/UL (ref 3.81–5.12)
SODIUM SERPL-SCNC: 141 MMOL/L (ref 135–147)
T4 FREE SERPL-MCNC: 1.55 NG/DL (ref 0.76–1.46)
TSH SERPL DL<=0.05 MIU/L-ACNC: 0.06 UIU/ML (ref 0.45–4.5)
WBC # BLD AUTO: 5.45 THOUSAND/UL (ref 4.31–10.16)

## 2022-12-13 RX ORDER — ZOLPIDEM TARTRATE 6.25 MG/1
6.25 TABLET, FILM COATED, EXTENDED RELEASE ORAL
Qty: 30 TABLET | Refills: 0 | Status: SHIPPED | OUTPATIENT
Start: 2022-12-13

## 2022-12-13 RX ORDER — SERTRALINE HYDROCHLORIDE 25 MG/1
25 TABLET, FILM COATED ORAL DAILY
Qty: 30 TABLET | Refills: 0 | Status: SHIPPED | OUTPATIENT
Start: 2022-12-13

## 2022-12-13 RX ORDER — ALPRAZOLAM 0.25 MG/1
0.25 TABLET ORAL 3 TIMES DAILY PRN
Qty: 30 TABLET | Refills: 0 | Status: SHIPPED | OUTPATIENT
Start: 2022-12-13 | End: 2022-12-22 | Stop reason: SDUPTHER

## 2022-12-13 NOTE — ASSESSMENT & PLAN NOTE
Patient has been off blood pressure medications for the past 4 days, citing she was was having hypotension at home  Patient denies symptoms, but records reviewed from home note blood pressure as low as 74/52  Patient is looking to come off blood pressure medications if not indicated  At this time advised patient to continue on Hyzaar daily as prescribed, and to follow-up with cardiology as scheduled in February  Patient advised to continue to monitor blood pressure at home and if gets continued elevated readings or decreased readings again, to notify office for follow-up

## 2022-12-13 NOTE — ASSESSMENT & PLAN NOTE
Will discontinue use of trazodone due to medication not being effective and may be causing decreased blood pressure  Discussed with patient managing depression and anxiety, will initiate sertraline 25 mg daily, but will initiate Ambien to assist with sleep disturbance until sertraline becomes effective  Patient educated on possible side effects of Ambien including plaques sleep behaviors  Advised patient if this were to occur, to stop medication immediately and call office for reevaluation  Patient also educated on creating a routine for bedtime, and sleep hygiene  Will re-evaluate patient in 4 weeks

## 2022-12-13 NOTE — ASSESSMENT & PLAN NOTE
Patient testing positive for depression today  Denies SI/HI  Patient states she has began seeing behavioral health therapist, is agreeable to medication management  Goal is to be weaned off alprazolam completely  We will initiate sertraline 25 mg  Advised to use alprazolam 2-3 times daily as needed  Educated patient on how to take medication and possible side effects  Advised to continue follow-up with behavioral health therapist, will reevaluate in 4 weeks

## 2022-12-13 NOTE — PATIENT INSTRUCTIONS
Depression   AMBULATORY CARE:   Depression  is a medical condition that causes feelings of sadness or hopelessness that do not go away  Depression may cause you to lose interest in things you used to enjoy  These feelings may interfere with your daily life  Common symptoms include the following:   Appetite changes, or weight gain or loss    Trouble going to sleep or staying asleep, or sleeping too much    Fatigue or lack of energy    Feeling restless, irritable, or withdrawn    Feeling worthless, hopeless, discouraged, or guilty    Trouble concentrating, remembering things, doing daily tasks, or making decisions    Thoughts about hurting or killing yourself    Call your local emergency number (911 in the [address] [place] [address] [address]) if:   You think about harming yourself or someone else  You have done something on purpose to hurt yourself  Call your therapist or doctor if:   Your symptoms do not improve  You cannot make it to your next appointment  You have new symptoms  You have questions or concerns about your condition or care  The following resources are available at any time to help you, if needed:   [address] [place] [address]: [phone] (2-307-020-LMEO)     Suicide Hotline: 6-797.123.2156 (7-723-AZOEMBC)     For a list of international numbers: https://save org/find-help/international-resources/    Treatment for depression  may include medicine to relieve depression  Medicine is often used together with therapy  Therapy is a way for you to talk about your feelings and anything that may be causing depression  Therapy can be done alone or in a group  It may also be done with family members or a significant other  Self-care:   Get regular physical activity  Try to be active for 30 minutes, 3 to 5 days a week  Physical activity can help relieve depression  Work with your healthcare provider to develop a plan that you enjoy  It may help to ask someone to be active with you      Create a regular sleep schedule  A routine can help you relax before bed  Listen to music, read, or do yoga  Try to go to bed and wake up at the same time every day  Sleep is important for emotional health  Eat a variety of healthy foods  Healthy foods include fruits, vegetables, whole-grain breads, low-fat dairy products, lean meats, fish, and cooked beans  A healthy meal plan is low in fat, salt, and added sugar  Do not drink alcohol or use drugs  Alcohol and drugs can make depression worse  Talk to your therapist or doctor if you need help quitting  Follow up with your healthcare provider as directed: Your healthcare provider will monitor your progress at follow-up visits  He or she will also monitor your medicine if you take antidepressants  Your healthcare provider will ask if the medicine is helping  Tell him or her about any side effects or problems you may have with your medicine  The type or amount of medicine may need to be changed  Write down your questions so you remember to ask them during your visits  © Copyright [employer] 2022 Information is for End User's use only and may not be sold, redistributed or otherwise used for commercial purposes  All illustrations and images included in CareNotes® are the copyrighted property of [employer] M , Inc  or [facility] Kike Wells   The above information is an  only  It is not intended as medical advice for individual conditions or treatments  Talk to your doctor, nurse or pharmacist before following any medical regimen to see if it is safe and effective for you

## 2022-12-13 NOTE — PROGRESS NOTES
Name: Gabriel Jacques      : 1950      MRN: [id number]  Encounter Provider: SLY Robert  Encounter Date: 2022   [facility] [address] [place] [address]  Depression with anxiety  Assessment & Plan:  Patient testing positive for depression today  Denies SI/HI  Patient states she has began seeing behavioral health therapist, is agreeable to medication management  Goal is to be weaned off alprazolam completely  We will initiate sertraline 25 mg  Advised to use alprazolam 2-3 times daily as needed  Educated patient on how to take medication and possible side effects  Advised to continue follow-up with behavioral health therapist, will reevaluate in 4 weeks  Orders:  -     sertraline (Zoloft) 25 mg tablet; Take 1 tablet (25 mg total) by mouth daily  -     ALPRAZolam (XANAX) 0 25 mg tablet; Take 1 tablet (0 25 mg total) by mouth 3 (three) times a day as needed for anxiety 1/2-1 as needed tid    2  Other insomnia  Assessment & Plan: Will discontinue use of trazodone due to medication not being effective and may be causing decreased blood pressure  Discussed with patient managing depression and anxiety, will initiate sertraline 25 mg daily, but will initiate Ambien to assist with sleep disturbance until sertraline becomes effective  Patient educated on possible side effects of Ambien including plaques sleep behaviors  Advised patient if this were to occur, to stop medication immediately and call office for reevaluation  Patient also educated on creating a routine for bedtime, and sleep hygiene  Will re-evaluate patient in 4 weeks  Orders:  -     zolpidem (AMBIEN CR) 6 25 MG CR tablet; Take 1 tablet (6 25 mg total) by mouth daily at bedtime as needed for sleep    3   Secondary hypertension  Assessment & Plan:  Patient has been off blood pressure medications for the past 4 days, citing she was was having hypotension at home   Patient denies symptoms, but records reviewed from home note blood pressure as low as 74/52  Patient is looking to come off blood pressure medications if not indicated  At this time advised patient to continue on Hyzaar daily as prescribed, and to follow-up with cardiology as scheduled in February  Patient advised to continue to monitor blood pressure at home and if gets continued elevated readings or decreased readings again, to notify office for follow-up  Depression Screening and Follow-up Plan: Patient's depression screening was positive with a PHQ-2 score of 5  Their PHQ-9 score was 13  Patient assessed for underlying major depression  Brief counseling provided and recommend additional follow-up/re-evaluation next office visit  Continue regular follow-up with their mental health provider who is managing their mental health condition(s)  Subjective      Patient presents to the office for medication management  Patient was initiated on trazodone, dose had recently been increased from 50 to 100 mg  Patient notes increase of dose has been ineffective and continues with lack of sleep  At this point it has been almost a month since patient began having disruption in sleep  Patient states that she has no difficulties falling asleep, but is waking up after only 2 to 3 hours of sleep  Notes her mind "starts racing" and is unable to go back to sleep  Patient is going through a divorce at this time and notes increased depression and anxiety due to current marital state  Patient notes lack of sleep is making symptoms worse, and is looking for relief of sleep disturbances  Patient states she ended up taking 2 Xanax at night, but that is ineffective in helping her stay asleep  Patiently currently denies SI/HI, alcohol or drug abuse  Began seeing behavioral health therapist         Review of Systems   Constitutional: Positive for activity change, appetite change and fatigue   Negative for diaphoresis and unexpected weight change  HENT: Negative for congestion, sore throat and trouble swallowing  Eyes: Negative for photophobia and visual disturbance  Respiratory: Negative for cough and shortness of breath  Cardiovascular: Negative for chest pain and palpitations  Gastrointestinal: Negative for abdominal pain, diarrhea, nausea and vomiting  Genitourinary: Negative for decreased urine volume, dysuria, frequency and urgency  Musculoskeletal: Negative for arthralgias and myalgias  Skin: Negative for color change and rash  Neurological: Negative for dizziness, weakness, light-headedness and headaches  Psychiatric/Behavioral: Positive for dysphoric mood and sleep disturbance  Negative for agitation, confusion, self-injury and suicidal ideas  The patient is nervous/anxious          Current Outpatient Medications on File Prior to Visit   Medication Sig   • albuterol (PROVENTIL HFA,VENTOLIN HFA) 90 mcg/act inhaler Inhale 2 puffs every 4 (four) hours as needed   • ammonium lactate (LAC-HYDRIN) 12 % cream Apply topically as needed for dry skin   • aspirin (ECOTRIN LOW STRENGTH) 81 mg EC tablet Take 81 mg by mouth in the morning   • atorvastatin (LIPITOR) 40 mg tablet Take 1 tablet (40 mg total) by mouth daily   • clopidogrel (PLAVIX) 75 mg tablet TAKE 1 TABLET DAILY   • fluticasone (FLONASE) 50 mcg/act nasal spray 2 sprays into each nostril daily as needed   • fluticasone-vilanterol (BREO ELLIPTA) 200-25 MCG/INH inhaler Inhale 1 puff daily   • gabapentin (NEURONTIN) 100 mg capsule Take 200 mg by mouth once a week   • levothyroxine (Synthroid) 150 mcg tablet Take 1 tablet (150 mcg total) by mouth daily in the early morning   • montelukast (SINGULAIR) 10 mg tablet Take 10 mg by mouth daily as needed   • Multiple Vitamin (multivitamin) tablet Take 1 tablet by mouth daily   • polyethylene glycol (MIRALAX) 17 g packet Take 17 g by mouth daily   • Restasis 0 05 % ophthalmic emulsion    • [DISCONTINUED] ALPRAZolam (XANAX) 0 25 mg tablet Take 1 tablet (0 25 mg total) by mouth 3 (three) times a day as needed for anxiety 1/2-1 as needed tid   • [DISCONTINUED] traZODone (DESYREL) 100 mg tablet Take 1 tablet (100 mg total) by mouth daily at bedtime   • ascorbic acid (VITAMIN C) 500 MG tablet Take 1 tablet (500 mg total) by mouth 2 (two) times a day   • ferrous sulfate 324 (65 Fe) mg Take 1 tablet (324 mg total) by mouth 2 (two) times a day before meals   • folic acid (FOLVITE) 586 mcg tablet Take 1 tablet (400 mcg total) by mouth daily   • Lidocaine-Adhesive Sheets (LidoPure Patch) 5 % KIT Apply topically   • losartan-hydrochlorothiazide (HYZAAR) 100-25 MG per tablet Take 1 tablet by mouth daily (Patient not taking: Reported on 12/13/2022)   • verapamil (VERELAN PM) 360 MG 24 hr capsule Take 360 mg by mouth in the morning (Patient not taking: Reported on 12/13/2022)       Objective     /80 (BP Location: Left arm, Patient Position: Sitting)   Pulse 80   Temp 98 °F (36 7 °C) (Tympanic)   Ht 5' 6" (1 676 m)   Wt 101 kg (221 lb 12 8 oz)   SpO2 99%   BMI 35 80 kg/m²     Physical Exam  Vitals reviewed  Constitutional:       General: She is not in acute distress  Appearance: Normal appearance  She is not ill-appearing  HENT:      Head: Normocephalic and atraumatic  Right Ear: External ear normal       Left Ear: External ear normal       Nose: Nose normal       Mouth/Throat:      Mouth: Mucous membranes are moist       Pharynx: Oropharynx is clear  Eyes:      Conjunctiva/sclera: Conjunctivae normal       Pupils: Pupils are equal, round, and reactive to light  Cardiovascular:      Rate and Rhythm: Normal rate and regular rhythm  Pulses: Normal pulses  Heart sounds: Normal heart sounds  No murmur heard  Pulmonary:      Effort: Pulmonary effort is normal       Breath sounds: Normal breath sounds  No wheezing  Abdominal:      Palpations: Abdomen is soft        Tenderness: There is no abdominal tenderness  Musculoskeletal:      Cervical back: Normal range of motion and neck supple  Right lower leg: No edema  Left lower leg: No edema  Lymphadenopathy:      Cervical: No cervical adenopathy  Skin:     General: Skin is warm and dry  Capillary Refill: Capillary refill takes less than 2 seconds  Neurological:      General: No focal deficit present  Mental Status: She is alert and oriented to person, place, and time  Psychiatric:         Attention and Perception: Attention and perception normal  She does not perceive auditory or visual hallucinations  Mood and Affect: Mood normal  Affect is tearful  Speech: Speech normal          Behavior: Behavior normal  Behavior is cooperative  Thought Content: Thought content normal  Thought content does not include homicidal or suicidal ideation  Thought content does not include homicidal or suicidal plan         [address]

## 2022-12-14 ENCOUNTER — APPOINTMENT (OUTPATIENT)
Dept: LAB | Facility: HOSPITAL | Age: 72
End: 2022-12-14

## 2022-12-14 DIAGNOSIS — E03.9 ACQUIRED HYPOTHYROIDISM: Primary | ICD-10-CM

## 2022-12-14 LAB
BACTERIA UR QL AUTO: ABNORMAL /HPF
BILIRUB UR QL STRIP: NEGATIVE
CLARITY UR: ABNORMAL
COLOR UR: ABNORMAL
CREAT UR-MCNC: 63.1 MG/DL
GLUCOSE UR STRIP-MCNC: NEGATIVE MG/DL
HGB UR QL STRIP.AUTO: NEGATIVE
HYALINE CASTS #/AREA URNS LPF: ABNORMAL /LPF
KETONES UR STRIP-MCNC: NEGATIVE MG/DL
LEUKOCYTE ESTERASE UR QL STRIP: ABNORMAL
MUCOUS THREADS UR QL AUTO: ABNORMAL
NITRITE UR QL STRIP: POSITIVE
NON-SQ EPI CELLS URNS QL MICRO: ABNORMAL /HPF
PH UR STRIP.AUTO: 7 [PH]
PROT UR STRIP-MCNC: NEGATIVE MG/DL
PROT UR-MCNC: 8 MG/DL
PROT/CREAT UR: 0.13 MG/G{CREAT} (ref 0–0.1)
RBC #/AREA URNS AUTO: ABNORMAL /HPF
SP GR UR STRIP.AUTO: 1.01 (ref 1–1.03)
UROBILINOGEN UR STRIP-ACNC: <2 MG/DL
WBC #/AREA URNS AUTO: ABNORMAL /HPF

## 2022-12-14 RX ORDER — LEVOTHYROXINE SODIUM 0.12 MG/1
125 TABLET ORAL
Qty: 90 TABLET | Refills: 3 | Status: SHIPPED | OUTPATIENT
Start: 2022-12-14

## 2022-12-14 RX ORDER — LEVOTHYROXINE SODIUM 0.12 MG/1
125 TABLET ORAL
Qty: 90 TABLET | Refills: 3 | Status: SHIPPED | OUTPATIENT
Start: 2022-12-14 | End: 2022-12-14 | Stop reason: SDUPTHER

## 2022-12-14 NOTE — TELEPHONE ENCOUNTER
Medication was sent to the incorrect pharmacy  Could you please resend to [facility]?     I already cancelled it at [facility]

## 2022-12-16 ENCOUNTER — TELEPHONE (OUTPATIENT)
Dept: NEPHROLOGY | Facility: CLINIC | Age: 72
End: 2022-12-16

## 2022-12-16 NOTE — TELEPHONE ENCOUNTER
Good Morning,    Dr Catalan patient of yours had blood work done and would like to know the test results      Please contact patient at [phone]

## 2022-12-20 ENCOUNTER — TELEPHONE (OUTPATIENT)
Dept: NEPHROLOGY | Facility: CLINIC | Age: 72
End: 2022-12-20

## 2022-12-20 NOTE — TELEPHONE ENCOUNTER
LM to call us to schedule follow up appt from recall list  Open spot tomorrow at 430  Pt has labs done

## 2022-12-22 DIAGNOSIS — F41.8 DEPRESSION WITH ANXIETY: ICD-10-CM

## 2022-12-22 RX ORDER — ALPRAZOLAM 0.25 MG/1
0.25 TABLET ORAL 3 TIMES DAILY PRN
Qty: 30 TABLET | Refills: 0 | OUTPATIENT
Start: 2022-12-22

## 2022-12-27 ENCOUNTER — TELEPHONE (OUTPATIENT)
Dept: NEPHROLOGY | Facility: CLINIC | Age: 72
End: 2022-12-27

## 2022-12-27 ENCOUNTER — APPOINTMENT (OUTPATIENT)
Dept: ULTRASOUND IMAGING | Facility: HOSPITAL | Age: 72
End: 2022-12-27
Attending: INTERNAL MEDICINE
Payer: MEDICARE

## 2022-12-27 NOTE — TELEPHONE ENCOUNTER
I called and left a message on machine for patient to return our call about scheduling her nephrology follow up appointment with Dr Jazmin Chiu from our recall list

## 2023-01-02 DIAGNOSIS — F41.8 DEPRESSION WITH ANXIETY: ICD-10-CM

## 2023-01-03 RX ORDER — SERTRALINE HYDROCHLORIDE 25 MG/1
25 TABLET, FILM COATED ORAL DAILY
Qty: 30 TABLET | Refills: 0 | Status: SHIPPED | OUTPATIENT
Start: 2023-01-03

## 2023-01-04 ENCOUNTER — TELEPHONE (OUTPATIENT)
Dept: OBGYN CLINIC | Facility: HOSPITAL | Age: 73
End: 2023-01-04

## 2023-01-04 NOTE — TELEPHONE ENCOUNTER
Caller: Representative from [facility]    Doctor: Seth Rossi    Reason for call: [facility] called to confirm patient is under providers care and will be faxing over paperwork to the [facility]      Call back#:N/A

## 2023-01-05 ENCOUNTER — EVALUATION (OUTPATIENT)
Dept: PHYSICAL THERAPY | Facility: CLINIC | Age: 73
End: 2023-01-05

## 2023-01-05 DIAGNOSIS — M16.12 OSTEOARTHRITIS OF LEFT HIP, UNSPECIFIED OSTEOARTHRITIS TYPE: Primary | ICD-10-CM

## 2023-01-05 DIAGNOSIS — Z96.642 S/P TOTAL LEFT HIP ARTHROPLASTY: ICD-10-CM

## 2023-01-05 NOTE — PROGRESS NOTES
PT Evaluation     Today's date: 2023  Patient name: Tresa Neri  : 1950  MRN: [id number]  Referring provider: MARCELINA Lindsey*  Dx:   Encounter Diagnosis     ICD-10-CM    1  Osteoarthritis of left hip, unspecified osteoarthritis type  M16 12       2  S/P total left hip arthroplasty  Z96 [id number] Ambulatory Referral to Physical Therapy          Start Time: 1145  Stop Time: 1230  Total time in clinic (min): 45 minutes    Assessment  Assessment details: Pt is a 81 y o  female presenting to physical therapy with decreased ROM, decreased strength, and increased pain in her L hip, resulting in limitations with prolonged ambulation, kicking her L leg, balance, and household activities  Pt's main limitation appears to be weakness of L rectus femoris muscle, pt demonstrates minimal L knee extension actively but has full motion of knee passively  PT POC will focus on addressing weak musculature, particularly L rectus femoris/quadriceps, in order to improve function  Pt is a good candidate for skilled PT to address impairments and facilitate return to prior level of function  Impairments: abnormal gait, abnormal muscle firing, abnormal or restricted ROM, activity intolerance, impaired balance, impaired physical strength, lacks appropriate home exercise program and pain with function  Functional limitations: prolonged ambulation, kicking her L leg, balance, household activities  Symptom irritability: lowUnderstanding of Dx/Px/POC: good   Prognosis: good    Goals  STG 4 - weeks  1  Patient will demonstrate L knee extensor lag of 25 degrees or less to facilitate functional ability  2  Patient will demonstrate improved L hip strength to 4+/5 or better in all planes to facilitate functional ability  LTG 8 - weeks  1  Patient will demonstrate L quadriceps MMT strength to 5/5 to facilitate functional ability    2  Patient will demonstrate increased FOTO score from 56 at baseline to predicted score of 68 or higher to facilitate functional ability  Plan  Patient would benefit from: PT eval and skilled physical therapy  Planned modality interventions: cryotherapy, thermotherapy: hydrocollator packs and unattended electrical stimulation  Planned therapy interventions: balance, functional ROM exercises, flexibility, gait training, home exercise program, joint mobilization, manual therapy, massage, neuromuscular re-education, patient education, strengthening, stretching, therapeutic activities and therapeutic exercise  Frequency: 2x week  Duration in weeks: 8  Plan of Care beginning date: 2023  Plan of Care expiration date: 3/2/2023  Treatment plan discussed with: patient        Subjective Evaluation    History of Present Illness  Date of surgery: 10/5/2022  Mechanism of injury: surgery  Mechanism of injury: Pt presents to PT s/p L anterior total hip arthroplasty on 10/5/22  Pt had attempted conservative management prior to surgery but was unsuccessful  Pt was completing home health PT prior to outpatient referral  Pt reports home health went well and she continues to do exercises every day  Pt reports her only hip precaution is to not cross her L foot onto her R leg  Pt reports she still feels some weakness in her L hip joint, fell a few weeks ago when standing on her LLE with the knee bent due to it giving out  Pt also reports some stiffness in her L knee and her B patellas are slightly askew  Pt reports significant weakness with kicking her legs out  Pt reports occasional discomfort in L hip and slight swelling  Pt reports she is unsure how far she is able to walk but is fine with short distances, able to do her stairs at home with her cane  Pain  Current pain ratin  At best pain ratin  At worst pain ratin  Location: L hip   Quality: burning  Relieving factors: change in position  Progression: improved    Patient Goals  Patient goals for therapy: increased strength  Patient goal: To walk up and down stairs  Return to full function and be able to kick her legs          Objective     Active Range of Motion   Left Hip   Flexion: 100 degrees     Right Hip   Flexion: 90 degrees   Left Knee   Extensor la degrees     Passive Range of Motion   Left Knee   Flexion: WFL  Extension: 0 degrees WFL    Strength/Myotome Testing     Left Hip   Planes of Motion   Flexion: 4-  Extension: 4-  Abduction: 4-  Adduction: 4  External rotation: 4-  Internal rotation: 5    Right Hip   Planes of Motion   Flexion: 5  Extension: 4  Abduction: 4+  Adduction: 5  External rotation: 4+  Internal rotation: 5    Left Knee   Flexion: 4+  Extension: 2-    Right Knee   Flexion: 5  Extension: 5    Left Ankle/Foot   Dorsiflexion: 4  Plantar flexion: 4    Right Ankle/Foot   Dorsiflexion: 5  Plantar flexion: 5             Precautions: R hip anterior CAROLIN 10/5      Manuals                                                                 Neuro Re-Ed             Pt education about HEP, prognosis, POC 8'            Quad Sets  1x10 5"            Standing hip extension 1x10                                                                Ther Ex             LAQ eccentric 1x10            Standing hip abduction 1x10             Forward step ups 1x10 2"            Lateral step ups 1x10 2"                                                                Ther Activity                                       Gait Training                                       Modalities

## 2023-01-06 ENCOUNTER — EPISODE CHANGES (OUTPATIENT)
Dept: CASE MANAGEMENT | Facility: OTHER | Age: 73
End: 2023-01-06

## 2023-01-10 ENCOUNTER — OFFICE VISIT (OUTPATIENT)
Dept: PHYSICAL THERAPY | Facility: CLINIC | Age: 73
End: 2023-01-10

## 2023-01-10 DIAGNOSIS — M16.12 OSTEOARTHRITIS OF LEFT HIP, UNSPECIFIED OSTEOARTHRITIS TYPE: Primary | ICD-10-CM

## 2023-01-10 DIAGNOSIS — Z96.642 S/P TOTAL LEFT HIP ARTHROPLASTY: ICD-10-CM

## 2023-01-10 NOTE — PROGRESS NOTES
Daily Note     Today's date: 1/10/2023  Patient name: Yung Alexander  : 1950  MRN: [id number]  Referring provider: MARCELINA Duncan*  Dx:   Encounter Diagnosis     ICD-10-CM    1  Osteoarthritis of left hip, unspecified osteoarthritis type  M16 12       2  S/P total left hip arthroplasty  Z96 642           Start Time: 1500  Stop Time: 1545  Total time in clinic (min): 45 minutes    Subjective: Pt reports some soreness in her L hip today, has been doing exercises at home  Objective: See treatment diary below      Assessment: Tolerated treatment well  Patient demonstrated fatigue post treatment, exhibited good technique with therapeutic exercises and would benefit from continued PT  Pt program progressed with additional ROM and strengthening exercises of L hip  Initiated Performed hip PROM within range to not break hip precautions of bringing L foot across RLE, pt tolerated well and demonstrated decreased stiffness secondary to performance  Continue to focus on targeting L rectus femoris contraction with exercises in order to strengthen L knee extension to pre-op levels  Initiated Nustep for closed chain contraction and conditioning of LLE muscles  Pt demonstrated muscle fatigue without discomfort secondary to exercise  Plan: Continue per plan of care  Progress treatment as tolerated         Precautions: R hip anterior CAROLIN 10/5      Manuals 1/5 1/10           PROM L Hip (flex; ER within precautions)  8'                                                  Neuro Re-Ed             Pt education about HEP 8' 5'           Quad Sets  1x10 5" 3x10 5"           Standing hip extension 1x10 2x10           Iso bolster squeeze  2x10 5"           Bridges  HEP                                     Ther Ex             LAQ eccentric 1x10 2x5           LAQ AAROM  1x10           Standing hip abduction 1x10  3x10           Forward step ups 1x10 2" 1x10 2"           Lateral step ups 1x10 2" 2x10 2"           Nustep  6' Prone Quad stretch  5x20"           TB HS curls  2x10 grn                        Ther Activity                                       Gait Training                                       Modalities

## 2023-01-11 ENCOUNTER — TELEMEDICINE (OUTPATIENT)
Dept: FAMILY MEDICINE CLINIC | Facility: CLINIC | Age: 73
End: 2023-01-11

## 2023-01-11 DIAGNOSIS — G47.09 OTHER INSOMNIA: ICD-10-CM

## 2023-01-11 DIAGNOSIS — I15.9 SECONDARY HYPERTENSION: ICD-10-CM

## 2023-01-11 DIAGNOSIS — F41.8 DEPRESSION WITH ANXIETY: Primary | ICD-10-CM

## 2023-01-11 RX ORDER — TRAZODONE HYDROCHLORIDE 100 MG/1
100 TABLET ORAL
Qty: 90 TABLET | Refills: 1 | Status: SHIPPED | OUTPATIENT
Start: 2023-01-11

## 2023-01-11 RX ORDER — TRAZODONE HYDROCHLORIDE 100 MG/1
100 TABLET ORAL
COMMUNITY
End: 2023-01-11 | Stop reason: SDUPTHER

## 2023-01-11 RX ORDER — ALPRAZOLAM 0.25 MG/1
0.25 TABLET ORAL 3 TIMES DAILY PRN
Qty: 30 TABLET | Refills: 0 | Status: SHIPPED | OUTPATIENT
Start: 2023-01-11

## 2023-01-11 NOTE — ASSESSMENT & PLAN NOTE
Blood pressures have been recorded by patient has been getting readings 110s-120s over 50s to 60s  Has been off verapamil since last month  Still continues on Hyzaar  Continue on current medication management  Keep appointment with cardiology next month

## 2023-01-11 NOTE — ASSESSMENT & PLAN NOTE
Was initiated on sertraline 25 mg during last visit  Notes improvement of overall symptoms  Improvement in sleep and overall mood  Has been weaning self off of Xanax  Is now using 1 tablet daily as needed  To continue to use Xanax sparingly  Continue on current dose of sertraline  Continue visits with behavioral health therapist as scheduled

## 2023-01-11 NOTE — PROGRESS NOTES
Virtual Regular Visit    Verification of patient location:    Patient is located in the following state in which I hold an active license [place]      Assessment/Plan:    Problem List Items Addressed This Visit        Cardiovascular and Mediastinum    Hypertension     Blood pressures have been recorded by patient has been getting readings 110s-120s over 50s to 60s  Has been off verapamil since last month  Still continues on Hyzaar  Continue on current medication management  Keep appointment with cardiology next month  Other    Depression with anxiety - Primary     Was initiated on sertraline 25 mg during last visit  Notes improvement of overall symptoms  Improvement in sleep and overall mood  Has been weaning self off of Xanax  Is now using 1 tablet daily as needed  To continue to use Xanax sparingly  Continue on current dose of sertraline  Continue visits with behavioral health therapist as scheduled  Relevant Medications    traZODone (DESYREL) 100 mg tablet    ALPRAZolam (XANAX) 0 25 mg tablet    Other insomnia     Was taking Ambien CR, but noted it was ineffective  Patient restarted trazodone 100 mg and notes it has been effective  Notes decreased in symptoms related to depression and anxiety  To continue using trazodone as needed         Relevant Medications    traZODone (DESYREL) 100 mg tablet            Reason for visit is   Chief Complaint   Patient presents with   • Medication Management   • Virtual Regular Visit        Encounter provider SLY Varghese    Provider located at [facility] [id number] [address] [phone]      Recent Visits  No visits were found meeting these conditions    Showing recent visits within past 7 days and meeting all other requirements  Today's Visits  Date Type Provider Dept   01/11/23 [address] [place] [address] [address] Brianna   Showing today's visits and meeting all other requirements  Future Appointments  No visits were found meeting these conditions  Showing future appointments within next 150 days and meeting all other requirements       The patient was identified by name and date of birth  Hugo Ganser was informed that this is a telemedicine visit and that the visit is being conducted through the [employer]  She agrees to proceed     My office door was closed  No one else was in the room  She acknowledged consent and understanding of privacy and security of the video platform  The patient has agreed to participate and understands they can discontinue the visit at any time  Patient is aware this is a billable service  Subjective  Hugo Ganser is a 81 y o  female    Patient presents via video evaluation of medication  Patient was initiated on sertraline 25 mg during last visit  Advised patient to also weaning self off Xanax  She was using at that time 3 times daily  Notes since initiating sertraline, has noted improvement in symptoms  Patient states she is in a better mood, has less bouts of anxiety and sleep has improved  States she is getting 7 hours of sleep every other night  Has decreased use of Xanax to once daily  Also notes blood pressures have improved since discontinuing verapamil and lowering use of Xanax  Patient denies SI/HI, visual hallucinations, alcohol or drug abuse         Past Medical History:   Diagnosis Date   • Allergic Childhood   • Anxiety 2019   • Arthritis    • Asthma Childhood   • CPAP (continuous positive airway pressure) dependence     pt states was tested for VILMA, and ruled that she doesnt have it, however oxygen levels drop during sleep so CPAP was recommended,is not yet using it   • Disease of thyroid gland Approx 2000   • Diverticulitis of colon 2000   • Heart murmur    • Hyperlipidemia    • Hypertension 1995 approx   • Obesity Childhood   • [facility]        Past Surgical History:   Procedure Laterality Date   • APPENDECTOMY  About 2000   • [address] [facility] [address]   • COLON SURGERY  About 2000   • HYSTERECTOMY  About 2000   • [place] ARTHRP ACETBLR/PROX FEM PROSTC AGRFT/ALGRFT Left 10/5/2022    Procedure: ARTHROPLASTY LEFT HIP TOTAL ANTERIOR;  Surgeon: Neto Mccormick MD;  Location: [facility];  Service: Orthopedics       Current Outpatient Medications   Medication Sig Dispense Refill   • albuterol (PROVENTIL HFA,VENTOLIN HFA) 90 mcg/act inhaler Inhale 2 puffs every 4 (four) hours as needed     • ALPRAZolam (XANAX) 0 25 mg tablet Take 1 tablet (0 25 mg total) by mouth 3 (three) times a day as needed for anxiety 1/2-1 as needed tid 30 tablet 0   • ammonium lactate (LAC-HYDRIN) 12 % cream Apply topically as needed for dry skin     • aspirin (ECOTRIN LOW STRENGTH) 81 mg EC tablet Take 81 mg by mouth in the morning     • atorvastatin (LIPITOR) 40 mg tablet Take 1 tablet (40 mg total) by mouth daily 90 tablet 1   • clopidogrel (PLAVIX) 75 mg tablet TAKE 1 TABLET DAILY 90 tablet 3   • fluticasone (FLONASE) 50 mcg/act nasal spray 2 sprays into each nostril daily as needed     • fluticasone-vilanterol (BREO ELLIPTA) 200-25 MCG/INH inhaler Inhale 1 puff daily     • gabapentin (NEURONTIN) 100 mg capsule Take 200 mg by mouth once a week     • levothyroxine (Synthroid) 125 mcg tablet Take 1 tablet (125 mcg total) by mouth daily in the early morning 90 tablet 3   • losartan-hydrochlorothiazide (HYZAAR) 100-25 MG per tablet Take 1 tablet by mouth daily     • montelukast (SINGULAIR) 10 mg tablet Take 10 mg by mouth daily as needed     • Multiple Vitamin (multivitamin) tablet Take 1 tablet by mouth daily 30 tablet 0   • polyethylene glycol (MIRALAX) 17 g packet Take 17 g by mouth daily     • Restasis 0 05 % ophthalmic emulsion      • sertraline (Zoloft) 25 mg tablet Take 1 tablet (25 mg total) by mouth daily 30 tablet 0   • traZODone (DESYREL) 100 mg tablet Take 1 tablet (100 mg total) by mouth daily at bedtime 90 tablet 1   • verapamil (VERELAN PM) 360 MG 24 hr capsule Take 360 mg by mouth in the morning (Patient not taking: Reported on 12/13/2022)       No current facility-administered medications for this visit  Allergies   Allergen Reactions   • Sulfamethoxazole-Trimethoprim Rash       Review of Systems   Constitutional: Negative for activity change, appetite change and fatigue  Eyes: Negative for photophobia and visual disturbance  Respiratory: Negative for cough, chest tightness and shortness of breath  Cardiovascular: Negative for chest pain and palpitations  Gastrointestinal: Negative for diarrhea, nausea and vomiting  Genitourinary: Negative for decreased urine volume  Musculoskeletal: Negative for arthralgias and myalgias  Skin: Negative for color change and rash  Neurological: Negative for dizziness, weakness, light-headedness and headaches  Psychiatric/Behavioral: Negative for agitation, dysphoric mood, sleep disturbance and suicidal ideas  The patient is not nervous/anxious  Video Exam    There were no vitals filed for this visit  Physical Exam  Constitutional:       General: She is not in acute distress  Appearance: Normal appearance  She is not ill-appearing  Pulmonary:      Effort: Pulmonary effort is normal  No respiratory distress  Musculoskeletal:         General: Normal range of motion  Cervical back: Normal range of motion  Skin:     Coloration: Skin is not pale  Neurological:      General: No focal deficit present  Mental Status: She is alert and oriented to person, place, and time  Psychiatric:         Attention and Perception: Attention and perception normal  She does not perceive auditory or visual hallucinations  Mood and Affect: Mood normal          Speech: Speech normal          Behavior: Behavior normal  Behavior is cooperative  Thought Content:  Thought content does not include homicidal or suicidal ideation  Thought content does not include homicidal or suicidal plan            I spent 11 minutes directly with the patient during this visit

## 2023-01-11 NOTE — ASSESSMENT & PLAN NOTE
Was taking Ambien CR, but noted it was ineffective  Patient restarted trazodone 100 mg and notes it has been effective  Notes decreased in symptoms related to depression and anxiety    To continue using trazodone as needed

## 2023-01-17 ENCOUNTER — OFFICE VISIT (OUTPATIENT)
Dept: PHYSICAL THERAPY | Facility: CLINIC | Age: 73
End: 2023-01-17

## 2023-01-17 DIAGNOSIS — Z96.642 S/P TOTAL LEFT HIP ARTHROPLASTY: ICD-10-CM

## 2023-01-17 DIAGNOSIS — M16.12 OSTEOARTHRITIS OF LEFT HIP, UNSPECIFIED OSTEOARTHRITIS TYPE: Primary | ICD-10-CM

## 2023-01-17 NOTE — PROGRESS NOTES
Daily Note     Today's date: 2023  Patient name: Tash Godwin  : 1950  MRN: [id number]  Referring provider: MARCELINA Khan*  Dx:   Encounter Diagnosis     ICD-10-CM    1  Osteoarthritis of left hip, unspecified osteoarthritis type  M16 12       2  S/P total left hip arthroplasty  Z96 642                      Subjective: Pt reports her hip is feeling good  Soreness reported in her quad after last session  She used ice      Objective: See treatment diary below      Assessment: Tolerated treatment well  Trialled NMES/russain stim for L quad contraction with trace contraction palpable  Good effort noted throughout session  Patient demonstrated fatigue post treatment, exhibited good technique with therapeutic exercises and would benefit from continued PT      Plan: Continue per plan of care        Precautions: L hip anterior CAROLIN 10/5      Manuals 1/5 1/10 1/17          PROM L Hip (flex; ER within precautions)  8' 8'                                                 Neuro Re-Ed             Pt education about HEP 8' 5'           Quad Sets  1x10 5" 3x10 5" 3x10 5"          Standing hip extension 1x10 2x10 2x10          Iso bolster squeeze  2x10 5" 2x10 5"          Bridges  HEP                                     Ther Ex             LAQ eccentric 1x10 2x5 2x5          LAQ AAROM  1x10 2x10          Standing hip abduction 1x10  3x10 3x10          Forward step ups 1x10 2" 1x10 2" 2x10 2"          Lateral step ups 1x10 2" 2x10 2" 2x10 2"          Nustep  6' 6'          Prone Quad stretch  5x20" 5x20"          TB HS curls  2x10 grn 2x10 grn          Supine heel slide actively   20x slowly          Ther Activity                                       Gait Training                                       Modalities             NMES/Russain stim   L quad 10'  50% duty cycle, 10/30  20 5 intensity (neuro re-ed)

## 2023-01-19 ENCOUNTER — OFFICE VISIT (OUTPATIENT)
Dept: GASTROENTEROLOGY | Facility: CLINIC | Age: 73
End: 2023-01-19

## 2023-01-19 ENCOUNTER — PREP FOR PROCEDURE (OUTPATIENT)
Dept: GASTROENTEROLOGY | Facility: CLINIC | Age: 73
End: 2023-01-19

## 2023-01-19 VITALS
BODY MASS INDEX: 34.1 KG/M2 | OXYGEN SATURATION: 98 % | SYSTOLIC BLOOD PRESSURE: 128 MMHG | WEIGHT: 212.2 LBS | DIASTOLIC BLOOD PRESSURE: 66 MMHG | HEART RATE: 90 BPM | HEIGHT: 66 IN

## 2023-01-19 DIAGNOSIS — K74.60 LIVER CIRRHOSIS SECONDARY TO NASH (NONALCOHOLIC STEATOHEPATITIS) (HCC): Primary | ICD-10-CM

## 2023-01-19 DIAGNOSIS — K75.81 LIVER CIRRHOSIS SECONDARY TO NASH (NONALCOHOLIC STEATOHEPATITIS) (HCC): Primary | ICD-10-CM

## 2023-01-19 DIAGNOSIS — Z12.11 COLON CANCER SCREENING: Primary | ICD-10-CM

## 2023-01-19 DIAGNOSIS — Z12.11 COLON CANCER SCREENING: ICD-10-CM

## 2023-01-19 DIAGNOSIS — Z86.010 HISTORY OF COLON POLYPS: ICD-10-CM

## 2023-01-19 NOTE — PATIENT INSTRUCTIONS
Scheduled date of colonoscopy (as of today):4/26/23  Physician performing colonoscopy: Alex  Location of colonoscopy:[facility]  Bowel prep reviewed with patient:miralax/dulcolax  Instructions reviewed with patient by:Bebe DIEZ  Clearances:  none    Plavix 4 day hold

## 2023-01-19 NOTE — PROGRESS NOTES
[facility] - Outpatient Consultation  Liam [place] 72 y o  female MRN: [id number]  Encounter: [id number]          ASSESSMENT AND PLAN:      1  Liver cirrhosis secondary to JOSEPH (nonalcoholic steatohepatitis) (HCC)  - MELD score 6 previously, Child Class A; plan to get MELD labs in June (every 6 months)  - Grade 0 HE  - No history of ascites or LE Edema  - No history of varices on lat EGD in 2021; recall in 2024  - [facility]  screen negative with CT in May 2022, plan for [facility] to update this and will continue imaging q6 months  - No indication for transplant evaluation given compensated state and low MELD score      2  Colon cancer screening  3  History of colon polyps  - She has a history of adenomatous polyps in the past with her last colonoscopy being in 2018, recommended to have a 5 yr recall  - Schedule colonoscopy with miralax prep  - She has no alarm symptoms or family history  - Continue miralax for her chronic constipation    Follow up q6 months for routine cirrhosis care     ______________________________________________________________________    HPI:  Shannon Villarreal is a pleasant 71 yo F with a PMH of JOSEPH cirrhosis, hypothyroidism, PAD, HTN, diverticulitis s/p sigmoid resection, presenting to establish care with a gastroenterologist after she moved from the [place] area  She is been following with a gastroenterologist there for years for her diagnosis of the [place] cirrhosis  She has remained well compensated with a meld score of 6  She is never had complications such as hepatic encephalopathy or ascites  She has no history of varices  She has no complaints or problems currently  She does have a history of diverticulitis requiring sigmoid resection in the early 2000  She is maintained on MiraLAX daily for management of chronic constipation  She is due for colonoscopy this year per her gastroenterologist records        REVIEW OF SYSTEMS:    CONSTITUTIONAL: Denies any fever, chills, rigors, and weight loss  HEENT: No earache or tinnitus  Denies hearing loss or visual disturbances  CARDIOVASCULAR: No chest pain or palpitations  RESPIRATORY: Denies any cough, hemoptysis, shortness of breath or dyspnea on exertion  GASTROINTESTINAL: As noted in the History of Present Illness  GENITOURINARY: No problems with urination  Denies any hematuria or dysuria  NEUROLOGIC: No dizziness or vertigo, denies headaches  MUSCULOSKELETAL: Denies any muscle or joint pain  SKIN: Denies skin rashes or itching  ENDOCRINE: Denies excessive thirst  Denies intolerance to heat or cold  PSYCHOSOCIAL: Denies depression or anxiety  Denies any recent memory loss         Historical Information   Past Medical History:   Diagnosis Date   • Allergic Childhood   • Anxiety    • Arthritis    • Asthma Childhood   • CPAP (continuous positive airway pressure) dependence     pt states was tested for VILMA, and ruled that she doesnt have it, however oxygen levels drop during sleep so CPAP was recommended,is not yet using it   • Disease of thyroid gland Approx    • Diverticulitis of colon    • Heart murmur    • Hyperlipidemia    • Hypertension  approx   • Obesity Childhood   • PAD (peripheral artery disease) ([facility] )      Past Surgical History:   Procedure Laterality Date   • APPENDECTOMY  About    •  SECTION   &    • COLON SURGERY  About    • HYSTERECTOMY  About    • [place] ARTHRP ACETBLR/PROX FEM PROSTC AGRFT/ALGRFT Left 10/5/2022    Procedure: ARTHROPLASTY LEFT HIP TOTAL ANTERIOR;  Surgeon: Monty Kennedy MD;  [facility];  Service: Orthopedics     Social History   Social History     Substance and Sexual Activity   Alcohol Use Not Currently     Social History     Substance and Sexual Activity   Drug Use Never     Social History     Tobacco Use   Smoking Status Never   Smokeless Tobacco Never     Family History   Problem Relation Age of Onset   • Depression Mother    • Thrombosis Mother    • Arthritis Mother    • Cancer Mother    • Anxiety disorder Mother    • Heart disease Father         I   • Cancer Brother    • Dementia Brother    • Heart disease Brother        Meds/Allergies       Current Outpatient Medications:   •  albuterol (PROVENTIL HFA,VENTOLIN HFA) 90 mcg/act inhaler  •  ALPRAZolam (XANAX) 0 25 mg tablet  •  ammonium lactate (LAC-HYDRIN) 12 % cream  •  aspirin (ECOTRIN LOW STRENGTH) 81 mg EC tablet  •  atorvastatin (LIPITOR) 40 mg tablet  •  clopidogrel (PLAVIX) 75 mg tablet  •  fluticasone (FLONASE) 50 mcg/act nasal spray  •  fluticasone-vilanterol (BREO ELLIPTA) 200-25 MCG/INH inhaler  •  gabapentin (NEURONTIN) 100 mg capsule  •  levothyroxine (Synthroid) 125 mcg tablet  •  losartan-hydrochlorothiazide (HYZAAR) 100-25 MG per tablet  •  montelukast (SINGULAIR) 10 mg tablet  •  Multiple Vitamin (multivitamin) tablet  •  polyethylene glycol (MIRALAX) 17 g packet  •  Restasis 0 05 % ophthalmic emulsion  •  sertraline (Zoloft) 25 mg tablet  •  traZODone (DESYREL) 100 mg tablet    Allergies   Allergen Reactions   • Sulfamethoxazole-Trimethoprim Rash           Objective     Blood pressure 128/66, pulse 90, height 5' 6" (1 676 m), weight 96 3 kg (212 lb 3 2 oz), SpO2 98 %  Body mass index is 34 25 kg/m²  PHYSICAL EXAM:      General Appearance:   Alert, cooperative, no distress   HEENT:   Normocephalic, atraumatic, anicteric      Neck:  Supple, symmetrical, trachea midline   Lungs:   Clear to auscultation bilaterally; no rales, rhonchi or wheezing; respirations unlabored    Heart[de-identified]   Regular rate and rhythm; no murmur, rub, or gallop     Abdomen:   Soft, non-tender, non-distended; normal bowel sounds; no masses, no organomegaly    Genitalia:   Deferred    Rectal:   Deferred    Extremities:  No cyanosis, clubbing or edema    Pulses:  2+ and symmetric    Skin:  No jaundice, rashes, or lesions    Lymph nodes:  No palpable cervical lymphadenopathy        Lab Results:   No visits with results within 1 Day(s) from this visit     Latest known visit with results is:   Appointment on 12/13/2022   Component Date Value   • TSH 3RD GENERATON 12/13/2022 0 060 (L)    • WBC 12/13/2022 5 45    • RBC 12/13/2022 4 02    • Hemoglobin 12/13/2022 12 9    • Hematocrit 12/13/2022 40 0    • MCV 12/13/2022 100 (H)    • MCH 12/13/2022 32 1    • MCHC 12/13/2022 32 3    • RDW 12/13/2022 13 3    • MPV 12/13/2022 11 2    • Platelets 22/91/3850 174    • nRBC 12/13/2022 0    • Neutrophils Relative 12/13/2022 65    • Immat GRANS % 12/13/2022 0    • Lymphocytes Relative 12/13/2022 26    • Monocytes Relative 12/13/2022 6    • Eosinophils Relative 12/13/2022 2    • Basophils Relative 12/13/2022 1    • Neutrophils Absolute 12/13/2022 3 61    • Immature Grans Absolute 12/13/2022 0 01    • Lymphocytes Absolute 12/13/2022 1 41    • Monocytes Absolute 12/13/2022 0 30    • Eosinophils Absolute 12/13/2022 0 09    • Basophils Absolute 12/13/2022 0 03    • Sodium 12/13/2022 141    • Potassium 12/13/2022 3 9    • Chloride 12/13/2022 111 (H)    • CO2 12/13/2022 25    • ANION GAP 12/13/2022 5    • BUN 12/13/2022 13    • Creatinine 12/13/2022 0 97    • Glucose 12/13/2022 104    • Calcium 12/13/2022 9 8    • AST 12/13/2022 19    • ALT 12/13/2022 21    • Alkaline Phosphatase 12/13/2022 74    • Total Protein 12/13/2022 7 7    • Albumin 12/13/2022 4 0    • Total Bilirubin 12/13/2022 0 71    • eGFR 12/13/2022 59    • Phosphorus 12/13/2022 2 8    • Creatinine, Ur 12/14/2022 63 1    • Protein Urine Random 12/14/2022 8    • Prot/Creat Ratio, Ur 12/14/2022 0 13 (H)    • PTH 12/13/2022 49 9    • Color, UA 12/14/2022 Light Yellow    • Clarity, UA 12/14/2022 Turbid    • Specific Gravity, UA 12/14/2022 1 009    • pH, UA 12/14/2022 7 0    • Leukocytes, UA 12/14/2022 Moderate (A)    • Nitrite, UA 12/14/2022 Positive (A)    • Protein, UA 12/14/2022 Negative    • Glucose, UA 12/14/2022 Negative    • Ketones, UA 12/14/2022 Negative    • Urobilinogen, UA 12/14/2022 <2 0    • Bilirubin, UA 12/14/2022 Negative    • Occult Blood, UA 12/14/2022 Negative    • RBC, UA 12/14/2022 1-2    • WBC, UA 12/14/2022 20-30 (A)    • Epithelial Cells 12/14/2022 Occasional    • Bacteria, UA 12/14/2022 Occasional    • MUCUS THREADS 12/14/2022 Occasional (A)    • Hyaline Casts, UA 12/14/2022 0-3 (A)    • Vit D, 25-Hydroxy 12/13/2022 30 6    • Free T4 12/13/2022 1 55 (H)          Radiology Results:   No results found

## 2023-01-24 ENCOUNTER — OFFICE VISIT (OUTPATIENT)
Dept: PHYSICAL THERAPY | Facility: CLINIC | Age: 73
End: 2023-01-24

## 2023-01-24 DIAGNOSIS — M16.12 OSTEOARTHRITIS OF LEFT HIP, UNSPECIFIED OSTEOARTHRITIS TYPE: Primary | ICD-10-CM

## 2023-01-24 DIAGNOSIS — Z96.642 S/P TOTAL LEFT HIP ARTHROPLASTY: ICD-10-CM

## 2023-01-24 NOTE — PROGRESS NOTES
Daily Note     Today's date: 2023  Patient name: Hari Gaines  : 1950  MRN: [id number]  Referring provider: MARCELINA Card*  Dx:   Encounter Diagnosis     ICD-10-CM    1  Osteoarthritis of left hip, unspecified osteoarthritis type  M16 12       2  S/P total left hip arthroplasty  Z96 642           Start Time: 1224  Stop Time: 5920  Total time in clinic (min): 53 minutes    Subjective: Pt reports continued weakness with L knee extension  Objective: See treatment diary below      Assessment: Tolerated treatment well  Patient demonstrated fatigue post treatment, exhibited good technique with therapeutic exercises and would benefit from continued PT  Pt program progressed, continue to focus on improving L quadriceps activation in order to restore function of L knee  Discussed progressions for L hip/knee strengthening for HEP performance, provided pt with theraband for home performance  Continued to perform [place] e-stim, pt tolerated well at a stronger intensity this visit, able to work with e-stim for stronger contraction with quad sets  Pt demonstrated muscle fatigue at end of session, especially in L quadriceps  Plan: Continue per plan of care  Progress treatment as tolerated  Precautions: L hip anterior CAROLIN 10/5      Manuals 1/5 1/10 1/17 1/24         PROM L Hip (flex; ER within precautions)  8' 8' 6'         LAQ AAROM  1x10 2x10 2x10                                   Neuro Re-Ed             Pt education about HEP 8' 5'  3'         Quad Sets  1x10 5" 3x10 5" 3x10 5" On E-stim         Standing hip extension 1x10 2x10 2x10 HEP         Iso bolster squeeze  2x10 5" 2x10 5" 3x10 5"         Bridges  HEP           Supine Hip abd      2x10 blue         Prone TKE    2x10 5"         Ther Ex             LAQ eccentric 1x10 2x5 2x5 3x5         Standing hip abduction 1x10  3x10 3x10 HEP         Forward step ups 1x10 2" 1x10 2" 2x10 2" 2x10 2"         Lateral step ups 1x10 2" 2x10 2" 2x10 2" 2x10 2"         Nustep  6' 6' 8'         Prone Quad stretch  5x20" 5x20" 5x20"         TB HS curls  2x10 grn 2x10 grn 2x10 blue         Supine heel slide actively   20x slowly NP         Ther Activity                                       Gait Training                                       Modalities             NMES/Russain stim   L quad 10'  50% duty cycle, 10/30  20 5 intensity (neuro re-ed) L quad 10'  50% duty cycle, 10/30  30 5 intensity (neuro re-ed)

## 2023-01-30 ENCOUNTER — TELEPHONE (OUTPATIENT)
Dept: NEPHROLOGY | Facility: CLINIC | Age: 73
End: 2023-01-30

## 2023-01-30 NOTE — TELEPHONE ENCOUNTER
Patient called to reschedule her nephrology follow up appointment with Dr Allyssa Teague because of rescheduling her testing  Patient appointment was reschedule to 4/7/2023 and the patient understood and was okay with the day and time of her next appointment

## 2023-01-31 ENCOUNTER — OFFICE VISIT (OUTPATIENT)
Dept: PHYSICAL THERAPY | Facility: CLINIC | Age: 73
End: 2023-01-31

## 2023-01-31 DIAGNOSIS — M16.12 OSTEOARTHRITIS OF LEFT HIP, UNSPECIFIED OSTEOARTHRITIS TYPE: Primary | ICD-10-CM

## 2023-01-31 DIAGNOSIS — Z96.642 S/P TOTAL LEFT HIP ARTHROPLASTY: ICD-10-CM

## 2023-01-31 NOTE — PROGRESS NOTES
Daily Note     Today's date: 2023  Patient name: Andrey Pickard  : 1950  MRN: [id number]  Referring provider: MARCELINA Padron*  Dx:   Encounter Diagnosis     ICD-10-CM    1  Osteoarthritis of left hip, unspecified osteoarthritis type  M16 12       2  S/P total left hip arthroplasty  Z96 642           Start Time: 1230  Stop Time: 1320  Total time in clinic (min): 50 minutes    Subjective: Pt reports she has been trying to kick her leg out more when she is walking  Objective: See treatment diary below      Assessment: Tolerated treatment well  Patient demonstrated fatigue post treatment, exhibited good technique with therapeutic exercises and would benefit from continued PT  Pt demonstrates improved quadriceps strength and control this session, notable progress with AAROM long arc quads  Pt also able to perform some active knee extension within small range of motion  Pt also demonstrates improved strength and control with eccentric knee extension  Pt tolerated treatment well and demonstrated muscle fatigue at end of session  Plan: Continue per plan of care  Progress treatment as tolerated  Precautions: L hip anterior CAROLIN 10/5      Manuals 1/5 1/10 1/17 1/24 1/31        PROM L Hip (flex; ER within precautions)  8' 8' 6' 6'        LAQ AAROM  1x10 2x10 2x10 2x10                                  Neuro Re-Ed             Pt education about HEP 8' 5'  3'         Quad Sets  1x10 5" 3x10 5" 3x10 5" On E-stim On E-stim        Standing hip extension 1x10 2x10 2x10 HEP         Iso bolster squeeze  2x10 5" 2x10 5" 3x10 5" NP        Bridges c bolster squeeze  HEP   3x10        Supine Hip abd      2x10 blue         Prone TKE    2x10 5" HEP        Ther Ex             LAQ eccentric 1x10 2x5 2x5 3x5 5x5        Standing hip abduction 1x10  3x10 3x10 HEP         Forward step ups 1x10 2" 1x10 2" 2x10 2" 2x10 2"         Lateral step ups 1x10 2" 2x10 2" 2x10 2" 2x10 2"         Nustep  6' 6' 8' 7' Prone Quad stretch  5x20" 5x20" 5x20" 5x20"        TB HS curls  2x10 grn 2x10 grn 2x10 blue 2x10        Supine heel slide actively   20x slowly NP         Dock kicks     3x10        Ther Activity                                       Gait Training                                       Modalities             NMES/Russain stim   L quad 10'  50% duty cycle, 10/30  20 5 intensity (neuro re-ed) L quad 10'  50% duty cycle, 10/30  30 5 intensity (neuro re-ed) L quad 8'  50% duty cycle, 10/20  25 intensity (neuro re-ed)

## 2023-02-02 ENCOUNTER — OFFICE VISIT (OUTPATIENT)
Dept: CARDIOLOGY CLINIC | Facility: CLINIC | Age: 73
End: 2023-02-02

## 2023-02-02 VITALS
SYSTOLIC BLOOD PRESSURE: 156 MMHG | BODY MASS INDEX: 33.43 KG/M2 | WEIGHT: 208 LBS | OXYGEN SATURATION: 97 % | DIASTOLIC BLOOD PRESSURE: 88 MMHG | RESPIRATION RATE: 16 BRPM | HEIGHT: 66 IN | HEART RATE: 87 BPM

## 2023-02-02 DIAGNOSIS — I15.9 SECONDARY HYPERTENSION: ICD-10-CM

## 2023-02-02 DIAGNOSIS — I25.10 CORONARY ARTERY DISEASE INVOLVING NATIVE HEART WITHOUT ANGINA PECTORIS, UNSPECIFIED VESSEL OR LESION TYPE: ICD-10-CM

## 2023-02-02 DIAGNOSIS — I73.9 PERIPHERAL ARTERIAL DISEASE WITH HISTORY OF REVASCULARIZATION (HCC): ICD-10-CM

## 2023-02-02 DIAGNOSIS — Z98.890 PERIPHERAL ARTERIAL DISEASE WITH HISTORY OF REVASCULARIZATION (HCC): ICD-10-CM

## 2023-02-02 DIAGNOSIS — I35.0 NONRHEUMATIC AORTIC VALVE STENOSIS: ICD-10-CM

## 2023-02-02 DIAGNOSIS — I70.403: Primary | ICD-10-CM

## 2023-02-02 NOTE — PROGRESS NOTES
Cardiology Consultation     Annemarie Bloch  [id number]  1950  [place] [id number]        HPI:    Very pleasant  51-year-old lady with known peripheral vascular disease  She had a previous femoropopliteal operation on the left because she had developed a nonhealing infection of the left great toe which was later amputated  She has done all right since then  She had a stress test in 2019 which was negative  She does not get exertional chest discomfort despite the fact that she works out on a pedal machine for up to an hour and develops mild sweating at the time  She has no history of smoking or diabetes but has had high blood pressure for many years  This has been controlled at home  She takes Lipitor 40 mg daily and said her cholesterol is under good control although we have no recent data      She is now here because of the presence of a "heart murmur "      Patient Active Problem List   Diagnosis   • Primary osteoarthritis of one hip, left   • Peripheral arterial disease with history of revascularization (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 ([address] )   • History of diverticulitis   • Stage 3b chronic kidney disease (HCC)   • Anxiety   • Depression with anxiety   • Other insomnia   • Coronary artery disease involving native heart without angina pectoris   • Nonrheumatic aortic valve stenosis     Past Medical History:   Diagnosis Date   • Allergic Childhood   • Anxiety 2019   • Arthritis    • Asthma Childhood • CPAP (continuous positive airway pressure) dependence     pt states was tested for VILMA, and ruled that she doesnt have it, however oxygen levels drop during sleep so CPAP was recommended,is not yet using it   • Disease of thyroid gland Approx 2000   • Diverticulitis of colon 2000   • Heart murmur    • Hyperlipidemia    • Hypertension 1995 approx   • Obesity Childhood   • PAD (peripheral artery disease) ([facility])      Social History     Socioeconomic History   • Marital status: /Civil Union     Spouse name: Not on file   • Number of children: Not on file   • Years of education: Not on file   • Highest education level: Not on file   Occupational History   • Not on file   Tobacco Use   • Smoking status: Never   • Smokeless tobacco: Never   Vaping Use   • Vaping Use: Never used   Substance and Sexual Activity   • Alcohol use: Not Currently   • Drug use: Never   • Sexual activity: Not Currently     Partners: Female     Birth control/protection: Diaphragm, None   Other Topics Concern   • Not on file   Social History Narrative   • Not on file     Social Determinants of Health     Financial Resource Strain: Not on file   Food Insecurity: No Food Insecurity   • Worried About Running Out of Food in the Last Year: Never true   • Ran Out of Food in the Last Year: Never true   Transportation Needs: No Transportation Needs   • Lack of Transportation (Medical): No   • Lack of Transportation (Non-Medical):  No   Physical Activity: Not on file   Stress: Not on file   Social Connections: Not on file   Intimate Partner Violence: Not on file   Housing Stability: Low Risk    • Unable to Pay for Housing in the Last Year: No   • Number of Places Lived in the Last Year: 1   • Unstable Housing in the Last Year: No      Family History   Problem Relation Age of Onset   • Depression Mother    • Thrombosis Mother    • Arthritis Mother    • Cancer Mother    • Anxiety disorder Mother    • Heart disease Father         I   • Cancer Brother • Dementia Brother    • Heart disease Brother      Past Surgical History:   Procedure Laterality Date   • APPENDECTOMY  About 2000   • [address] [facility] [address]   • COLON SURGERY  About 2000   • HYSTERECTOMY  About 2000   • AR ARTHRP ACETBLR/PROX FEM PROSTC AGRFT/ALGRFT Left 10/5/2022    Procedure: ARTHROPLASTY LEFT HIP TOTAL ANTERIOR;  Surgeon: Fabrice Pedro MD;  Location: [facility];  Service: Orthopedics       Current Outpatient Medications:   •  albuterol (PROVENTIL HFA,VENTOLIN HFA) 90 mcg/act inhaler, Inhale 2 puffs every 4 (four) hours as needed, Disp: , Rfl:   •  ALPRAZolam (XANAX) 0 25 mg tablet, Take 1 tablet (0 25 mg total) by mouth 3 (three) times a day as needed for anxiety 1/2-1 as needed tid, Disp: 30 tablet, Rfl: 0  •  ammonium lactate (LAC-HYDRIN) 12 % cream, Apply topically as needed for dry skin, Disp: , Rfl:   •  aspirin (ECOTRIN LOW STRENGTH) 81 mg EC tablet, Take 81 mg by mouth in the morning, Disp: , Rfl:   •  atorvastatin (LIPITOR) 40 mg tablet, Take 1 tablet (40 mg total) by mouth daily, Disp: 90 tablet, Rfl: 1  •  clopidogrel (PLAVIX) 75 mg tablet, TAKE 1 TABLET DAILY, Disp: 90 tablet, Rfl: 3  •  fluticasone (FLONASE) 50 mcg/act nasal spray, 2 sprays into each nostril daily as needed, Disp: , Rfl:   •  fluticasone-vilanterol (BREO ELLIPTA) 200-25 MCG/INH inhaler, Inhale 1 puff daily, Disp: , Rfl:   •  gabapentin (NEURONTIN) 100 mg capsule, Take 200 mg by mouth once a week, Disp: , Rfl:   •  levothyroxine (Synthroid) 125 mcg tablet, Take 1 tablet (125 mcg total) by mouth daily in the early morning, Disp: 90 tablet, Rfl: 3  •  losartan-hydrochlorothiazide (HYZAAR) 100-25 MG per tablet, Take 1 tablet by mouth daily, Disp: , Rfl:   •  montelukast (SINGULAIR) 10 mg tablet, Take 10 mg by mouth daily as needed, Disp: , Rfl:   •  Multiple Vitamin (multivitamin) tablet, Take 1 tablet by mouth daily, Disp: 30 tablet, Rfl: 0  •  polyethylene glycol (MIRALAX) 17 g packet, Take 17 g by mouth daily, Disp: , Rfl:   •  Restasis 0 05 % ophthalmic emulsion, , Disp: , Rfl:   •  sertraline (Zoloft) 25 mg tablet, Take 1 tablet (25 mg total) by mouth daily, Disp: 30 tablet, Rfl: 0  •  traZODone (DESYREL) 100 mg tablet, Take 1 tablet (100 mg total) by mouth daily at bedtime, Disp: 90 tablet, Rfl: 1  Allergies   Allergen Reactions   • Sulfamethoxazole-Trimethoprim Rash     Vitals:    02/02/23 1030   BP: 156/88   BP Location: Left arm   Patient Position: Sitting   Cuff Size: Standard   Pulse: 87   Resp: 16   SpO2: 97%   Weight: 94 3 kg (208 lb)   Height: 5' 6" (1 676 m)       Labs:  Appointment on 12/13/2022   Component Date Value   • TSH 3RD GENERATON 12/13/2022 0 060 (L)    • WBC 12/13/2022 5 45    • RBC 12/13/2022 4 02    • Hemoglobin 12/13/2022 12 9    • Hematocrit 12/13/2022 40 0    • MCV 12/13/2022 100 (H)    • MCH 12/13/2022 32 1    • MCHC 12/13/2022 32 3    • RDW 12/13/2022 13 3    • MPV 12/13/2022 11 2    • Platelets 46/70/6539 174    • nRBC 12/13/2022 0    • Neutrophils Relative 12/13/2022 65    • Immat GRANS % 12/13/2022 0    • Lymphocytes Relative 12/13/2022 26    • Monocytes Relative 12/13/2022 6    • Eosinophils Relative 12/13/2022 2    • Basophils Relative 12/13/2022 1    • Neutrophils Absolute 12/13/2022 3 61    • Immature Grans Absolute 12/13/2022 0 01    • Lymphocytes Absolute 12/13/2022 1 41    • Monocytes Absolute 12/13/2022 0 30    • Eosinophils Absolute 12/13/2022 0 09    • Basophils Absolute 12/13/2022 0 03    • Sodium 12/13/2022 141    • Potassium 12/13/2022 3 9    • Chloride 12/13/2022 111 (H)    • CO2 12/13/2022 25    • ANION GAP 12/13/2022 5    • BUN 12/13/2022 13    • Creatinine 12/13/2022 0 97    • Glucose 12/13/2022 104    • Calcium 12/13/2022 9 8    • AST 12/13/2022 19    • ALT 12/13/2022 21    • Alkaline Phosphatase 12/13/2022 74    • Total Protein 12/13/2022 7 7    • Albumin 12/13/2022 4 0    • Total Bilirubin 12/13/2022 0 71    • eGFR 12/13/2022 59    • Phosphorus 12/13/2022 2 8 • Creatinine, Ur 12/14/2022 63 1    • Protein Urine Random 12/14/2022 8    • Prot/Creat Ratio, Ur 12/14/2022 0 13 (H)    • PTH 12/13/2022 49 9    • Color, UA 12/14/2022 Light Yellow    • Clarity, UA 12/14/2022 Turbid    • Specific Gravity, UA 12/14/2022 1 009    • pH, UA 12/14/2022 7 0    • Leukocytes, UA 12/14/2022 Moderate (A)    • Nitrite, UA 12/14/2022 Positive (A)    • Protein, UA 12/14/2022 Negative    • Glucose, UA 12/14/2022 Negative    • Ketones, UA 12/14/2022 Negative    • Urobilinogen, UA 12/14/2022 <2 0    • Bilirubin, UA 12/14/2022 Negative    • Occult Blood, UA 12/14/2022 Negative    • RBC, UA 12/14/2022 1-2    • WBC, UA 12/14/2022 20-30 (A)    • Epithelial Cells 12/14/2022 Occasional    • Bacteria, UA 12/14/2022 Occasional    • MUCUS THREADS 12/14/2022 Occasional (A)    • Hyaline Casts, UA 12/14/2022 0-3 (A)    • Vit D, 25-Hydroxy 12/13/2022 30 6    • Free T4 12/13/2022 1 55 (H)      Imaging: No results found  Review of Systems:  Review of Systems   Constitutional: Negative for appetite change and fever  Eyes: Negative for visual disturbance  Respiratory: Negative for cough, chest tightness and shortness of breath  History of asthma   Cardiovascular: Negative for chest pain, palpitations and leg swelling  Gastrointestinal:        Previous bowel resection for diverticulosis, appendectomy   Genitourinary: Negative for hematuria and menstrual problem  Previous hysterectomy   Musculoskeletal: Negative for arthralgias and back pain  Skin: Negative for color change and rash  Neurological: Negative for syncope and light-headedness  All other systems reviewed and are negative  Physical Exam:  She is overweight  Blood pressure 156/88  Heart rate 87 and regular  Skin reveals surgical scars  Pupils equal   Carotids 2+ without bruits  Lungs reveal mild generalized decreased airflow and mild expiratory wheezing  Rhythm regular    Grade 2/6 systolic ejection murmur at the base   A2 well-preserved  Regular rhythm  No organomegaly  Good femoral pulses  No edema  Good strength in all extremities  Last EKG showed right bundle branch block but otherwise was normal    Discussion/Summary:    1  Coronary artery disease equivalent  Without angina pectoris and with negative stress test  2  Aortic stenosis-mild  3  Peripheral vascular disease  4  Asthma  5  Hypertension-controlled on home blood pressures    Recommendations:    1  Lipid panel  2   Echocardiogram  3  Return in 1 year  4  If she has severe discomfort between the epigastrium and jaw inclusive-seek prompt medical attention                  Sury Nur MD

## 2023-02-03 ENCOUNTER — APPOINTMENT (OUTPATIENT)
Dept: PHYSICAL THERAPY | Facility: CLINIC | Age: 73
End: 2023-02-03

## 2023-02-06 ENCOUNTER — OFFICE VISIT (OUTPATIENT)
Dept: FAMILY MEDICINE CLINIC | Facility: CLINIC | Age: 73
End: 2023-02-06

## 2023-02-06 VITALS
SYSTOLIC BLOOD PRESSURE: 100 MMHG | BODY MASS INDEX: 33.43 KG/M2 | DIASTOLIC BLOOD PRESSURE: 74 MMHG | WEIGHT: 208 LBS | TEMPERATURE: 98.1 F | HEIGHT: 66 IN | OXYGEN SATURATION: 97 % | HEART RATE: 92 BPM

## 2023-02-06 DIAGNOSIS — R73.01 ELEVATED FASTING GLUCOSE: ICD-10-CM

## 2023-02-06 DIAGNOSIS — D69.6 THROMBOCYTOPENIA, UNSPECIFIED (HCC): ICD-10-CM

## 2023-02-06 DIAGNOSIS — K75.81 LIVER CIRRHOSIS SECONDARY TO NASH (NONALCOHOLIC STEATOHEPATITIS) (HCC): ICD-10-CM

## 2023-02-06 DIAGNOSIS — F33.9 DEPRESSION, RECURRENT (HCC): ICD-10-CM

## 2023-02-06 DIAGNOSIS — K74.60 LIVER CIRRHOSIS SECONDARY TO NASH (NONALCOHOLIC STEATOHEPATITIS) (HCC): ICD-10-CM

## 2023-02-06 DIAGNOSIS — Z89.422 ACQUIRED ABSENCE OF OTHER LEFT TOE(S) (HCC): ICD-10-CM

## 2023-02-06 DIAGNOSIS — I10 PRIMARY HYPERTENSION: ICD-10-CM

## 2023-02-06 DIAGNOSIS — Z00.00 MEDICARE ANNUAL WELLNESS VISIT, SUBSEQUENT: ICD-10-CM

## 2023-02-06 DIAGNOSIS — G47.30 SLEEP APNEA, UNSPECIFIED TYPE: ICD-10-CM

## 2023-02-06 DIAGNOSIS — R53.83 FATIGUE, UNSPECIFIED TYPE: ICD-10-CM

## 2023-02-06 DIAGNOSIS — E66.01 CLASS 3 SEVERE OBESITY WITHOUT SERIOUS COMORBIDITY WITH BODY MASS INDEX (BMI) OF 40.0 TO 44.9 IN ADULT, UNSPECIFIED OBESITY TYPE (HCC): ICD-10-CM

## 2023-02-06 DIAGNOSIS — I73.9 PERIPHERAL ARTERIAL DISEASE WITH HISTORY OF REVASCULARIZATION (HCC): ICD-10-CM

## 2023-02-06 DIAGNOSIS — N18.32 STAGE 3B CHRONIC KIDNEY DISEASE (HCC): ICD-10-CM

## 2023-02-06 DIAGNOSIS — F41.8 DEPRESSION WITH ANXIETY: Primary | ICD-10-CM

## 2023-02-06 DIAGNOSIS — E03.9 ACQUIRED HYPOTHYROIDISM: ICD-10-CM

## 2023-02-06 DIAGNOSIS — N25.81 SECONDARY HYPERPARATHYROIDISM OF RENAL ORIGIN (HCC): ICD-10-CM

## 2023-02-06 DIAGNOSIS — Z98.890 PERIPHERAL ARTERIAL DISEASE WITH HISTORY OF REVASCULARIZATION (HCC): ICD-10-CM

## 2023-02-06 PROBLEM — Z86.0100 HISTORY OF COLONIC POLYPS: Status: ACTIVE | Noted: 2019-09-18

## 2023-02-06 PROBLEM — J45.20 MILD INTERMITTENT ASTHMA WITHOUT COMPLICATION: Status: ACTIVE | Noted: 2022-01-17

## 2023-02-06 PROBLEM — L25.9 CONTACT DERMATITIS: Status: ACTIVE | Noted: 2019-01-09

## 2023-02-06 PROBLEM — Z86.010 HISTORY OF COLONIC POLYPS: Status: ACTIVE | Noted: 2019-09-18

## 2023-02-06 NOTE — PROGRESS NOTES
Assessment and Plan:       Pt is a pleasant 72 yr old female   Presents in office to establish care   Underlying history of depression and anxiety   Currently on Zoloft and Trazodone at night - recently adjustment was made to her zoloft - increased to 50 mg   HTN stable she has been on Hyzaar   Does have non alcoholic steato hepatitis - she is being monitored by GI   Sleep apnea - uses CPAP   PVD she has had stents to lower extremities and lost a toe to left lower extremity - take Plavix 75 mg daily and aspirin daily denies any issues currently  Does have some seasonal allergies and bronchitis     She is on Levothyroxine recently adjusted by previous PCP  Will need follow up labs   Also history of elevated fasting glucose --> will continue to monitor for diabetes   Discussed healthy diet   Discussed adequate hydration   Follow up with Nephrology for CKD monitoring   Follow up in 4-6 weeks for lab review and follow up     Problem List Items Addressed This Visit        Digestive    Liver cirrhosis secondary to JOSPEH (nonalcoholic steatohepatitis) (HCC)    Relevant Orders    Comprehensive metabolic panel    CBC and differential    TSH, 3rd generation with Free T4 reflex    Lipid panel    UA (URINE) with reflex to Scope    HEMOGLOBIN A1C W/ EAG ESTIMATION       Endocrine    Acquired hypothyroidism    Relevant Orders    Comprehensive metabolic panel    CBC and differential    TSH, 3rd generation with Free T4 reflex    Lipid panel    UA (URINE) with reflex to Scope    HEMOGLOBIN A1C W/ EAG ESTIMATION    Secondary hyperparathyroidism of renal origin [facility])       Cardiovascular and Mediastinum    Peripheral arterial disease with history of revascularization ([facility] [address] )    Relevant Orders    Comprehensive metabolic panel    CBC and differential    TSH, 3rd generation with Free T4 reflex    Lipid panel    UA (URINE) with reflex to Scope    HEMOGLOBIN A1C W/ EAG ESTIMATION    Hypertension    Relevant Orders    Comprehensive metabolic panel    CBC and differential    TSH, 3rd generation with Free T4 reflex    Lipid panel    UA (URINE) with reflex to Scope    HEMOGLOBIN A1C W/ EAG ESTIMATION       Hematopoietic and Hemostatic    Thrombocytopenia, unspecified (HCC)       Genitourinary    Stage 3b chronic kidney disease (HCC)    Relevant Orders    Comprehensive metabolic panel    CBC and differential    TSH, 3rd generation with Free T4 reflex    Lipid panel    UA (URINE) with reflex to Scope    HEMOGLOBIN A1C W/ EAG ESTIMATION       Other    Acquired absence of other left toe(s) (HCC)    Class 3 severe obesity without serious comorbidity with body mass index (BMI) of 40 0 to 44 9 in adult, unspecified obesity type (HCC)    Depression with anxiety - Primary    Relevant Orders    Comprehensive metabolic panel    CBC and differential    TSH, 3rd generation with Free T4 reflex    Lipid panel    UA (URINE) with reflex to Scope    HEMOGLOBIN A1C W/ EAG ESTIMATION    Depression, recurrent (HCC)   Other Visit Diagnoses     Fatigue, unspecified type        Relevant Orders    Comprehensive metabolic panel    CBC and differential    TSH, 3rd generation with Free T4 reflex    Lipid panel    UA (URINE) with reflex to Scope    HEMOGLOBIN A1C W/ EAG ESTIMATION    Sleep apnea, unspecified type        Relevant Orders    Comprehensive metabolic panel    CBC and differential    TSH, 3rd generation with Free T4 reflex    Lipid panel    UA (URINE) with reflex to Scope    HEMOGLOBIN A1C W/ EAG ESTIMATION    Elevated fasting glucose        Relevant Orders    HEMOGLOBIN A1C W/ EAG ESTIMATION        BMI Counseling: Body mass index is 33 57 kg/m²   The BMI is above normal  Nutrition recommendations include decreasing portion sizes, encouraging healthy choices of fruits and vegetables, decreasing fast food intake, consuming healthier snacks, limiting drinks that contain sugar, moderation in carbohydrate intake, increasing intake of lean protein, reducing intake of saturated and trans fat and reducing intake of cholesterol  Exercise recommendations include vigorous physical activity 75 minutes/week, exercising 3-5 times per week and strength training exercises  No pharmacotherapy was ordered  Patient referred to PCP  Rationale for BMI follow-up plan is due to patient being overweight or obese  Depression Screening and Follow-up Plan: Their PHQ-9 score was 8  Patient assessed for underlying major depression  Brief counseling provided and recommend additional follow-up/re-evaluation next office visit  Continue regular follow-up with their mental health provider who is managing their mental health condition(s)  Patient advised to follow-up with PCP for further management  Recently adjustment to the zoloft doing ok for now   We will continue to monitor     Falls Plan of Care: balance, strength, and gait training instructions were provided  Recommended assistive device to help with gait and balance  Medications that increase falls were reviewed  Assessed feet and footwear  Vitamin D supplementation was recommended  Home safety education provided  Preventive health issues were discussed with patient, and age appropriate screening tests were ordered as noted in patient's After Visit Summary  Personalized health advice and appropriate referrals for health education or preventive services given if needed, as noted in patient's After Visit Summary       History of Present Illness:     Patient presents for a Medicare Wellness Visit    Pt is a pleasant 72 yr old female   Presents in office to establish care   Underlying history of depression and anxiety   Currently on Zoloft and Trazodone at night - recently adjustment was made to her zoloft - increased to 50 mg   HTN stable she has been on Hyzaar   Does have non alcoholic steato hepatitis - she is being monitored by GI   Sleep apnea - uses CPAP   PVD she has had stents to lower extremities and lost a toe to left lower extremity - take Plavix 75 mg daily and aspirin daily denies any issues currently  Does have some seasonal allergies and bronchitis  She is on Levothyroxine recently adjusted by previous PCP  Will need follow up labs        Patient Care Team:  Janet Starr as PCP - General (Nurse Practitioner)     Review of Systems:     Review of Systems   Constitutional: Negative for chills, fatigue, fever and unexpected weight change  HENT: Negative for congestion, postnasal drip, sinus pressure and voice change  Eyes: Negative  Respiratory: Negative for cough and shortness of breath  Cardiovascular: Negative for chest pain and palpitations  HTN  Stable   Hyperlipemia  PVD     Gastrointestinal: Negative for abdominal distention, abdominal pain, nausea and vomiting  JOSEPH - fatty liver   Sees GI    Endocrine:        Hypothyroidism    Genitourinary: Negative for difficulty urinating and flank pain  Musculoskeletal: Positive for arthralgias and myalgias  Skin: Negative for rash  Allergic/Immunologic: Positive for environmental allergies  Neurological: Positive for headaches  Hematological: Negative for adenopathy  Psychiatric/Behavioral: Positive for sleep disturbance  Negative for suicidal ideas  The patient is nervous/anxious           Problem List:     Patient Active Problem List   Diagnosis   • Primary osteoarthritis of one hip, left   • Peripheral arterial disease with history of revascularization (HCC)   • Hypertension   • Acquired absence of other left toe(s) (HCC)   • Class 3 severe obesity without serious comorbidity with body mass index (BMI) of 40 0 to 44 9 in adult, unspecified obesity type ([facility] [address] )   • Liver cirrhosis secondary to JOSEPH (nonalcoholic steatohepatitis) ([facility] [address] )   • Acquired hypothyroidism   • Diverticulitis   • 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 ([facility] [address] )   • History of diverticulitis   • Stage 3b chronic kidney disease ([facility] [address] )   • Anxiety   • Depression with anxiety   • Other insomnia   • Coronary artery disease involving native heart without angina pectoris   • Nonrheumatic aortic valve stenosis   • Mild intermittent asthma without complication   • Liver cyst   • History of colonic polyps   • Contact dermatitis   • Depression, recurrent (HCC)   • Secondary hyperparathyroidism of renal origin ([facility] [address] )   • Thrombocytopenia, unspecified ([facility] )      Past Medical and Surgical History:     Past Medical History:   Diagnosis Date   • Allergic Childhood   • Anxiety    • Arthritis    • Asthma Childhood   • CPAP (continuous positive airway pressure) dependence     pt states was tested for VILMA, and ruled that she doesnt have it, however oxygen levels drop during sleep so CPAP was recommended,is not yet using it   • Disease of thyroid gland Approx    • Diverticulitis of colon    • Heart murmur    • Hyperlipidemia    • Hypertension  approx   • Obesity Childhood   • PAD (peripheral artery disease) ([facility] )      Past Surgical History:   Procedure Laterality Date   • APPENDECTOMY  About    •  SECTION   &    • COLON SURGERY  About    • HYSTERECTOMY  About    • DC ARTHRP ACETBLR/PROX FEM PROSTC AGRFT/ALGRFT Left 10/5/2022    Procedure: ARTHROPLASTY LEFT HIP TOTAL ANTERIOR;  Surgeon: Shan Iyer MD;  Location: [facility];  Service: Orthopedics      Family History:     Family History   Problem Relation Age of Onset   • Depression Mother    • Thrombosis Mother    • Arthritis Mother    • Cancer Mother    • Anxiety disorder Mother    • Heart disease Father         I   • Cancer Brother    • Dementia Brother    • Heart disease Brother       Social History:     Social History     Socioeconomic History   • Marital status: /Civil Union     Spouse name: None   • Number of children: None   • Years of education: None   • Highest education level: None   Occupational History   • None   Tobacco Use   • Smoking status: Never   • Smokeless tobacco: Never   Vaping Use   • Vaping Use: Never used   Substance and Sexual Activity   • Alcohol use: Not Currently   • Drug use: Never   • Sexual activity: Not Currently     Partners: Female     Birth control/protection: Diaphragm, None   Other Topics Concern   • None   Social History Narrative   • None     Social Determinants of Health     Financial Resource Strain: Low Risk    • Difficulty of Paying Living Expenses: Not very hard   Food Insecurity: No Food Insecurity   • Worried About Running Out of Food in the Last Year: Never true   • Ran Out of Food in the Last Year: Never true   Transportation Needs: No Transportation Needs   • Lack of Transportation (Medical): No   • Lack of Transportation (Non-Medical):  No   Physical Activity: Not on file   Stress: Not on file   Social Connections: Not on file   Intimate Partner Violence: Not on file   Housing Stability: Low Risk    • Unable to Pay for Housing in the Last Year: No   • Number of Places Lived in the Last Year: 1   • Unstable Housing in the Last Year: No      Medications and Allergies:     Current Outpatient Medications   Medication Sig Dispense Refill   • albuterol (PROVENTIL HFA,VENTOLIN HFA) 90 mcg/act inhaler Inhale 2 puffs every 4 (four) hours as needed     • ALPRAZolam (XANAX) 0 25 mg tablet Take 1 tablet (0 25 mg total) by mouth 3 (three) times a day as needed for anxiety 1/2-1 as needed tid 30 tablet 0   • ammonium lactate (LAC-HYDRIN) 12 % cream Apply topically as needed for dry skin     • aspirin (ECOTRIN LOW STRENGTH) 81 mg EC tablet Take 81 mg by mouth in the morning     • atorvastatin (LIPITOR) 40 mg tablet Take 1 tablet (40 mg total) by mouth daily 90 tablet 1   • clopidogrel (PLAVIX) 75 mg tablet TAKE 1 TABLET DAILY 90 tablet 3   • fluticasone (FLONASE) 50 mcg/act nasal spray 2 sprays into each nostril daily as needed     • fluticasone-vilanterol (BREO ELLIPTA) 200-25 MCG/INH inhaler Inhale 1 puff daily     • levothyroxine (Synthroid) 125 mcg tablet Take 1 tablet (125 mcg total) by mouth daily in the early morning 90 tablet 3   • losartan-hydrochlorothiazide (HYZAAR) 100-25 MG per tablet Take 1 tablet by mouth daily     • montelukast (SINGULAIR) 10 mg tablet Take 10 mg by mouth daily as needed     • Multiple Vitamin (multivitamin) tablet Take 1 tablet by mouth daily 30 tablet 0   • polyethylene glycol (MIRALAX) 17 g packet Take 17 g by mouth daily     • Restasis 0 05 % ophthalmic emulsion      • sertraline (Zoloft) 50 mg tablet Take 1 tablet (50 mg total) by mouth daily 30 tablet 0   • traZODone (DESYREL) 100 mg tablet Take 1 tablet (100 mg total) by mouth daily at bedtime 90 tablet 1     No current facility-administered medications for this visit  Allergies   Allergen Reactions   • Sulfamethoxazole-Trimethoprim Rash      Immunizations:     Immunization History   Administered Date(s) Administered   • COVID-19 PFIZER VACCINE 0 3 ML IM 02/25/2021, 03/18/2021   • INFLUENZA 09/27/2022   • Tdap 06/02/2021      Health Maintenance:         Topic Date Due   • Hepatitis C Screening  Never done   • Breast Cancer Screening: Mammogram  11/22/2023   • Colorectal Cancer Screening  01/23/2028         Topic Date Due   • Hepatitis A Vaccine (1 of 2 - Risk 2-dose series) Never done   • Pneumococcal Vaccine: 65+ Years (1 - PCV) Never done   • Hepatitis B Vaccine (1 of 3 - Risk 3-dose series) Never done   • COVID-19 Vaccine (3 - Booster for Pfizer series) 05/13/2021      Medicare Screening Tests and Risk Assessments:     Nory Winters is here for her Subsequent Wellness visit  Health Risk Assessment:   Patient rates overall health as good  Patient feels that their physical health rating is much better  Patient is satisfied with their life  Eyesight was rated as same  Hearing was rated as same  Patient feels that their emotional and mental health rating is slightly worse  Patients states they are never, rarely angry  Patient states they are sometimes unusually tired/fatigued   Pain experienced in the last 7 days has been some  Patient's pain rating has been 3/10  Patient states that she has experienced weight loss or gain in last 6 months  Depression Screening:   PHQ-9 Score: 8      Fall Risk Screening: In the past year, patient has experienced: history of falling in past year    Number of falls: 2 or more  Injured during fall?: No    Feels unsteady when standing or walking?: No    Worried about falling?: Yes      Urinary Incontinence Screening:   Patient has not leaked urine accidently in the last six months  Home Safety:  Patient has trouble with stairs inside or outside of their home  Patient has working smoke alarms and has working carbon monoxide detector  Home safety hazards include: none  Nutrition:   Current diet is Low Saturated Fat and Regular  Medications:   Patient is currently taking over-the-counter supplements  OTC medications include: see medication list  Patient is able to manage medications  Activities of Daily Living (ADLs)/Instrumental Activities of Daily Living (IADLs):   Walk and transfer into and out of bed and chair?: Yes  Dress and groom yourself?: Yes    Bathe or shower yourself?: Yes    Feed yourself?  Yes  Do your laundry/housekeeping?: Yes  Manage your money, pay your bills and track your expenses?: Yes  Make your own meals?: Yes    Do your own shopping?: Yes    Previous Hospitalizations:   Any hospitalizations or ED visits within the last 12 months?: Yes    How many hospitalizations have you had in the last year?: 1-2    Hospitalization Comments: Left hip surgery 10/2022    Advance Care Planning:   Living will: No    Advanced directive: No    Advanced directive counseling given: Yes    Five wishes given: Yes      Cognitive Screening:   Provider or family/friend/caregiver concerned regarding cognition?: No    PREVENTIVE SCREENINGS        Diabetes Screening:     General: Screening Current      Colorectal Cancer Screening:     General: Screening Current      Breast Cancer Screening:     General: Screening Current      Cervical Cancer Screening:    General: Screening Not Indicated      Lung Cancer Screening:     General: Screening Not Indicated    Screening, Brief Intervention, and Referral to Treatment (SBIRT)    Screening  Typical number of drinks in a day: 0  Typical number of drinks in a week: 0  Interpretation: Low risk drinking behavior  Single Item Drug Screening:  How often have you used an illegal drug (including marijuana) or a prescription medication for non-medical reasons in the past year? never    Single Item Drug Screen Score: 0  Interpretation: Negative screen for possible drug use disorder    Other Counseling Topics:   Car/seat belt/driving safety, skin self-exam, sunscreen and calcium and vitamin D intake and regular weightbearing exercise  No results found  Physical Exam:     /74 (BP Location: Right arm, Patient Position: Sitting, Cuff Size: Large)   Pulse 92   Temp 98 1 °F (36 7 °C)   Ht 5' 6" (1 676 m)   Wt 94 3 kg (208 lb)   SpO2 97%   BMI 33 57 kg/m²     Physical Exam  Constitutional:       Appearance: Normal appearance  HENT:      Head: Atraumatic  Eyes:      Extraocular Movements: Extraocular movements intact  Cardiovascular:      Rate and Rhythm: Normal rate and regular rhythm  Pulses: Normal pulses  Heart sounds: Normal heart sounds  Pulmonary:      Effort: Pulmonary effort is normal       Breath sounds: Normal breath sounds  Abdominal:      Palpations: Abdomen is soft  Musculoskeletal:         General: Normal range of motion  Cervical back: Normal range of motion  Right lower leg: No edema  Left lower leg: No edema  Skin:     Capillary Refill: Capillary refill takes less than 2 seconds  Comments: Left 2nd toe missing   PVD    Neurological:      Mental Status: She is alert and oriented to person, place, and time     Psychiatric:         Mood and Affect: Mood normal  Behavior: Behavior normal           SLY Londono

## 2023-02-07 ENCOUNTER — EVALUATION (OUTPATIENT)
Dept: PHYSICAL THERAPY | Facility: CLINIC | Age: 73
End: 2023-02-07

## 2023-02-07 DIAGNOSIS — M16.12 OSTEOARTHRITIS OF LEFT HIP, UNSPECIFIED OSTEOARTHRITIS TYPE: Primary | ICD-10-CM

## 2023-02-07 DIAGNOSIS — Z96.642 S/P TOTAL LEFT HIP ARTHROPLASTY: ICD-10-CM

## 2023-02-07 NOTE — PROGRESS NOTES
PT Re-Evaluation     Today's date: 2023  Patient name: Liam Hudson  : 1950  MRN: [id number]  Referring provider: MARCELINA Shrestha*  Dx:   Encounter Diagnosis     ICD-10-CM    1  Osteoarthritis of left hip, unspecified osteoarthritis type  M16 12       2  S/P total left hip arthroplasty  Z96 642           Start Time: 1100  Stop Time: 1146  Total time in clinic (min): 46 minutes    Assessment  Assessment details: Pt is a 81 y o  female presenting upon physical therapy re-evaluation with improvements in ROM, strength, and pain during functional activities in her L hip  Pt reports functional improvements with ambulation and stairs  Pt demonstrates slightly improved L knee extension strength, however chief limitation continues to be gross quadriceps weakness and diminished L knee extension, especially against gravity  Pt continues to demonstrate full L knee extension passively  Pt's L knee weakness continues to result in limitations of prolonged ambulation, kicking her L leg, balance, and household activities  PT POC will continue to focus on addressing weak musculature, particularly L rectus femoris/quadriceps, in order to improve function  Will continue to utilize [place] e-stim for facilitation of quadriceps contraction  Pt is a good candidate for continued skilled PT to address impairments and facilitate return to prior level of function  Impairments: abnormal gait, abnormal muscle firing, abnormal or restricted ROM, activity intolerance, impaired balance, impaired physical strength, lacks appropriate home exercise program and pain with function  Functional limitations: prolonged ambulation, kicking her L leg, balance, household activities  Symptom irritability: lowUnderstanding of Dx/Px/POC: good   Prognosis: good    Goals  STG 4 - weeks  1  Patient will demonstrate L knee extensor lag of 25 degrees or less to facilitate functional ability  -not met  2   Patient will demonstrate improved L hip strength to 4+/5 or better in all planes to facilitate functional ability  -met  LTG 8 - weeks  1  Patient will demonstrate L quadriceps MMT strength to 5/5 to facilitate functional ability  -not met  2  Patient will demonstrate increased FOTO score from 56 at baseline to predicted score of 68 or higher to facilitate functional ability  -not met    Plan  Patient would benefit from: PT eval and skilled physical therapy  Planned modality interventions: cryotherapy, thermotherapy: hydrocollator packs and unattended electrical stimulation  Planned therapy interventions: balance, functional ROM exercises, flexibility, gait training, home exercise program, joint mobilization, manual therapy, massage, neuromuscular re-education, patient education, strengthening, stretching, therapeutic activities, therapeutic exercise and motor coordination training  Frequency: 2x week  Duration in weeks: 8  Plan of Care beginning date: 2/7/2023  Plan of Care expiration date: 4/4/2023  Treatment plan discussed with: patient        Subjective Evaluation    History of Present Illness  Date of surgery: 10/5/2022  Mechanism of injury: surgery  Mechanism of injury: Pt presents to PT s/p L anterior total hip arthroplasty on 10/5/22  Pt had attempted conservative management prior to surgery but was unsuccessful  Pt was completing home health PT prior to outpatient referral  Pt reports home health went well and she continues to do exercises every day  Pt reports her only hip precaution is to not cross her L foot onto her R leg  Pt reports she still feels some weakness in her L hip joint, fell a few weeks ago when standing on her LLE with the knee bent due to it giving out  Pt also reports some stiffness in her L knee and her B patellas are slightly askew  Upon re-evaluation, pt notes improved L hip strength and more confidence walking, still carries her cane but does not rely on it   Pt also reports that stairs are better, she depends on her cane less  Pt notes some improvements with knee extension, however still feels significant weakness there and some numbness in her L thigh  Pain  Current pain ratin  At best pain ratin  At worst pain rating: 3  Location: L hip   Quality: burning  Relieving factors: change in position  Progression: improved    Patient Goals  Patient goals for therapy: increased strength  Patient goal: To walk up and down stairs  Return to full function and be able to kick her legs  Objective     Active Range of Motion   Left Hip   Flexion: 100 degrees     Right Hip   Flexion: 100 degrees   Left Knee   Extensor la degrees     Passive Range of Motion   Left Knee   Flexion: WFL  Extension: 0 degrees WFL    Strength/Myotome Testing     Left Hip   Planes of Motion   Flexion: 4-  Extension: 4+  Abduction: 4+  Adduction: 5  External rotation: 4+  Internal rotation: 5    Right Hip   Planes of Motion   Flexion: 5  Extension: 4  Abduction: 4+  Adduction: 5  External rotation: 4+  Internal rotation: 5    Left Knee   Flexion: 4+  Extension: 2+  Quadriceps contraction: poor    Right Knee   Flexion: 5  Extension: 5    Left Ankle/Foot   Dorsiflexion: 5  Plantar flexion: 5    Right Ankle/Foot   Dorsiflexion: 5  Plantar flexion: 5             Precautions: R hip anterior CAROLIN 10/5    Manuals 1/5 1/10 1/17 1/24 1/31 2/7       PROM L Hip (flex; ER within precautions)  8' 8' 6' 6' NV       LAQ AAROM  1x10 2x10 2x10 2x10 2x10                                 Neuro Re-Ed             Pt education about HEP 8' 5'  3'         Quad Sets  1x10 5" 3x10 5" 3x10 5" On E-stim On E-stim On E-stim       Standing hip extension 1x10 2x10 2x10 HEP         Iso bolster squeeze  2x10 5" 2x10 5" 3x10 5" NP        Bridges c bolster squeeze  HEP   3x10 3x10       Supine Hip abd      2x10 blue         Prone TKE    2x10 5"  2x10 5"       Ther Ex             LAQ eccentric 1x10 2x5 2x5 3x5 5x5 5x5       Standing hip abduction 1x10  3x10 3x10 HEP         Forward step ups 1x10 2" 1x10 2" 2x10 2" 2x10 2"  2x10 2" (attempted 4" box-unable)       Lateral step ups 1x10 2" 2x10 2" 2x10 2" 2x10 2"  2x10 2"       Nustep  6' 6' 8' 7' 7'       Prone Quad stretch  5x20" 5x20" 5x20" 5x20" NV       TB HS curls  2x10 grn 2x10 grn 2x10 blue 2x10 2x10 blue       Supine heel slide actively   20x slowly NP         Dock kicks     3x10 3x10       Ther Activity                                       Gait Training                                       Modalities             NMES/Russain stim   L quad 10'  50% duty cycle, 10/30  20 5 intensity (neuro re-ed) L quad 10'  50% duty cycle, 10/30  30 5 intensity (neuro re-ed) L quad 8'  50% duty cycle, 10/20  25 intensity (neuro re-ed) L quad 8'  50% duty cycle, 10/20  25 intensity (neuro re-ed)

## 2023-02-09 DIAGNOSIS — F41.8 DEPRESSION WITH ANXIETY: ICD-10-CM

## 2023-02-10 RX ORDER — ALPRAZOLAM 0.25 MG/1
0.25 TABLET ORAL 3 TIMES DAILY PRN
Qty: 30 TABLET | Refills: 0 | Status: SHIPPED | OUTPATIENT
Start: 2023-02-10

## 2023-02-14 ENCOUNTER — OFFICE VISIT (OUTPATIENT)
Dept: PHYSICAL THERAPY | Facility: CLINIC | Age: 73
End: 2023-02-14

## 2023-02-14 DIAGNOSIS — M16.12 OSTEOARTHRITIS OF LEFT HIP, UNSPECIFIED OSTEOARTHRITIS TYPE: Primary | ICD-10-CM

## 2023-02-14 DIAGNOSIS — Z96.642 S/P TOTAL LEFT HIP ARTHROPLASTY: ICD-10-CM

## 2023-02-14 NOTE — PROGRESS NOTES
Daily Note     Today's date: 2023  Patient name: Robert Cadena  : 1950  MRN: [id number]  Referring provider: MARCELINA Vale*  Dx:   Encounter Diagnosis     ICD-10-CM    1  Osteoarthritis of left hip, unspecified osteoarthritis type  M16 12       2  S/P total left hip arthroplasty  Z96 642           Start Time: 1145  Stop Time: 1235  Total time in clinic (min): 50 minutes    Subjective: Pt reports her L leg continues to get slowly better  Objective: See treatment diary below      Assessment: Tolerated treatment well  Patient demonstrated fatigue post treatment, exhibited good technique with therapeutic exercises and would benefit from continued PT   Discussed communication with Dr Arjun Mason and pt encouraged by continued progress, good understanding that progression will be slow but continues to work towards improvement  Increased repetitions of quadriceps facilitation exercises today  Also initiated leg press for further closed chain strengthening of LLE, pt tolerated well and was able to work on eccentric control during the exercise  Pt tolerated exercise program well without discomfort  Plan: Continue per plan of care  Progress treatment as tolerated  Precautions: R hip anterior CAROLIN 10/5    Manuals 1/5 1/10 1/17 1/24 1/31 2/7 2/14      PROM L Hip (flex; ER within precautions)  8' 8' 6' 6' NV       LAQ AAROM  1x10 2x10 2x10 2x10 2x10 self                                Neuro Re-Ed             Pt education about HEP 8' 5'  3'         Quad Sets  1x10 5" 3x10 5" 3x10 5" On E-stim On E-stim On E-stim On E-stim      Standing hip extension 1x10 2x10 2x10 HEP         Iso bolster squeeze  2x10 5" 2x10 5" 3x10 5" NP        Bridges c bolster squeeze  HEP   3x10 3x10 NV      Supine Hip abd      2x10 blue         Prone TKE    2x10 5"  2x10 5"       Ther Ex             LAQ eccentric 1x10 2x5 2x5 3x5 5x5 5x5 3x10      LAQ AAROM       3x10      Standing hip abduction 1x10  3x10 3x10 HEP Forward step ups 1x10 2" 1x10 2" 2x10 2" 2x10 2"  2x10 2" (attempted 4" box-unable) 2x10 2"      Lateral step ups 1x10 2" 2x10 2" 2x10 2" 2x10 2"  2x10 2" 2x10 2"      Nustep  6' 6' 8' 7' 7' 7'      Prone Quad stretch  5x20" 5x20" 5x20" 5x20" NV 5x20"      TB HS curls  2x10 grn 2x10 grn 2x10 blue 2x10 2x10 blue 2x10 blue      Supine heel slide actively   20x slowly NP         Dock kicks     3x10 3x10 3x10      SL leg press       5x10 25#      Ther Activity                                       Gait Training                                       Modalities             NMES/Russain stim   L quad 10'  50% duty cycle, 10/30  20 5 intensity (neuro re-ed) L quad 10'  50% duty cycle, 10/30  30 5 intensity (neuro re-ed) L quad 8'  50% duty cycle, 10/20  25 intensity (neuro re-ed) L quad 8'  50% duty cycle, 10/20  25 intensity (neuro re-ed) L quad 10'  50% duty cycle, 10/20  25 intensity (neuro re-ed)

## 2023-02-21 ENCOUNTER — APPOINTMENT (OUTPATIENT)
Dept: PHYSICAL THERAPY | Facility: CLINIC | Age: 73
End: 2023-02-21

## 2023-02-21 ENCOUNTER — TELEPHONE (OUTPATIENT)
Dept: NEPHROLOGY | Facility: CLINIC | Age: 73
End: 2023-02-21

## 2023-02-21 NOTE — TELEPHONE ENCOUNTER
Pt called and LM asking if its ok to take Hyzaar before US kidney and bladder  Spoke to Dr Kandy Sy and he said ok  Called and let the pt know  Pt was ok with that

## 2023-02-23 DIAGNOSIS — F41.8 DEPRESSION WITH ANXIETY: ICD-10-CM

## 2023-02-23 DIAGNOSIS — Z96.642 S/P TOTAL LEFT HIP ARTHROPLASTY: Primary | ICD-10-CM

## 2023-02-26 DIAGNOSIS — I73.9 PERIPHERAL ARTERIAL DISEASE WITH HISTORY OF REVASCULARIZATION (HCC): ICD-10-CM

## 2023-02-26 DIAGNOSIS — Z98.890 PERIPHERAL ARTERIAL DISEASE WITH HISTORY OF REVASCULARIZATION (HCC): ICD-10-CM

## 2023-02-26 RX ORDER — ATORVASTATIN CALCIUM 40 MG/1
TABLET, FILM COATED ORAL
Qty: 90 TABLET | Refills: 3 | Status: SHIPPED | OUTPATIENT
Start: 2023-02-26

## 2023-02-27 DIAGNOSIS — F41.8 DEPRESSION WITH ANXIETY: ICD-10-CM

## 2023-02-27 DIAGNOSIS — G47.09 OTHER INSOMNIA: ICD-10-CM

## 2023-02-27 RX ORDER — TRAZODONE HYDROCHLORIDE 100 MG/1
100 TABLET ORAL
Qty: 90 TABLET | Refills: 1 | Status: SHIPPED | OUTPATIENT
Start: 2023-02-27 | End: 2023-03-06 | Stop reason: SDUPTHER

## 2023-03-02 ENCOUNTER — OFFICE VISIT (OUTPATIENT)
Dept: PHYSICAL THERAPY | Facility: CLINIC | Age: 73
End: 2023-03-02

## 2023-03-02 DIAGNOSIS — M16.12 OSTEOARTHRITIS OF LEFT HIP, UNSPECIFIED OSTEOARTHRITIS TYPE: Primary | ICD-10-CM

## 2023-03-02 DIAGNOSIS — Z96.642 S/P TOTAL LEFT HIP ARTHROPLASTY: ICD-10-CM

## 2023-03-02 NOTE — PROGRESS NOTES
Daily Note     Today's date: 3/2/2023  Patient name: Amalia Cuello  : 1950  MRN: [id number]  Referring provider: MARCELINA Pardo*  Dx:   Encounter Diagnosis     ICD-10-CM    1  Osteoarthritis of left hip, unspecified osteoarthritis type  M16 12       2  S/P total left hip arthroplasty  Z96 642           Start Time: 1100  Stop Time: 1147  Total time in clinic (min): 47 minutes    Subjective: Pt reports the front of her L leg has been a little stiff when she wakes up but feels better as the day goes  Objective: See treatment diary below      Assessment: Tolerated treatment well  Patient demonstrated fatigue post treatment, exhibited good technique with therapeutic exercises and would benefit from continued PT  Pt continues to demonstrate improvements of L quadriceps contraction and L knee extension ROM  Pt tolerated treatment well, demonstrated muscle fatigue in her quadriceps at end of session without discomfort  Pt responded well to quadriceps stretching and demonstrated less muscular stiffness after performance  Plan: Continue per plan of care  Progress treatment as tolerated  Precautions: R hip anterior CAROLIN 10/5    Manuals 1/5 1/10 1/17 1/24 1/31 2/7 2/14 3/2     PROM L Hip (flex; ER within precautions)  8' 8' 6' 6' NV       LAQ AAROM  1x10 2x10 2x10 2x10 2x10 self                                Neuro Re-Ed             Pt education about HEP 8' 5'  3'         Quad Sets  1x10 5" 3x10 5" 3x10 5" On E-stim On E-stim On E-stim On E-stim On E-stim     Standing hip extension 1x10 2x10 2x10 HEP         Iso bolster squeeze  2x10 5" 2x10 5" 3x10 5" NP        Bridges c bolster squeeze  HEP   3x10 3x10 NV 3x10     Supine Hip abd      2x10 blue         Prone TKE    2x10 5"  2x10 5"       Ther Ex             LAQ eccentric 1x10 2x5 2x5 3x5 5x5 5x5 3x10 3x10     LAQ AAROM       3x10 3x10     Standing hip abduction 1x10  3x10 3x10 HEP         Forward step ups 1x10 2" 1x10 2" 2x10 2" 2x10 2"  2x10 2" (attempted 4" box-unable) 2x10 2" 2x10 2"     Lateral step ups 1x10 2" 2x10 2" 2x10 2" 2x10 2"  2x10 2" 2x10 2" 2x10 2"     Nustep  6' 6' 8' 7' 7' 7' 7' lvl 3     Prone Quad stretch  5x20" 5x20" 5x20" 5x20" NV 5x20" 5x20"     TB HS curls  2x10 grn 2x10 grn 2x10 blue 2x10 2x10 blue 2x10 blue 2x10 blue     Supine heel slide actively   20x slowly NP         Dock kicks     3x10 3x10 3x10 3x10     SL leg press       5x10 25# 3x15 35#     Ther Activity                                       Gait Training                                       Modalities             NMES/Russain stim   L quad 10'  50% duty cycle, 10/30  20 5 intensity (neuro re-ed) L quad 10'  50% duty cycle, 10/30  30 5 intensity (neuro re-ed) L quad 8'  50% duty cycle, 10/20  25 intensity (neuro re-ed) L quad 8'  50% duty cycle, 10/20  25 intensity (neuro re-ed) L quad 10'  50% duty cycle, 10/20  25 intensity (neuro re-ed) L quad 10'  50% duty cycle, 10/20  25 intensity (neuro re-ed)

## 2023-03-06 DIAGNOSIS — G47.09 OTHER INSOMNIA: ICD-10-CM

## 2023-03-06 RX ORDER — TRAZODONE HYDROCHLORIDE 100 MG/1
100 TABLET ORAL
Qty: 90 TABLET | Refills: 1 | Status: SHIPPED | OUTPATIENT
Start: 2023-03-06

## 2023-03-07 ENCOUNTER — EVALUATION (OUTPATIENT)
Dept: PHYSICAL THERAPY | Facility: CLINIC | Age: 73
End: 2023-03-07

## 2023-03-07 DIAGNOSIS — Z96.642 S/P TOTAL LEFT HIP ARTHROPLASTY: ICD-10-CM

## 2023-03-07 DIAGNOSIS — M16.12 OSTEOARTHRITIS OF LEFT HIP, UNSPECIFIED OSTEOARTHRITIS TYPE: Primary | ICD-10-CM

## 2023-03-07 NOTE — PROGRESS NOTES
PT Re-Evaluation     Today's date: 3/7/2023  Patient name: Lea Kim  : 1950  MRN: [id number]  Referring provider: MARCELINA Mckay*  Dx:   Encounter Diagnosis     ICD-10-CM    1  Osteoarthritis of left hip, unspecified osteoarthritis type  M16 12       2  S/P total left hip arthroplasty  Z96 642           Start Time: 1100  Stop Time: 1154  Total time in clinic (min): 54 minutes    Assessment  Assessment details: Pt is a 81 y o  female presenting upon physical therapy re-evaluation with improvements in strength of her L hip and L knee, with improved active range of motion of L knee due to improved activation of L quadriceps  Pt notes subjective improvements of strength and numbness of her L leg, which is resulting in improvements of function, especially with ambulation  Pt continues to demonstrate decreased activation and strength of L quadriceps  With regaining knee extension ROM, pt's L patella is more noticeably tracking laterally and grinding with knee ROM, though pt reports no pain during performance  Pt's impairments are resulting in continued limitations of prolonged ambulation, kicking her L leg, balance, and household activities  PT POC will continue to focus on facilitating L quadriceps activation and strength in order to improve function  Pt is a good candidate for continued skilled PT to address impairments and facilitate return to prior level of function  Impairments: abnormal gait, abnormal muscle firing, abnormal or restricted ROM, activity intolerance, impaired balance, impaired physical strength, lacks appropriate home exercise program and pain with function  Functional limitations: prolonged ambulation, kicking her L leg, balance, household activities  Symptom irritability: lowUnderstanding of Dx/Px/POC: good   Prognosis: good    Goals  STG 4 - weeks  1  Patient will demonstrate L knee extensor lag of 25 degrees or less to facilitate functional ability  - met  2   Patient will demonstrate improved L hip strength to 4+/5 or better in all planes to facilitate functional ability  -met  LTG 8 - weeks  1  Patient will demonstrate L quadriceps MMT strength to 5/5 to facilitate functional ability  -not met  2  Patient will demonstrate increased FOTO score from 56 at baseline to predicted score of 68 or higher to facilitate functional ability  -not met    Plan  Patient would benefit from: PT eval and skilled physical therapy  Planned modality interventions: cryotherapy, thermotherapy: hydrocollator packs, unattended electrical stimulation and electrical stimulation/Russian stimulation  Planned therapy interventions: balance, functional ROM exercises, flexibility, gait training, home exercise program, joint mobilization, manual therapy, massage, neuromuscular re-education, patient education, strengthening, stretching, therapeutic activities, therapeutic exercise and motor coordination training  Frequency: 2x week  Duration in weeks: 8  Plan of Care beginning date: 3/7/2023  Plan of Care expiration date: 5/2/2023  Treatment plan discussed with: patient        Subjective Evaluation    History of Present Illness  Date of surgery: 10/5/2022  Mechanism of injury: surgery  Mechanism of injury: Pt presents to PT s/p L anterior total hip arthroplasty on 10/5/22  Pt had attempted conservative management prior to surgery but was unsuccessful  Pt was completing home health PT prior to outpatient referral  Pt reports home health went well and she continues to do exercises every day  Pt reports her only hip precaution is to not cross her L foot onto her R leg  Pt reports she still feels some weakness in her L hip joint, fell a few weeks ago when standing on her LLE with the knee bent due to it giving out  Pt also reports some stiffness in her L knee and her B patellas are slightly askew  Upon re-evaluation, pt notes improvements with the strength with raising her leg and straightening her knee   Pt reports improvements with walking, says she feels more confident  Pt also reports the numbness in her leg has also been improving, notes she is getting improved feeling in her L thigh  Pt reports some continued occasional stiffness in her L knee  Pain  Current pain ratin  At best pain ratin  At worst pain rating: 3  Location: L hip   Quality: burning  Relieving factors: change in position  Progression: improved    Patient Goals  Patient goals for therapy: increased strength  Patient goal: To walk up and down stairs  Return to full function and be able to kick her legs          Objective     Active Range of Motion   Left Hip   Flexion: 100 degrees     Right Hip   Flexion: 100 degrees   Left Knee   Extensor la degrees     Additional Active Range of Motion Details  Extensor lag measured with LAQ    Passive Range of Motion   Left Knee   Flexion: WFL  Extension: 0 degrees WFL    Mobility     Additional Mobility Details  Grinding of L patella with active knee extension    Patellar Static Positioning   Left Knee: lateral tilt    Additional Patellar Static Positioning Details  L patella laterally positioned    Strength/Myotome Testing     Left Hip   Planes of Motion   Flexion: 4  Extension: 4+  Abduction: 4+  Adduction: 5  External rotation: 4+  Internal rotation: 5    Right Hip   Planes of Motion   Flexion: 5  Extension: 4  Abduction: 4+  Adduction: 5  External rotation: 4+  Internal rotation: 5    Left Knee   Flexion: 4+  Extension: 3+  Quadriceps contraction: poor    Right Knee   Flexion: 5  Extension: 5    Left Ankle/Foot   Dorsiflexion: 5  Plantar flexion: 5    Right Ankle/Foot   Dorsiflexion: 5  Plantar flexion: 5    Additional Strength Details  3+ extension strength in midrange             Precautions: R hip anterior CAROLIN 10/5    Manuals 1/5 1/10 1/17 1/24 1/31 2/7 2/14 3 3    PROM L Hip (flex; ER within precautions)  8' 8' 6' 6' NV       LAQ AAROM  1x10 2x10 2x10 2x10 2x10 self Neuro Re-Ed             Pt education about HEP 8' 5'  3'         Quad Sets  1x10 5" 3x10 5" 3x10 5" On E-stim On E-stim On E-stim On E-stim On E-stim On E-stim    Standing hip extension 1x10 2x10 2x10 HEP         Iso bolster squeeze  2x10 5" 2x10 5" 3x10 5" NP        Bridges c bolster squeeze  HEP   3x10 3x10 NV 3x10 3x10    Supine Hip abd      2x10 blue         Prone TKE    2x10 5"  2x10 5"       SAQ             Ther Ex             LAQ eccentric 1x10 2x5 2x5 3x5 5x5 5x5 3x10 3x10 3x10    LAQ AAROM       3x10 3x10 NP-active    Standing hip abduction 1x10  3x10 3x10 HEP         Nustep  6' 6' 8' 7' 7' 7' 7' lvl 3 7' lvl 3    Prone Quad stretch  5x20" 5x20" 5x20" 5x20" NV 5x20" 5x20" 5x20"    TB HS curls  2x10 grn 2x10 grn 2x10 blue 2x10 2x10 blue 2x10 blue 2x10 blue     Supine heel slide actively   20x slowly NP         Dock kicks     3x10 3x10 3x10 3x10 3x10    SL leg press       5x10 25# 3x15 35# 3x30" 35#    Ther Activity             Forward step ups    2x10 2"  2x10 2" 2x10 2" 2x10 2" 3x10 4"    Lateral step ups    2x10 2"  2x10 2" 2x10 2" 2x10 2" 3x10 4"    Gait Training                                       Modalities             NMES/Russain stim   L quad 10'  50% duty cycle, 10/30  20 5 intensity (neuro re-ed) L quad 10'  50% duty cycle, 10/30  30 5 intensity (neuro re-ed) L quad 8'  50% duty cycle, 10/20  25 intensity (neuro re-ed) L quad 8'  50% duty cycle, 10/20  25 intensity (neuro re-ed) L quad 10'  50% duty cycle, 10/20  25 intensity (neuro re-ed) L quad 10'  50% duty cycle, 10/20  25 intensity (neuro re-ed) L quad 10'  50% duty cycle, 10/20  25 intensity (neuro re-ed)

## 2023-03-09 ENCOUNTER — OFFICE VISIT (OUTPATIENT)
Dept: PHYSICAL THERAPY | Facility: CLINIC | Age: 73
End: 2023-03-09

## 2023-03-09 DIAGNOSIS — Z96.642 S/P TOTAL LEFT HIP ARTHROPLASTY: ICD-10-CM

## 2023-03-09 DIAGNOSIS — Z96.642 S/P TOTAL LEFT HIP ARTHROPLASTY: Primary | ICD-10-CM

## 2023-03-09 DIAGNOSIS — M16.12 OSTEOARTHRITIS OF LEFT HIP, UNSPECIFIED OSTEOARTHRITIS TYPE: Primary | ICD-10-CM

## 2023-03-09 NOTE — PROGRESS NOTES
Daily Note     Today's date: 3/9/2023  Patient name: David Cuellar  : 1950  MRN: [id number]  Referring provider: MARCELINA Gutierrez*  Dx:   Encounter Diagnosis     ICD-10-CM    1  Osteoarthritis of left hip, unspecified osteoarthritis type  M16 12       2  S/P total left hip arthroplasty  Z96 642           Start Time: 1100  Stop Time: 1145  Total time in clinic (min): 45 minutes    Subjective: Pt reports her L knee feels about the same and she ordered a L knee brace to help support her L kneecap and keep it aligned better  Objective: See treatment diary below      Assessment: Tolerated treatment well  Patient demonstrated fatigue post treatment, exhibited good technique with therapeutic exercises and would benefit from continued PT  Pt able to initiate sit to stands for further closed chain strengthening of LLE  Pt demonstrated difficulty rising from chair without UE support, added airex for additional chair height and pt tolerated well  Pt also able to increase resistance for SL leg press and tolerated well without discomfort, and demonstrated increased muscle fatigue  Plan: Continue per plan of care  Progress treatment as tolerated  Precautions: R hip anterior CAROLIN 10/5    Manuals   1/17 1/24 1/31 2/7 2/14 3/2 3/7 3/9   PROM L Hip (flex; ER within precautions)   8' 6' 6' NV       LAQ AAROM   2x10 2x10 2x10 2x10 self      L hamstring stretching          5x20"                Neuro Re-Ed             Pt education about HEP    3'         Quad Sets    3x10 5" On E-stim On E-stim On E-stim On E-stim On E-stim On E-stim On E-stim   Standing hip extension   2x10 HEP         Iso bolster squeeze   2x10 5" 3x10 5" NP        Bridges c bolster squeeze     3x10 3x10 NV 3x10 3x10 3x10   Supine Hip abd      2x10 blue         Prone TKE    2x10 5"  2x10 5"       SAQ             Ther Ex             LAQ eccentric   2x5 3x5 5x5 5x5 3x10 3x10 3x10 3x10   LAQ AAROM       3x10 3x10 NP-active    Standing hip abduction   3x10 HEP         Nustep   6' 8' 7' 7' 7' 7' lvl 3 7' lvl 3 7' lvl 3   Prone Quad stretch   5x20" 5x20" 5x20" NV 5x20" 5x20" 5x20" 5x20"   TB HS curls   2x10 grn 2x10 blue 2x10 2x10 blue 2x10 blue 2x10 blue     Supine heel slide actively   20x slowly NP         Dock kicks     3x10 3x10 3x10 3x10 3x10 3x10   SL leg press       5x10 25# 3x15 35# 3x30 35# 3x20 45#   Squats at bar             Ther Activity             Forward step ups    2x10 2"  2x10 2" 2x10 2" 2x10 2" 3x10 4" 3x10 4"   Lateral step ups    2x10 2"  2x10 2" 2x10 2" 2x10 2" 3x10 4" 3x10 4"   Sit to stands          2x10 c airex   Gait Training                                       Modalities             NMES/Russain stim   L quad 10'  50% duty cycle, 10/30  20 5 intensity (neuro re-ed) L quad 10'  50% duty cycle, 10/30  30 5 intensity (neuro re-ed) L quad 8'  50% duty cycle, 10/20  25 intensity (neuro re-ed) L quad 8'  50% duty cycle, 10/20  25 intensity (neuro re-ed) L quad 10'  50% duty cycle, 10/20  25 intensity (neuro re-ed) L quad 10'  50% duty cycle, 10/20  25 intensity (neuro re-ed) L quad 10'  50% duty cycle, 10/20  25 intensity (neuro re-ed) L quad 10'  50% duty cycle, 10/20  21 intensity (neuro re-ed)

## 2023-03-13 ENCOUNTER — APPOINTMENT (OUTPATIENT)
Dept: PHYSICAL THERAPY | Facility: CLINIC | Age: 73
End: 2023-03-13

## 2023-03-19 ENCOUNTER — HOSPITAL ENCOUNTER (OUTPATIENT)
Dept: ULTRASOUND IMAGING | Facility: HOSPITAL | Age: 73
Discharge: HOME | End: 2023-03-19
Attending: INTERNAL MEDICINE

## 2023-03-19 DIAGNOSIS — N18.31 STAGE 3A CHRONIC KIDNEY DISEASE (HCC): ICD-10-CM

## 2023-03-21 ENCOUNTER — APPOINTMENT (OUTPATIENT)
Dept: LAB | Facility: HOSPITAL | Age: 73
End: 2023-03-21

## 2023-03-21 ENCOUNTER — TELEPHONE (OUTPATIENT)
Dept: FAMILY MEDICINE CLINIC | Facility: CLINIC | Age: 73
End: 2023-03-21

## 2023-03-21 ENCOUNTER — HOSPITAL ENCOUNTER (OUTPATIENT)
Dept: VASCULAR ULTRASOUND | Facility: HOSPITAL | Age: 73
Discharge: HOME | End: 2023-03-21

## 2023-03-21 DIAGNOSIS — K74.60 LIVER CIRRHOSIS SECONDARY TO NASH (NONALCOHOLIC STEATOHEPATITIS) (HCC): ICD-10-CM

## 2023-03-21 DIAGNOSIS — I73.9 PERIPHERAL ARTERIAL DISEASE WITH HISTORY OF REVASCULARIZATION (HCC): ICD-10-CM

## 2023-03-21 DIAGNOSIS — Z98.890 PERIPHERAL ARTERIAL DISEASE WITH HISTORY OF REVASCULARIZATION (HCC): ICD-10-CM

## 2023-03-21 DIAGNOSIS — R53.83 FATIGUE, UNSPECIFIED TYPE: ICD-10-CM

## 2023-03-21 DIAGNOSIS — N18.32 STAGE 3B CHRONIC KIDNEY DISEASE (HCC): ICD-10-CM

## 2023-03-21 DIAGNOSIS — E03.9 ACQUIRED HYPOTHYROIDISM: Primary | ICD-10-CM

## 2023-03-21 DIAGNOSIS — R73.01 ELEVATED FASTING GLUCOSE: ICD-10-CM

## 2023-03-21 DIAGNOSIS — F41.8 DEPRESSION WITH ANXIETY: ICD-10-CM

## 2023-03-21 DIAGNOSIS — E03.9 ACQUIRED HYPOTHYROIDISM: ICD-10-CM

## 2023-03-21 DIAGNOSIS — K75.81 LIVER CIRRHOSIS SECONDARY TO NASH (NONALCOHOLIC STEATOHEPATITIS) (HCC): ICD-10-CM

## 2023-03-21 DIAGNOSIS — G47.30 SLEEP APNEA, UNSPECIFIED TYPE: ICD-10-CM

## 2023-03-21 DIAGNOSIS — I10 PRIMARY HYPERTENSION: ICD-10-CM

## 2023-03-21 DIAGNOSIS — N18.31 STAGE 3A CHRONIC KIDNEY DISEASE (HCC): ICD-10-CM

## 2023-03-21 LAB
PHOSPHATE SERPL-MCNC: 3.5 MG/DL (ref 2.3–4.1)
T4 FREE SERPL-MCNC: 1.2 NG/DL (ref 0.76–1.46)
T4 FREE SERPL-MCNC: 1.22 NG/DL (ref 0.76–1.46)
TSH SERPL DL<=0.05 MIU/L-ACNC: 0.11 UIU/ML (ref 0.45–4.5)

## 2023-03-21 RX ORDER — LEVOTHYROXINE SODIUM 112 UG/1
112 TABLET ORAL
Qty: 90 TABLET | Refills: 1 | Status: SHIPPED | OUTPATIENT
Start: 2023-03-21 | End: 2023-06-19

## 2023-03-22 LAB
EST. AVERAGE GLUCOSE BLD GHB EST-MCNC: 97 MG/DL
HBA1C MFR BLD: 5 %

## 2023-03-23 ENCOUNTER — APPOINTMENT (OUTPATIENT)
Dept: PHYSICAL THERAPY | Facility: CLINIC | Age: 73
End: 2023-03-23

## 2023-03-23 ENCOUNTER — OFFICE VISIT (OUTPATIENT)
Dept: PHYSICAL THERAPY | Facility: CLINIC | Age: 73
End: 2023-03-23

## 2023-03-23 DIAGNOSIS — Z96.642 S/P TOTAL LEFT HIP ARTHROPLASTY: ICD-10-CM

## 2023-03-23 DIAGNOSIS — M16.12 OSTEOARTHRITIS OF LEFT HIP, UNSPECIFIED OSTEOARTHRITIS TYPE: Primary | ICD-10-CM

## 2023-03-23 NOTE — PROGRESS NOTES
Daily Note     Today's date: 3/23/2023  Patient name: Coye Jeans  : 1950  MRN: [id number]  Referring provider: MARCELINA Pearson*  Dx:   Encounter Diagnosis     ICD-10-CM    1  Osteoarthritis of left hip, unspecified osteoarthritis type  M16 12       2  S/P total left hip arthroplasty  Z96 642                      Subjective: Patient states her hip is good, no new complaints  Objective: See treatment diary below      Assessment: Tolerated treatment well  Improved quad activation  Able to perform LAQ with no assistance although ROM is limited by patellar pain  Attempted SAQ w/ e-stim, not performed due to decreased ROM  Performed with quad set for full quad activation  Patient demonstrated fatigue post treatment, exhibited good technique with therapeutic exercises and would benefit from continued PT      Plan: Continue per plan of care  Precautions: R hip anterior CAROLIN 10/5    Manuals 3/23  1/17 1/24 1/31 2/7 2/14 3/2 3/7 3/9   PROM L Hip (flex; ER within precautions)   8' 6' 6' NV       LAQ AAROM   2x10 2x10 2x10 2x10 self      L hamstring stretching          5x20"                Neuro Re-Ed             Pt education about HEP    3'         Quad Sets  On E-stim  3x10 5" On E-stim On E-stim On E-stim On E-stim On E-stim On E-stim On E-stim   Standing hip extension   2x10 HEP         Iso bolster squeeze   2x10 5" 3x10 5" NP        Bridges c bolster squeeze 3x10    3x10 3x10 NV 3x10 3x10 3x10   Supine Hip abd      2x10 blue         Prone TKE    2x10 5"  2x10 5"       SAQ attempted on E-stim            Ther Ex             LAQ eccentric 2x10 active  2x5 3x5 5x5 5x5 3x10 3x10 3x10 2x10   LAQ AAROM       3x10 3x10 NP-active    Standing hip abduction   3x10 HEP         Nustep 7' lvl 3   6' 8' 7' 7' 7' 7' lvl 3 7' lvl 3 7' lvl 3   Prone Quad stretch   5x20" 5x20" 5x20" NV 5x20" 5x20" 5x20" 5x20"   TB HS curls   2x10 grn 2x10 blue 2x10 2x10 blue 2x10 blue 2x10 blue     Supine heel slide actively   20x slowly NP         Dock kicks 3x10    3x10 3x10 3x10 3x10 3x10 3x10   SL leg press 3x20 45#      5x10 25# 3x15 35# 3x30 35# 3x20 45#   Squats at bar             Ther Activity             Forward step ups 3x10 4"   2x10 2"  2x10 2" 2x10 2" 2x10 2" 3x10 4" 3x10 4"   Lateral step ups 3x10   4"   2x10 2"  2x10 2" 2x10 2" 2x10 2" 3x10 4" 3x10 4"   Sit to stands 2x10 c airex         2x10 c airex   Gait Training                                       Modalities             NMES/Russain stim L Quad 10'  50% duty cycle, 10/20  21 intensity (neuro re-ed)  L quad 10'  50% duty cycle, 10/30  20 5 intensity (neuro re-ed) L quad 10'  50% duty cycle, 10/30  30 5 intensity (neuro re-ed) L quad 8'  50% duty cycle, 10/20  25 intensity (neuro re-ed) L quad 8'  50% duty cycle, 10/20  25 intensity (neuro re-ed) L quad 10'  50% duty cycle, 10/20  25 intensity (neuro re-ed) L quad 10'  50% duty cycle, 10/20  25 intensity (neuro re-ed) L quad 10'  50% duty cycle, 10/20  25 intensity (neuro re-ed) L quad 10'  50% duty cycle, 10/20  21 intensity (neuro re-ed)

## 2023-03-26 DIAGNOSIS — F41.8 DEPRESSION WITH ANXIETY: ICD-10-CM

## 2023-03-28 ENCOUNTER — APPOINTMENT (OUTPATIENT)
Dept: RADIOLOGY | Facility: CLINIC | Age: 73
End: 2023-03-28

## 2023-03-28 ENCOUNTER — OFFICE VISIT (OUTPATIENT)
Dept: OBGYN CLINIC | Facility: CLINIC | Age: 73
End: 2023-03-28

## 2023-03-28 VITALS
BODY MASS INDEX: 32.35 KG/M2 | WEIGHT: 201.3 LBS | HEART RATE: 75 BPM | HEIGHT: 66 IN | SYSTOLIC BLOOD PRESSURE: 125 MMHG | DIASTOLIC BLOOD PRESSURE: 68 MMHG

## 2023-03-28 DIAGNOSIS — M25.562 LEFT KNEE PAIN, UNSPECIFIED CHRONICITY: ICD-10-CM

## 2023-03-28 DIAGNOSIS — M17.12 PATELLOFEMORAL ARTHRITIS OF LEFT KNEE: ICD-10-CM

## 2023-03-28 DIAGNOSIS — Z96.642 S/P TOTAL LEFT HIP ARTHROPLASTY: Primary | ICD-10-CM

## 2023-03-28 DIAGNOSIS — Z96.642 S/P TOTAL LEFT HIP ARTHROPLASTY: ICD-10-CM

## 2023-03-28 NOTE — PROGRESS NOTES
Orthopaedics Office Visit - Follow up Patient Visit    ASSESSMENT/PLAN:    Assessment:   Aprox  6 months s/p left anterior total hip arthroplasty  Repair of released rectus femoris intraop, DOS 10/5/22   5/5 strength left hip; numbness improving   Left knee osteoarthritis     Complaint free with exception of flexion strength when climbing stairs    Plan:   · X-rays were performed in the office and reviewed   · She may continue PT and transition to a [facility] with regards to strengthening exercises  · She was shown a trupull knee brace for patellar support in the office today as well as a hinged knee brace, to be worn as needed for with activities  · If knee pain worsens, a left knee CSI may be performed in the future pending symptoms   · Follow up in 3 months time with left hip x-rays      To Do Next Visit:  X-ray left hip     _____________________________________________________  CHIEF COMPLAINT:  Chief Complaint   Patient presents with   • Left Hip - Follow-up         SUBJECTIVE:  Rebekah Morgan is a 72 y o  female who presents to the office aprox  6 months s/p left anterior total hip arthroplasty  Repair of released rectus femoris intraop, DOS 10/5/22  Overall Stephanie Aguirre is doing well  She continues to attend PT, with improvement in her strength  She does note some weakness when she walks up the steps  She notes her thigh numbness is slowly improving  She also notes soreness to her left knee as well as stiffness  Symptoms have been ongoing for aprox  2 weeks  She does note 2 hyperflexion injuries to her knee  She notes it is more stiffness then pain with regards to her knee       PAST MEDICAL HISTORY:  Past Medical History:   Diagnosis Date   • Allergic Childhood   • Anxiety 2019   • Arthritis    • Asthma Childhood   • CPAP (continuous positive airway pressure) dependence     pt states was tested for VILMA, and ruled that she doesnt have it, however oxygen levels drop during sleep so CPAP was recommended,is not yet using it   • Disease of thyroid gland Approx    • Diverticulitis of colon    • Heart murmur    • Hyperlipidemia    • Hypertension  approx   • Obesity Childhood   • PAD (peripheral artery disease) ([address] )        PAST SURGICAL HISTORY:  Past Surgical History:   Procedure Laterality Date   • APPENDECTOMY  About    •  SECTION   &    • COLON SURGERY  About    • HYSTERECTOMY  About    • MO ARTHRP ACETBLR/PROX FEM PROSTC AGRFT/ALGRFT Left 10/5/2022    Procedure: ARTHROPLASTY LEFT HIP TOTAL ANTERIOR;  Surgeon: Manan Perez MD;  Location: [facility];  Service: Orthopedics       FAMILY HISTORY:  Family History   Problem Relation Age of Onset   • Depression Mother    • Thrombosis Mother    • Arthritis Mother    • Cancer Mother    • Anxiety disorder Mother    • Heart disease Father         I   • Cancer Brother    • Dementia Brother    • Heart disease Brother        SOCIAL HISTORY:  Social History     Tobacco Use   • Smoking status: Never   • Smokeless tobacco: Never   Vaping Use   • Vaping Use: Never used   Substance Use Topics   • Alcohol use: Not Currently   • Drug use: Never       MEDICATIONS:    Current Outpatient Medications:   •  albuterol (PROVENTIL HFA,VENTOLIN HFA) 90 mcg/act inhaler, Inhale 2 puffs every 4 (four) hours as needed, Disp: , Rfl:   •  ALPRAZolam (XANAX) 0 25 mg tablet, Take 1 tablet (0 25 mg total) by mouth 3 (three) times a day as needed for anxiety 1/2-1 as needed tid, Disp: 30 tablet, Rfl: 0  •  ammonium lactate (LAC-HYDRIN) 12 % cream, Apply topically as needed for dry skin, Disp: , Rfl:   •  aspirin (ECOTRIN LOW STRENGTH) 81 mg EC tablet, Take 81 mg by mouth in the morning, Disp: , Rfl:   •  atorvastatin (LIPITOR) 40 mg tablet, TAKE 1 TABLET DAILY, Disp: 90 tablet, Rfl: 3  •  fluticasone (FLONASE) 50 mcg/act nasal spray, 2 sprays into each nostril daily as needed, Disp: , Rfl:   •  fluticasone-vilanterol (BREO ELLIPTA) 200-25 MCG/INH inhaler, Inhale 1 puff daily, Disp: ", Rfl:   •  levothyroxine (Euthyrox) 112 mcg tablet, Take 1 tablet (112 mcg total) by mouth daily in the early morning, Disp: 90 tablet, Rfl: 1  •  losartan-hydrochlorothiazide (HYZAAR) 100-25 MG per tablet, Take 1 tablet by mouth daily, Disp: , Rfl:   •  montelukast (SINGULAIR) 10 mg tablet, Take 10 mg by mouth daily as needed, Disp: , Rfl:   •  Multiple Vitamin (multivitamin) tablet, Take 1 tablet by mouth daily, Disp: 30 tablet, Rfl: 0  •  polyethylene glycol (MIRALAX) 17 g packet, Take 17 g by mouth daily, Disp: , Rfl:   •  Restasis 0 05 % ophthalmic emulsion, , Disp: , Rfl:   •  sertraline (ZOLOFT) 50 mg tablet, TAKE 1 TABLET BY MOUTH EVERY DAY, Disp: 30 tablet, Rfl: 0  •  traZODone (DESYREL) 100 mg tablet, Take 1 tablet (100 mg total) by mouth daily at bedtime, Disp: 90 tablet, Rfl: 1  •  clopidogrel (PLAVIX) 75 mg tablet, TAKE 1 TABLET DAILY, Disp: 90 tablet, Rfl: 3    ALLERGIES:  Allergies   Allergen Reactions   • Sulfamethoxazole-Trimethoprim Rash       REVIEW OF SYSTEMS:  MSK: as noted in HPI  Neuro: WNL  Pertinent items are otherwise noted in HPI  A comprehensive review of systems was otherwise negative  LABS:  HgA1c:   Lab Results   Component Value Date    HGBA1C 5 0 03/21/2023     BMP:   Lab Results   Component Value Date    CALCIUM 10 1 03/21/2023    K 3 9 03/21/2023    CO2 27 03/21/2023     03/21/2023    BUN 24 03/21/2023    CREATININE 1 00 03/21/2023     CBC: No components found for: CBC    _____________________________________________________  PHYSICAL EXAMINATION:  Vital signs: /68   Pulse 75   Ht 5' 6\" (1 676 m)   Wt 91 3 kg (201 lb 4 8 oz)   BMI 32 49 kg/m²   General: No acute distress, awake and alert  Psychiatric: Mood and affect appear appropriate  HEENT: Trachea Midline, No torticollis, no apparent facial trauma  Cardiovascular: No audible murmurs;  Extremities appear perfused  Pulmonary: No audible wheezing or stridor  Skin: No open lesions; see further details (if any) " below    MUSCULOSKELETAL EXAMINATION:    Extremities: Left knee:   No erythema, ecchymosis or significant edema  Medial joint line tenderness   No lateral joint line tenderness  ROM 0-120 degrees   Ambulates without assistance   Extremity appears warm and well perfused     Left hip:   No erythema, ecchymosis or edema  Well healed surgical incision   Hip flexion and hip abduction strength is full   No pain with hip flexion, abduction, internal or external rotation   Ambulates without assistance  Extremity appears warm and well perfused      _____________________________________________________  STUDIES REVIEWED:  I personally reviewed the images and interpretation is as follows:    X-rays of the left knee demonstrate left knee osteoarthritis  Most involved at the patellofemoral joint  X-rays of the left hip demonstrate s/p left total knee arthroplasty         PROCEDURES PERFORMED:  Procedures    Scribe Attestation    I,:  Fredy Morales am acting as a scribe while in the presence of the attending physician :       I,:  Jasmin Mi MD personally performed the services described in this documentation    as scribed in my presence :

## 2023-03-30 ENCOUNTER — OFFICE VISIT (OUTPATIENT)
Dept: PHYSICAL THERAPY | Facility: CLINIC | Age: 73
End: 2023-03-30

## 2023-03-30 DIAGNOSIS — Z96.642 S/P TOTAL LEFT HIP ARTHROPLASTY: ICD-10-CM

## 2023-03-30 DIAGNOSIS — M16.12 OSTEOARTHRITIS OF LEFT HIP, UNSPECIFIED OSTEOARTHRITIS TYPE: Primary | ICD-10-CM

## 2023-03-30 NOTE — PROGRESS NOTES
"Daily Note     Today's date: 3/30/2023  Patient name: Javi Gallo  : 1950  MRN: [id number]  Referring provider: MARCELINA Russ*  Dx:   Encounter Diagnosis     ICD-10-CM    1  Osteoarthritis of left hip, unspecified osteoarthritis type  M16 12       2  S/P total left hip arthroplasty  Z96 642           Start Time: 1145  Stop Time: 1230  Total time in clinic (min): 45 minutes    Subjective: Pt had a follow up with her surgeon yesterday, said everything looks good with the hip and pt received a brace for her L knee  Objective: See treatment diary below      Assessment: Tolerated treatment well  Patient demonstrated fatigue post treatment, exhibited good technique with therapeutic exercises and would benefit from continued PT  Pt demonstrating continued improvement of L knee extension ROM, though demonstrates pain due to patellar tracking  Attempted medial glide of patella with LAQs but pain persisted  Instead performed isometrics for knee extension and advised pt to wear knee brace with performance of exercises and to bring to therapy next session  Plan: Continue per plan of care  Progress treatment as tolerated         Precautions: R hip anterior CAROLIN 10/5    Manuals 3/23 3/30    2/7 2/14 3/2 3/7 3/9   PROM L Hip (flex; ER within precautions)      NV       L hamstring stretching          5x20\"   Knee extension isometric  3x10 3\"           Neuro Re-Ed             Pt education about HEP             Quad Sets  On E-stim On E-stim    On E-stim On E-stim On E-stim On E-stim On E-stim   Standing SLR  3x10           Bridges c bolster squeeze 3x10 3x10    3x10 NV 3x10 3x10 3x10   Prone TKE      2x10 5\"       SAQ attempted on E-stim            Supine SLR  1x10 pain           Ther Ex             LAQ eccentric 2x10 active     5x5 3x10 3x10 3x10 2x10   Nustep 7' lvl 3  7' lvl 3     7' 7' 7' lvl 3 7' lvl 3 7' lvl 3   Prone Quad stretch  5x20\"    NV 5x20\" 5x20\" 5x20\" 5x20\"   TB HS curls      2x10 blue 2x10 " "blue 2x10 blue     Dock kicks 3x10 pain    3x10 3x10 3x10 3x10 3x10   SL leg press 3x20 45# 3x20 45-55#     5x10 25# 3x15 35# 3x30 35# 3x20 45#   Squats at bar             Ther Activity             Forward step ups 3x10 4\" 3x10 4\"    2x10 2\" 2x10 2\" 2x10 2\" 3x10 4\" 3x10 4\"   Lateral step ups 3x10   4\" 3x10 4\"    2x10 2\" 2x10 2\" 2x10 2\" 3x10 4\" 3x10 4\"   Sit to stands 2x10 c airex 2x10 s airex        2x10 c airex   Gait Training                                       Modalities             NMES/Russain stim L Quad 10'  50% duty cycle, 10/20  21 intensity (neuro re-ed) L Quad 10'  50% duty cycle, 10/20  21 intensity (neuro re-ed)    L quad 8'  50% duty cycle, 10/20  25 intensity (neuro re-ed) L quad 10'  50% duty cycle, 10/20  25 intensity (neuro re-ed) L quad 10'  50% duty cycle, 10/20  25 intensity (neuro re-ed) L quad 10'  50% duty cycle, 10/20  25 intensity (neuro re-ed) L quad 10'  50% duty cycle, 10/20  21 intensity (neuro re-ed)                     "

## 2023-04-04 ENCOUNTER — OFFICE VISIT (OUTPATIENT)
Dept: PHYSICAL THERAPY | Facility: CLINIC | Age: 73
End: 2023-04-04

## 2023-04-04 DIAGNOSIS — Z96.642 S/P TOTAL LEFT HIP ARTHROPLASTY: ICD-10-CM

## 2023-04-04 DIAGNOSIS — M16.12 OSTEOARTHRITIS OF LEFT HIP, UNSPECIFIED OSTEOARTHRITIS TYPE: Primary | ICD-10-CM

## 2023-04-04 NOTE — PROGRESS NOTES
"Daily Note     Today's date: 2023  Patient name: Sean Macdonald  : 1950  MRN: [id number]  Referring provider: MARCELINA Lewis*  Dx:   Encounter Diagnosis     ICD-10-CM    1  Osteoarthritis of left hip, unspecified osteoarthritis type  M16 12       2  S/P total left hip arthroplasty  Z96 642           Start Time: 1015  Stop Time: 1100  Total time in clinic (min): 45 minutes    Subjective: Pt presents with L knee brace on today, notes that she has been doing her exercises at home with the brace on and it has been helping a lot  Objective: See treatment diary below      Assessment: Tolerated treatment well  Patient demonstrated fatigue post treatment, exhibited good technique with therapeutic exercises and would benefit from continued PT  Pt demonstrated significantly improved tolerance to quadriceps and hip strengthening exercises this session due to wearing L knee brace  Pt demonstrated muscle fatigue without pain in her L knee with performance of SLRs with brace on and pt able to perform dock kicks through entire available ROM this session with minimal to no pain  Attempted SAQs while on e-stim but pt still demonstrating pain with performance, continued with quad sets  Will continue to progress L hip and knee strengthening exercises next session as tolerated  Plan: Continue per plan of care  Progress treatment as tolerated         Precautions: R hip anterior CAROLIN 10    Manuals 3/23 3/30 4/4   2/7 2/14 3/2 3/7 3/9   PROM L Hip (flex; ER within precautions)      NV       L hamstring stretching          5x20\"   Knee extension isometric  3x10 3\" NP          Neuro Re-Ed             Pt education about HEP             Quad Sets  On E-stim On E-stim On E-stim   On E-stim On E-stim On E-stim On E-stim On E-stim   Standing SLR  3x10 3x10          Bridges c bolster squeeze 3x10 3x10 3x10   3x10 NV 3x10 3x10 3x10   Prone TKE      2x10 5\"       SAQ attempted on E-stim  attempted on E-stim        " "  Supine SLR  1x10 pain 4x5           Ther Ex             LAQ eccentric 2x10 active     5x5 3x10 3x10 3x10 2x10   Nustep 7' lvl 3  7' lvl 3  7' lvl 3    7' 7' 7' lvl 3 7' lvl 3 7' lvl 3   Prone Quad stretch  5x20\" 5x20\"   NV 5x20\" 5x20\" 5x20\" 5x20\"   TB HS curls      2x10 blue 2x10 blue 2x10 blue     Dock kicks 3x10 pain 3x10   3x10 3x10 3x10 3x10 3x10   SL leg press 3x20 45# 3x20 45-55# 3x20 45# seat 8    5x10 25# 3x15 35# 3x30 35# 3x20 45#   Squats at bar             Ther Activity             Forward step ups 3x10 4\" 3x10 4\" 3x10 4\"   2x10 2\" 2x10 2\" 2x10 2\" 3x10 4\" 3x10 4\"   Lateral step ups 3x10   4\" 3x10 4\" 3x10 4\"   2x10 2\" 2x10 2\" 2x10 2\" 3x10 4\" 3x10 4\"   Sit to stands 2x10 c airex 2x10 s airex 2x10 s airex       2x10 c airex   Gait Training                                       Modalities             NMES/Russain stim L Quad 10'  50% duty cycle, 10/20  21 intensity (neuro re-ed) L Quad 10'  50% duty cycle, 10/20  21 intensity (neuro re-ed) L Quad 10'  50% duty cycle, 10/20  21 intensity (neuro re-ed)   L quad 8'  50% duty cycle, 10/20  25 intensity (neuro re-ed) L quad 10'  50% duty cycle, 10/20  25 intensity (neuro re-ed) L quad 10'  50% duty cycle, 10/20  25 intensity (neuro re-ed) L quad 10'  50% duty cycle, 10/20  25 intensity (neuro re-ed) L quad 10'  50% duty cycle, 10/20  21 intensity (neuro re-ed)                     "

## 2023-04-06 ENCOUNTER — TELEPHONE (OUTPATIENT)
Dept: NEPHROLOGY | Facility: CLINIC | Age: 73
End: 2023-04-06

## 2023-04-07 ENCOUNTER — OFFICE VISIT (OUTPATIENT)
Dept: NEPHROLOGY | Facility: CLINIC | Age: 73
End: 2023-04-07

## 2023-04-07 VITALS
WEIGHT: 202 LBS | HEIGHT: 66 IN | RESPIRATION RATE: 16 BRPM | SYSTOLIC BLOOD PRESSURE: 126 MMHG | OXYGEN SATURATION: 97 % | DIASTOLIC BLOOD PRESSURE: 76 MMHG | HEART RATE: 101 BPM | TEMPERATURE: 97.9 F | BODY MASS INDEX: 32.47 KG/M2

## 2023-04-07 DIAGNOSIS — N18.31 STAGE 3A CHRONIC KIDNEY DISEASE (HCC): Primary | ICD-10-CM

## 2023-04-07 NOTE — PROGRESS NOTES
NEPHROLOGY PROGRESS NOTE    Patient: Harika Sullivan               Sex: female          DOA: No admission date for patient encounter  YOB: 1950        Age:  72 y o         LOS: [unfilled]  4/7/2023        BACKGROUND     72years old female with past medical history of chronic kidney disease stage III, hypertension, obesity, degenerative joint disease who has been following her renal office in September 2022  SUBJECTIVE     Patient following up after 6 months  Underwent left hip surgery in October 2022 at [facility]  Patient has done well  She has lost weight  She is able to exercise as well  REVIEW OF SYSTEMS     Review of Systems   Constitutional: Negative  HENT: Negative  Eyes: Negative  Respiratory: Negative  Cardiovascular: Negative  Gastrointestinal: Negative  Endocrine: Negative  Genitourinary: Negative  Musculoskeletal: Negative  Skin: Negative  Allergic/Immunologic: Negative  Neurological: Negative  Hematological: Negative  All other systems reviewed and are negative  OBJECTIVE     Current Weight: Weight - Scale: 91 6 kg (202 lb)  Vitals:    04/07/23 0851   BP: 126/76   Pulse: 101   Resp: 16   Temp: 97 9 °F (36 6 °C)   SpO2: 97%     Body mass index is 32 6 kg/m²    [unfilled]    CURRENT MEDICATIONS       Current Outpatient Medications:   •  albuterol (PROVENTIL HFA,VENTOLIN HFA) 90 mcg/act inhaler, Inhale 2 puffs every 4 (four) hours as needed, Disp: , Rfl:   •  ALPRAZolam (XANAX) 0 25 mg tablet, Take 1 tablet (0 25 mg total) by mouth 3 (three) times a day as needed for anxiety 1/2-1 as needed tid, Disp: 30 tablet, Rfl: 0  •  ammonium lactate (LAC-HYDRIN) 12 % cream, Apply topically as needed for dry skin, Disp: , Rfl:   •  aspirin (ECOTRIN LOW STRENGTH) 81 mg EC tablet, Take 81 mg by mouth in the morning, Disp: , Rfl:   •  atorvastatin (LIPITOR) 40 mg tablet, TAKE 1 TABLET DAILY, Disp: 90 tablet, Rfl: 3  •  clopidogrel (PLAVIX) 75 mg tablet, TAKE 1 TABLET DAILY, Disp: 90 tablet, Rfl: 3  •  fluticasone (FLONASE) 50 mcg/act nasal spray, 2 sprays into each nostril daily as needed, Disp: , Rfl:   •  fluticasone-vilanterol (BREO ELLIPTA) 200-25 MCG/INH inhaler, Inhale 1 puff daily, Disp: , Rfl:   •  levothyroxine (Euthyrox) 112 mcg tablet, Take 1 tablet (112 mcg total) by mouth daily in the early morning, Disp: 90 tablet, Rfl: 1  •  losartan-hydrochlorothiazide (HYZAAR) 100-25 MG per tablet, Take 1 tablet by mouth daily, Disp: , Rfl:   •  montelukast (SINGULAIR) 10 mg tablet, Take 10 mg by mouth daily as needed, Disp: , Rfl:   •  Multiple Vitamin (multivitamin) tablet, Take 1 tablet by mouth daily, Disp: 30 tablet, Rfl: 0  •  polyethylene glycol (MIRALAX) 17 g packet, Take 17 g by mouth daily, Disp: , Rfl:   •  Restasis 0 05 % ophthalmic emulsion, , Disp: , Rfl:   •  sertraline (ZOLOFT) 50 mg tablet, TAKE 1 TABLET BY MOUTH EVERY DAY, Disp: 30 tablet, Rfl: 0  •  traZODone (DESYREL) 100 mg tablet, Take 1 tablet (100 mg total) by mouth daily at bedtime, Disp: 90 tablet, Rfl: 1      PHYSICAL EXAMINATION     Physical Exam  Constitutional:       Appearance: She is well-developed  HENT:      Head: Normocephalic and atraumatic  Eyes:      Pupils: Pupils are equal, round, and reactive to light  Cardiovascular:      Rate and Rhythm: Normal rate and regular rhythm  Heart sounds: Normal heart sounds  Pulmonary:      Effort: Pulmonary effort is normal    Abdominal:      General: Bowel sounds are normal       Palpations: Abdomen is soft  Musculoskeletal:         General: Normal range of motion  Cervical back: Neck supple  Skin:     General: Skin is warm  Neurological:      Mental Status: She is alert and oriented to person, place, and time             LAB RESULTS     Results from last 6 Months   Lab Units 03/21/23  1346 12/13/22  1359   WBC Thousand/uL 5 21 5 45   HEMOGLOBIN g/dL 13 3 12 9   HEMATOCRIT % 39 6 40 0   PLATELETS Thousands/uL 152 174   POTASSIUM mmol/L 3 9 3 9   CHLORIDE mmol/L 104 111*   CO2 mmol/L 27 25   BUN mg/dL 24 13   CREATININE mg/dL 1 00 0 97   CALCIUM mg/dL 10 1 9 8   PHOSPHORUS mg/dL 3 5 2 8             RADIOLOGY RESULTS    Renal ultrasound     IMPRESSION:  Normal size kidneys  No hydronephrosis    ASSESSMENT/PLAN     72years old female with past medical history of hypertension, obesity, degenerative joint disease, dyslipidemia, chronic NSAID use who presents to our facility for follow-up    1  Chronic kidney disease stage IIIa/B: Baseline serum creatinine is 1 0-1 1  Serum creatinine is 1 0 mg/dL and acceptable  Ultrasound had revealed normal-sized kidneys  Avoidance of NSAIDs, contrast agents recommended  2   Hypertension due to CKD: Blood pressure within acceptable range while on Hyzaar  3   Abnormal urinalysis: Likely suggestive of colonization  Patient is asymptomatic    4  Secondary hyperparathyroidism of renal origin: Normal PTH intact, 25-hydroxy vitamin D and calcium levels  5   Anemia due to CKD: Patient did have drop in hemoglobin postsurgery  Her hemoglobin has nicely rebounded and current hemoglobin stands at 13 g/dL  6   Nutrition: Low-salt, moderate potassium, moderate protein diet recommended  Ingestion with please 64 ounces of fluids recommended as well              Sathish Cameron MD  Nephrology  4/7/2023

## 2023-04-25 ENCOUNTER — APPOINTMENT (OUTPATIENT)
Dept: PHYSICAL THERAPY | Facility: CLINIC | Age: 73
End: 2023-04-25

## 2023-05-02 ENCOUNTER — OFFICE VISIT (OUTPATIENT)
Dept: PHYSICAL THERAPY | Facility: CLINIC | Age: 73
End: 2023-05-02

## 2023-05-02 DIAGNOSIS — M16.12 OSTEOARTHRITIS OF LEFT HIP, UNSPECIFIED OSTEOARTHRITIS TYPE: Primary | ICD-10-CM

## 2023-05-02 DIAGNOSIS — Z96.642 S/P TOTAL LEFT HIP ARTHROPLASTY: ICD-10-CM

## 2023-05-09 ENCOUNTER — PATIENT MESSAGE (OUTPATIENT)
Dept: FAMILY MEDICINE CLINIC | Facility: CLINIC | Age: 73
End: 2023-05-09

## 2023-05-09 DIAGNOSIS — J30.2 SEASONAL ALLERGIES: Primary | ICD-10-CM

## 2023-05-09 RX ORDER — MONTELUKAST SODIUM 10 MG/1
10 TABLET ORAL DAILY PRN
Qty: 90 TABLET | Refills: 1 | Status: SHIPPED | OUTPATIENT
Start: 2023-05-09 | End: 2023-08-07

## 2023-05-11 ENCOUNTER — EVALUATION (OUTPATIENT)
Dept: PHYSICAL THERAPY | Facility: CLINIC | Age: 73
End: 2023-05-11

## 2023-05-11 DIAGNOSIS — M25.562 LEFT KNEE PAIN, UNSPECIFIED CHRONICITY: ICD-10-CM

## 2023-05-11 DIAGNOSIS — M16.12 OSTEOARTHRITIS OF LEFT HIP, UNSPECIFIED OSTEOARTHRITIS TYPE: Primary | ICD-10-CM

## 2023-05-11 DIAGNOSIS — Z96.642 S/P TOTAL LEFT HIP ARTHROPLASTY: ICD-10-CM

## 2023-05-11 NOTE — PROGRESS NOTES
PT Re-Evaluation     Today's date: 2023  Patient name: Dayron Jones  : 1950  MRN: [id number]  Referring provider: MARCELINA Maier*  Dx:   Encounter Diagnosis     ICD-10-CM    1  Osteoarthritis of left hip, unspecified osteoarthritis type  M16 12       2  S/P total left hip arthroplasty  Z96 642       3  Left knee pain, unspecified chronicity  M25 562           Start Time: 1315  Stop Time: 1400  Total time in clinic (min): 45 minutes    Assessment  Assessment details: Pt is a 81 y o  female presenting upon physical therapy re-evaluation with improvements in L quadriceps strength, now demonstrating full L knee extension AROM and cessation of extensor lag  Pt also notes subjective improvements of L quadriceps strength and function, however does still report pain and difficulty with stairs  Attempted step over step pattern for descending stairs today but pt demonstrated pain in LLE, will attempt again next session with use of L knee brace and progress with strengthening in order to restore function  Pt would benefit from continued skilled PT to address lingering impairments and facilitate return to full function  Impairments: abnormal gait, abnormal muscle firing, activity intolerance, impaired physical strength and pain with function  Functional limitations: ascending/descending stairs, household activities  Symptom irritability: lowUnderstanding of Dx/Px/POC: good   Prognosis: good    Goals  STG 4 - weeks  1  Patient will demonstrate L knee extensor lag of 25 degrees or less to facilitate functional ability  - met  2  Patient will demonstrate improved L hip strength to 4+/5 or better in all planes to facilitate functional ability  -met  LTG 8 - weeks  1  Patient will demonstrate L quadriceps MMT strength to 5/5 to facilitate functional ability  -not met  2  Patient will demonstrate increased FOTO score from 56 at baseline to predicted score of 68 or higher to facilitate functional ability  -met    Plan  Patient would benefit from: PT eval and skilled physical therapy  Planned modality interventions: cryotherapy, thermotherapy: hydrocollator packs, unattended electrical stimulation and electrical stimulation/Russian stimulation  Planned therapy interventions: balance, functional ROM exercises, flexibility, gait training, home exercise program, joint mobilization, manual therapy, massage, neuromuscular re-education, patient education, strengthening, stretching, therapeutic activities, therapeutic exercise and motor coordination training  Frequency: 2x week  Duration in weeks: 8  Plan of Care beginning date: 2023  Plan of Care expiration date: 2023  Treatment plan discussed with: patient        Subjective Evaluation    History of Present Illness  Date of surgery: 10/5/2022  Mechanism of injury: surgery  Mechanism of injury: Pt presents to PT s/p L anterior total hip arthroplasty on 10/5/22  Pt had attempted conservative management prior to surgery but was unsuccessful  Pt was completing home health PT prior to outpatient referral  Pt reports home health went well and she continues to do exercises every day  Pt reports her only hip precaution is to not cross her L foot onto her R leg  Upon re-evaluation, pt reports L hip feels good, L knee has also been feeling better but she does still get pain and grinding of her kneecap  Pt has been feeling steadier and has not been needing to use cane for walking  Pt reports some continued quadriceps weakness that is causing difficulty going up and down stairs, pt is still careful and takes it one step at a time  Pain  Current pain ratin  At best pain ratin  At worst pain ratin (Hip pain 0/10)  Location: L knee  Quality: discomfort  Relieving factors: change in position  Progression: improved    Patient Goals  Patient goals for therapy: increased strength  Patient goal: To walk up and down stairs   Return to full function and be able to kick her "legs         Objective     Active Range of Motion   Left Hip   Flexion: 100 degrees     Right Hip   Flexion: 100 degrees   Left Knee   Extensor la degrees     Additional Active Range of Motion Details  Extensor lag measured with LAQ    Passive Range of Motion   Left Knee   Flexion: WFL  Extension: 0 degrees WFL    Mobility     Additional Mobility Details  Grinding of L patella with active knee extension    Patellar Static Positioning   Left Knee: lateral tilt    Additional Patellar Static Positioning Details  L patella laterally positioned    Strength/Myotome Testing     Left Hip   Planes of Motion   Flexion: 5  Extension: 4+  Abduction: 4+  Adduction: 5  External rotation: 5  Internal rotation: 5    Right Hip   Planes of Motion   Flexion: 4  Extension: 4  Abduction: 4+  Adduction: 5  External rotation: 5  Internal rotation: 5    Left Knee   Flexion: 5  Extension: 3+  Quadriceps contraction: fair    Right Knee   Flexion: 5  Extension: 5    Left Ankle/Foot   Dorsiflexion: 5  Plantar flexion: 5    Right Ankle/Foot   Dorsiflexion: 5  Plantar flexion: 5             Precautions: R hip anterior CAROLIN 105    Manuals 3/23 3/30 4/4 4/11 5/2 5/11       PROM L Hip (flex; ER within precautions)             L hamstring stretching             Knee extension isometric  3x10 3\" NP          Neuro Re-Ed             Pt education about HEP      2'       Quad Sets  On E-stim On E-stim On E-stim On E-stim On E-stim        Standing SLR  3x10 3x10 3x10 3x10 3x10 2#       Bridges c TB ER 3x10 3x10 3x10 3x10 3x10 3x10       Prone TKE             SAQ attempted on E-stim  attempted on E-stim          Supine SLR  1x10 pain 4x5  6x5 3x10 3x10       Sled push for TKE      1 lap x60' full stack       Ther Ex             LAQ eccentric 2x10 active            Nustep 7' lvl 3  7' lvl 3  7' lvl 3  7' lvl 3  7' lvl 3  7' lvl 4        Prone Quad stretch  5x20\" 5x20\" 5x20\" 5x20\"        TB HS curls             Dock kicks 3x10 pain 3x10          SL leg " "press 3x20 45# 3x20 45-55# 3x20 45# seat 8 3x20   45# seat 8 3x20   45# seat 8 3x20   45# seat 8       Squats at bar             Ther Activity             Forward step ups 3x10 4\" 3x10 4\" 3x10 4\" 3x10 4\" 3x10 6\" 3x10 6\"       Lateral step ups 3x10   4\" 3x10 4\" 3x10 4\" 3x10 4\" 3x10 6\" 3x10 6\"       Sit to stands 2x10 c airex 2x10 s airex 2x10 s airex 2x10 c airex 3x10 c airex 3x10 c airex       Stair descent      NV       Gait Training                                       Modalities             NMES/Russain stim L Quad 10'  50% duty cycle, 10/20  21 intensity (neuro re-ed) L Quad 10'  50% duty cycle, 10/20  21 intensity (neuro re-ed) L Quad 10'  50% duty cycle, 10/20  21 intensity (neuro re-ed) L Quad 10'  50% duty cycle, 10/20  21 intensity (neuro re-ed) L Quad 10'  50% duty cycle, 10/20  21 intensity (neuro re-ed)                            "

## 2023-05-18 ENCOUNTER — OFFICE VISIT (OUTPATIENT)
Dept: PHYSICAL THERAPY | Facility: CLINIC | Age: 73
End: 2023-05-18

## 2023-05-18 DIAGNOSIS — Z96.642 S/P TOTAL LEFT HIP ARTHROPLASTY: ICD-10-CM

## 2023-05-18 DIAGNOSIS — M25.562 LEFT KNEE PAIN, UNSPECIFIED CHRONICITY: ICD-10-CM

## 2023-05-18 DIAGNOSIS — M16.12 OSTEOARTHRITIS OF LEFT HIP, UNSPECIFIED OSTEOARTHRITIS TYPE: Primary | ICD-10-CM

## 2023-05-18 NOTE — PROGRESS NOTES
"Daily Note     Today's date: 2023  Patient name: Ria Pedersen  : 1950  MRN: [id number]  Referring provider: MARCELINA Thorne*  Dx:   Encounter Diagnosis     ICD-10-CM    1  Osteoarthritis of left hip, unspecified osteoarthritis type  M16 12       2  S/P total left hip arthroplasty  Z96 642       3  Left knee pain, unspecified chronicity  M25 562                      Subjective: Patient reports she is doing well with no new complaints  Objective: See treatment diary below      Assessment: Tolerated treatment well  Minimal verbal cueing required throughout session for technique  Able to progress strengthening this visit without adverse effects or increase in pain  Initiated stair descent which patient tolerated well with use of L knee brace  Following treatment, patient notes muscle fatigue  Patient would benefit from continued PT  Plan: Continue per plan of care  Progress treatment as tolerated         Precautions: R hip anterior CAROLIN 10/5    Manuals 3/23 3/30 4/4 4/11 5/2 5/11 5/18      PROM L Hip (flex; ER within precautions)             L hamstring stretching             Knee extension isometric  3x10 3\" NP          Neuro Re-Ed             Pt education about HEP      2'       Quad Sets  On E-stim On E-stim On E-stim On E-stim On E-stim        Standing SLR  3x10 3x10 3x10 3x10 3x10 2# 3x10 2#      Bridges c TB ER 3x10 3x10 3x10 3x10 3x10 3x10 3x10      Prone TKE             SAQ attempted on E-stim  attempted on E-stim          Supine SLR  1x10 pain 4x5  6x5 3x10 3x10 3x10      Sled push for TKE      1 lap x60' full stack 2 laps full stack      Ther Ex             LAQ eccentric 2x10 active            Nustep 7' lvl 3  7' lvl 3  7' lvl 3  7' lvl 3  7' lvl 3  7' lvl 4  7 min L4      Prone Quad stretch  5x20\" 5x20\" 5x20\" 5x20\"  5x20s      TB HS curls             Dock kicks 3x10 pain 3x10          SL leg press 3x20 45# 3x20 45-55# 3x20 45# seat 8 3x20   45# seat 8 3x20   45# seat 8 3x20   45# " "seat 8 1x20 55#  2x20 45#      Squats at bar             Ther Activity             Forward step ups 3x10 4\" 3x10 4\" 3x10 4\" 3x10 4\" 3x10 6\" 3x10 6\" 3x10 6\"      Lateral step ups 3x10   4\" 3x10 4\" 3x10 4\" 3x10 4\" 3x10 6\" 3x10 6\" 3x10 6\"      Sit to stands 2x10 c airex 2x10 s airex 2x10 s airex 2x10 c airex 3x10 c airex 3x10 c airex 3x10 w airex      Stair descent      NV 1x10 1x5 4\"      Gait Training                                       Modalities             NMES/Russain stim L Quad 10'  50% duty cycle, 10/20  21 intensity (neuro re-ed) L Quad 10'  50% duty cycle, 10/20  21 intensity (neuro re-ed) L Quad 10'  50% duty cycle, 10/20  21 intensity (neuro re-ed) L Quad 10'  50% duty cycle, 10/20  21 intensity (neuro re-ed) L Quad 10'  50% duty cycle, 10/20  21 intensity (neuro re-ed)                              "

## 2023-05-22 ENCOUNTER — PATIENT MESSAGE (OUTPATIENT)
Dept: FAMILY MEDICINE CLINIC | Facility: CLINIC | Age: 73
End: 2023-05-22

## 2023-05-22 DIAGNOSIS — L23.9 ALLERGIC CONTACT DERMATITIS, UNSPECIFIED TRIGGER: Primary | ICD-10-CM

## 2023-05-22 RX ORDER — AMMONIUM LACTATE 12 G/100G
CREAM TOPICAL AS NEEDED
Qty: 385 G | Refills: 1 | Status: SHIPPED | OUTPATIENT
Start: 2023-05-22

## 2023-05-23 DIAGNOSIS — F41.8 DEPRESSION WITH ANXIETY: ICD-10-CM

## 2023-05-25 ENCOUNTER — OFFICE VISIT (OUTPATIENT)
Dept: PHYSICAL THERAPY | Facility: CLINIC | Age: 73
End: 2023-05-25

## 2023-05-25 DIAGNOSIS — M16.12 OSTEOARTHRITIS OF LEFT HIP, UNSPECIFIED OSTEOARTHRITIS TYPE: Primary | ICD-10-CM

## 2023-05-25 DIAGNOSIS — M25.562 LEFT KNEE PAIN, UNSPECIFIED CHRONICITY: ICD-10-CM

## 2023-05-25 DIAGNOSIS — Z96.642 S/P TOTAL LEFT HIP ARTHROPLASTY: ICD-10-CM

## 2023-05-25 NOTE — PROGRESS NOTES
"Daily Note     Today's date: 2023  Patient name: Charlotte Low  : 1950  MRN: [id number]  Referring provider: MARCELINA Rodas*  Dx:   Encounter Diagnosis     ICD-10-CM    1  Osteoarthritis of left hip, unspecified osteoarthritis type  M16 12       2  S/P total left hip arthroplasty  Z96 642       3  Left knee pain, unspecified chronicity  M25 562           Start Time: 1100  Stop Time: 1146  Total time in clinic (min): 46 minutes    Subjective: Pt reports no new issues with L knee/hip  Objective: See treatment diary below      Assessment: Tolerated treatment well  Patient demonstrated fatigue post treatment, exhibited good technique with therapeutic exercises and would benefit from continued PT  Pt able to increase resistance for hip/knee strengthening exercises  Pt demonstrated slight discomfort near L knee during performance of lateral tap downs but able to complete with good control  Plan: Continue per plan of care  Progress treatment as tolerated         Precautions: R hip anterior CAROLIN 10/5    Manuals 3/23 3/30 4/4 4/11 5/2 5/11 5/18 5/25     PROM L Hip (flex; ER within precautions)             Knee extension isometric  3x10 3\" NP          Neuro Re-Ed             Pt education about HEP      2'       Quad Sets  On E-stim On E-stim On E-stim On E-stim On E-stim        Standing SLR  3x10 3x10 3x10 3x10 3x10 2# 3x10 2# 3x10 2#     Bridges c TB ER 3x10 3x10 3x10 3x10 3x10 3x10 3x10 3x10     Prone TKE             SAQ attempted on E-stim  attempted on E-stim          Supine SLR  1x10 pain 4x5  6x5 3x10 3x10 3x10 3x10 1#     Sled push for TKE      1 lap x60' full stack 2 laps full stack 2 laps 10#     Ther Ex             LAQ eccentric 2x10 active            Nustep 7' lvl 3  7' lvl 3  7' lvl 3  7' lvl 3  7' lvl 3  7' lvl 4  7 min L4 7' L4     Prone Quad stretch  5x20\" 5x20\" 5x20\" 5x20\"  5x20s 5x20s     TB HS curls             Dock kicks 3x10 pain 3x10          SL leg press 3x20 45# 3x20 " "45-55# 3x20 45# seat 8 3x20   45# seat 8 3x20   45# seat 8 3x20   45# seat 8 1x20 55#  2x20 45# 1x30 55#  1x30 45#     Squats at bar             Ther Activity             Forward step ups 3x10 4\" 3x10 4\" 3x10 4\" 3x10 4\" 3x10 6\" 3x10 6\" 3x10 6\" 3x10 6\"     Lateral step ups 3x10   4\" 3x10 4\" 3x10 4\" 3x10 4\" 3x10 6\" 3x10 6\" 3x10 6\" 3x10 6\"     Sit to stands c airex 2x10 c airex 2x10 s airex 2x10 s airex 2x10 c airex 3x10 c airex 3x10 c airex 3x10 w airex 2x10; 2x10 10#     Lateral tap downs       NV 1x10 1x5 4\" x20 2\"     Gait Training                                       Modalities             NMES/Russain stim L Quad 10'  50% duty cycle, 10/20  21 intensity (neuro re-ed) L Quad 10'  50% duty cycle, 10/20  21 intensity (neuro re-ed) L Quad 10'  50% duty cycle, 10/20  21 intensity (neuro re-ed) L Quad 10'  50% duty cycle, 10/20  21 intensity (neuro re-ed) L Quad 10'  50% duty cycle, 10/20  21 intensity (neuro re-ed)                                "

## 2023-05-30 ENCOUNTER — TELEPHONE (OUTPATIENT)
Dept: GASTROENTEROLOGY | Facility: CLINIC | Age: 73
End: 2023-05-30

## 2023-05-30 NOTE — TELEPHONE ENCOUNTER
Patients GI provider:  Fabrizio Tavarez    Number to return call: [phone]    Reason for call: Pt called in to reschedule her procedure as she did not have a ride  Above is her call back number       Scheduled procedure/appointment date if applicable: Apt/procedure NA

## 2023-05-31 ENCOUNTER — RA CDI HCC (OUTPATIENT)
Dept: OTHER | Facility: HOSPITAL | Age: 73
End: 2023-05-31

## 2023-06-01 ENCOUNTER — OFFICE VISIT (OUTPATIENT)
Dept: PHYSICAL THERAPY | Facility: CLINIC | Age: 73
End: 2023-06-01

## 2023-06-01 DIAGNOSIS — M25.562 LEFT KNEE PAIN, UNSPECIFIED CHRONICITY: ICD-10-CM

## 2023-06-01 DIAGNOSIS — Z96.642 S/P TOTAL LEFT HIP ARTHROPLASTY: ICD-10-CM

## 2023-06-01 DIAGNOSIS — M16.12 OSTEOARTHRITIS OF LEFT HIP, UNSPECIFIED OSTEOARTHRITIS TYPE: Primary | ICD-10-CM

## 2023-06-01 NOTE — PROGRESS NOTES
"Daily Note     Today's date: 2023  Patient name: Bismark Marquez  : 1950  MRN: [id number]  Referring provider: MARCELINA Avila*  Dx:   Encounter Diagnosis     ICD-10-CM    1  Osteoarthritis of left hip, unspecified osteoarthritis type  M16 12       2  S/P total left hip arthroplasty  Z96 642       3  Left knee pain, unspecified chronicity  M25 562           Start Time: 1145  Stop Time: 1230  Total time in clinic (min): 45 minutes    Subjective: Patient reports no new complaint  Patient states that hip is good, it is the left knee  Objective: See treatment diary below      Assessment: Tolerated treatment well  Patient participated in skilled PT session focused on strengthening, stretching, and ROM  Patient able to complete exercise program with no increase in sx  Patient progressing well with strengthening program   Patient would continue to benefit from skilled PT interventions to address strengthening, stretching, and ROM  Patient demonstrated fatigue post treatment, exhibited good technique with therapeutic exercises and would benefit from continued PT      Plan: Continue per plan of care        Precautions: R hip anterior CAROLIN 10/5    Manuals 3/23 3/30 4/4 4/11 5/2 5/11 5/18 5/25 6/1    PROM L Hip (flex; ER within precautions)             Knee extension isometric  3x10 3\" NP          Neuro Re-Ed             Pt education about HEP      2'       Quad Sets  On E-stim On E-stim On E-stim On E-stim On E-stim        Standing SLR  3x10 3x10 3x10 3x10 3x10 2# 3x10 2# 3x10 2# 3x10 2#    Bridges c TB ER 3x10 3x10 3x10 3x10 3x10 3x10 3x10 3x10 3x10    Prone TKE             SAQ attempted on E-stim  attempted on E-stim          Supine SLR  1x10 pain 4x5  6x5 3x10 3x10 3x10 3x10 1# 3x10 1#    Sled push for TKE      1 lap x60' full stack 2 laps full stack 2 laps 10# 2 laps 10#    Ther Ex             LAQ eccentric 2x10 active            Nustep 7' lvl 3  7' lvl 3  7' lvl 3  7' lvl 3  7' lvl 3  7' lvl 4  " "7 min L4 7' L4 7' L4    Prone Quad stretch  5x20\" 5x20\" 5x20\" 5x20\"  5x20s 5x20s 5x20s    TB HS curls             Dock kicks 3x10 pain 3x10          SL leg press 3x20 45# 3x20 45-55# 3x20 45# seat 8 3x20   45# seat 8 3x20   45# seat 8 3x20   45# seat 8 1x20 55#  2x20 45# 1x30 55#  1x30 45# 1x30 55#  1x30 45#    Squats at bar             Ther Activity             Forward step ups 3x10 4\" 3x10 4\" 3x10 4\" 3x10 4\" 3x10 6\" 3x10 6\" 3x10 6\" 3x10 6\" 3x10 6\"    Lateral step ups 3x10   4\" 3x10 4\" 3x10 4\" 3x10 4\" 3x10 6\" 3x10 6\" 3x10 6\" 3x10 6\" 3x10 6\"    Sit to stands c airex 2x10 c airex 2x10 s airex 2x10 s airex 2x10 c airex 3x10 c airex 3x10 c airex 3x10 w airex 2x10; 2x10 10# 2x10; 2x10 10#    Lateral tap downs       NV 1x10 1x5 4\" x20 2\" x20 2\"    Gait Training                                       Modalities             NMES/Russain stim L Quad 10'  50% duty cycle, 10/20  21 intensity (neuro re-ed) L Quad 10'  50% duty cycle, 10/20  21 intensity (neuro re-ed) L Quad 10'  50% duty cycle, 10/20  21 intensity (neuro re-ed) L Quad 10'  50% duty cycle, 10/20  21 intensity (neuro re-ed) L Quad 10'  50% duty cycle, 10/20  21 intensity (neuro re-ed)                                  "

## 2023-06-02 ENCOUNTER — LAB (OUTPATIENT)
Dept: LAB | Facility: CLINIC | Age: 73
End: 2023-06-02
Payer: MEDICARE

## 2023-06-02 DIAGNOSIS — E03.9 ACQUIRED HYPOTHYROIDISM: ICD-10-CM

## 2023-06-02 LAB
25(OH)D3 SERPL-MCNC: 38.2 NG/ML (ref 30–100)
ALBUMIN SERPL BCP-MCNC: 3.7 G/DL (ref 3.5–5)
ALP SERPL-CCNC: 65 U/L (ref 46–116)
ALT SERPL W P-5'-P-CCNC: 24 U/L (ref 12–78)
ANION GAP SERPL CALCULATED.3IONS-SCNC: 2 MMOL/L (ref 4–13)
AST SERPL W P-5'-P-CCNC: 21 U/L (ref 5–45)
BASOPHILS # BLD AUTO: 0.04 THOUSANDS/ÂΜL (ref 0–0.1)
BASOPHILS NFR BLD AUTO: 1 % (ref 0–1)
BILIRUB SERPL-MCNC: 0.63 MG/DL (ref 0.2–1)
BUN SERPL-MCNC: 22 MG/DL (ref 5–25)
CALCIUM SERPL-MCNC: 9.5 MG/DL (ref 8.3–10.1)
CHLORIDE SERPL-SCNC: 109 MMOL/L (ref 96–108)
CO2 SERPL-SCNC: 27 MMOL/L (ref 21–32)
CREAT SERPL-MCNC: 1.18 MG/DL (ref 0.6–1.3)
CREAT UR-MCNC: 109 MG/DL
EOSINOPHIL # BLD AUTO: 0.11 THOUSAND/ÂΜL (ref 0–0.61)
EOSINOPHIL NFR BLD AUTO: 2 % (ref 0–6)
ERYTHROCYTE [DISTWIDTH] IN BLOOD BY AUTOMATED COUNT: 14.4 % (ref 11.6–15.1)
GFR SERPL CREATININE-BSD FRML MDRD: 46 ML/MIN/1.73SQ M
GLUCOSE SERPL-MCNC: 86 MG/DL (ref 65–140)
HCT VFR BLD AUTO: 39.6 % (ref 34.8–46.1)
HGB BLD-MCNC: 12.8 G/DL (ref 11.5–15.4)
IMM GRANULOCYTES # BLD AUTO: 0.01 THOUSAND/UL (ref 0–0.2)
IMM GRANULOCYTES NFR BLD AUTO: 0 % (ref 0–2)
LYMPHOCYTES # BLD AUTO: 1.19 THOUSANDS/ÂΜL (ref 0.6–4.47)
LYMPHOCYTES NFR BLD AUTO: 24 % (ref 14–44)
MCH RBC QN AUTO: 32.4 PG (ref 26.8–34.3)
MCHC RBC AUTO-ENTMCNC: 32.3 G/DL (ref 31.4–37.4)
MCV RBC AUTO: 100 FL (ref 82–98)
MONOCYTES # BLD AUTO: 0.26 THOUSAND/ÂΜL (ref 0.17–1.22)
MONOCYTES NFR BLD AUTO: 5 % (ref 4–12)
NEUTROPHILS # BLD AUTO: 3.41 THOUSANDS/ÂΜL (ref 1.85–7.62)
NEUTS SEG NFR BLD AUTO: 68 % (ref 43–75)
NRBC BLD AUTO-RTO: 0 /100 WBCS
PHOSPHATE SERPL-MCNC: 3.7 MG/DL (ref 2.3–4.1)
PLATELET # BLD AUTO: 158 THOUSANDS/UL (ref 149–390)
PMV BLD AUTO: 11.2 FL (ref 8.9–12.7)
POTASSIUM SERPL-SCNC: 4.1 MMOL/L (ref 3.5–5.3)
PROT SERPL-MCNC: 7.2 G/DL (ref 6.4–8.4)
PROT UR-MCNC: 13 MG/DL
PROT/CREAT UR: 0.12 MG/G{CREAT} (ref 0–0.1)
PTH-INTACT SERPL-MCNC: 42.5 PG/ML (ref 12–88)
RBC # BLD AUTO: 3.95 MILLION/UL (ref 3.81–5.12)
SODIUM SERPL-SCNC: 138 MMOL/L (ref 135–147)
TSH SERPL DL<=0.05 MIU/L-ACNC: 0.47 UIU/ML (ref 0.45–4.5)
WBC # BLD AUTO: 5.02 THOUSAND/UL (ref 4.31–10.16)

## 2023-06-02 PROCEDURE — 84443 ASSAY THYROID STIM HORMONE: CPT

## 2023-06-05 ENCOUNTER — OFFICE VISIT (OUTPATIENT)
Dept: FAMILY MEDICINE CLINIC | Facility: CLINIC | Age: 73
End: 2023-06-05
Payer: MEDICARE

## 2023-06-05 VITALS
TEMPERATURE: 96.5 F | DIASTOLIC BLOOD PRESSURE: 70 MMHG | OXYGEN SATURATION: 96 % | WEIGHT: 200 LBS | BODY MASS INDEX: 32.14 KG/M2 | HEART RATE: 82 BPM | HEIGHT: 66 IN | SYSTOLIC BLOOD PRESSURE: 116 MMHG

## 2023-06-05 DIAGNOSIS — I35.0 NONRHEUMATIC AORTIC VALVE STENOSIS: ICD-10-CM

## 2023-06-05 DIAGNOSIS — E78.5 DYSLIPIDEMIA: ICD-10-CM

## 2023-06-05 DIAGNOSIS — I70.403: ICD-10-CM

## 2023-06-05 DIAGNOSIS — I25.10 CORONARY ARTERY DISEASE INVOLVING NATIVE HEART WITHOUT ANGINA PECTORIS, UNSPECIFIED VESSEL OR LESION TYPE: ICD-10-CM

## 2023-06-05 DIAGNOSIS — R73.01 ELEVATED FASTING GLUCOSE: ICD-10-CM

## 2023-06-05 DIAGNOSIS — N18.32 STAGE 3B CHRONIC KIDNEY DISEASE (HCC): ICD-10-CM

## 2023-06-05 DIAGNOSIS — E66.01 CLASS 3 SEVERE OBESITY WITHOUT SERIOUS COMORBIDITY WITH BODY MASS INDEX (BMI) OF 40.0 TO 44.9 IN ADULT, UNSPECIFIED OBESITY TYPE (HCC): ICD-10-CM

## 2023-06-05 DIAGNOSIS — J45.20 MILD INTERMITTENT ASTHMA WITHOUT COMPLICATION: Primary | ICD-10-CM

## 2023-06-05 DIAGNOSIS — I15.9 SECONDARY HYPERTENSION: ICD-10-CM

## 2023-06-05 DIAGNOSIS — E03.9 ACQUIRED HYPOTHYROIDISM: ICD-10-CM

## 2023-06-05 DIAGNOSIS — N25.81 SECONDARY HYPERPARATHYROIDISM OF RENAL ORIGIN (HCC): ICD-10-CM

## 2023-06-05 PROCEDURE — 99214 OFFICE O/P EST MOD 30 MIN: CPT | Performed by: NURSE PRACTITIONER

## 2023-06-05 RX ORDER — FLUTICASONE PROPIONATE AND SALMETEROL XINAFOATE 230; 21 UG/1; UG/1
2 AEROSOL, METERED RESPIRATORY (INHALATION) 2 TIMES DAILY
Qty: 36 G | Refills: 1 | Status: SHIPPED | OUTPATIENT
Start: 2023-06-05 | End: 2023-09-03

## 2023-06-05 NOTE — TELEPHONE ENCOUNTER
Spoke with the patient and she rescheduled her procedure  She still has her prep instructions  Scheduled date of colonoscopy (as of today):8/25/23  Physician performing colonoscopy: Alex  Location of colonoscopy:[facility]  Bowel prep reviewed with patient:miralax/dulcolax  Instructions reviewed with patient by:Bebe DIEZ  Clearances: none    Plavix 4 day hold

## 2023-06-06 PROBLEM — G62.9 PERIPHERAL NERVE DISEASE: Status: ACTIVE | Noted: 2022-01-10

## 2023-06-06 PROBLEM — Z87.19 HISTORY OF DIVERTICULITIS: Status: RESOLVED | Noted: 2017-07-27 | Resolved: 2023-06-06

## 2023-06-06 PROBLEM — E78.5 DYSLIPIDEMIA: Status: ACTIVE | Noted: 2022-01-10

## 2023-06-06 NOTE — PROGRESS NOTES
BMI Counseling: Body mass index is 32 28 kg/m²  The BMI is above normal  Nutrition recommendations include decreasing portion sizes, encouraging healthy choices of fruits and vegetables, decreasing fast food intake, consuming healthier snacks, limiting drinks that contain sugar, moderation in carbohydrate intake, increasing intake of lean protein, reducing intake of saturated and trans fat and reducing intake of cholesterol  Exercise recommendations include vigorous physical activity 75 minutes/week, exercising 3-5 times per week and strength training exercises  No pharmacotherapy was ordered  Patient referred to PCP  Rationale for BMI follow-up plan is due to patient being overweight or obese  Depression Screening and Follow-up Plan: Patient assessed for underlying major depression  Brief counseling provided and recommend additional follow-up/re-evaluation next office visit  Patient advised to follow-up with PCP for further management  Doing better with increased Zoloft     Falls Plan of Care: balance, strength, and gait training instructions were provided  Recommended assistive device to help with gait and balance  Medications that increase falls were reviewed  Assessed feet and footwear  Vitamin D supplementation was recommended  Home safety education provided  Assessment/Plan:    Pt is a pleasant 72 yr old female   Presents in office for 6 weeks follow up and lab review   Underlying history of depression and anxiety   Currently on Zoloft and Trazodone at night - recently adjustment was made to her zoloft - increased to 50 mg - doing well  HTN stable she has been on Hyzaar   Does have non alcoholic steato hepatitis - she is being monitored by GI   Sleep apnea - uses CPAP   PVD she has had stents to lower extremities and lost a toe to left lower extremity - take Plavix 75 mg daily and aspirin daily denies any issues currently  Does have some seasonal allergies and bronchitis   - no issues currently   She is on Levothyroxine recently adjusted by previous PCP  - TSH improved dose have some issues with the nails and hair loss - we will continue to monitor   Also history of elevated fasting glucose --> will continue to monitor for diabetes   Discussed healthy diet   Discussed adequate hydration   Follow up with Nephrology for CKD monitoring   Follow up in 4-6 months      Problem List Items Addressed This Visit        Endocrine    Acquired hypothyroidism    Relevant Orders    Comprehensive metabolic panel    CBC and differential    TSH, 3rd generation with Free T4 reflex    UA w Reflex to Microscopic w Reflex to Culture -Lab Collect    HEMOGLOBIN A1C W/ EAG ESTIMATION    Vitamin D 25 hydroxy    Secondary hyperparathyroidism of renal origin ([facility] )     Secondary to renal issues          Relevant Orders    Comprehensive metabolic panel    CBC and differential    TSH, 3rd generation with Free T4 reflex    UA w Reflex to Microscopic w Reflex to Culture -Lab Collect    HEMOGLOBIN A1C W/ EAG ESTIMATION    Vitamin D 25 hydroxy       Respiratory    Mild intermittent asthma without complication - Primary    Relevant Medications    fluticasone-salmeterol (Advair HFA) 230-21 MCG/ACT inhaler       Cardiovascular and Mediastinum    Hypertension     HTN assessed for stability and discussed plan of care   Estimated Creatinine Clearance: 49 6 mL/min (by C-G formula based on SCr of 1 18 mg/dL)  Reviewed labs       No changes   We will continue to monitor            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)    Relevant Orders    Comprehensive metabolic panel    CBC and differential    TSH, 3rd generation with Free T4 reflex    UA w Reflex to Microscopic w Reflex to Culture -Lab Collect    HEMOGLOBIN A1C W/ EAG ESTIMATION    Vitamin D 25 hydroxy    Coronary artery disease involving native heart without angina pectoris    Nonrheumatic aortic valve stenosis     Sees cardiology for monitoring             Genitourinary Stage 3b chronic kidney disease ([facility] )     Lab Results   Component Value Date    CREATININE 1 18 06/02/2023    CREATININE 1 00 03/21/2023    CREATININE 0 97 12/13/2022    EGFR 46 06/02/2023    EGFR 56 03/21/2023    EGFR 59 12/13/2022               Other    Class 3 severe obesity without serious comorbidity with body mass index (BMI) of 40 0 to 44 9 in adult, unspecified obesity type ([place] [address] )    Dyslipidemia    Relevant Orders    Comprehensive metabolic panel    CBC and differential    TSH, 3rd generation with Free T4 reflex    UA w Reflex to Microscopic w Reflex to Culture -Lab Collect    HEMOGLOBIN A1C W/ EAG ESTIMATION    Vitamin D 25 hydroxy   Other Visit Diagnoses     Elevated fasting glucose        Relevant Orders    HEMOGLOBIN A1C W/ EAG ESTIMATION            Subjective:      Patient ID: Tamica Hernandez is a 72 y o  female  Pt is a pleasant 72 yr old female   Presents in office for 6 weeks follow up and lab review   Underlying history of depression and anxiety   Currently on Zoloft and Trazodone at night - recently adjustment was made to her zoloft - increased to 50 mg - doing well  HTN stable she has been on Hyzaar   Does have non alcoholic steato hepatitis - she is being monitored by GI   Sleep apnea - uses CPAP   PVD she has had stents to lower extremities and lost a toe to left lower extremity - take Plavix 75 mg daily and aspirin daily denies any issues currently  Does have some seasonal allergies and bronchitis   - no issues currently   She is on Levothyroxine recently adjusted by previous PCP  - TSH improved dose have some issues with the nails and hair loss - we will continue to monitor   Also history of elevated fasting glucose --> will continue to monitor for diabetes   Discussed healthy diet   Discussed adequate hydration   Follow up with Nephrology for CKD monitoring   Follow up in 4-6 months       The following portions of the patient's history were reviewed and updated as appropriate:   Past Medical History:  She has a past medical history of Allergic (Childhood), Anxiety (), Arthritis, Asthma (Childhood), Chronic kidney disease, CPAP (continuous positive airway pressure) dependence, Disease of thyroid gland (Approx ), Diverticulitis of colon (), Heart murmur, Hyperlipidemia, Hypertension (1995), Obesity (Childhood), and PAD (peripheral artery disease) ([address] )  ,  _______________________________________________________________________  Medical Problems:  does not have any pertinent problems on file ,  _______________________________________________________________________  Past Surgical History:   has a past surgical history that includes Appendectomy (About ); Colon surgery (About );  section ( & ); Hysterectomy (About ); and pr arthrp acetblr/prox fem prostc agrft/algrft (Left, 10/5/2022)  ,  _______________________________________________________________________  Family History:  family history includes Anxiety disorder in her mother; Arthritis in her mother; Cancer in her brother and mother; Dementia in her brother; Depression in her mother; Heart disease in her brother and father; Thrombosis in her mother ,  _______________________________________________________________________  Social History:   reports that she has never smoked  She has never used smokeless tobacco  She reports that she does not currently use alcohol  She reports that she does not use drugs  ,  _______________________________________________________________________  Allergies:  is allergic to sulfamethoxazole-trimethoprim     _______________________________________________________________________  Current Outpatient Medications   Medication Sig Dispense Refill   • albuterol (PROVENTIL HFA,VENTOLIN HFA) 90 mcg/act inhaler Inhale 2 puffs every 4 (four) hours as needed     • ALPRAZolam (XANAX) 0 25 mg tablet Take 1 tablet (0 25 mg total) by mouth 3 (three) times a day as needed for anxiety 1/2-1 as needed tid 30 tablet 0   • ammonium lactate (LAC-HYDRIN) 12 % cream Apply topically as needed for dry skin 385 g 1   • aspirin (ECOTRIN LOW STRENGTH) 81 mg EC tablet Take 81 mg by mouth in the morning     • atorvastatin (LIPITOR) 40 mg tablet TAKE 1 TABLET DAILY 90 tablet 3   • clopidogrel (PLAVIX) 75 mg tablet TAKE 1 TABLET DAILY 90 tablet 3   • fluticasone (FLONASE) 50 mcg/act nasal spray 2 sprays into each nostril daily as needed     • fluticasone-salmeterol (Advair HFA) 230-21 MCG/ACT inhaler Inhale 2 puffs 2 (two) times a day Rinse mouth after use  36 g 1   • fluticasone-vilanterol (BREO ELLIPTA) 200-25 MCG/INH inhaler Inhale 1 puff daily     • levothyroxine (Euthyrox) 112 mcg tablet Take 1 tablet (112 mcg total) by mouth daily in the early morning 90 tablet 1   • losartan-hydrochlorothiazide (HYZAAR) 100-25 MG per tablet Take 1 tablet by mouth daily     • montelukast (SINGULAIR) 10 mg tablet Take 1 tablet (10 mg total) by mouth daily as needed (allergies) 90 tablet 1   • Multiple Vitamin (multivitamin) tablet Take 1 tablet by mouth daily 30 tablet 0   • polyethylene glycol (MIRALAX) 17 g packet Take 17 g by mouth daily     • Restasis 0 05 % ophthalmic emulsion      • sertraline (ZOLOFT) 50 mg tablet Take 1 tablet (50 mg total) by mouth daily 30 tablet 0   • traZODone (DESYREL) 100 mg tablet Take 1 tablet (100 mg total) by mouth daily at bedtime 90 tablet 1     No current facility-administered medications for this visit      _______________________________________________________________________  Review of Systems   Constitutional: Negative for chills, fatigue, fever and unexpected weight change  HENT: Negative for congestion, postnasal drip, sinus pressure and voice change  Eyes: Negative  Respiratory: Negative for cough and shortness of breath  Cardiovascular: Negative for chest pain and palpitations          HTN  Stable   Hyperlipemia  PVD     Gastrointestinal: Negative for abdominal "distention, abdominal pain, nausea and vomiting  JOSEPH - fatty liver   Sees GI    Endocrine:        Hypothyroidism    Genitourinary: Negative for difficulty urinating and flank pain  Musculoskeletal: Positive for arthralgias and myalgias  Skin: Negative for rash  Allergic/Immunologic: Positive for environmental allergies  Neurological: Negative for headaches  Hematological: Negative for adenopathy  Psychiatric/Behavioral: Positive for sleep disturbance  Negative for suicidal ideas  The patient is nervous/anxious (much improved )  Objective:  Vitals:    06/05/23 1316   BP: 116/70   BP Location: Left arm   Patient Position: Sitting   Cuff Size: Large   Pulse: 82   Temp: (!) 96 5 °F (35 8 °C)   SpO2: 96%   Weight: 90 7 kg (200 lb)   Height: 5' 6\" (1 676 m)     Body mass index is 32 28 kg/m²  Physical Exam  Vitals and nursing note reviewed  Constitutional:       Appearance: Normal appearance  Comments: BMI 32 28    HENT:      Head: Atraumatic  Eyes:      Extraocular Movements: Extraocular movements intact  Cardiovascular:      Rate and Rhythm: Normal rate and regular rhythm  Pulses: Normal pulses  Heart sounds: Normal heart sounds  Pulmonary:      Breath sounds: Normal breath sounds  Abdominal:      Palpations: Abdomen is soft  Musculoskeletal:         General: Normal range of motion  Cervical back: Normal range of motion  Skin:     General: Skin is warm  Capillary Refill: Capillary refill takes less than 2 seconds  Neurological:      Mental Status: She is alert and oriented to person, place, and time     Psychiatric:         Mood and Affect: Mood normal          Behavior: Behavior normal        Contains abnormal data CBC and differential  Order: [id number]   Status: Final result   Visible to patient: Yes (seen)   Next appt: 06/08/2023 at 10:15 AM in Physical Therapy Moe Rodriguez, PTMae   Dx: Stage 3a chronic kidney disease ([facility] [address] )   1 Result Note   1 " Patient Communication             Component Ref Range & Units 6/2/23 10:54 AM 3/21/23 1:46 PM 12/13/22 1:59 PM 10/7/22 6:27 AM 10/6/22 6:20 AM 9/26/22 3:01 PM 9/9/22 3:45 PM   WBC 4 31 - 10 16 Thousand/uL 5 02  5 21  5 45  5 72  8 57  5 54  5 40    RBC 3 81 - 5 12 Million/uL 3 95  4 11  4 02  2 53 Low  3 05 Low CM  4 02  3 72 Low    Hemoglobin 11 5 - 15 4 g/dL 12 8  13 3  12 9  7 9 Low  9 6 Low  12 5  11 7    Hematocrit 34 8 - 46 1 % 39 6  39 6  40 0  24 5 Low  30 0 Low  39 5  35 5    MCV 82 - 98 fL 100 High  96  100 High  97  98  98  95    MCH 26 8 - 34 3 pg 32 4  32 4  32 1  31 2  31 5  31 1  31 5    MCHC 31 4 - 37 4 g/dL 32 3  33 6  32 3  32 2  32 0  31 6  33 0    RDW 11 6 - 15 1 % 14 4  13 2  13 3  15 1  14 7  15 2 High  15 1    MPV 8 9 - 12 7 fL 11 2  10 7  11 2  11 4  11 1  11 0  11 2    Platelets 091 - 712 Thousands/uL 158  152  174  102 Low  138 Low CM  165  191    nRBC /100 WBCs 0  0  0    0  0    Neutrophils Relative 43 - 75 % 68  63  65    63  66    Immat GRANS % 0 - 2 % 0  0  0    0  0    Lymphocytes Relative 14 - 44 % 24  28  26    28  25    Monocytes Relative 4 - 12 % 5  6  6    6  6    Eosinophils Relative 0 - 6 % 2  2  2    2  2    Basophils Relative 0 - 1 % 1  1  1    1  1    Neutrophils Absolute 1 85 - 7 62 Thousands/µL 3 41  µ3 33  µ3 61    µ3 49  µ3 56    Immature Grans Absolute 0 00 - 0 20 Thousand/uL 0 01  0 00  0 01    0 01  0 01    Lymphocytes Absolute 0 60 - 4 47 Thousands/µL 1 19  µ1 45  µ1 41    µ1 56  µ1 37    Monocytes Absolute 0 17 - 1 22 Thousand/µL 0 26  µ0 32  µ0 30    µ0 35  µ0 31    Eosinophils Absolute 0 00 - 0 61 Thousand/µL 0 11  µ0 08  µ0 09    µ0 10  µ0 12    Basophils Absolute 0 00 - 0 10 Thousands/µL 0 04  µ0 03  µ0 03    µ0 03  µ0 03              Specimen Collected: 06/02/23 10:54 AM Last Resulted: 06/02/23 7:45 PM        Contains abnormal data Comprehensive metabolic panel  Order: [id number]   Status: Final result   Visible to patient: Yes (seen)   Next appt: 06/08/2023 at 10:15 AM in Physical Therapy Alycia Rosen PT)   Dx: Stage 3a chronic kidney disease ([facility] )   1 Result Note   1 Patient Communication             Component Ref Range & Units 6/2/23 10:54 AM 3/21/23 1:46 PM 12/13/22 1:59 PM 10/7/22 6:27 AM 10/6/22 6:20 AM 9/26/22 3:01 PM 9/26/22 3:01 PM   Sodium 135 - 147 mmol/L 138  139  141  135  136   140    Potassium 3 5 - 5 3 mmol/L 4 1  3 9  3 9  3 9  4 1   3 9    Chloride 96 - 108 mmol/L 109 High  104  111 High  104  105   104    CO2 21 - 32 mmol/L 27  27  25  28  26   27    ANION GAP 4 - 13 mmol/L 2 Low  8  5  3 Low  5   9    BUN 5 - 25 mg/dL 22  24  13  17  18   21    Creatinine 0 60 - 1 30 mg/dL 1 18  1 00 CM  0 97 CM  0 92 CM  1 09 CM   1 19 CM    Comment: Standardized to IDMS reference method   Glucose 65 - 140 mg/dL 86   104 CM  111 CM  145 High CM   112 CM    Comment: If the patient is fasting, the ADA then defines impaired fasting glucose as > 100 mg/dL and diabetes as > or equal to 123 mg/dL  Specimen collection should occur prior to Sulfasalazine administration due to the potential for falsely depressed results  Specimen collection should occur prior to Sulfapyridine administration due to the potential for falsely elevated results  Calcium 8 3 - 10 1 mg/dL 9 5  10 1 R  9 8  8 5  8 6   9 9    AST 5 - 45 U/L 21  19 R, CM  19 CM     16 CM    Comment: Specimen collection should occur prior to Sulfasalazine administration due to the potential for falsely depressed results  ALT 12 - 78 U/L 24  17 R, CM  21 CM     20 CM    Comment: Specimen collection should occur prior to Sulfasalazine and/or Sulfapyridine administration due to the potential for falsely depressed results      Alkaline Phosphatase 46 - 116 U/L 65  57 R  74     67    Total Protein 6 4 - 8 4 g/dL 7 2  7 0  7 7    7 3 R, CM  7 8    Albumin 3 5 - 5 0 g/dL 3 7  4 4  4 0     4 0    Total Bilirubin 0 20 - 1 00 mg/dL 0 63  0 74  0 71 CM     0 68 CM    Comment: Use of this assay is not recommended for patients undergoing treatment with eltrombopag due to the potential for falsely elevated results  eGFR ml/min/1 73sq m 46  56           Phosphorus  Order: [id number]   Status: Final result      Visible to patient: Yes (seen)      Next appt: 06/08/2023 at 10:15 AM in Physical Therapy [facility], )      Dx: Stage 3a chronic kidney disease (HCC)      0 Result Notes          Component Ref Range & Units 6/2/23 10:54 AM 3/21/23  1:46 PM 12/13/22  1:59 PM 9/26/22  3:01 PM   Phosphorus 2 3 - 4 1 mg/dL 3 7  3 5  2 8        PTH, intact  Order: [id number]   Status: Final result      Visible to patient: Yes (seen)      Next appt: 06/08/2023 at 10:15 AM in Physical Therapy [facility], )      Dx: Stage 3a chronic kidney disease (HCC)      0 Result Notes          Component Ref Range & Units 6/2/23 10:54 AM 3/21/23  1:46 PM 12/13/22  1:59 PM 9/26/22  3:01 PM   PTH 12 0 - 88 0 pg/mL 42 5  32 4 R  49 9         Vitamin D 25 hydroxy  Order: [id number]   Status: Final result      Visible to patient: Yes (seen)      Next appt: 06/08/2023 at 10:15 AM in Physical Therapy [facility], )      Dx: Stage 3a chronic kidney disease (HCC)      0 Result Notes          Component Ref Range & Units 6/2/23 10:54 AM 3/21/23  1:46 PM 12/13/22  1:59 PM 9/26/22  3:01 PM   Vit D, 25-Hydroxy 30 0 - 100 0 ng/mL 38 2  30 0  30 6         Contains abnormal data Protein / creatinine ratio, urine  Order: [id number]   Status: Final result      Visible to patient: Yes (seen)      Next appt: 06/08/2023 at 10:15 AM in Physical Therapy [facility], )      Dx:  Stage 3a chronic kidney disease (HCC)      1 Result Note     1 Patient Communication          Component Ref Range & Units 6/2/23 10:54 AM 3/21/23  1:46 PM 12/14/22  3:25 PM 9/26/22  3:01 PM   Creatinine, Ur mg/dL 109 0  40 2  63 1  92 4    Protein Urine Random mg/dL 13  5  8  9    Prot/Creat Ratio, Ur 0 00 - 0 10 0 12 High   0 12 High   0 13 High          TSH, 3rd generation with Free T4 reflex  Order: [id number]   Status: Final result      Visible to patient: Yes (seen)      Next appt: 06/08/2023 at 10:15 AM in Physical Therapy Wannetta Severe, PT)      Dx:  Acquired hypothyroidism      1 Result Note     1 Patient Communication           Component Ref Range & Units 6/2/23 10:54 AM 3/21/23  1:46 PM 12/13/22  1:59 PM 9/9/22  3:45 PM 7/26/22  4:33 PM   TSH 3RD GENERATON 0 450 - 4 500 uIU/mL 0 469  0 111 Low  CM  0 060 Low  CM

## 2023-06-06 NOTE — ASSESSMENT & PLAN NOTE
Lab Results   Component Value Date    CREATININE 1 18 06/02/2023    CREATININE 1 00 03/21/2023    CREATININE 0 97 12/13/2022    EGFR 46 06/02/2023    EGFR 56 03/21/2023    EGFR 59 12/13/2022

## 2023-06-06 NOTE — ASSESSMENT & PLAN NOTE
HTN assessed for stability and discussed plan of care   Estimated Creatinine Clearance: 49 6 mL/min (by C-G formula based on SCr of 1 18 mg/dL)  Reviewed labs       No changes   We will continue to monitor

## 2023-06-08 ENCOUNTER — OFFICE VISIT (OUTPATIENT)
Dept: PHYSICAL THERAPY | Facility: CLINIC | Age: 73
End: 2023-06-08
Payer: MEDICARE

## 2023-06-08 DIAGNOSIS — M25.562 LEFT KNEE PAIN, UNSPECIFIED CHRONICITY: ICD-10-CM

## 2023-06-08 DIAGNOSIS — Z96.642 S/P TOTAL LEFT HIP ARTHROPLASTY: ICD-10-CM

## 2023-06-08 DIAGNOSIS — M16.12 OSTEOARTHRITIS OF LEFT HIP, UNSPECIFIED OSTEOARTHRITIS TYPE: Primary | ICD-10-CM

## 2023-06-08 PROCEDURE — 97530 THERAPEUTIC ACTIVITIES: CPT

## 2023-06-08 PROCEDURE — 97112 NEUROMUSCULAR REEDUCATION: CPT

## 2023-06-08 PROCEDURE — 97110 THERAPEUTIC EXERCISES: CPT

## 2023-06-08 NOTE — PROGRESS NOTES
"Daily Note     Today's date: 2023  Patient name: Tamica Hernandez  : 1950  MRN: [id number]  Referring provider: MARCELINA Mendez*  Dx:   Encounter Diagnosis     ICD-10-CM    1  Osteoarthritis of left hip, unspecified osteoarthritis type  M16 12       2  S/P total left hip arthroplasty  Z96 642       3  Left knee pain, unspecified chronicity  M25 562                      Subjective: Pt reports feeling good today  Only pain noted is when she turns a certain way she feels it in her knee  Objective: See treatment diary below      Assessment: Tolerated treatment well  Good recall of current program demonstrated  No reports of increased pain  She still continues to be challenged by use of weight and increased reps  Patient demonstrated fatigue post treatment, exhibited good technique with therapeutic exercises and would benefit from continued PT      Plan: Continue per plan of care        Precautions: R hip anterior CAROLIN 10/5    Manuals 3/23 3/30 4/4 4/11 5/2 5/11 5/18 5/25 6/1 6/8   PROM L Hip (flex; ER within precautions)             Knee extension isometric  3x10 3\" NP          Neuro Re-Ed             Pt education about HEP      2'       Quad Sets  On E-stim On E-stim On E-stim On E-stim On E-stim        Standing SLR  3x10 3x10 3x10 3x10 3x10 2# 3x10 2# 3x10 2# 3x10 2#    Bridges c TB ER 3x10 3x10 3x10 3x10 3x10 3x10 3x10 3x10 3x10    Prone TKE             SAQ attempted on E-stim  attempted on E-stim          Supine SLR  1x10 pain 4x5  6x5 3x10 3x10 3x10 3x10 1# 3x10 2#    Sled push for TKE      1 lap x60' full stack 2 laps full stack 2 laps 10# 2 laps 10#    Ther Ex             LAQ eccentric 2x10 active            Nustep 7' lvl 3  7' lvl 3  7' lvl 3  7' lvl 3  7' lvl 3  7' lvl 4  7 min L4 7' L4 7' L4    Prone Quad stretch  5x20\" 5x20\" 5x20\" 5x20\"  5x20s 5x20s 5x20s    TB HS curls             Dock kicks 3x10 pain 3x10          SL leg press 3x20 45# 3x20 45-55# 3x20 45# seat 8 3x20   45# seat 8 3x20 " "  45# seat 8 3x20   45# seat 8 1x20 55#  2x20 45# 1x30 55#  1x30 45# 1x30 55#  1x30 45#    Squats at bar             Ther Activity             Forward step ups 3x10 4\" 3x10 4\" 3x10 4\" 3x10 4\" 3x10 6\" 3x10 6\" 3x10 6\" 3x10 6\" 3x10 6\"    Lateral step ups 3x10   4\" 3x10 4\" 3x10 4\" 3x10 4\" 3x10 6\" 3x10 6\" 3x10 6\" 3x10 6\" 3x10 6\"    Sit to stands c airex 2x10 c airex 2x10 s airex 2x10 s airex 2x10 c airex 3x10 c airex 3x10 c airex 3x10 w airex 2x10; 2x10 10# 2x10; 2x10 10#    Lateral tap downs       NV 1x10 1x5 4\" x20 2\" x20 2\"    Gait Training                                       Modalities             NMES/Russ[place] stim L Quad 10'  50% duty cycle, 10/20  21 intensity (neuro re-ed) L Quad 10'  50% duty cycle, 10/20  21 intensity (neuro re-ed) L Quad 10'  50% duty cycle, 10/20  21 intensity (neuro re-ed) L Quad 10'  50% duty cycle, 10/20  21 intensity (neuro re-ed) L Quad 10'  50% duty cycle, 10/20  21 intensity (neuro re-ed)                                    "

## 2023-06-11 DIAGNOSIS — Z96.642 S/P TOTAL LEFT HIP ARTHROPLASTY: Primary | ICD-10-CM

## 2023-06-13 ENCOUNTER — APPOINTMENT (OUTPATIENT)
Dept: RADIOLOGY | Facility: CLINIC | Age: 73
End: 2023-06-13
Payer: MEDICARE

## 2023-06-13 ENCOUNTER — OFFICE VISIT (OUTPATIENT)
Dept: OBGYN CLINIC | Facility: CLINIC | Age: 73
End: 2023-06-13
Payer: MEDICARE

## 2023-06-13 VITALS
WEIGHT: 198.3 LBS | DIASTOLIC BLOOD PRESSURE: 81 MMHG | HEIGHT: 66 IN | HEART RATE: 75 BPM | BODY MASS INDEX: 31.87 KG/M2 | SYSTOLIC BLOOD PRESSURE: 122 MMHG

## 2023-06-13 DIAGNOSIS — Z96.642 S/P TOTAL LEFT HIP ARTHROPLASTY: ICD-10-CM

## 2023-06-13 DIAGNOSIS — Z96.642 S/P TOTAL LEFT HIP ARTHROPLASTY: Primary | ICD-10-CM

## 2023-06-13 PROCEDURE — 73502 X-RAY EXAM HIP UNI 2-3 VIEWS: CPT

## 2023-06-13 PROCEDURE — 99213 OFFICE O/P EST LOW 20 MIN: CPT | Performed by: ORTHOPAEDIC SURGERY

## 2023-06-13 NOTE — PROGRESS NOTES
Orthopaedics Office Visit - Established Patient Visit    ASSESSMENT/PLAN:    Assessment:   Aprox  8 months s/p left anterior total hip arthroplasty  Repair of released rectus femoris intraop, DOS 10/5/22   5/5 strength left hip; has now reached 5/5 hip flexion and knee extension  Left knee osteoarthritis     Plan:   · X-rays were performed in the office and reviewed   · Finish PT and transition to a HEP   · We discussed lateral hip pain may be early developmental of trochanteric bursitis, which may be amenable to a CSI in the future pending symptoms   · She may use Voltaren Gel sparingly to the lateral aspect of the hip   · Follow up in 4 months time for left hip x-rays     To Do Next Visit:  X-ray left hip     _____________________________________________________  CHIEF COMPLAINT:  Chief Complaint   Patient presents with   • Left Hip - Follow-up         SUBJECTIVE:  Alexandra Stein is a 72 y o  female who presents to the office aprox  8 months s/p left anterior total hip arthroplasty, DOS 10/5/22  Overall [facility] is doing well  She notes hip flexion strength has improved  She notes she is now able to perform a straight leg raise  She notes that therapy will evaluate her again and she will likely be discharged  She notes mild pain to the lateral aspect of her hip  She notes the pain has not been bad enough to take anything for the lateral pain         PAST MEDICAL HISTORY:  Past Medical History:   Diagnosis Date   • Allergic Childhood   • Anxiety 2019   • Arthritis    • Asthma Childhood   • Chronic kidney disease    • CPAP (continuous positive airway pressure) dependence     pt states was tested for VILMA, and ruled that she doesnt have it, however oxygen levels drop during sleep so CPAP was recommended,is not yet using it   • Disease of thyroid gland Approx 2000   • Diverticulitis of colon 2000   • Heart murmur    • Hyperlipidemia    • Hypertension 1995 approx   • Obesity Childhood   [facility] PAST SURGICAL HISTORY:  Past Surgical History:   Procedure Laterality Date   • APPENDECTOMY  About    •  SECTION   &    • COLON SURGERY  About    • HYSTERECTOMY  About    • MN ARTHRP ACETBLR/PROX FEM PROSTC AGRFT/ALGRFT Left 10/5/2022    Procedure: ARTHROPLASTY LEFT HIP TOTAL ANTERIOR;  Surgeon: Alfa Wahl MD;  Location: [facility];  Service: Orthopedics       FAMILY HISTORY:  Family History   Problem Relation Age of Onset   • Depression Mother    • Thrombosis Mother    • Arthritis Mother    • Cancer Mother    • Anxiety disorder Mother    • Heart disease Father         I   • Cancer Brother    • Dementia Brother    • Heart disease Brother        SOCIAL HISTORY:  Social History     Tobacco Use   • Smoking status: Never   • Smokeless tobacco: Never   Vaping Use   • Vaping Use: Never used   Substance Use Topics   • Alcohol use: Not Currently   • Drug use: Never       MEDICATIONS:    Current Outpatient Medications:   •  albuterol (PROVENTIL HFA,VENTOLIN HFA) 90 mcg/act inhaler, Inhale 2 puffs every 4 (four) hours as needed, Disp: , Rfl:   •  ALPRAZolam (XANAX) 0 25 mg tablet, Take 1 tablet (0 25 mg total) by mouth 3 (three) times a day as needed for anxiety 1/2-1 as needed tid, Disp: 30 tablet, Rfl: 0  •  ammonium lactate (LAC-HYDRIN) 12 % cream, Apply topically as needed for dry skin, Disp: 385 g, Rfl: 1  •  aspirin (ECOTRIN LOW STRENGTH) 81 mg EC tablet, Take 81 mg by mouth in the morning, Disp: , Rfl:   •  atorvastatin (LIPITOR) 40 mg tablet, TAKE 1 TABLET DAILY, Disp: 90 tablet, Rfl: 3  •  clopidogrel (PLAVIX) 75 mg tablet, TAKE 1 TABLET DAILY, Disp: 90 tablet, Rfl: 3  •  fluticasone (FLONASE) 50 mcg/act nasal spray, 2 sprays into each nostril daily as needed, Disp: , Rfl:   •  fluticasone-salmeterol (Advair HFA) 230-21 MCG/ACT inhaler, Inhale 2 puffs 2 (two) times a day Rinse mouth after use , Disp: 36 g, Rfl: 1  •  fluticasone-vilanterol (BREO ELLIPTA) 200-25 MCG/INH "inhaler, Inhale 1 puff daily, Disp: , Rfl:   •  levothyroxine (Euthyrox) 112 mcg tablet, Take 1 tablet (112 mcg total) by mouth daily in the early morning, Disp: 90 tablet, Rfl: 1  •  losartan-hydrochlorothiazide (HYZAAR) 100-25 MG per tablet, Take 1 tablet by mouth daily, Disp: , Rfl:   •  montelukast (SINGULAIR) 10 mg tablet, Take 1 tablet (10 mg total) by mouth daily as needed (allergies), Disp: 90 tablet, Rfl: 1  •  Multiple Vitamin (multivitamin) tablet, Take 1 tablet by mouth daily, Disp: 30 tablet, Rfl: 0  •  polyethylene glycol (MIRALAX) 17 g packet, Take 17 g by mouth daily, Disp: , Rfl:   •  Restasis 0 05 % ophthalmic emulsion, , Disp: , Rfl:   •  sertraline (ZOLOFT) 50 mg tablet, Take 1 tablet (50 mg total) by mouth daily, Disp: 30 tablet, Rfl: 0  •  traZODone (DESYREL) 100 mg tablet, Take 1 tablet (100 mg total) by mouth daily at bedtime, Disp: 90 tablet, Rfl: 1    ALLERGIES:  Allergies   Allergen Reactions   • Sulfamethoxazole-Trimethoprim Rash       REVIEW OF SYSTEMS:  MSK: as noted in HPI  Neuro: WNL  Pertinent items are otherwise noted in HPI  A comprehensive review of systems was otherwise negative  LABS:  HgA1c:   Lab Results   Component Value Date    HGBA1C 5 0 03/21/2023     BMP:   Lab Results   Component Value Date    BUN 22 06/02/2023    CALCIUM 9 5 06/02/2023     (H) 06/02/2023    CO2 27 06/02/2023    CREATININE 1 18 06/02/2023    K 4 1 06/02/2023     CBC: No components found for: \"CBC\"    _____________________________________________________  PHYSICAL EXAMINATION:  Vital signs: /81   Pulse 75   Ht 5' 6\" (1 676 m)   Wt 89 9 kg (198 lb 4 8 oz)   BMI 32 01 kg/m²   General: No acute distress, awake and alert  Psychiatric: Mood and affect appear appropriate  HEENT: Trachea Midline, No torticollis, no apparent facial trauma  Cardiovascular: No audible murmurs;  Extremities appear perfused  Pulmonary: No audible wheezing or stridor  Skin: No open lesions; see further details (if " any) below    MUSCULOSKELETAL EXAMINATION:    Extremities:  Left hip     No erythema, ecchymosis or edema  Well healed surgical incision   Hip flexion strength 5/5  Knee extension strength 5/5  Normal hip ROM without pain   Able to perform a straight leg raise   Ambulates without assistance   Extremity appears warm and well perfused     _____________________________________________________  STUDIES REVIEWED:  I personally reviewed the images and interpretation is as follows:  X-rays of the left hip performed in the office today demonstrate s/p left total hip arthroplasty without signs of loosening or failure         PROCEDURES PERFORMED:  Procedures    Scribe Attestation    I,:  Naomie Bhakta am acting as a scribe while in the presence of the attending physician :       I,:  Elizabeth Cooper MD personally performed the services described in this documentation    as scribed in my presence :

## 2023-06-14 NOTE — PROGRESS NOTES
PT Re-Evaluation     Today's date: 6/15/2023  Patient name: Mitchell Loja  : 1950  MRN: [id number]  Referring provider: MARCELINA Medina*  Dx:   Encounter Diagnosis     ICD-10-CM    1  Osteoarthritis of left hip, unspecified osteoarthritis type  M16 12       2  Left knee pain, unspecified chronicity  M25 562       3  S/P total left hip arthroplasty  Z96 642           Start Time: 0930  Stop Time: 1015  Total time in clinic (min): 45 minutes    Assessment  Assessment details: Pt is a 81 y o  female presenting upon physical therapy re-evaluation with improvements in L quadriceps and hip flexor strength  Pt demonstrates no extensor lag of L knee and adequate quadriceps control  Pt also reports subjective improvement of functional ability since previous re-evaluation  Pt is agreeable and appropriate for discharge with home exercise program due to achieving her functional goals with physical therapy  Re-enforced HEP and discussed continued progressions and strengthening  Pt demonstrates good understanding of HEP and of current plan  Understanding of Dx/Px/POC: good   Prognosis: good    Goals  STG 4 - weeks  1  Patient will demonstrate L knee extensor lag of 25 degrees or less to facilitate functional ability  - met  2  Patient will demonstrate improved L hip strength to 4+/5 or better in all planes to facilitate functional ability  -met  LTG 8 - weeks  1  Patient will demonstrate L quadriceps MMT strength to 5/5 to facilitate functional ability  -not met  2  Patient will demonstrate increased FOTO score from 56 at baseline to predicted score of 68 or higher to facilitate functional ability   -met    Plan  Patient would benefit from: PT eval and skilled physical therapy  Planned modality interventions: cryotherapy, thermotherapy: hydrocollator packs, unattended electrical stimulation and electrical stimulation/Russian stimulation  Planned therapy interventions: balance, functional ROM exercises, flexibility, gait training, home exercise program, joint mobilization, manual therapy, massage, neuromuscular re-education, patient education, strengthening, stretching, therapeutic activities, therapeutic exercise and motor coordination training  Frequency: 1x week  Duration in weeks: 8  Plan of Care beginning date: 2023  Plan of Care expiration date: 2023  Treatment plan discussed with: patient        Subjective Evaluation    History of Present Illness  Date of surgery: 10/5/2022  Mechanism of injury: surgery  Mechanism of injury: Pt presents to PT s/p L anterior total hip arthroplasty on 10/5/22  Pt had attempted conservative management prior to surgery but was unsuccessful  Pt was completing home health PT prior to outpatient referral  Pt reports home health went well and she continues to do exercises every day  Pt reports her only hip precaution is to not cross her L foot onto her R leg  Upon re-evaluation, pt reports continued improvement of her L hip and L knee  Pt reports only occasional lateral hip pain that she reports feels muscular in nature  Pt notes continued grinding of L knee but that it has been less painful  Pt doing better with stairs and is able to go down step-over-step most of the time with use of railing for support  Pain  Current pain ratin  At best pain ratin  At worst pain ratin (Hip pain 0/10)  Location: L knee  Quality: discomfort  Relieving factors: change in position  Progression: improved    Patient Goals  Patient goals for therapy: increased strength  Patient goal: To walk up and down stairs  Return to full function and be able to kick her legs          Objective     Active Range of Motion   Left Hip   Flexion: 100 degrees     Right Hip   Flexion: 100 degrees   Left Knee   Extension: WFL  Extensor la degrees     Passive Range of Motion   Left Knee   Flexion: WFL  Extension: 0 degrees WFL    Mobility     Additional Mobility Details  Grinding of L patella with "active knee extension    Patellar Static Positioning   Left Knee: lateral tilt    Additional Patellar Static Positioning Details  L patella laterally positioned    Strength/Myotome Testing     Left Hip   Planes of Motion   Flexion: 4+  Extension: 4+  Abduction: 4+  Adduction: 5  External rotation: 5  Internal rotation: 5    Right Hip   Planes of Motion   Flexion: 5  Extension: 4+  Abduction: 4+  Adduction: 5  External rotation: 5  Internal rotation: 5    Left Knee   Flexion: 5  Extension: 4-  Quadriceps contraction: fair    Right Knee   Flexion: 5  Extension: 5    Left Ankle/Foot   Dorsiflexion: 5  Plantar flexion: 5    Right Ankle/Foot   Dorsiflexion: 5  Plantar flexion: 5             Precautions: R hip anterior CAROLIN 10/5    POC expires [facility] 30 Days PT/OT + Visit Limit?    7/20 7/15 BOMN                Manuals 6/15   4/11 5/2 5/11 5/18 5/25 6/1 6/8   PROM L Hip (flex; ER within precautions)             Knee extension isometric             Neuro Re-Ed             Pt education about HEP 8'     2'       Quad Sets     On E-stim On E-stim        Standing SLR    3x10 3x10 3x10 2# 3x10 2# 3x10 2# 3x10 2#    Bridges c TB ER 3x10 blue   3x10 3x10 3x10 3x10 3x10 3x10    Prone TKE             SAQ             Supine SLR 3x10 2#   6x5 3x10 3x10 3x10 3x10 1# 3x10 2#    Sled push for TKE      1 lap x60' full stack 2 laps full stack 2 laps 10# 2 laps 10#    Ther Ex             LAQ eccentric             Nustep 7' L4   7' lvl 3  7' lvl 3  7' lvl 4  7 min L4 7' L4 7' L4    Prone Quad stretch 5x20s   5x20\" 5x20\"  5x20s 5x20s 5x20s    TB HS curls             Dock kicks             SL leg press 1x30 55#   3x20   45# seat 8 3x20   45# seat 8 3x20   45# seat 8 1x20 55#  2x20 45# 1x30 55#  1x30 45# 1x30 55#  1x30 45#    Squats at bar             Ther Activity             Forward step ups 3x10 6\"   3x10 4\" 3x10 6\" 3x10 6\" 3x10 6\" 3x10 6\" 3x10 6\"    Lateral step ups 3x10 6\"   3x10 4\" 3x10 6\" 3x10 6\" 3x10 6\" 3x10 6\" 3x10 6\"    Sit to stands c " "airex 3x10 10#   2x10 c airex 3x10 c airex 3x10 c airex 3x10 w airex 2x10; 2x10 10# 2x10; 2x10 10#    Lateral tap downs  x10 2\"; x25 4\"     NV 1x10 1x5 4\" x20 2\" x20 2\"    Gait Training                                       Modalities             NMES/Russain stim    L Quad 10'  50% duty cycle, 10/20  21 intensity (neuro re-ed) L Quad 10'  50% duty cycle, 10/20  21 intensity (neuro re-ed)                            "

## 2023-06-15 ENCOUNTER — EVALUATION (OUTPATIENT)
Dept: PHYSICAL THERAPY | Facility: CLINIC | Age: 73
End: 2023-06-15
Payer: MEDICARE

## 2023-06-15 DIAGNOSIS — M16.12 OSTEOARTHRITIS OF LEFT HIP, UNSPECIFIED OSTEOARTHRITIS TYPE: Primary | ICD-10-CM

## 2023-06-15 DIAGNOSIS — Z96.642 S/P TOTAL LEFT HIP ARTHROPLASTY: ICD-10-CM

## 2023-06-15 DIAGNOSIS — M25.562 LEFT KNEE PAIN, UNSPECIFIED CHRONICITY: ICD-10-CM

## 2023-06-15 PROCEDURE — 97112 NEUROMUSCULAR REEDUCATION: CPT

## 2023-06-15 PROCEDURE — 97110 THERAPEUTIC EXERCISES: CPT

## 2023-06-15 PROCEDURE — 97530 THERAPEUTIC ACTIVITIES: CPT

## 2023-06-16 NOTE — RESULT ENCOUNTER NOTE
Degenerative changes pubic symphysis and visualized lower lumbar spine      IMPRESSION:     Unremarkable appearance of left total hip arthroplasty

## 2023-06-17 DIAGNOSIS — F41.8 DEPRESSION WITH ANXIETY: ICD-10-CM

## 2023-07-05 ENCOUNTER — HOSPITAL ENCOUNTER (OUTPATIENT)
Dept: NON INVASIVE DIAGNOSTICS | Facility: HOSPITAL | Age: 73
Discharge: HOME | End: 2023-07-05
Attending: INTERNAL MEDICINE
Payer: MEDICARE

## 2023-07-05 VITALS
DIASTOLIC BLOOD PRESSURE: 81 MMHG | HEIGHT: 66 IN | WEIGHT: 198 LBS | BODY MASS INDEX: 31.82 KG/M2 | SYSTOLIC BLOOD PRESSURE: 122 MMHG | HEART RATE: 75 BPM

## 2023-07-05 DIAGNOSIS — I35.0 AORTIC VALVE STENOSIS, ETIOLOGY OF CARDIAC VALVE DISEASE UNSPECIFIED: Primary | ICD-10-CM

## 2023-07-05 DIAGNOSIS — I51.7 LAE (LEFT ATRIAL ENLARGEMENT): ICD-10-CM

## 2023-07-05 DIAGNOSIS — I35.0 NONRHEUMATIC AORTIC VALVE STENOSIS: ICD-10-CM

## 2023-07-05 LAB
AORTIC ROOT: 2.6 CM
AORTIC VALVE MEAN VELOCITY: 27.4 M/S
APICAL FOUR CHAMBER EJECTION FRACTION: 59 %
ASCENDING AORTA: 4.1 CM
AV AREA BY CONTINUOUS VTI: 1.1 CM2
AV AREA PEAK VELOCITY: 1.1 CM2
AV LVOT MEAN GRADIENT: 4 MMHG
AV LVOT PEAK GRADIENT: 6 MMHG
AV MEAN GRADIENT: 33 MMHG
AV PEAK GRADIENT: 52 MMHG
AV VALVE AREA: 1.05 CM2
DOP CALC AO VTI: 84.6 CM
DOP CALC LVOT AREA: 3.14
DOP CALC LVOT DIAMETER: 2 CM
DOP CALC LVOT PEAK VEL VTI: 28.4 CM
DOP CALC LVOT PEAK VEL: 1.21 M/S
DOP CALC LVOT STROKE INDEX: 44.7 ML/M2
DOP CALC LVOT STROKE VOLUME: 89.18
DOP CALC MV VTI: 42 CM
E WAVE DECELERATION TIME: 257 MS
FRACTIONAL SHORTENING: 34 (ref 28–44)
INTERVENTRICULAR SEPTUM IN DIASTOLE (PARASTERNAL SHORT AXIS VIEW): 1.3 CM
INTERVENTRICULAR SEPTUM: 1.3 CM (ref 0.6–1.1)
LA/AORTA RATIO 2D: 1.62
LAAS-AP2: 31.8 CM2
LAAS-AP4: 22.1 CM2
LEFT ATRIUM SIZE: 4.2 CM
LEFT ATRIUM VOLUME (MOD BIPLANE): 88 ML
LEFT INTERNAL DIMENSION IN SYSTOLE: 2.3 CM (ref 2.1–4)
LEFT VENTRICULAR INTERNAL DIMENSION IN DIASTOLE: 3.5 CM (ref 3.5–6)
LEFT VENTRICULAR POSTERIOR WALL IN END DIASTOLE: 1.2 CM
LEFT VENTRICULAR STROKE VOLUME: 33 ML
LVSV (TEICH): 33 ML
MV E'TISSUE VEL-SEP: 6 CM/S
MV MEAN GRADIENT: 6 MMHG
MV PEAK A VEL: 1.98 M/S
MV PEAK E VEL: 113 CM/S
MV PEAK GRADIENT: 15 MMHG
MV STENOSIS PRESSURE HALF TIME: 89 MS
MV VALVE AREA BY CONTINUITY EQUATION: 2.12 CM2
MV VALVE AREA P 1/2 METHOD: 2.47
SL CV LV EF: 60
SL CV PED ECHO LEFT VENTRICLE DIASTOLIC VOLUME (MOD BIPLANE) 2D: 51 ML
SL CV PED ECHO LEFT VENTRICLE SYSTOLIC VOLUME (MOD BIPLANE) 2D: 18 ML
TRICUSPID ANNULAR PLANE SYSTOLIC EXCURSION: 3.5 CM

## 2023-07-05 PROCEDURE — 93306 TTE W/DOPPLER COMPLETE: CPT

## 2023-07-05 PROCEDURE — 93306 TTE W/DOPPLER COMPLETE: CPT | Performed by: INTERNAL MEDICINE

## 2023-07-05 NOTE — LETTER
[address] [place] [address]  Cardiology Department  [address] [place] [id number]    July 6, 2023    MRN: [id number]     Phone: [phone]     Dear Ms. Jn Strickland recently had a(n) Echocardiogram performed on 7/5/2023 at  [address] [place] [address] that was requested by Prabhakar Meredith MD. The study was reviewed by a cardiologist, which is a physician who specializes in testing involving the heart. The cardiologist issued a report describing his or her findings. In that report there was a finding that the cardiologists felt warranted further discussion with your health care provider and that discussion would be beneficial to you. The results were sent to Prabhakar Meredith MD on 07/05/2023  3:43 PM. We recommend that you contact Prabhakar Meredith MD at [phone] or set up an appointment to discuss the results of your cardiac test. If you have already heard from Prabhakar Meredith MD regarding the results of your study, you can disregard this letter. This letter is not meant to alarm you, but intended to encourage you to follow-up on your test results with the provider that sent you. In addition, we have enclosed answers to frequently asked questions by other patients who have also received a letter to review results with their health care provider (see page two). Thank you for choosing [address] [place] [address] for your cardiac testing needs. Sincerely,    [address] [place] [address]  Cardiology Department                                                                                                                                                                        FREQUENTLY ASKED QUESTIONS    Why am I receiving this letter? [facility] requires us to notify patients who have findings on imaging exams that may require more testing or follow-up with a health professional within the next 3 months.         How serious is the finding on the imaging test?  This letter is sent to all patients who need follow-up or more testing within 3 months. Receiving this letter does not necessarily mean you have a life-threatening finding or disease. Recommendations in the cardiologist report are general in nature and it is up to your healthcare provider to say whether those recommendations make sense for your situation. You are strongly encouraged to talk to your healthcare provider about the results and ask whether additional steps need to be taken. Where can I get a copy of the final report for my recent cardiology exam?  To get a full copy of the report you can access your records online at [email or web address] or please contact [facility] Medical Records Department at [phone] Monday through Friday between 8 am and 6 pm.         What do I need to do now? Please contact your health care provider who requested the test to discuss what further actions (if any) are needed. You may have already heard from your ordering physician in regards to this test in which case you can disregard this letter. NOTICE IN ACCORDANCE WITH THE [place] STATE “PATIENT TEST RESULT INFORMATION ACT OF 2018”    You are receiving this notice as a result of a determination by your diagnostic study that further discussions of your test results are warranted and would be beneficial to you. The complete results of your test or tests have been or will be sent to the health care practitioner that ordered the test or tests. It is recommended that you contact your health care practitioner to discuss your results as soon as possible.

## 2023-07-05 NOTE — RESULT ENCOUNTER NOTE
Needs to see Cardiology - has some aortic valve stenosis and atrium that is dilated        ·  Left Ventricle: Left ventricular cavity size is normal. Wall thickness is mildly increased. The left ventricular ejection fraction is 60%. Systolic function is normal. Wall motion is normal. Diastolic function is mildly abnormal, consistent with grade I (abnormal) relaxation. ·  Left Atrium: The atrium is moderately dilated. ·  Aortic Valve: There is moderate to severe stenosis. Mean aortic valve gradient of 36 mmHg. ·  Aorta: The aortic root is mildly dilated.   Maximum measured anteroposterior diameter of 41 mm.

## 2023-07-19 DIAGNOSIS — F41.8 DEPRESSION WITH ANXIETY: ICD-10-CM

## 2023-07-19 RX ORDER — ALPRAZOLAM 0.25 MG/1
0.25 TABLET ORAL 3 TIMES DAILY PRN
Qty: 30 TABLET | Refills: 0 | Status: SHIPPED | OUTPATIENT
Start: 2023-07-19

## 2023-07-19 NOTE — TELEPHONE ENCOUNTER
Controlled Substance Review    [place] PDMP or [place]  reviewed: No red flags were identified; safe to proceed with prescription. .    RX Summary   Summary  Total Prescriptions  14    Total Private Pay  1    Total Prescribers  5    Total Pharmacies  3    Narcotics (excluding Buprenorphine)  Current MME/day  0.00    30 Day Avg MME/day  0.00    Current Qty  0    Buprenorphine   Current mg/day  0.00    30 Day Avg mg/day  0.00    Current Qty  0      Tile cannot be moved due to a restriction by the Admin  Prescriptions   Total: 14   Private Pay: 1   Showing 1-14 of 14 Items   View   15 Items    1 of 1   Filled  Written  Sold  ID  Drug  QTY  Days  Prescriber  RX #  Dispenser  Refill  Daily Dose*  Pymt Type      04/14/2023 04/14/2023   1  Alprazolam 0.25 Mg Tablet 30.00  10  Ve Jeff  [id number]   Pen ([id number])   1.50 LME  [place]    02/13/2023  02/10/2023   1  Alprazolam 0.25 Mg Tablet 30.00  10  Ve Jeff  [id number]   Pen ([id number])   1.50 LME  [place]    01/11/2023 01/11/2023   1  Alprazolam 0.25 Mg Tablet 30.00  10  Me Jarrett  [id number]   Pen (2981)   1.50 LME  [place]    12/22/2022 12/22/2022   1  Alprazolam 0.25 Mg Tablet 30.00  10  Me Jarrett  [id number]   Pen (3281)   1.50 LME  [place]    12/13/2022 12/13/2022   1  Zolpidem Tart Er 6.25 Mg Tab 30.00  30  Me Jarrett  [id number]   Pen (2393)   0.31 LME  [place]    12/13/2022 12/13/2022   1  Alprazolam 0.25 Mg Tablet 30.00  10  Me Jarrett  [id number]   Pen (2093)   1.50 LME [place]    11/30/2022 11/30/2022   1  Alprazolam 0.25 Mg Tablet 30.00  10  Me Jarrett  [id number]   Pen (5293)   1.50 LME  [place]    11/15/2022  11/15/2022   1  Alprazolam 0.25 Mg Tablet 30.00  10  Me Jarrett  [id number]   Pen (2381)
n/a

## 2023-07-21 ENCOUNTER — HOSPITAL ENCOUNTER (OUTPATIENT)
Dept: NON INVASIVE DIAGNOSTICS | Facility: CLINIC | Age: 73
Discharge: HOME | End: 2023-07-21
Payer: MEDICARE

## 2023-07-21 DIAGNOSIS — I73.9 PERIPHERAL ARTERIAL DISEASE WITH HISTORY OF REVASCULARIZATION (HCC): ICD-10-CM

## 2023-07-21 DIAGNOSIS — Z98.890 PERIPHERAL ARTERIAL DISEASE WITH HISTORY OF REVASCULARIZATION (HCC): ICD-10-CM

## 2023-07-21 PROCEDURE — 93923 UPR/LXTR ART STDY 3+ LVLS: CPT

## 2023-07-21 PROCEDURE — 93922 UPR/L XTREMITY ART 2 LEVELS: CPT | Performed by: SURGERY

## 2023-07-21 PROCEDURE — 93925 LOWER EXTREMITY STUDY: CPT | Performed by: SURGERY

## 2023-07-21 PROCEDURE — 93925 LOWER EXTREMITY STUDY: CPT

## 2023-07-22 NOTE — RESULT ENCOUNTER NOTE
Does she see vascular specialist ? She needs to follow up with them to take a look at the left lower extremity  stenosis  please   If she doesn't have one refer her to one please

## 2023-07-24 ENCOUNTER — TELEPHONE (OUTPATIENT)
Dept: VASCULAR SURGERY | Facility: CLINIC | Age: 73
End: 2023-07-24

## 2023-07-24 NOTE — TELEPHONE ENCOUNTER
----- Message from Carole Lefort sent at 7/24/2023  9:47 AM EDT -----  Call patient for OV with Dorothy Cerda

## 2023-08-05 DIAGNOSIS — E03.9 ACQUIRED HYPOTHYROIDISM: ICD-10-CM

## 2023-08-07 RX ORDER — LEVOTHYROXINE SODIUM 112 UG/1
112 TABLET ORAL
Qty: 90 TABLET | Refills: 3 | Status: SHIPPED | OUTPATIENT
Start: 2023-08-07

## 2023-08-17 ENCOUNTER — OFFICE VISIT (OUTPATIENT)
Dept: CARDIOLOGY CLINIC | Facility: CLINIC | Age: 73
End: 2023-08-17
Payer: MEDICARE

## 2023-08-17 VITALS
SYSTOLIC BLOOD PRESSURE: 130 MMHG | DIASTOLIC BLOOD PRESSURE: 70 MMHG | BODY MASS INDEX: 31.5 KG/M2 | WEIGHT: 196 LBS | HEART RATE: 86 BPM | HEIGHT: 66 IN | RESPIRATION RATE: 16 BRPM | OXYGEN SATURATION: 97 %

## 2023-08-17 DIAGNOSIS — I70.402 ATHEROSCLEROSIS OF AUTOLOGOUS VEIN BYPASS GRAFT OF LEFT LOWER EXTREMITY, WITH UNSPECIFIED PRESENCE OF CLINICAL MANIFESTATION (HCC): Primary | ICD-10-CM

## 2023-08-17 DIAGNOSIS — I51.7 LAE (LEFT ATRIAL ENLARGEMENT): ICD-10-CM

## 2023-08-17 DIAGNOSIS — I35.0 AORTIC VALVE STENOSIS, ETIOLOGY OF CARDIAC VALVE DISEASE UNSPECIFIED: ICD-10-CM

## 2023-08-17 PROCEDURE — 99213 OFFICE O/P EST LOW 20 MIN: CPT | Performed by: INTERNAL MEDICINE

## 2023-08-17 RX ORDER — GLUCOSAMINE/D3/BOSWELLIA SERRA 1500MG-400
TABLET ORAL
COMMUNITY
Start: 2023-07-01

## 2023-08-17 NOTE — PROGRESS NOTES
Cardiology Follow Up    Milagros Sanchez  1950  [id number]  [facility] - [facility] [address]  [address] [facility] [id number] [phone]    1. 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 Aortic valve stenosis, etiology of cardiac valve disease unspecified  -     Ambulatory Referral to Cardiology    3. LAE (left atrial enlargement)  -     Ambulatory Referral to Cardiology          Interval History: Pleasant 62-year-old lady with peripheral vascular disease and aortic stenosis. I last saw her in February. She continues to do well. She has not had any exertionally related shortness of breath or chest discomfort or lightheadedness. She was walking up to a mile a day but because of her foot problems she now only pedals on a bicycle for 1 hour/day.     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)   • Stage 3b chronic kidney disease (HCC)   • Anxiety   • Depression with anxiety   • Other insomnia   • Coronary artery disease involving native heart without angina pectoris   • Aortic valve stenosis   • Mild intermittent asthma without complication   • Liver cyst   • History of colonic polyps   • Contact dermatitis   • Depression, recurrent (HCC)   • Secondary hyperparathyroidism of renal origin ([address])   • Thrombocytopenia, unspecified ([address])   • Primary osteoarthritis of left knee   • S/P total left hip arthroplasty   • Peripheral nerve disease   • Dyslipidemia     Past Medical History:   Diagnosis Date   • Allergic Childhood   • Anxiety 2019   • Arthritis    • Asthma Childhood   • Chronic kidney disease    • CPAP (continuous positive airway pressure) dependence     pt states was tested for VILMA, and ruled that she doesnt have it, however oxygen levels drop during sleep so CPAP was recommended,is not yet using it   • Disease of thyroid gland Approx 2000   • Diverticulitis of colon 2000   • Heart murmur    • Hyperlipidemia    • Hypertension 1995 approx   • Obesity Childhood   • PAD (peripheral artery disease) ([address])      Social History     Socioeconomic History   • Marital status:      Spouse name: Not on file   • Number of children: Not on file   • Years of education: Not on file   • Highest education level: Not on file   Occupational History   • Not on file   Tobacco Use   • Smoking status: Never   • Smokeless tobacco: Never   Vaping Use   • Vaping Use: Never used   Substance and Sexual Activity   • Alcohol use: Not Currently   • Drug use: Never   • Sexual activity: Not Currently     Partners: Female     Birth control/protection: Diaphragm, None   Other Topics Concern   • Not on file   Social History Narrative   • Not on file     Social Determinants of Health     Financial Resource Strain: Low Risk  (2/6/2023)    Overall Financial Resource Strain (CARDIA)    • Difficulty of Paying Living Expenses: Not very hard   Food Insecurity: No Food Insecurity (10/6/2022)    Hunger Vital Sign    • Worried About Running Out of Food in the Last Year: Never true    • Ran Out of Food in the Last Year: Never true   Transportation Needs: No Transportation Needs (2/6/2023)    PRAPARE - Transportation    • Lack of Transportation (Medical):  No    • Lack of Transportation (Non-Medical): No   Physical Activity: Not on file   Stress: Not on file   Social Connections: Not on file   Intimate Partner Violence: Not on file   Housing Stability: Low Risk  (10/6/2022)    Housing Stability Vital Sign    • Unable to Pay for Housing in the Last Year: No    • Number of Places Lived in the Last Year: 1    • Unstable Housing in the Last Year: No      Family History   Problem Relation Age of Onset   • Depression Mother    • Thrombosis Mother    • Arthritis Mother    • Cancer Mother    • Anxiety disorder Mother    • Heart disease Father         I   • Cancer Brother    • Dementia Brother    • Heart disease Brother      Past Surgical History:   Procedure Laterality Date   • APPENDECTOMY  About 2000   • [address]   • COLON SURGERY  About 2000   • HYSTERECTOMY  About 2000   • [place] ARTHRP ACETBLR/PROX FEM PROSTC AGRFT/ALGRFT Left 10/5/2022    Procedure: ARTHROPLASTY LEFT HIP TOTAL ANTERIOR;  Surgeon: Kimberly Whitehead MD;  Location: [facility];  Service: Orthopedics       Current Outpatient Medications:   •  albuterol (PROVENTIL HFA,VENTOLIN HFA) 90 mcg/act inhaler, Inhale 2 puffs every 4 (four) hours as needed, Disp: , Rfl:   •  ALPRAZolam (XANAX) 0.25 mg tablet, Take 1 tablet (0.25 mg total) by mouth 3 (three) times a day as needed for anxiety 1/2-1 as needed tid, Disp: 30 tablet, Rfl: 0  •  ammonium lactate (LAC-HYDRIN) 12 % cream, Apply topically as needed for dry skin, Disp: 385 g, Rfl: 1  •  aspirin (ECOTRIN LOW STRENGTH) 81 mg EC tablet, Take 81 mg by mouth in the morning, Disp: , Rfl:   •  atorvastatin (LIPITOR) 40 mg tablet, TAKE 1 TABLET DAILY, Disp: 90 tablet, Rfl: 3  •  Biotin 17671 MCG TABS, , Disp: , Rfl:   •  clopidogrel (PLAVIX) 75 mg tablet, TAKE 1 TABLET DAILY, Disp: 90 tablet, Rfl: 3  •  fluticasone (FLONASE) 50 mcg/act nasal spray, 2 sprays into each nostril daily as needed, Disp: , Rfl:   •  fluticasone-salmeterol (Advair HFA) 230-21 MCG/ACT inhaler, Inhale 2 puffs 2 (two) times a day Rinse mouth after use., Disp: 36 g, Rfl: 1  •  levothyroxine 112 mcg tablet, TAKE 1 TABLET DAILY EARLY IN THE MORNING, Disp: 90 tablet, Rfl: 3  •  losartan-hydrochlorothiazide (HYZAAR) 100-25 MG per tablet, Take 1 tablet by mouth daily, Disp: , Rfl:   •  montelukast (SINGULAIR) 10 mg tablet, Take 1 tablet (10 mg total) by mouth daily as needed (allergies), Disp: 90 tablet, Rfl: 1  •  Multiple Vitamin (multivitamin) tablet, Take 1 tablet by mouth daily, Disp: 30 tablet, Rfl: 0  •  polyethylene glycol (MIRALAX) 17 g packet, Take 17 g by mouth daily, Disp: , Rfl:   •  sertraline (ZOLOFT) 50 mg tablet, TAKE 1 TABLET DAILY, Disp: 30 tablet, Rfl: 11  •  traZODone (DESYREL) 100 mg tablet, Take 1 tablet (100 mg total) by mouth daily at bedtime, Disp: 90 tablet, Rfl: 1  •  fluticasone-vilanterol (BREO ELLIPTA) 200-25 MCG/INH inhaler, Inhale 1 puff daily, Disp: , Rfl:   •  Restasis 0.05 % ophthalmic emulsion, , Disp: , Rfl:   Allergies   Allergen Reactions   • Sulfamethoxazole-Trimethoprim Rash       Labs:  Hospital Outpatient Visit on 07/05/2023   Component Date Value   • A4C EF 07/05/2023 59    • LVOT stroke volume 07/05/2023 89.18    • LVOT stroke volume index 07/05/2023 44.70    • LVIDd 07/05/2023 3.50    • LVIDS 07/05/2023 2.30    • IVSd 07/05/2023 1.30    • LVPWd 07/05/2023 1.20    • LVOT area 07/05/2023 3.14    • FS 07/05/2023 34    • MV E' Tissue Velocity Se* 07/05/2023 6    • E wave deceleration time 07/05/2023 257    • MV Peak E Ovidio 07/05/2023 113    • MV Peak A Ovidio 07/05/2023 1.98    • AV LVOT peak gradient 07/05/2023 6    • LVOT peak VTI 07/05/2023 28.4    • LVOT peak ovidio 07/05/2023 1.21    • Tricuspid annular plane * 07/05/2023 3.50    • LA size 07/05/2023 4.2    • LA/Ao Ratio 2D 07/05/2023 1.62    • LA volume (BP) 07/05/2023 88    • LVOT diameter 07/05/2023 2.0    • Ao VTI 07/05/2023 84.6    • AV mean gradient 07/05/2023 33    • LVOT mn grad 07/05/2023 4.0    • AV peak gradient 07/05/2023 52    • AV area by cont VTI 07/05/2023 1.1    • AV area peak marion 07/05/2023 1.1    • MV mean gradient antegra* 07/05/2023 6    • MV peak gradient antegra* 07/05/2023 15    • MV VTI 07/05/2023 42    • MV stenosis pressure 1/2* 07/05/2023 89    • MV valve area p 1/2 meth* 07/05/2023 2.47    • Ao root 07/05/2023 2.60    • Asc Ao 07/05/2023 4.1    • Aortic valve mean veloci* 07/05/2023 27.40    • Left ventricular stroke * 07/05/2023 33.00    • IVS 07/05/2023 1.3    • LEFT VENTRICLE SYSTOLIC * 49/11/4551 18    • LV DIASTOLIC VOLUME (MOD* 84/68/4892 51    • Left Atrium Area-systoli* 07/05/2023 22.1    • Left Atrium Area-systoli* 07/05/2023 31.8    • LVSV, 2D 07/05/2023 33    • AV valve area 07/05/2023 1.05    • MV valve area by continu* 07/05/2023 2.12    • LV EF 07/05/2023 60      Imaging: XR FOOT 3+ VW RIGHT    Result Date: 8/8/2023  Narrative: HISTORY: Right foot pain. COMPARISON: None. FINDINGS and     Impression: IMPRESSION: 3 views were obtained of the right foot. There is no plain film evidence for an acute dislocation or fracture. Mild degenerative changes involve the first MTP joint. There is a plantar calcaneal spur. Workstation:[id number]    VAS lower limb arterial duplex, complete bilateral    Result Date: 7/21/2023  Narrative:  [facility] REPORT [facility]INICAL: Indications:  Yearly surveillance for progression of disease. Patient has no complaints at this time. Operative History: Left AK popliteal artery to distal posterior tibial artery bypass graft Risk Factors The patient has history of HTN, Hyperlipidemia and PAD. Clinical Right Pressure:  109/ mm Hg, Left Pressure:  116/ mm Hg.   FINDINGS:  Right                                   Impression  PSV (cm/s)  Common Femoral Artery                                       78  Prox Profunda                                               64  Prox SFA                                                    51  Mid SFA                                                     55  Dist SFA 44  Proximal Pop                                                41  Distal Pop                                                  46  Tibioperoneal                                               43  Dist Post Tibial                        Occluded             0  Dist. Ant. Tibial                                           62   Left                                    Impression  PSV (cm/s)  Inflow Anastomosis (Pop-Distal Graft)                       94  Proximal Graft (Pop-Distal Graft)                           37  Middle Graft (Pop-Distal Graft)                             42  Distal Graft (Pop-Distal Graft)                             30  Outflow Anastomosis (Pop-Distal Graft)  >75%               213  Common Femoral Artery                                       48  Prox Profunda                                               44  Prox SFA                                                    49  Mid SFA                                                     54  Dist SFA                                                    46  Proximal Pop                                                40  Distal Pop                                                  57  Dist Post Tibial                                           148     CONCLUSION: Impression: RIGHT LOWER LIMB: No evidence of significant lower extremity arterial occlusive disease in the femoral and popliteal arteries. The distal posterior tibial artery is occluded. Ankle/Brachial index: 1.02 which is in the normal disease category (Prior 1.14) PVR/ PPG tracings are slightly dampened. Metatarsal pressure of 136mmHg Great toe pressure of 36mmHg, within the healing range, Prior 35mmHg. LEFT LOWER LIMB: The below knee popliteal artery to distal posterior tibial artery bypass graft is patent with a >75% stenosis of the distal anastomosis.  Ankle/Brachial index:  Deferred due to distal graft placement (Prior Deferred) PVR/ PPG tracings are normal. Metatarsal pressure of 148mmHg Great toe pressure of 106mmHg, within the healing range, Prior 63mmHg. Compared to previous study on 07/20/2022, there is no significant change noted. SIGNATURE: Electronically Signed by: Reinaldo Mckee MD on 2023-07-21 05:44:57 PM      Review of Systems:  Review of Systems    Physical Exam:  No distress. Blood pressure 130/78. Heart rate 86 and regular. Carotid upstroke mildly diminished but not delayed. Regular rate and rhythm. Grade 2/6 medium like systolic ejection murmur at the base without radiation to the carotids. A2 well-preserved. Lungs clear. No edema. Discussion/Summary:    1. Aortic stenosis-asymptomatic at present  2. Coronary disease without angina pectoris  3. Peripheral vascular disease    Recommendations:    1. Discussion about aortic stenosis and treatment  2. Recommended the patient be port probably any symptoms of exertionally related discomfort or lightheadedness  3. Return in 6 months.       Momo Petit MD

## 2023-08-21 ENCOUNTER — TELEPHONE (OUTPATIENT)
Dept: GASTROENTEROLOGY | Facility: CLINIC | Age: 73
End: 2023-08-21

## 2023-08-25 ENCOUNTER — HOSPITAL ENCOUNTER (OUTPATIENT)
Dept: GASTROENTEROLOGY | Facility: HOSPITAL | Age: 73
Setting detail: OUTPATIENT SURGERY
Discharge: HOME | End: 2023-08-25
Payer: MEDICARE

## 2023-08-25 ENCOUNTER — ANESTHESIA (OUTPATIENT)
Dept: GASTROENTEROLOGY | Facility: HOSPITAL | Age: 73
End: 2023-08-25

## 2023-08-25 ENCOUNTER — ANESTHESIA EVENT (OUTPATIENT)
Dept: GASTROENTEROLOGY | Facility: HOSPITAL | Age: 73
End: 2023-08-25

## 2023-08-25 VITALS
BODY MASS INDEX: 30.26 KG/M2 | HEIGHT: 66 IN | RESPIRATION RATE: 18 BRPM | WEIGHT: 188.27 LBS | HEART RATE: 64 BPM | DIASTOLIC BLOOD PRESSURE: 63 MMHG | TEMPERATURE: 98.1 F | OXYGEN SATURATION: 96 % | SYSTOLIC BLOOD PRESSURE: 107 MMHG

## 2023-08-25 DIAGNOSIS — Z12.11 COLON CANCER SCREENING: ICD-10-CM

## 2023-08-25 DIAGNOSIS — Z86.010 HISTORY OF COLON POLYPS: ICD-10-CM

## 2023-08-25 PROBLEM — E66.813 CLASS 3 SEVERE OBESITY WITHOUT SERIOUS COMORBIDITY WITH BODY MASS INDEX (BMI) OF 40.0 TO 44.9 IN ADULT, UNSPECIFIED OBESITY TYPE (HCC): Status: RESOLVED | Noted: 2022-06-14 | Resolved: 2023-08-25

## 2023-08-25 PROBLEM — E66.01 CLASS 3 SEVERE OBESITY WITHOUT SERIOUS COMORBIDITY WITH BODY MASS INDEX (BMI) OF 40.0 TO 44.9 IN ADULT, UNSPECIFIED OBESITY TYPE (HCC): Status: RESOLVED | Noted: 2022-06-14 | Resolved: 2023-08-25

## 2023-08-25 RX ORDER — LIDOCAINE HYDROCHLORIDE 10 MG/ML
INJECTION, SOLUTION EPIDURAL; INFILTRATION; INTRACAUDAL; PERINEURAL AS NEEDED
Status: DISCONTINUED | OUTPATIENT
Start: 2023-08-25 | End: 2023-08-25

## 2023-08-25 RX ORDER — PROPOFOL 10 MG/ML
INJECTION, EMULSION INTRAVENOUS AS NEEDED
Status: DISCONTINUED | OUTPATIENT
Start: 2023-08-25 | End: 2023-08-25

## 2023-08-25 RX ORDER — SODIUM CHLORIDE, SODIUM LACTATE, POTASSIUM CHLORIDE, CALCIUM CHLORIDE 600; 310; 30; 20 MG/100ML; MG/100ML; MG/100ML; MG/100ML
125 INJECTION, SOLUTION INTRAVENOUS CONTINUOUS
Status: DISCONTINUED | OUTPATIENT
Start: 2023-08-25 | End: 2023-08-29 | Stop reason: HOSPADM

## 2023-08-25 RX ORDER — LIDOCAINE HYDROCHLORIDE 10 MG/ML
0.5 INJECTION, SOLUTION EPIDURAL; INFILTRATION; INTRACAUDAL; PERINEURAL ONCE AS NEEDED
Status: DISCONTINUED | OUTPATIENT
Start: 2023-08-25 | End: 2023-08-29 | Stop reason: HOSPADM

## 2023-08-25 RX ADMIN — PROPOFOL 10 MG: 10 INJECTION, EMULSION INTRAVENOUS at 12:23

## 2023-08-25 RX ADMIN — LIDOCAINE HYDROCHLORIDE 20 MG: 10 INJECTION, SOLUTION EPIDURAL; INFILTRATION; INTRACAUDAL; PERINEURAL at 12:17

## 2023-08-25 RX ADMIN — PROPOFOL 10 MG: 10 INJECTION, EMULSION INTRAVENOUS at 12:27

## 2023-08-25 RX ADMIN — PROPOFOL 80 MG: 10 INJECTION, EMULSION INTRAVENOUS at 12:17

## 2023-08-25 RX ADMIN — SODIUM CHLORIDE, SODIUM LACTATE, POTASSIUM CHLORIDE, AND CALCIUM CHLORIDE: .6; .31; .03; .02 INJECTION, SOLUTION INTRAVENOUS at 12:05

## 2023-08-25 RX ADMIN — PROPOFOL 10 MG: 10 INJECTION, EMULSION INTRAVENOUS at 12:25

## 2023-08-25 RX ADMIN — PROPOFOL 10 MG: 10 INJECTION, EMULSION INTRAVENOUS at 12:21

## 2023-08-25 RX ADMIN — PROPOFOL 20 MG: 10 INJECTION, EMULSION INTRAVENOUS at 12:19

## 2023-08-25 NOTE — H&P
History and Physical - [facility]  Lourdes Nava 72 y.o. female MRN: [id number]                  HPI: Lourdes Nava is a 72y.o. year old female who presents for colonoscopy for history of polyps      REVIEW OF SYSTEMS: Per the HPI, and otherwise unremarkable.     Historical Information   Past Medical History:   Diagnosis Date   • Allergic Childhood   • Anxiety    • Arthritis    • Asthma Childhood   • Chronic kidney disease    • CPAP (continuous positive airway pressure) dependence     pt states was tested for VILMA, and ruled that she doesnt have it, however oxygen levels drop during sleep so CPAP was recommended,is not yet using it   • Disease of thyroid gland Approx    • Diverticulitis of colon    • Heart murmur    • Hyperlipidemia    • Hypertension  approx   • Obesity Childhood   • PAD (peripheral artery disease) ([address])      Past Surgical History:   Procedure Laterality Date   • APPENDECTOMY  About    •  SECTION   &    • COLON SURGERY  About    • HYSTERECTOMY  About    • [place] ARTHRP ACETBLR/PROX FEM PROSTC AGRFT/ALGRFT Left 10/5/2022    Procedure: ARTHROPLASTY LEFT HIP TOTAL ANTERIOR;  Surgeon: Chancy Schlatter, MD;  Location: [facility];  Service: Orthopedics     Social History   Social History     Substance and Sexual Activity   Alcohol Use Not Currently     Social History     Substance and Sexual Activity   Drug Use Never     Social History     Tobacco Use   Smoking Status Never   Smokeless Tobacco Never     Family History   Problem Relation Age of Onset   • Depression Mother    • Thrombosis Mother    • Arthritis Mother    • Cancer Mother    • Anxiety disorder Mother    • Heart disease Father         I   • Cancer Brother    • Dementia Brother    • Heart disease Brother        Meds/Allergies     (Not in a hospital admission)      Allergies   Allergen Reactions   • Sulfamethoxazole-Trimethoprim Rash       Objective     Blood pressure 103/68, pulse 78, temperature 97.8 °F (36.6 °C), temperature source Temporal, resp. rate 18, height 5' 6" (1.676 m), weight 85.4 kg (188 lb 4.4 oz), SpO2 95 %. PHYSICAL EXAM    /68   Pulse 78   Temp 97.8 °F (36.6 °C) (Temporal)   Resp 18   Ht 5' 6" (1.676 m)   Wt 85.4 kg (188 lb 4.4 oz)   SpO2 95%   BMI 30.39 kg/m²       Gen: NAD  CV: RRR  CHEST: Clear  ABD: soft, NT/ND  EXT: no edema      ASSESSMENT/PLAN:  This is a 72y.o. year old female here for colonoscopy, and she is stable and optimized for her procedure.

## 2023-08-25 NOTE — ANESTHESIA PREPROCEDURE EVALUATION
Procedure:  COLONOSCOPY    Relevant Problems   ANESTHESIA (within normal limits)   (-) History of anesthesia complications      CARDIO   (+) Aortic valve stenosis   (+) Coronary artery disease involving native heart without angina pectoris   (+) Hypertension   (-) Chest pain   (-) ARMAS (dyspnea on exertion)      ENDO   (+) Acquired hypothyroidism   (+) Secondary hyperparathyroidism of renal origin ([address])      GI/HEPATIC   (+) Liver cirrhosis secondary to JOSEPH (nonalcoholic steatohepatitis) (HCC)   (+) Liver cyst      /RENAL   (+) Stage 3b chronic kidney disease (HCC)      HEMATOLOGY   (+) Thrombocytopenia, unspecified (HCC)      MUSCULOSKELETAL   (+) Primary osteoarthritis of left knee   (+) Primary osteoarthritis of one hip, left      NEURO/PSYCH   (+) Anxiety   (+) Depression with anxiety   (+) Depression, recurrent (HCC)      PULMONARY   (+) Mild intermittent asthma without complication   (-) Shortness of breath   (-) URI (upper respiratory infection)      Cardiovascular and Mediastinum   (+) Peripheral arterial disease with history of revascularization ([address])      •  Left Ventricle: Left ventricular cavity size is normal. Wall thickness is mildly increased. The left ventricular ejection fraction is 60%. Systolic function is normal. Wall motion is normal. Diastolic function is mildly abnormal, consistent with grade I (abnormal) relaxation. •  Left Atrium: The atrium is moderately dilated. •  Aortic Valve: There is moderate to severe stenosis. Mean aortic valve gradient of 36 mmHg. •  Aorta: The aortic root is mildly dilated. Maximum measured anteroposterior diameter of 41 mm. Physical Exam    Airway    Mallampati score: II  TM Distance: >3 FB  Neck ROM: full     Dental       Cardiovascular      Pulmonary      Other Findings        Anesthesia Plan  ASA Score- 3     Anesthesia Type- IV sedation with anesthesia with ASA Monitors.          Additional Monitors:   Airway Plan:           Plan Factors-Exercise tolerance (METS): >4 METS. Chart reviewed. EKG reviewed. Existing labs reviewed. Patient summary reviewed. Induction- intravenous. Postoperative Plan-     Informed Consent- Anesthetic plan and risks discussed with patient. I personally reviewed this patient with the CRNA. Discussed and agreed on the Anesthesia Plan with the CRNA. Yin Wilkins

## 2023-08-25 NOTE — RESULT ENCOUNTER NOTE
IMPRESSION:  No polyps  Pandiverticulosis  Surgical anastomosis at 20 cm        RECOMMENDATION:    Repeat colonoscopy in 5 years    · Personal history of colon polyps        High-fiber diet          Dayanna Nicolas MD

## 2023-08-25 NOTE — ANESTHESIA POSTPROCEDURE EVALUATION
Post-Op Assessment Note    CV Status:  Stable  Pain Score: 0    Pain management: adequate     Mental Status:  Arousable and sleepy   Hydration Status:  Euvolemic   PONV Controlled:  Controlled   Airway Patency:  Patent      Post Op Vitals Reviewed: Yes      Staff: CRNA         No notable events documented.     BP   90/55   Temp      Pulse 60   Resp 18   SpO2 97% RA

## 2023-08-30 NOTE — RESULT ENCOUNTER NOTE
IMPRESSION:  No polyps  Pandiverticulosis  Surgical anastomosis at 20 cm        RECOMMENDATION:    Repeat colonoscopy in 5 years    · Personal history of colon polyps        High-fiber diet          Galo Velazquez MD

## 2023-09-02 DIAGNOSIS — G47.09 OTHER INSOMNIA: ICD-10-CM

## 2023-09-05 RX ORDER — TRAZODONE HYDROCHLORIDE 100 MG/1
100 TABLET ORAL
Qty: 90 TABLET | Refills: 3 | Status: SHIPPED | OUTPATIENT
Start: 2023-09-05

## 2023-09-05 NOTE — RESULT ENCOUNTER NOTE
FINDINGS:  · Diverticula in the ascending colon, hepatic flexure, transverse colon, splenic flexure, descending colon, sigmoid colon and rectosigmoid  · The cecum, rectosigmoid and rectum appeared normal.  · Healthy colocolonic anastomosis in the sigmoid colon

## 2023-09-18 NOTE — PROGRESS NOTES
Assessment/Plan:    Peripheral arterial disease with history of revascularization (HCC)  -     VAS lower limb arterial duplex, complete bilateral; Future  -     VAS abdominal aorta/iliacs; complete study; Future  -Walking is much as she wants to walk without any leg pain or claudication  -No ischemic rest pain or tissue loss    -KALE 7/21/2023    R 1.02/136/36; no significant LE arterial occlusive disease    L MICHELE deferred/metatarsal 148/great toe 106 (prior 63); Patent BK poplteal -PT bypass graft with >  75% distal anastomosis    -Exam: R LE 2+ DP pulse; L LE bypass palpable, 2+ DP/PT pulses; feet are warm and well-perfused, no wounds    Plan: Patient with asymptomatic atherosclerosis lower extremities status post BK pop-distal PT bypass with known distal anastomosis stenosis. We will continue optimal medical therapy and close clinical monitoring.    -Overall stable PAD   -Lower extremity arterial duplex study is unchanged from last year with>75% distal anastomosis stenosis  -Apparently her prior vascular surgeon told her that we will try to angioplasty this area but there was scar tissue  -Continue with aspirin, clopidogrel 75 and atorvastatin 40  -Avoid foot wounds and check feet daily for any wounds or changes  -Follow-up lower extremity arterial duplex study in 1 year with office visit  -Discussed reasons why she should be seen sooner        Additional diagnoses:  Counseling about travel  -     Compression Stocking  -Planning for long air travel   -Should exercise legs on plane and get up and walk around every hour to   -May use mild compression for travel  -Already on aspirin and clopidogrel      Stage 3b chronic kidney disease ([address])    Dyslipidemia    Liver cirrhosis secondary to JOSEPH (nonalcoholic steatohepatitis) ([address])      Subjective:      Patient ID: Zora Carrera is a 72 y.o. female. Patient presents today to review AOIL w/ MICHELE (3/21) and KALE (7/21). Ashok claudication. States that she walks daily. HPI    Rebeca Player 73 y/oF obesity (BMI 42.5), liver cirrhosis/JOSEPH, diverticulitis, Hx colonic polyps, asthma, Htn, HLD, hypothyroidism, peripheral arterial disease s/p L popliteal to distal PT artery bypass graft (7/31/2019) s/p bypass graft angioplasty x 2 (1/20/21, 8/12/20) who presents to  vascular for initial consultation. She has been followed by [place] and has now relocated to the [facility] and she is looking to transfer her care to [facility].     Mrs. Miles gives history of left second toe ulceration in 2019 which developed osteo and was amputated.  In that setting, she required left lower extremity bypass.  Since bypass she is had 2 interventions for recurrent stenosis to the graft and was told that there is "scar tissue." Last duplex in [place] was 01/03/2022. Olive Coca was noted to be recurrent area of stenosis of the distal popliteal to PT bypass graft with good flow proximally.  There was also noted to be anterior tibial artery disease. She has been maintained on aspirin, clopidogrel and high-intensity statin therapy since her bypass. 9/19/23:   Patient reports that she has had some right foot tendinitis which is holding her back a bit. She is otherwise doing well. She has no complaints. She is walking is much as she wants to walk without any claudication. She has no ischemic rest pain or wounds. She is living with her family including 2 grandchildren who she enjoys. We confirmed that she is still taking her medications including aspirin 81, clopidogrel 75 and atorvastatin 40. We reviewed her recent lower extremity arterial duplex study which does show a stenosis at the distal aspect of the left femoral to distal posterior tibial artery bypass which was also seen by her prior vascular surgeon.   She tells me he her prior vascular surgeon tried to intervene upon this but it was scar tissue      LE arterial duplex 7/21/23  FINDINGS:     Right                                   Impression PSV (cm/s)    Common Femoral Artery                                       78    Prox Profunda                                               64    Prox SFA                                                    51    Mid SFA                                                     55    Dist SFA                                                    44    Proximal Pop                                                41    Distal Pop                                                  46    Tibioperoneal                                               43    Dist Post Tibial                        Occluded             0    Dist. Ant. Tibial                                           62       Left                                    Impression  PSV (cm/s)    Inflow Anastomosis (Pop-Distal Graft)                       94    Proximal Graft (Pop-Distal Graft)                           37    Middle Graft (Pop-Distal Graft)                             42    Distal Graft (Pop-Distal Graft)                             30    Outflow Anastomosis (Pop-Distal Graft)  >75%               213    Common Femoral Artery                                       48    Prox Profunda                                               44    Prox SFA                                                    49    Mid SFA                                                     54    Dist SFA                                                    46    Proximal Pop                                                40    Distal Pop                                                  57    Dist Post Tibial                                           148             CONCLUSION:  Impression:  RIGHT LOWER LIMB:  No evidence of significant lower extremity arterial occlusive disease in the  femoral and popliteal arteries. The distal posterior tibial artery is occluded. Ankle/Brachial index: 1.02 which is in the normal disease category (Prior 1.14)  PVR/ PPG tracings are slightly dampened.   Metatarsal pressure of 136mmHg  Great toe pressure of 36mmHg, within the healing range, Prior 35mmHg. LEFT LOWER LIMB:  The below knee popliteal artery to distal posterior tibial artery bypass graft  is patent with a >75% stenosis of the distal anastomosis. Ankle/Brachial index:  Deferred due to distal graft placement (Prior Deferred)  PVR/ PPG tracings are normal.  Metatarsal pressure of 148mmHg  Great toe pressure of 106mmHg, within the healing range, Prior 63mmHg. Compared to previous study on 07/20/2022, there is no significant change noted. The following portions of the patient's history were reviewed and updated as appropriate: allergies, current medications, past family history, past medical history, past social history, past surgical history and problem list.    Review of Systems   Constitutional: Negative. HENT: Negative. Eyes: Negative. Respiratory: Negative. Cardiovascular: Negative. Gastrointestinal: Negative. Endocrine: Negative. Genitourinary: Negative. Musculoskeletal: Negative. Skin: Negative. Allergic/Immunologic: Negative. Neurological: Negative. Hematological: Negative. Psychiatric/Behavioral: Negative. Objective:      /76 (BP Location: Right arm, Patient Position: Sitting)   Pulse 90   Ht 5' 6" (1.676 m)   Wt 86.2 kg (190 lb)   BMI 30.67 kg/m²     Left lower extremity palpable bypass pulse  2+ DP pulses bilaterally  Feet are warm and well-perfused, no wounds       Physical Exam  Vitals and nursing note reviewed. Constitutional:       Appearance: She is well-developed. HENT:      Head: Normocephalic and atraumatic. Eyes:      Pupils: Pupils are equal, round, and reactive to light. Neck:      Thyroid: No thyromegaly. Vascular: No carotid bruit or JVD. Trachea: Trachea normal.   Cardiovascular:      Rate and Rhythm: Normal rate and regular rhythm.       Pulses:           Carotid pulses are 2+ on the right side and 2+ on the left side. Radial pulses are 2+ on the right side and 2+ on the left side. Dorsalis pedis pulses are 2+ on the right side and 2+ on the left side. Posterior tibial pulses are 0 on the right side and 2+ on the left side. Heart sounds: Normal heart sounds, S1 normal and S2 normal. No murmur heard. No friction rub. No gallop. Pulmonary:      Effort: Pulmonary effort is normal. No accessory muscle usage or respiratory distress. Breath sounds: Normal breath sounds. No wheezing or rales. Abdominal:      General: Bowel sounds are normal. There is no distension. Palpations: Abdomen is soft. Tenderness: There is no abdominal tenderness. Musculoskeletal:         General: No deformity. Normal range of motion. Cervical back: Neck supple. Right lower leg: No edema. Left lower leg: No edema. Skin:     General: Skin is warm and dry. Capillary Refill: Capillary refill takes less than 2 seconds. Findings: No lesion or rash. Nails: There is no clubbing. Neurological:      Mental Status: She is alert and oriented to person, place, and time. Deep Tendon Reflexes: Reflexes normal.      Comments: Grossly normal    Psychiatric:         Behavior: Behavior is cooperative. I have reviewed and made appropriate changes to the review of systems input by the medical assistant.     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 ([address])   • Stage 3b chronic kidney disease ([address])   • Anxiety   • Depression with anxiety   • Other insomnia   • Coronary artery disease involving native heart without angina pectoris   • Aortic valve stenosis   • Mild intermittent asthma without complication   • Liver cyst   • History of colonic polyps   • Contact dermatitis   • Depression, recurrent (HCC)   • Secondary hyperparathyroidism of renal origin ([address])   • Thrombocytopenia, unspecified ([address])   • Primary osteoarthritis of left knee   • S/P total left hip arthroplasty   • Peripheral nerve disease   • Dyslipidemia       Past Surgical History:   Procedure Laterality Date   • APPENDECTOMY  About    •  SECTION   &    • COLON SURGERY  About    • HYSTERECTOMY  About    • KS ARTHRP ACETBLR/PROX FEM PROSTC AGRFT/ALGRFT Left 10/5/2022    Procedure: ARTHROPLASTY LEFT HIP TOTAL ANTERIOR;  Surgeon: Arline Dance, MD;  Location: [facility];  Service: Orthopedics       Family History   Problem Relation Age of Onset   • Depression Mother    • Thrombosis Mother    • Arthritis Mother    • Cancer Mother    • Anxiety disorder Mother    • Heart disease Father         I   • Cancer Brother    • Dementia Brother    • Heart disease Brother        Social History     Socioeconomic History   • Marital status:      Spouse name: Not on file   • Number of children: Not on file   • Years of education: Not on file   • Highest education level: Not on file   Occupational History   • Not on file   Tobacco Use   • Smoking status: Never   • Smokeless tobacco: Never   Vaping Use   • Vaping Use: Never used   Substance and Sexual Activity   • Alcohol use: Not Currently   • Drug use: Never   • Sexual activity: Not Currently     Partners: Female     Birth control/protection: Diaphragm, None   Other Topics Concern   • Not on file   Social History Narrative   • Not on file     Social Determinants of Health     Financial Resource Strain: Low Risk  (2023) Overall Financial Resource Strain (CARDIA)    • Difficulty of Paying Living Expenses: Not very hard   Food Insecurity: No Food Insecurity (10/6/2022)    Hunger Vital Sign    • Worried About Running Out of Food in the Last Year: Never true    • Ran Out of Food in the Last Year: Never true   Transportation Needs: No Transportation Needs (2/6/2023)    PRAPARE - Transportation    • Lack of Transportation (Medical): No    • Lack of Transportation (Non-Medical):  No   Physical Activity: Not on file   Stress: Not on file   Social Connections: Not on file   Intimate Partner Violence: Not on file   Housing Stability: Low Risk  (10/6/2022)    Housing Stability Vital Sign    • Unable to Pay for Housing in the Last Year: No    • Number of Places Lived in the Last Year: 1    • Unstable Housing in the Last Year: No       Allergies   Allergen Reactions   • Sulfamethoxazole-Trimethoprim Rash         Current Outpatient Medications:   •  albuterol (PROVENTIL HFA,VENTOLIN HFA) 90 mcg/act inhaler, Inhale 2 puffs every 4 (four) hours as needed, Disp: , Rfl:   •  ALPRAZolam (XANAX) 0.25 mg tablet, Take 1 tablet (0.25 mg total) by mouth 3 (three) times a day as needed for anxiety 1/2-1 as needed tid, Disp: 30 tablet, Rfl: 0  •  ammonium lactate (LAC-HYDRIN) 12 % cream, Apply topically as needed for dry skin, Disp: 385 g, Rfl: 1  •  aspirin (ECOTRIN LOW STRENGTH) 81 mg EC tablet, Take 81 mg by mouth in the morning, Disp: , Rfl:   •  atorvastatin (LIPITOR) 40 mg tablet, TAKE 1 TABLET DAILY, Disp: 90 tablet, Rfl: 3  •  Biotin 95082 MCG TABS, , Disp: , Rfl:   •  clopidogrel (PLAVIX) 75 mg tablet, TAKE 1 TABLET DAILY, Disp: 90 tablet, Rfl: 3  •  fluticasone (FLONASE) 50 mcg/act nasal spray, 2 sprays into each nostril daily as needed, Disp: , Rfl:   •  fluticasone-salmeterol (Advair HFA) 230-21 MCG/ACT inhaler, Inhale 2 puffs 2 (two) times a day Rinse mouth after use., Disp: 36 g, Rfl: 1  •  levothyroxine 112 mcg tablet, TAKE 1 TABLET DAILY EARLY IN THE MORNING, Disp: 90 tablet, Rfl: 3  •  losartan-hydrochlorothiazide (HYZAAR) 100-25 MG per tablet, Take 1 tablet by mouth daily, Disp: , Rfl:   •  montelukast (SINGULAIR) 10 mg tablet, Take 1 tablet (10 mg total) by mouth daily as needed (allergies), Disp: 90 tablet, Rfl: 1  •  Multiple Vitamin (multivitamin) tablet, Take 1 tablet by mouth daily, Disp: 30 tablet, Rfl: 0  •  polyethylene glycol (MIRALAX) 17 g packet, Take 17 g by mouth daily, Disp: , Rfl:   •  Restasis 0.05 % ophthalmic emulsion, , Disp: , Rfl:   •  sertraline (ZOLOFT) 50 mg tablet, TAKE 1 TABLET DAILY, Disp: 30 tablet, Rfl: 11  •  traZODone (DESYREL) 100 mg tablet, TAKE 1 TABLET DAILY AT BEDTIME, Disp: 90 tablet, Rfl: 3

## 2023-09-19 ENCOUNTER — OFFICE VISIT (OUTPATIENT)
Dept: VASCULAR SURGERY | Facility: CLINIC | Age: 73
End: 2023-09-19
Payer: MEDICARE

## 2023-09-19 VITALS
DIASTOLIC BLOOD PRESSURE: 76 MMHG | SYSTOLIC BLOOD PRESSURE: 118 MMHG | HEART RATE: 90 BPM | WEIGHT: 190 LBS | HEIGHT: 66 IN | BODY MASS INDEX: 30.53 KG/M2

## 2023-09-19 DIAGNOSIS — Z71.84 COUNSELING ABOUT TRAVEL: ICD-10-CM

## 2023-09-19 DIAGNOSIS — I73.9 PERIPHERAL ARTERIAL DISEASE WITH HISTORY OF REVASCULARIZATION: ICD-10-CM

## 2023-09-19 DIAGNOSIS — K74.60 LIVER CIRRHOSIS SECONDARY TO NASH (NONALCOHOLIC STEATOHEPATITIS) (HCC): Primary | ICD-10-CM

## 2023-09-19 DIAGNOSIS — K75.81 LIVER CIRRHOSIS SECONDARY TO NASH (NONALCOHOLIC STEATOHEPATITIS) (HCC): Primary | ICD-10-CM

## 2023-09-19 DIAGNOSIS — I70.402 ATHEROSCLEROSIS OF AUTOLOGOUS VEIN BYPASS GRAFT OF LEFT LOWER EXTREMITY, WITH UNSPECIFIED PRESENCE OF CLINICAL MANIFESTATION (HCC): ICD-10-CM

## 2023-09-19 DIAGNOSIS — E78.5 DYSLIPIDEMIA: ICD-10-CM

## 2023-09-19 DIAGNOSIS — Z98.890 PERIPHERAL ARTERIAL DISEASE WITH HISTORY OF REVASCULARIZATION: ICD-10-CM

## 2023-09-19 DIAGNOSIS — N18.32 STAGE 3B CHRONIC KIDNEY DISEASE (HCC): ICD-10-CM

## 2023-09-19 PROCEDURE — 99213 OFFICE O/P EST LOW 20 MIN: CPT | Performed by: PHYSICIAN ASSISTANT

## 2023-10-09 ENCOUNTER — TELEPHONE (OUTPATIENT)
Age: 73
End: 2023-10-09

## 2023-10-09 NOTE — TELEPHONE ENCOUNTER
Caller: Patient    Doctor: Dr. Elise Green    Reason for call: Patient calling stating that she had a LTHA in October of last year and she is going for a dental cleaning and she was wondering if she is required to take antibiotic before hand.   Patient asking to speak to clinical team.      Call back#: [phone]

## 2023-10-09 NOTE — TELEPHONE ENCOUNTER
Spoke with patient at length. Patient was prescribed antibiotics via dentist.  Discussed that official stance of the practice is that patient does not necessarily need to take antibiotics prior to dental procedure. Patient was willing to proceed with antibiotics for the procedure. All questions and concerns were answered at this time.

## 2023-10-13 DIAGNOSIS — Z96.642 S/P TOTAL LEFT HIP ARTHROPLASTY: Primary | ICD-10-CM

## 2023-10-18 ENCOUNTER — LAB (OUTPATIENT)
Dept: LAB | Facility: CLINIC | Age: 73
End: 2023-10-18
Payer: MEDICARE

## 2023-10-18 DIAGNOSIS — N25.81 SECONDARY HYPERPARATHYROIDISM OF RENAL ORIGIN (HCC): ICD-10-CM

## 2023-10-18 DIAGNOSIS — E78.5 DYSLIPIDEMIA: ICD-10-CM

## 2023-10-18 DIAGNOSIS — I70.403: ICD-10-CM

## 2023-10-18 DIAGNOSIS — R73.01 ELEVATED FASTING GLUCOSE: ICD-10-CM

## 2023-10-18 DIAGNOSIS — E03.9 ACQUIRED HYPOTHYROIDISM: ICD-10-CM

## 2023-10-18 LAB
AMORPH URATE CRY URNS QL MICRO: ABNORMAL
BACTERIA UR QL AUTO: ABNORMAL /HPF
BILIRUB UR QL STRIP: NEGATIVE
CLARITY UR: CLEAR
COLOR UR: ABNORMAL
EST. AVERAGE GLUCOSE BLD GHB EST-MCNC: 103 MG/DL
GLUCOSE UR STRIP-MCNC: NEGATIVE MG/DL
HBA1C MFR BLD: 5.2 %
HGB UR QL STRIP.AUTO: NEGATIVE
HYALINE CASTS #/AREA URNS LPF: ABNORMAL /LPF
KETONES UR STRIP-MCNC: NEGATIVE MG/DL
LEUKOCYTE ESTERASE UR QL STRIP: ABNORMAL
NITRITE UR QL STRIP: POSITIVE
NON-SQ EPI CELLS URNS QL MICRO: ABNORMAL /HPF
PH UR STRIP.AUTO: 7 [PH]
PROT UR STRIP-MCNC: NEGATIVE MG/DL
RBC #/AREA URNS AUTO: ABNORMAL /HPF
SP GR UR STRIP.AUTO: 1.01 (ref 1–1.03)
TSH SERPL DL<=0.05 MIU/L-ACNC: 0.57 UIU/ML (ref 0.45–4.5)
UROBILINOGEN UR STRIP-ACNC: <2 MG/DL
WBC #/AREA URNS AUTO: ABNORMAL /HPF

## 2023-10-18 PROCEDURE — 87077 CULTURE AEROBIC IDENTIFY: CPT

## 2023-10-18 PROCEDURE — 83036 HEMOGLOBIN GLYCOSYLATED A1C: CPT

## 2023-10-18 PROCEDURE — 81001 URINALYSIS AUTO W/SCOPE: CPT

## 2023-10-18 PROCEDURE — 87086 URINE CULTURE/COLONY COUNT: CPT

## 2023-10-18 PROCEDURE — 87186 SC STD MICRODIL/AGAR DIL: CPT

## 2023-10-18 PROCEDURE — 84443 ASSAY THYROID STIM HORMONE: CPT

## 2023-10-18 NOTE — RESULT ENCOUNTER NOTE
Does have some bacteria in urine and nitrates possible UTI ?    Rest of the labs look ok we will discuss at follow up

## 2023-10-19 ENCOUNTER — TELEPHONE (OUTPATIENT)
Dept: FAMILY MEDICINE CLINIC | Facility: CLINIC | Age: 73
End: 2023-10-19

## 2023-10-19 NOTE — TELEPHONE ENCOUNTER
Pt returned call back - pt stating she is fully asymptomatic and is feeling ok. Pt agreed to discuss this at the appt on 10/25/2023 with Ramirez Green. Also I gave pt Britt's comments / notes on her test results in her chart - pt voiced understanding  anf has no questions at this time. No further action is needed at this time. Copied/ pasted:   "Does have some bacteria in urine and nitrates possible UTI ?   Rest of the labs look ok we will discuss at follow up"

## 2023-10-19 NOTE — TELEPHONE ENCOUNTER
----- Message from Lashell Murillo, [address] [place] [address] sent at 10/18/2023  5:00 PM EDT -----  Any urinary issues does look like she has a UTI   ----- Message -----  From: Lab, Background User  Sent: 10/18/2023   4:14 PM EDT  To: SLY Sheriff

## 2023-10-20 LAB — BACTERIA UR CULT: ABNORMAL

## 2023-10-25 ENCOUNTER — OFFICE VISIT (OUTPATIENT)
Dept: FAMILY MEDICINE CLINIC | Facility: CLINIC | Age: 73
End: 2023-10-25
Payer: MEDICARE

## 2023-10-25 VITALS
SYSTOLIC BLOOD PRESSURE: 104 MMHG | BODY MASS INDEX: 31.18 KG/M2 | HEIGHT: 66 IN | TEMPERATURE: 100.1 F | HEART RATE: 91 BPM | OXYGEN SATURATION: 96 % | WEIGHT: 194 LBS | DIASTOLIC BLOOD PRESSURE: 64 MMHG

## 2023-10-25 DIAGNOSIS — Z78.0 ENCOUNTER FOR OSTEOPOROSIS SCREENING IN ASYMPTOMATIC POSTMENOPAUSAL PATIENT: ICD-10-CM

## 2023-10-25 DIAGNOSIS — L29.9 PRURITUS: ICD-10-CM

## 2023-10-25 DIAGNOSIS — E03.9 ACQUIRED HYPOTHYROIDISM: Primary | ICD-10-CM

## 2023-10-25 DIAGNOSIS — I25.10 CORONARY ARTERY DISEASE INVOLVING NATIVE HEART WITHOUT ANGINA PECTORIS, UNSPECIFIED VESSEL OR LESION TYPE: ICD-10-CM

## 2023-10-25 DIAGNOSIS — Z13.820 ENCOUNTER FOR OSTEOPOROSIS SCREENING IN ASYMPTOMATIC POSTMENOPAUSAL PATIENT: ICD-10-CM

## 2023-10-25 DIAGNOSIS — N25.81 SECONDARY HYPERPARATHYROIDISM OF RENAL ORIGIN (HCC): ICD-10-CM

## 2023-10-25 DIAGNOSIS — I70.402 ATHEROSCLEROSIS OF AUTOLOGOUS VEIN BYPASS GRAFT OF LEFT LOWER EXTREMITY, WITH UNSPECIFIED PRESENCE OF CLINICAL MANIFESTATION (HCC): ICD-10-CM

## 2023-10-25 DIAGNOSIS — J45.20 MILD INTERMITTENT ASTHMA WITHOUT COMPLICATION: ICD-10-CM

## 2023-10-25 DIAGNOSIS — Z98.890 PERIPHERAL ARTERIAL DISEASE WITH HISTORY OF REVASCULARIZATION: ICD-10-CM

## 2023-10-25 DIAGNOSIS — I35.0 AORTIC VALVE STENOSIS, ETIOLOGY OF CARDIAC VALVE DISEASE UNSPECIFIED: ICD-10-CM

## 2023-10-25 DIAGNOSIS — R23.9 SKIN CHANGE: ICD-10-CM

## 2023-10-25 DIAGNOSIS — E78.5 DYSLIPIDEMIA: ICD-10-CM

## 2023-10-25 DIAGNOSIS — I73.9 PERIPHERAL ARTERIAL DISEASE WITH HISTORY OF REVASCULARIZATION: ICD-10-CM

## 2023-10-25 DIAGNOSIS — F41.9 ANXIETY: ICD-10-CM

## 2023-10-25 PROCEDURE — 99214 OFFICE O/P EST MOD 30 MIN: CPT | Performed by: NURSE PRACTITIONER

## 2023-10-25 RX ORDER — NYSTATIN AND TRIAMCINOLONE ACETONIDE 100000; 1 [USP'U]/G; MG/G
OINTMENT TOPICAL 2 TIMES DAILY
Qty: 120 G | Refills: 1 | Status: SHIPPED | OUTPATIENT
Start: 2023-10-25 | End: 2023-11-04

## 2023-10-25 NOTE — PROGRESS NOTES
Assessment/Plan:    Pt is a 72 yr old female   Presents in office for follow up   Underlying history of depression and anxiety - stable   Currently on Zoloft and Trazodone at night - recently adjustment was made to her zoloft - increased to 50 mg - doing well  HTN stable she has been on Hyzaar   Does have non alcoholic steato hepatitis - she is being monitored by GI   Sleep apnea - uses CPAP   PVD she has had stents to lower extremities and lost a toe to left lower extremity - take Plavix 75 mg daily and aspirin daily denies any issues currently. Does have some seasonal allergies and bronchitis.  - no issues currently   She is on Levothyroxine recently adjusted by previous PCP  - TSH improved dose have some issues with the nails and hair loss - we will continue to monitor   Also history of elevated fasting glucose --> will continue to monitor for diabetes   Discussed healthy diet   Discussed adequate hydration   Follow up with Nephrology for CKD monitoring   Follow up in 4-6 months   LIPID panel was done   I will order one to be done  and I will call with results        Problem List Items Addressed This Visit          Endocrine    Acquired hypothyroidism - Primary    Secondary hyperparathyroidism of renal origin ([address])       Respiratory    Mild intermittent asthma without complication       Cardiovascular and Mediastinum    Peripheral arterial disease with history of revascularization     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 female. Pt is a 72 yr old female   Presents in office for follow up   Underlying history of depression and anxiety - stable   Currently on Zoloft and Trazodone at night - recently adjustment was made to her zoloft - increased to 50 mg - doing well  HTN stable she has been on Hyzaar   Does have non alcoholic steato hepatitis - she is being monitored by GI   Sleep apnea - uses CPAP   PVD she has had stents to lower extremities and lost a toe to left lower extremity - take Plavix 75 mg daily and aspirin daily denies any issues currently. Does have some seasonal allergies and bronchitis. - no issues currently   She is on Levothyroxine recently adjusted by previous PCP  - TSH improved dose have some issues with the nails and hair loss - we will continue to monitor   Also history of elevated fasting glucose --> will continue to monitor for diabetes         The following portions of the patient's history were reviewed and updated as appropriate:   Past Medical History:  She has a past medical history of Allergic (Childhood), Anxiety (2019), Arthritis, Asthma (Childhood), Chronic kidney disease, CPAP (continuous positive airway pressure) dependence, Disease of thyroid gland (Approx ), Diverticulitis of colon (), Heart murmur, Hyperlipidemia, Hypertension ( approx), Obesity (Childhood), and PAD (peripheral artery disease) ([address]). ,  _______________________________________________________________________  Medical Problems:  does not have any pertinent problems on file.,  _______________________________________________________________________  Past Surgical History:   has a past surgical history that includes Appendectomy (About ); Colon surgery (About );  section ( & );  Hysterectomy (About ); and pr arthrp acetblr/prox fem prostc agrft/algrft (Left, 10/5/2022). ,  _______________________________________________________________________  Family History:  family history includes Anxiety disorder in her mother; Arthritis in her mother; Cancer in her brother and mother; Dementia in her brother; Depression in her mother; Heart disease in her brother and father; Thrombosis in her mother.,  _______________________________________________________________________  Social History:   reports that she has never smoked. She has never used smokeless tobacco. She reports that she does not currently use alcohol. She reports that she does not use drugs. ,  _______________________________________________________________________  Allergies:  is allergic to sulfamethoxazole-trimethoprim. .  _______________________________________________________________________  Current Outpatient Medications   Medication Sig Dispense Refill    albuterol (PROVENTIL HFA,VENTOLIN HFA) 90 mcg/act inhaler Inhale 2 puffs every 4 (four) hours as needed      ALPRAZolam (XANAX) 0.25 mg tablet Take 1 tablet (0.25 mg total) by mouth 3 (three) times a day as needed for anxiety 1/2-1 as needed tid 30 tablet 0    ammonium lactate (LAC-HYDRIN) 12 % cream Apply topically as needed for dry skin 385 g 1    aspirin (ECOTRIN LOW STRENGTH) 81 mg EC tablet Take 81 mg by mouth in the morning      atorvastatin (LIPITOR) 40 mg tablet TAKE 1 TABLET DAILY 90 tablet 3    Biotin 21673 MCG TABS       clopidogrel (PLAVIX) 75 mg tablet TAKE 1 TABLET DAILY 90 tablet 3    fluticasone (FLONASE) 50 mcg/act nasal spray 2 sprays into each nostril daily as needed      levothyroxine 112 mcg tablet TAKE 1 TABLET DAILY EARLY IN THE MORNING 90 tablet 3    losartan-hydrochlorothiazide (HYZAAR) 100-25 MG per tablet Take 1 tablet by mouth daily      Multiple Vitamin (multivitamin) tablet Take 1 tablet by mouth daily 30 tablet 0    nystatin-triamcinolone (MYCOLOG-II) ointment Apply topically 2 (two) times a day for 10 days 120 g 1    polyethylene glycol (MIRALAX) 17 g packet Take 17 g by mouth daily      Restasis 0.05 % ophthalmic emulsion       sertraline (ZOLOFT) 50 mg tablet TAKE 1 TABLET DAILY 30 tablet 11    traZODone (DESYREL) 100 mg tablet TAKE 1 TABLET DAILY AT BEDTIME 90 tablet 3    fluticasone-salmeterol (Advair HFA) 230-21 MCG/ACT inhaler Inhale 2 puffs 2 (two) times a day Rinse mouth after use. 36 g 1    montelukast (SINGULAIR) 10 mg tablet Take 1 tablet (10 mg total) by mouth daily as needed (allergies) 90 tablet 1     No current facility-administered medications for this visit.     _______________________________________________________________________  Review of Systems   Constitutional:  Negative for chills, fatigue, fever and unexpected weight change. HENT:  Negative for congestion, postnasal drip, sinus pressure and voice change. Eyes: Negative. Respiratory:  Negative for cough and shortness of breath. Cardiovascular:  Negative for chest pain and palpitations. HTN  Stable   Hyperlipemia  PVD     Gastrointestinal:  Negative for abdominal distention, abdominal pain, nausea and vomiting. JOSEPH - fatty liver   Sees GI    Endocrine:        Hypothyroidism    Genitourinary:  Negative for difficulty urinating and flank pain. Musculoskeletal:  Positive for arthralgias and myalgias. Skin:  Negative for rash. Allergic/Immunologic: Positive for environmental allergies. Neurological:  Negative for headaches. Hematological:  Negative for adenopathy. Psychiatric/Behavioral:  Positive for sleep disturbance. Negative for suicidal ideas. The patient is nervous/anxious. Objective:  Vitals:    10/25/23 1121   BP: 104/64   BP Location: Right arm   Patient Position: Sitting   Cuff Size: Large   Pulse: 91   Temp: 100.1 °F (37.8 °C)   SpO2: 96%   Weight: 88 kg (194 lb)   Height: 5' 6" (1.676 m)     Body mass index is 31.31 kg/m². Physical Exam  Vitals and nursing note reviewed.    Constitutional:       Appearance: Normal appearance. Comments: BMI 31.31   HENT:      Head: Atraumatic. Eyes:      Extraocular Movements: Extraocular movements intact. Cardiovascular:      Rate and Rhythm: Normal rate and regular rhythm. Pulses: Normal pulses. Heart sounds: Normal heart sounds. Pulmonary:      Breath sounds: Normal breath sounds. Abdominal:      General: There is no distension. Palpations: Abdomen is soft. There is no mass. Comments: Fatty liver    Musculoskeletal:         General: Normal range of motion. Cervical back: Normal range of motion. Skin:     General: Skin is warm. Capillary Refill: Capillary refill takes less than 2 seconds. Neurological:      Mental Status: She is alert and oriented to person, place, and time.    Psychiatric:         Mood and Affect: Mood normal.         Behavior: Behavior normal.           Contains abnormal data CBC and differential  Order: [id number]  Status: Final result       Visible to patient: Yes (seen)       Next appt: 07/22/2024 at 08:00 AM in Radiology ([facility])       Dx: Stage 3a chronic kidney disease ([address])    0 Result Notes       1 Follow-up Encounter             Component Ref Range & Units 10/18/23  9:46 AM 6/2/23 10:54 AM 3/21/23  1:46 PM 12/13/22  1:59 PM 10/7/22  6:27 AM 10/6/22  6:20 AM 9/26/22  3:01 PM   WBC 4.31 - 10.16 Thousand/uL 5.09 5.02 5.21 5.45 5.72 8.57 5.54   RBC 3.81 - 5.12 Million/uL 4.12 3.95 4.11 4.02 2.53 Low  3.05 Low  CM 4.02   Hemoglobin 11.5 - 15.4 g/dL 13.3 12.8 13.3 12.9 7.9 Low  9.6 Low  12.5   Hematocrit 34.8 - 46.1 % 41.1 39.6 39.6 40.0 24.5 Low  30.0 Low  39.5   MCV 82 - 98 fL 100 High  100 High  96 100 High  97 98 98   MCH 26.8 - 34.3 pg 32.3 32.4 32.4 32.1 31.2 31.5 31.1   MCHC 31.4 - 37.4 g/dL 32.4 32.3 33.6 32.3 32.2 32.0 31.6   RDW 11.6 - 15.1 % 13.5 14.4 13.2 13.3 15.1 14.7 15.2 High    MPV 8.9 - 12.7 fL 11.1 11.2 10.7 11.2 11.4 11.1 11.0   Platelets 727 - 207 Thousands/uL 167 158 152 174 102 Low  138 Low     nRBC /100 WBCs 0 0 0 0   0   Neutrophils Relative 43 - 75 % 59 68 63 65   63   Immat GRANS % 0 - 2 % 0 0 0 0   0   Lymphocytes Relative 14 - 44 % 32 24 28 26   28   Monocytes Relative 4 - 12 % 6 5 6 6   6   Eosinophils Relative 0 - 6 % 2 2 2 2   2   Basophils Relative 0 - 1 % 1 1 1 1   1   Neutrophils Absolute 1.85 - 7.62 Thousands/µL 3.01 3.41 3.33 3.61   3.49   Immature Grans Absolute 0.00 - 0.20 Thousand/uL 0.01 0.01 0.00 0.01   0.01   Lymphocytes Absolute 0.60 - 4.47 Thousands/µL 1.61 1.19 1.45 1.41   1.56   Monocytes Absolute 0.17 - 1.22 Thousand/µL 0.32 0.26 0.32 0.30   0.35   Eosinophils Absolute 0.00 - 0.61 Thousand/µL 0.11 0.11 0.08 0.09   0.10   Basophils Absolute 0.00 - 0.10 Thousands/µL 0.03 0.04 0.03 0.03   0.03              Specimen Collected: 10/18/23  9:46 AM Last Resulted: 10/18/23  4:14 PM         Contains abnormal data Comprehensive metabolic panel  Order: [id number]  Status: Final result       Visible to patient: Yes (seen)       Next appt: 07/22/2024 at 08:00 AM in Radiology ([facility])       Dx: Stage 3a chronic kidney disease (HCC)    0 Result Notes       1 Follow-up Encounter              Component Ref Range & Units 10/18/23  9:46 AM 6/2/23 10:54 AM 3/21/23  1:46 PM 12/13/22  1:59 PM 10/7/22  6:27 AM 10/6/22  6:20 AM 9/26/22  3:01 PM 9/26/22  3:01 PM   Sodium 135 - 147 mmol/L 141 138 139 141 135 136  140   Potassium 3.5 - 5.3 mmol/L 4.1 4.1 3.9 3.9 3.9 4.1  3.9   Chloride 96 - 108 mmol/L 104 109 High  104 111 High  104 105  104   CO2 21 - 32 mmol/L 29 27 27 25 28 26  27   ANION GAP mmol/L 8 2 Low  R 8 R 5 R 3 Low  R 5 R  9 R   BUN 5 - 25 mg/dL 22 22 24 13 17 18  21   Creatinine 0.60 - 1.30 mg/dL 1.01 1.18 CM 1.00 CM 0.97 CM 0.92 CM 1.09 CM  1.19 CM   Comment: Standardized to IDMS reference method   Glucose, Fasting 65 - 99 mg/dL 100 High   103 High  CM        Calcium 8.4 - 10.2 mg/dL 10.9 High  9.5 R 10.1 9.8 R 8.5 R 8.6 R  9.9 R   AST 13 - 39 U/L 19 21 R, CM 19 CM 19 R, CM 16 R, CM   ALT 7 - 52 U/L 15 24 R, CM 17 CM 21 R, CM    20 R, CM   Comment: Specimen collection should occur prior to Sulfasalazine administration due to the potential for falsely depressed results. Alkaline Phosphatase 34 - 104 U/L 59 65 R 57 74 R    67 R   Total Protein 6.4 - 8.4 g/dL 7.0 7.2 7.0 7.7   7.3 R, CM 7.8   Albumin 3.5 - 5.0 g/dL 4.1 3.7 4.4 4.0    4.0   Total Bilirubin 0.20 - 1.00 mg/dL 0.73 0.63 CM 0.74 0.71 CM    0.68 CM   Comment: Use of this assay is not recommended for patients undergoing treatment with eltrombopag due to the potential for falsely elevated results. N-acetyl-p-benzoquinone imine (metabolite of Acetaminophen) will generate erroneously low results in samples for patients that have taken an overdose of Acetaminophen.    eGFR ml/min/1.73sq m 56 46 56           Phosphorus  Order: [id number]  Status: Final result       Visible to patient: Yes (seen)       Next appt: 07/22/2024 at 08:00 AM in Radiology ([facility])       Dx: Stage 3a chronic kidney disease ([address])    0 Result Notes       1 Follow-up Encounter           Component Ref Range & Units 10/18/23  9:46 AM 6/2/23 10:54 AM 3/21/23  1:46 PM 12/13/22  1:59 PM 9/26/22  3:01 PM   Phosphorus 2.3 - 4.1 mg/dL 3.6 3.7 3.5 2.8 3.2              Specimen Collected: 10/18/23  9:46 AM Last Resulted: 10/18/23  4:33 PM         PTH, intact  Order: [id number]  Status: Final result       Visible to patient: Yes (seen)       Next appt: 07/22/2024 at 08:00 AM in Radiology ([facility])       Dx: Stage 3a chronic kidney disease ([address])    0 Result Notes       1 Follow-up Encounter           Component Ref Range & Units 10/18/23  9:46 AM 6/2/23 10:54 AM 3/21/23  1:46 PM 12/13/22  1:59 PM 9/26/22  3:01 PM   PTH 12.0 - 88.0 pg/mL 45.9 42.5 32.4 R 49.9 R 51.1 R              Specimen Collected: 10/18/23  9:46 AM Last Resulted: 10/18/23  4:37 PM         Contains abnormal data Urinalysis with microscopic  Order: [id number]  Status: Final result       Visible to patient: Yes (seen)       Next appt: 07/22/2024 at 08:00 AM in Radiology ([facility])       Dx: Stage 3a chronic kidney disease ([address])    0 Result Notes       1 Follow-up Encounter   important suggestion  Newer results are available. Click to view them now.                 Component Ref Range & Units 10/18/23  9:46 AM 10/18/23  9:46 AM 3/21/23  1:46 PM 12/14/22  3:25 PM 9/26/22  3:01 PM   Color, UA  Light Yellow Light Yellow Light Yellow Light Yellow Yellow   Clarity, UA  Clear Clear Turbid Turbid Clear   Specific Gravity, UA 1.003 - 1.030 1.013 1.013 1.008 1.009 1.015   pH, UA 4.5, 5.0, 5.5, 6.0, 6.5, 7.0, 7.5, 8.0 7.0 7.0 7.0 7.0 6.0   Leukocytes, UA Negative Moderate Abnormal  Moderate Abnormal  Small Abnormal  Moderate Abnormal  Moderate Abnormal    Nitrite, UA Negative Positive Abnormal  Positive Abnormal  Negative Positive Abnormal  Positive Abnormal    Protein, UA Negative mg/dl Negative Negative Negative Negative Negative   Glucose, UA Negative mg/dl Negative Negative Negative Negative Negative   Ketones, UA Negative mg/dl Negative Negative Negative Negative Negative   Urobilinogen, UA <2.0 mg/dl mg/dl <2.0 <2.0 <2.0 <2.0    Bilirubin, UA Negative Negative Negative Negative Negative Negative   Occult Blood, UA Negative Negative Negative Negative Negative Negative   RBC, UA None Seen, 1-2 /hpf 1-2  2-4 Abnormal  1-2 None Seen R   WBC, UA None Seen, 1-2 /hpf 30-50 Abnormal   10-20 Abnormal  20-30 Abnormal  10-20 Abnormal  R   Epithelial Cells None Seen, Occasional /hpf None Seen  Occasional Occasional None Seen   Bacteria, UA None Seen, Occasional /hpf Moderate Abnormal   Innumerable Abnormal  Occasional Moderate Abnormal    MUCUS THREADS     Occasional Abnormal  R    Hyaline Casts, UA            Vitamin D 25 hydroxy  Order: [id number]  Status: Final result       Visible to patient: Yes (seen)       Next appt: 07/22/2024 at 08:00 AM in Radiology ([facility])       Dx: Stage 3a chronic kidney disease ([address])    0 Result Notes       1 Follow-up Encounter           Component Ref Range & Units 10/18/23  9:46 AM 6/2/23 10:54 AM 3/21/23  1:46 PM 12/13/22  1:59 PM 9/26/22  3:01 PM   Vit D, 25-Hydroxy 30.0 - 100.0 ng/mL 38.5 38.2 CM 30.0 30.6 35.3   Comment: Vitamin D guidelines established by Clinical Guidelines Subcommittee  of the Endocrine Society Task Force, 2011    Deficiency <20ng/ml  Insufficiency 20-30ng/ml      TSH, 3rd generation with Free T4 reflex  Order: [id number]  Status: Final result       Visible to patient: Yes (seen)       Next appt: 07/22/2024 at 08:00 AM in Radiology ([facility])       Dx: Acquired hypothyroidism; Dyslipidemia. ..    0 Result Notes            Component Ref Range & Units 10/18/23  9:46 AM 6/2/23 10:54 AM 3/21/23  1:46 PM 12/13/22  1:59 PM 9/9/22  3:45 PM 7/26/22  4:33 PM   TSH 3RD GENERATON 0.450 - 4.500 uIU/mL 0.568 0.469 CM 0.111 Low  CM 0.060 Low  CM 0.046 Low  CM 0.076 Low  CM   Comment: The recommended reference ranges for TSH during pregnancy are as follows:  First trimester 0.100 to 2.500 uIU/mL  Second trimester  0.200 to 3.000 uIU/mL  Third trimester 0.300 to 3.000 uIU/m      Contains abnormal data UA w Reflex to Microscopic w Reflex to Culture -Lab Collect  Order: [id number]  Status: Final result       Visible to patient: Yes (seen)       Next appt: 07/22/2024 at 08:00 AM in Radiology ([facility])       Dx: Acquired hypothyroidism; Dyslipidemia. .. Specimen Information: Urine, Clean Catch   2 Result Notes       2 Patient Communications   important suggestion  Newer results are available. Click to view them now.              Component Ref Range & Units 10/18/23  9:46 AM 3/21/23  1:46 PM 12/14/22  3:25 PM 9/26/22  3:01 PM   Color, UA  Light Yellow Light Yellow Light Yellow Yellow   Clarity, UA  Clear Turbid Turbid Clear   Specific Gravity, UA 1.003 - 1.030 1.013 1.008 1.009 1.015   pH, UA 4.5, 5.0, 5.5, 6.0, 6.5, 7.0, 7.5, 8.0 7.0 7.0 7.0 6.0   Leukocytes, UA Negative Moderate Abnormal  Small Abnormal  Moderate Abnormal  Moderate Abnormal    Nitrite, UA Negative Positive Abnormal  Negative Positive Abnormal  Positive Abnormal    Protein, UA Negative mg/dl Negative Negative Negative Negative   Glucose, UA Negative mg/dl Negative Negative Negative Negative   Ketones, UA Negative mg/dl Negative Negative Negative Negative   Urobilinogen, UA <2.0 mg/dl mg/dl <2.0 <2.0 <2.0    Bilirubin, UA Negative Negative Negative Negative Negative   Occult Blood, UA Negative Negative Negative Negative Negative              Specimen Collected: 10/18/23  9:46 AM Last Resulted: 10/18/23  4:13 PM         HEMOGLOBIN A1C W/ EAG ESTIMATION  Order: [id number]  Status: Final result       Visible to patient: Yes (seen)       Next appt: 07/22/2024 at 08:00 AM in Radiology ([facility])       Dx: Acquired hypothyroidism; Dyslipidemia. ..    0 Result Notes         Component Ref Range & Units 10/18/23  9:46 AM 3/21/23  1:46 PM 9/9/22  3:45 PM   Hemoglobin A1C Normal 4.0-5.6%; PreDiabetic 5.7-6.4%; Diabetic >=6.5%; Glycemic control for adults with diabetes <7.0% % 5.2 5.0 R 5.2 R   EAG mg/dl 103 97 103              Specimen Collected: 10/18/23  9:46 AM Last Resulted: 10/18/23  4:13 PM         Contains abnormal data Urine culture  Order: [id number] - Reflex for Order [id number]  Status: Final result       Visible to patient: Yes (not seen)       Next appt: 07/22/2024 at 08:00 AM in Radiology ([facility])       Dx: Acquired hypothyroidism; Dyslipidemia. .. Specimen Information: Urine, Clean Catch   1 Result Note  Urine Culture >100,000 cfu/ml Escherichia coli Abnormal    This organism has been edited. The previous result was Gram Negative Hugo Enteric Like on 10/19/2023 at 1334 EDT.         Susceptibility     Escherichia coli     BRETT     Amoxicillin + Clavulanate <=8/4 ug/ml Susceptible     Ampicillin ($$) >16.00 ug/ml Resistant     Ampicillin + Sulbactam ($) 16/8 ug/ml Intermediate     Aztreonam ($$$) <=4 ug/ml Susceptible     Cefazolin ($) <=2.00 ug/ml Susceptible     Ciprofloxacin ($) >2.00 ug/ml Resistant     Ertapenem ($$$) <=0.5 ug/ml Susceptible     Gentamicin ($$) <=2 ug/ml Susceptible     Levofloxacin ($) >4.00 ug/ml Resistant     Nitrofurantoin <=32 ug/ml Susceptible     Piperacillin + Tazobactam ($$$) <=8 ug/ml Susceptible     Tetracycline <=4 ug/ml Susceptible     Tobramycin ($) <=2 ug/ml Susceptible     Trimethoprim + Sulfamethoxazole ($$$) <=0.5/9.5 u...  Susceptible     ZID Performed Yes                   Specimen Collected: 10/18/23  9:46 AM Last Resulted: 10/20/23 10:00 AM       Order Details        View Encounter        Lab and Collection Details        Routing        Result History     View All Conversations on this Encounter           Result Care Coordination      Result Notes     Alen Geuvara, [address] [place] [address]  10/20/2023  7:36 AM EDT Back to Top      No symptoms  Will discuss further at follow up     Patient Communication     Add Comments   Add Notifications  Back to Top

## 2023-12-04 ENCOUNTER — TELEPHONE (OUTPATIENT)
Dept: FAMILY MEDICINE CLINIC | Facility: CLINIC | Age: 73
End: 2023-12-04

## 2023-12-04 DIAGNOSIS — Z12.31 ENCOUNTER FOR SCREENING MAMMOGRAM FOR MALIGNANT NEOPLASM OF BREAST: ICD-10-CM

## 2023-12-04 DIAGNOSIS — Z12.39 ENCOUNTER FOR SCREENING FOR MALIGNANT NEOPLASM OF BREAST, UNSPECIFIED SCREENING MODALITY: Primary | ICD-10-CM

## 2023-12-06 ENCOUNTER — OFFICE VISIT (OUTPATIENT)
Dept: FAMILY MEDICINE CLINIC | Facility: CLINIC | Age: 73
End: 2023-12-06
Payer: MEDICARE

## 2023-12-06 ENCOUNTER — TELEPHONE (OUTPATIENT)
Age: 73
End: 2023-12-06

## 2023-12-06 VITALS
SYSTOLIC BLOOD PRESSURE: 126 MMHG | HEART RATE: 77 BPM | BODY MASS INDEX: 31.34 KG/M2 | DIASTOLIC BLOOD PRESSURE: 78 MMHG | TEMPERATURE: 98.6 F | WEIGHT: 195 LBS | OXYGEN SATURATION: 96 % | HEIGHT: 66 IN

## 2023-12-06 DIAGNOSIS — R23.4 SKIN TEXTURE CHANGES: ICD-10-CM

## 2023-12-06 DIAGNOSIS — R23.8 POSTINFLAMMATORY SKIN CHANGES: Primary | ICD-10-CM

## 2023-12-06 DIAGNOSIS — B36.9 FUNGAL RASH OF TORSO: ICD-10-CM

## 2023-12-06 PROCEDURE — 99213 OFFICE O/P EST LOW 20 MIN: CPT | Performed by: NURSE PRACTITIONER

## 2023-12-06 NOTE — PROGRESS NOTES
Assessment/Plan:       1. Postinflammatory skin changes    2. Skin texture changes    3. Fungal rash of torso  Comments:  round raised  bump with scaly edges        Raised scaly bump on the right upper chest.  She has post-inflammatory skin texture changes and needs to follow up with dermatology. I discussed with her the possibility of either ringworm or mild skin irritation. I will give them a call and let them know. I have instructed her to use triamcinolone with nystatin cream, which she had used in the past at home, to the affected area for 1 week and monitor for improvements. If there are any further issues, she will notify me and follow up with dermatology to get it removed, but I think this is an infected skin area. Follow-up  The patient will follow up as planned. BMI Counseling: Body mass index is 31.47 kg/m². The BMI is above normal. Nutrition recommendations include decreasing portion sizes, encouraging healthy choices of fruits and vegetables, decreasing fast food intake, consuming healthier snacks, limiting drinks that contain sugar, moderation in carbohydrate intake, increasing intake of lean protein, reducing intake of saturated and trans fat and reducing intake of cholesterol. Exercise recommendations include vigorous physical activity 75 minutes/week, exercising 3-5 times per week and strength training exercises. No pharmacotherapy was ordered. Patient referred to PCP. Rationale for BMI follow-up plan is due to patient being overweight or obese. Falls Plan of Care: balance, strength, and gait training instructions were provided. Recommended assistive device to help with gait and balance. Medications that increase falls were reviewed. Assessed feet and footwear. Vitamin D supplementation was recommended. Home safety education provided.                 Subjective:      Patient ID: Janina Plummer is a 72 y.o. female who presents in office for raised scaly bump on the right upper chest.    Raised scaly bump on the right upper chest.  It was mildly itchy a few days ago and she scratched it. Currently, it appears as a raised circular lesion with scaly edges. She has significant skin damage due to sunburn in the past.   She has tried to see a dermatologist, but they do not have any availability until 03/2024. The following portions of the patient's history were reviewed and updated as appropriate: allergies, current medications, past family history, past medical history, past social history, past surgical history, and problem list.    Review of Systems   Constitutional:  Negative for chills, fatigue, fever and unexpected weight change. HENT:  Negative for congestion, postnasal drip, sinus pressure and voice change. Eyes: Negative. Respiratory:  Negative for cough and shortness of breath. Cardiovascular:  Negative for chest pain and palpitations. HTN  Stable   Hyperlipemia  PVD     Gastrointestinal:  Negative for abdominal distention, abdominal pain, nausea and vomiting. JOSEPH - fatty liver   Sees GI    Endocrine:        Hypothyroidism    Genitourinary:  Negative for difficulty urinating and flank pain. Musculoskeletal:  Positive for arthralgias and myalgias. Skin:  Positive for rash (upper chest breast area skin changes - raised lump round with scaly edges). Allergic/Immunologic: Positive for environmental allergies. Neurological:  Negative for headaches. Hematological:  Negative for adenopathy. Psychiatric/Behavioral:  Positive for sleep disturbance. Negative for suicidal ideas. The patient is not nervous/anxious. Objective:  /78 (BP Location: Right arm, Patient Position: Sitting, Cuff Size: Large)   Pulse 77   Temp 98.6 °F (37 °C)   Ht 5' 6"   Wt 88.5 kg (195 lb)   SpO2 96%   BMI 31.47 kg/m²          Physical Exam  Vitals and nursing note reviewed. Constitutional:       Appearance: Normal appearance.    HENT:      Head: Atraumatic. Pulmonary:      Effort: Pulmonary effort is normal.      Breath sounds: Normal breath sounds. Skin:         Neurological:      Mental Status: She is alert and oriented to person, place, and time. Psychiatric:         Mood and Affect: Mood normal.         Behavior: Behavior normal.               Transcribed for SLY Wood, by Marysol Tabor on 12/06/23 at 5:28 PM. Powered by [employer].

## 2023-12-06 NOTE — TELEPHONE ENCOUNTER
Patient called bc she noticed a red patch on her chest a few days ago, she said it is red and irritated, no hx of skin cancer, she an appt in march but she is calling to see if she can be seen sooner. Currently no sooner appts. I advised she contact pcp to have it evaluated in the meantime.

## 2023-12-07 ENCOUNTER — TELEPHONE (OUTPATIENT)
Dept: NEPHROLOGY | Facility: CLINIC | Age: 73
End: 2023-12-07

## 2023-12-07 DIAGNOSIS — Z98.890 PERIPHERAL ARTERIAL DISEASE WITH HISTORY OF REVASCULARIZATION: ICD-10-CM

## 2023-12-07 DIAGNOSIS — I73.9 PERIPHERAL ARTERIAL DISEASE WITH HISTORY OF REVASCULARIZATION: ICD-10-CM

## 2023-12-07 NOTE — TELEPHONE ENCOUNTER
I called and left a message on machine for patient to return our call about scheduling her 12 month nephrology follow up appointment with Dr. Wong Tovar from our the recall list for on or after 4/7/2024.

## 2023-12-08 ENCOUNTER — TELEPHONE (OUTPATIENT)
Dept: NEPHROLOGY | Facility: CLINIC | Age: 73
End: 2023-12-08

## 2023-12-08 NOTE — TELEPHONE ENCOUNTER
Patient of Dr. Arturo Parker did return our call and schedule her nephrology follow up appointment with Dr. Arturo Parker from our recall list. The patient understood and was okay with the day and time of her next appointment. Pt will consume >75% of meals and adhere to recommended diet

## 2023-12-10 DIAGNOSIS — Z98.890 PERIPHERAL ARTERIAL DISEASE WITH HISTORY OF REVASCULARIZATION: ICD-10-CM

## 2023-12-10 DIAGNOSIS — I73.9 PERIPHERAL ARTERIAL DISEASE WITH HISTORY OF REVASCULARIZATION: ICD-10-CM

## 2023-12-10 RX ORDER — CLOPIDOGREL BISULFATE 75 MG/1
75 TABLET ORAL DAILY
Qty: 90 TABLET | Refills: 0 | Status: CANCELLED | OUTPATIENT
Start: 2023-12-10

## 2023-12-10 RX ORDER — CLOPIDOGREL BISULFATE 75 MG/1
TABLET ORAL
Qty: 90 TABLET | Refills: 3 | Status: SHIPPED | OUTPATIENT
Start: 2023-12-10

## 2023-12-19 DIAGNOSIS — F41.8 DEPRESSION WITH ANXIETY: ICD-10-CM

## 2023-12-19 RX ORDER — ALPRAZOLAM 0.25 MG/1
0.25 TABLET ORAL 3 TIMES DAILY PRN
Qty: 30 TABLET | Refills: 0 | Status: SHIPPED | OUTPATIENT
Start: 2023-12-19

## 2024-01-08 ENCOUNTER — OFFICE VISIT (OUTPATIENT)
Dept: FAMILY MEDICINE CLINIC | Facility: CLINIC | Age: 74
End: 2024-01-08
Payer: MEDICARE

## 2024-01-08 VITALS
SYSTOLIC BLOOD PRESSURE: 126 MMHG | HEIGHT: 66 IN | TEMPERATURE: 98.2 F | OXYGEN SATURATION: 94 % | DIASTOLIC BLOOD PRESSURE: 70 MMHG | HEART RATE: 94 BPM | WEIGHT: 199 LBS | BODY MASS INDEX: 31.98 KG/M2

## 2024-01-08 DIAGNOSIS — D69.6 THROMBOCYTOPENIA, UNSPECIFIED (HCC): ICD-10-CM

## 2024-01-08 DIAGNOSIS — K74.60 LIVER CIRRHOSIS SECONDARY TO NASH (NONALCOHOLIC STEATOHEPATITIS) (HCC): ICD-10-CM

## 2024-01-08 DIAGNOSIS — R09.81 NASAL CONGESTION: ICD-10-CM

## 2024-01-08 DIAGNOSIS — Z89.422 ACQUIRED ABSENCE OF OTHER LEFT TOE(S) (HCC): ICD-10-CM

## 2024-01-08 DIAGNOSIS — R05.1 ACUTE COUGH: Primary | ICD-10-CM

## 2024-01-08 DIAGNOSIS — K75.81 LIVER CIRRHOSIS SECONDARY TO NASH (NONALCOHOLIC STEATOHEPATITIS) (HCC): ICD-10-CM

## 2024-01-08 DIAGNOSIS — N18.32 STAGE 3B CHRONIC KIDNEY DISEASE (HCC): ICD-10-CM

## 2024-01-08 DIAGNOSIS — I74.09 OTHER ARTERIAL EMBOLISM AND THROMBOSIS OF ABDOMINAL AORTA (HCC): ICD-10-CM

## 2024-01-08 DIAGNOSIS — F33.9 DEPRESSION, RECURRENT (HCC): ICD-10-CM

## 2024-01-08 DIAGNOSIS — N25.81 SECONDARY HYPERPARATHYROIDISM OF RENAL ORIGIN (HCC): ICD-10-CM

## 2024-01-08 PROCEDURE — 99213 OFFICE O/P EST LOW 20 MIN: CPT | Performed by: NURSE PRACTITIONER

## 2024-01-08 RX ORDER — BENZONATATE 100 MG/1
100 CAPSULE ORAL
Qty: 20 CAPSULE | Refills: 0 | Status: SHIPPED | OUTPATIENT
Start: 2024-01-08 | End: 2024-01-13

## 2024-01-08 RX ORDER — AMOXICILLIN 875 MG/1
875 TABLET, COATED ORAL 2 TIMES DAILY
Qty: 10 TABLET | Refills: 0 | Status: SHIPPED | OUTPATIENT
Start: 2024-01-08 | End: 2024-01-13

## 2024-01-08 NOTE — PROGRESS NOTES
Assessment/Plan:       1. Acute cough  -     amoxicillin (AMOXIL) 875 mg tablet; Take 1 tablet (875 mg total) by mouth 2 (two) times a day for 5 days  -     benzonatate (TESSALON PERLES) 100 mg capsule; Take 1 capsule (100 mg total) by mouth daily at bedtime as needed for cough for up to 5 days    2. Nasal congestion  -     amoxicillin (AMOXIL) 875 mg tablet; Take 1 tablet (875 mg total) by mouth 2 (two) times a day for 5 days  -     benzonatate (TESSALON PERLES) 100 mg capsule; Take 1 capsule (100 mg total) by mouth daily at bedtime as needed for cough for up to 5 days    3. 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 Acquired absence of other left toe(s) (HCC)    5. Liver cirrhosis secondary to JOSEPH (nonalcoholic steatohepatitis) (HCC)    6. Depression, recurrent (HCC)    7. Secondary hyperparathyroidism of renal origin (HCC)    8. Thrombocytopenia, unspecified (HCC)    9. Stage 3b chronic kidney disease (HCC)        Cough.  The patient has been prescribed amoxicillin 875 twice a day for 5 days, Tessalon Perles to be taken at night, and inhalers to be taken during the day. She is currently on Advair, which will be refilled as it is expiring. It’s recommended that she take the medications with food and monitor her symptoms for a week. If there is no improvement within a week, she is to call me, and we will probably get an x-ray for further evaluation.     Sinus congestion.  She still has some sinus congestion. I do notice some postnasal dripping. Plan is to call me within 3 to 5 days to see if she is feeling better. If not, then we will get an x-ray done. If any worsening of issues or shortness of breath, she needs to go to the ER.    Follow-up.  The patient will follow up in 1 week.                Subjective:      Patient ID: Saloni Miles is a 73 y.o. female.    Saloni Miles is a 73-year-old female who presents in office for a sick visit. She has underlying history of asthma, currently on  "inhalers.     Around 2 weeks ago, before Christmas, she contracted an upper respiratory viral infection. She suspects that she caught influenza A from her daughter-in-law. For the first 3 days, she experienced fever, congestion, and coughing. However, it has been 2 weeks now, and she is still having a recurring productive cough that is particularly bothersome at night. She is here to get evaluated.    She is experiencing fatigue, congestion, postnasal dripping, and a productive wet cough. She has a history of chronic recurrent bronchitis around this time of the year, especially with upper respiratory viral issues and underlying asthma. However, she denies any shortness of breath, chest pain, ear pain, sore throat, palpitation, distended belly, urinary issues, arthralgias, body aches, headaches, or rashes. No fevers and chill and deny issues going to the bathroom.    She had covid test at home at that time was negative.    The following portions of the patient's history were reviewed and updated as appropriate: allergies, current medications, past family history, past medical history, past social history, past surgical history, and problem list.    Review of Systems   Constitutional:  Negative for chills and fever.   HENT:  Positive for postnasal drip.    Respiratory:  Positive for cough. Negative for shortness of breath.    Musculoskeletal:  Positive for myalgias.   Skin:  Negative for rash.   Hematological:  Negative for adenopathy.     The pertinent positive and negative findings are as noted in the HPI.       Objective:  /70 (BP Location: Left arm, Patient Position: Sitting, Cuff Size: Large)   Pulse 94   Temp 98.2 °F (36.8 °C)   Ht 5' 6\" (1.676 m)   Wt 90.3 kg (199 lb)   SpO2 94%   BMI 32.12 kg/m²          Physical Exam  Physical Exam  Constitutional: Appearance: She is well-developed. She is alert and oriented x3. She is afebrile.  HENT:      Head: Normocephalic and atraumatic.      Right Ear: " Tympanic membrane normal.      Left Ear: Tympanic membrane normal.      Mouth/Throat:      Pharynx: Uvula midline.   Eyes:      Pupils: Pupils are equal, round, and reactive to light.   Cardiovascular:      Rate and Rhythm: Normal rate. Normal heart rate in the 80s to 90s.  Pulmonary:      Effort: Pulmonary effort is normal. Currently congested rhinorrhea, postnasal dripping.  Scattered rhonchi and mild wheezing with productive cough  Abdominal:      Palpations: Abdomen is soft, nondistended. She does have good bowel sounds.  Musculoskeletal:      Cervical back: Normal range of motion.   Skin:     General: Skin is warm. No rashes.    I have personally reviewed results with the patient.        Transcribed for SLY Ledesma, by Leann Shahid on 01/08/24 at 9:05 AM. Powered by [employer].

## 2024-01-15 DIAGNOSIS — J30.2 SEASONAL ALLERGIES: ICD-10-CM

## 2024-01-15 RX ORDER — MONTELUKAST SODIUM 10 MG/1
10 TABLET ORAL DAILY PRN
Qty: 90 TABLET | Refills: 3 | Status: SHIPPED | OUTPATIENT
Start: 2024-01-15

## 2024-02-13 DIAGNOSIS — I73.9 PERIPHERAL ARTERIAL DISEASE WITH HISTORY OF REVASCULARIZATION: ICD-10-CM

## 2024-02-13 DIAGNOSIS — Z98.890 PERIPHERAL ARTERIAL DISEASE WITH HISTORY OF REVASCULARIZATION: ICD-10-CM

## 2024-02-13 RX ORDER — ATORVASTATIN CALCIUM 40 MG/1
TABLET, FILM COATED ORAL
Qty: 90 TABLET | Refills: 3 | Status: SHIPPED | OUTPATIENT
Start: 2024-02-13

## 2024-02-19 ENCOUNTER — TELEPHONE (OUTPATIENT)
Dept: NEPHROLOGY | Facility: CLINIC | Age: 74
End: 2024-02-19

## 2024-02-19 NOTE — TELEPHONE ENCOUNTER
I called and left a message on machine for patient to return our call about rescheduling her 4/24/2024 nephrology follow up appointment with Dr. KEDAR Romano because of Dr. KEDAR Romano on hospital call.

## 2024-03-05 ENCOUNTER — APPOINTMENT (OUTPATIENT)
Dept: RADIOLOGY | Facility: CLINIC | Age: 74
End: 2024-03-05
Payer: MEDICARE

## 2024-03-05 ENCOUNTER — OFFICE VISIT (OUTPATIENT)
Dept: OBGYN CLINIC | Facility: CLINIC | Age: 74
End: 2024-03-05
Payer: MEDICARE

## 2024-03-05 VITALS
HEIGHT: 66 IN | BODY MASS INDEX: 31.98 KG/M2 | HEART RATE: 81 BPM | RESPIRATION RATE: 18 BRPM | WEIGHT: 199 LBS | OXYGEN SATURATION: 97 % | TEMPERATURE: 97.2 F | DIASTOLIC BLOOD PRESSURE: 73 MMHG | SYSTOLIC BLOOD PRESSURE: 110 MMHG

## 2024-03-05 DIAGNOSIS — M18.0 ARTHRITIS OF CARPOMETACARPAL (CMC) JOINT OF BOTH THUMBS: ICD-10-CM

## 2024-03-05 DIAGNOSIS — M79.642 BILATERAL HAND PAIN: ICD-10-CM

## 2024-03-05 DIAGNOSIS — R20.2 PARESTHESIA OF HAND, BILATERAL: Primary | ICD-10-CM

## 2024-03-05 DIAGNOSIS — M79.641 BILATERAL HAND PAIN: ICD-10-CM

## 2024-03-05 PROCEDURE — 73130 X-RAY EXAM OF HAND: CPT

## 2024-03-05 PROCEDURE — 99214 OFFICE O/P EST MOD 30 MIN: CPT | Performed by: FAMILY MEDICINE

## 2024-03-05 NOTE — PROGRESS NOTES
"   Subjective:  Chief Complaint   Patient presents with    Left Hand - Numbness, Tingling    Right Hand - Swelling, Tingling, Numbness, Pain       Saloni Miles is a 74 y.o. female complains of bilateral hand pain. Onset of the symptoms was several months ago.  Mechanism of injury:  none . Aggravating factors:  sleeping at night, pain reproduction causes nightime awakening . Treatment to date: none. Symptoms have progressed to a point and plateaued.    The following portions of the patient's history were reviewed and updated as appropriate: allergies, current medications, past family history, past medical history, past social history, past surgical history and problem list.    Occupation:      Review of Systems   Constitutional: Negative for fever.   Musculoskeletal: positive for hand pain  Skin: Negative for rash and ulcer.   Neurological: Negative for numbness or tingling in hand       Objective:  /73   Pulse 81   Temp (!) 97.2 °F (36.2 °C)   Resp 18   Ht 5' 6\" (1.676 m)   Wt 90.3 kg (199 lb)   SpO2 97%   BMI 32.12 kg/m²     Skin: no rashes, lesions, skin discolorations, lacerations  Vasculature: normal radial and ulnar pulse, normal skin color, normal capillary refill in extremity, no upper extremity edema  Neurologic: Neurologic exam is normal throughout upper extremities, Awake, alert, and oriented x3, no apparent distress.   Musculoskeletal: Bilateralhand   inspection: no gross deformity.  Palpation: +TTP cmc   ROM:  FROM  Hurd test negative  TFCC grind negative  Fovea sign negative\  +tinels   +phalens  No thenar atrophy noted            Imaging:    No results found.     Assessment/Plan:    1. Paresthesia of hand, bilateral    - XR hand 3+ vw right; Future  - XR hand 3+ vw left; Future  - EMG 2 Limb Upper Extremity; Future  - Cock Up Wrist Splint  - Ambulatory Referral to Physical Therapy; Future    2. Arthritis of carpometacarpal (CMC) joint of both thumbs    74-year-old female patient " presenting today for initial evaluation of her bilateral hand paresthesia, atraumatic in nature.  Radiographs of the bilateral hands were obtained and reviewed independently.  No acute fractures or dislocations were noted.  There were degenerative changes appreciated at the CMC joint bilateral hand.  Follow-up official radiology report.  Clinical impression is that patient is likely suffering from carpal tunnel of bilateral upper extremities.  I am recommending the patient follow-up with an EMG of bilateral upper extremities.  Patient will continue with cock up wrist brace for nighttime use.  Referral placed for physical therapy.  We discussed the role for corticosteroid injection for suspected carpal tunnel syndrome however would first like patient to complete EMG study to determine severity of median nerve compression.  Will follow-up once EMG results completed

## 2024-03-05 NOTE — RESULT ENCOUNTER NOTE
There is no acute fracture or dislocation.    No significant degenerative changes.    No lytic or blastic osseous lesion.    There are atherosclerotic calcifications. Soft tissues are otherwise unremarkable.

## 2024-03-07 ENCOUNTER — APPOINTMENT (OUTPATIENT)
Dept: LAB | Facility: CLINIC | Age: 74
End: 2024-03-07
Payer: MEDICARE

## 2024-03-07 DIAGNOSIS — I25.10 CORONARY ARTERY DISEASE INVOLVING NATIVE HEART WITHOUT ANGINA PECTORIS, UNSPECIFIED VESSEL OR LESION TYPE: ICD-10-CM

## 2024-03-07 DIAGNOSIS — I70.402 ATHEROSCLEROSIS OF AUTOLOGOUS VEIN BYPASS GRAFT OF LEFT LOWER EXTREMITY, WITH UNSPECIFIED PRESENCE OF CLINICAL MANIFESTATION (HCC): ICD-10-CM

## 2024-03-07 DIAGNOSIS — I35.0 AORTIC VALVE STENOSIS, ETIOLOGY OF CARDIAC VALVE DISEASE UNSPECIFIED: ICD-10-CM

## 2024-03-07 LAB
CHOLEST SERPL-MCNC: 134 MG/DL
HDLC SERPL-MCNC: 49 MG/DL
LDLC SERPL CALC-MCNC: 60 MG/DL (ref 0–100)
NONHDLC SERPL-MCNC: 85 MG/DL
TRIGL SERPL-MCNC: 124 MG/DL

## 2024-03-19 ENCOUNTER — HOSPITAL ENCOUNTER (OUTPATIENT)
Dept: MAMMOGRAPHY | Facility: CLINIC | Age: 74
Discharge: HOME | End: 2024-03-19
Payer: MEDICARE

## 2024-03-19 DIAGNOSIS — Z12.31 ENCOUNTER FOR SCREENING MAMMOGRAM FOR MALIGNANT NEOPLASM OF BREAST: ICD-10-CM

## 2024-03-19 DIAGNOSIS — Z12.39 ENCOUNTER FOR SCREENING FOR MALIGNANT NEOPLASM OF BREAST, UNSPECIFIED SCREENING MODALITY: ICD-10-CM

## 2024-03-19 PROCEDURE — 77067 SCR MAMMO BI INCL CAD: CPT

## 2024-03-20 NOTE — RESULT ENCOUNTER NOTE
IMPRESSION:  No mammographic evidence of malignancy.        ASSESSMENT/BI-RADS CATEGORY:  Left: 2 - Benign  Right: 2 - Benign  Overall: 2 - Benign    RECOMMENDATION:       - Routine screening mammogram in 1 year for both breasts.    Workstation ID: [id number]

## 2024-03-26 ENCOUNTER — CONSULT (OUTPATIENT)
Age: 74
End: 2024-03-26
Payer: MEDICARE

## 2024-03-26 DIAGNOSIS — R20.2 NOTALGIA PARESTHETICA: ICD-10-CM

## 2024-03-26 DIAGNOSIS — L82.1 SEBORRHEIC KERATOSIS: ICD-10-CM

## 2024-03-26 DIAGNOSIS — R23.9 SKIN CHANGE: ICD-10-CM

## 2024-03-26 DIAGNOSIS — D18.01 CHERRY ANGIOMA: ICD-10-CM

## 2024-03-26 DIAGNOSIS — D22.9 NEVUS: ICD-10-CM

## 2024-03-26 DIAGNOSIS — Z13.89 SCREENING FOR SKIN CONDITION: Primary | ICD-10-CM

## 2024-03-26 PROCEDURE — 99203 OFFICE O/P NEW LOW 30 MIN: CPT | Performed by: DERMATOLOGY

## 2024-03-26 NOTE — PATIENT INSTRUCTIONS
"MELANOCYTIC NEVI (\"Moles\")    Paste patient specific assessment and plan here *    Melanocytic nevi (\"moles\") are tan or brown, raised or flat areas of the skin which have an increased number of melanocytes. Melanocytes are the cells in our body which make pigment and account for skin color.    Some moles are present at birth (I.e., \"congenital nevi\"), while others come up later in life (i.e., \"acquired nevi\").  The sun can stimulate the body to make more moles.  Sunburns are not the only thing that triggers more moles.  Chronic sun exposure can do it too.     Clinically distinguishing a healthy mole from melanoma may be difficult, even for experienced dermatologists. The \"ABCDE's\" of moles have been suggested as a means of helping to alert a person to a suspicious mole and the possible increased risk of melanoma.  The suggestions for raising alert are as follows:    Asymmetry: Healthy moles tend to be symmetric, while melanomas are often asymmetric.  Asymmetry means if you draw a line through the mole, the two halves do not match in color, size, shape, or surface texture. Asymmetry can be a result of rapid enlargement of a mole, the development of a raised area on a previously flat lesion, scaling, ulceration, bleeding or scabbing within the mole.  Any mole that starts to demonstrate \"asymmetry\" should be examined promptly by a board certified dermatologist.     Border: Healthy moles tend to have discrete, even borders.  The border of a melanoma often blends into the normal skin and does not sharply delineate the mole from normal skin.  Any mole that starts to demonstrate \"uneven borders\" should be examined promptly by a board certified dermatologist.     Color: Healthy moles tend to be one color throughout.  Melanomas tend to be made up of different colors ranging from dark black, blue, white, or red.  Any mole that demonstrates a color change should be examined promptly by a board certified dermatologist. " "    Diameter: Healthy moles tend to be smaller than 0.6 cm in size; an exception are \"congenital nevi\" that can be larger.  Melanomas tend to grow and can often be greater than 0.6 cm (1/4 of an inch, or the size of a pencil eraser). This is only a guideline, and many normal moles may be larger than 0.6 cm without being unhealthy.  Any mole that starts to change in size (small to bigger or bigger to smaller) should be examined promptly by a board certified dermatologist.     Evolving: Healthy moles tend to \"stay the same.\"  Melanomas may often show signs of change or evolution such as a change in size, shape, color, or elevation.  Any mole that starts to itch, bleed, crust, burn, hurt, or ulcerate or demonstrate a change or evolution should be examined promptly by a board certified dermatologist.      Dysplastic Nevi  Dysplastic moles are moles that fit the ABCDE rules of melanoma but are not identified as melanomas when examined under the microscope.  They may indicate an increased risk of melanoma in that person. If there is a family history of melanoma, most experts agree that the person may be at an increased risk for developing a melanoma.  Experts still do not agree on what dysplastic moles mean in patients without a personal or family history of melanoma.  Dysplastic moles are usually larger than common moles and have different colors within it with irregular borders. The appearance can be very similar to a melanoma. Biopsies of dysplastic moles may show abnormalities which are different from a regular mole.      Melanoma  Malignant melanoma is a type of skin cancer that can be deadly if it spreads throughout the body. The incidence of melanoma in the [place] is growing faster than any other cancer. Melanoma usually grows near the surface of the skin for a period of time, and then begins to grow deeper into the skin. Once it grows deeper into the skin, the risk of spread to other organs greatly " "increases. Therefore, early detection and removal of a malignant melanoma may result in a better chance at a complete cure; removal after the tumor has spread may not be as effective, leading to worse clinical outcomes such as death.    The true rate of nevus transformation into a melanoma is unknown. It has been estimated that the lifetime risk for any acquired melanocytic nevus on any 20-year-old individual transforming into melanoma by age 80 is 0.03% (1 in 3,164) for men and 0.009% (1 in 10,800) for women.     The appearance of a \"new mole\" remains one of the most reliable methods for identifying a malignant melanoma.  Occasionally, melanomas appear as rapidly growing, blue-black, dome-shaped bumps within a previous mole or previous area of normal skin.  Other times, melanomas are suspected when a mole suddenly appears or changes. Itching, burning, or pain in a pigmented lesion should increase suspicion, but most patients with early melanoma have no skin discomfort whatsoever.  Melanoma can occur anywhere on the skin, including areas that are difficult for self-examination. Many melanomas are first noticed by other family members.  Suspicious-looking moles may be removed for microscopic examination.       You may be able to prevent death from melanoma by doing two simple things:    Try to avoid unnecessary sun exposure and protect your skin when it is exposed to the sun.  People who live near the equator, people who have intermittent exposures to large amounts of sun, and people who have had sunburns in childhood or adolescence have an increased risk for melanoma. Sun sense and vigilant sun protection may be keys to helping to prevent melanoma.  We recommend wearing UPF-rated sun protective clothing and sunglasses whenever possible and applying a moisturizer-sunscreen combination product (SPF 50+) such as Neutrogena Daily Defense to sun exposed areas of skin at least three times a day.    Have your moles " "regularly examined by a board certified dermatologist AND by yourself or a family member/friend at home.  We recommend that you have your moles examined at least once a year by a board certified dermatologist.  Use your birthday as an annual reminder to have your \"Birthday Suit\" (I.e., your skin) examined; it is a nice birthday gift to yourself to know that your skin is healthy appearing!  Additionally, at-home self examinations may be helpful for detecting a possible melanoma.  Use the ABCDEs we discussed and check your moles once a month at home.        SEBORRHEIC KERATOSIS  A seborrheic keratosis is a harmless warty spot that appears during adult life as a common sign of skin aging.  Seborrheic keratoses can arise on any area of skin, covered or uncovered, with the usual exception of the palms and soles. They do not arise from mucous membranes. Seborrheic keratoses can have highly variable appearance.      Seborrheic keratoses are extremely common. It has been estimated that over 90% of adults over the age of 60 years have one or more of them. They occur in males and females of all races, typically beginning to erupt in the 30s or 40s. They are uncommon under the age of 20 years.  The precise cause of seborrhoeic keratoses is not known.  Seborrhoeic keratoses are considered degenerative in nature. As time goes by, seborrheic keratoses tend to become more numerous. Some people inherit a tendency to develop a very large number of them; some people may have hundreds of them.    The name \"seborrheic keratosis\" is misleading, because these lesions are not limited to a seborrhoeic distribution (scalp, mid-face, chest, upper back), nor are they formed from sebaceous glands, nor are they associated with sebum -- which is greasy.  Seborrheic keratosis may also be called \"SK,\" \"Seb K,\" \"basal cell papilloma,\" \"senile wart,\" or \"barnacle.\"      There is no easy way to remove multiple lesions on a single occasion.  Unless a " "specific lesion is \"inflamed\" and is causing pain or stinging/burning or is bleeding, most insurance companies do not authorize treatment.      ANGIOMA (\"CHERRY ANGIOMA\")  Marshall angiomas markedly increase in number from about the age of 40, so it has been estimated that 75% of people over 75 years of age have them. Although they also called \"senile angiomas,\" they can occur in young people too - 5% of adolescents have been found to have them.     Cherry angiomas are very common in males and females of any age or race, with no difference in sexes or races affected. They are however more noticeable in white skin than in skin of colour.  There may be a family history of similar lesions. Eruptive (very large number appearing in a short period of time) cherry angiomas have been rarely reported to be associated with internal malignancy and pregnancy.       NOTALGIA PARESTHETICA         Assessment and Plan:  Based on a thorough discussion of this condition and the management approach to it (including a comprehensive discussion of the known risks, side effects and potential benefits of treatment), the patient (family) agrees to implement the following specific plan:  Benign reassurance      Assessment and Plan:  Notalgia paresthetica is a condition where itch and changed sensation arise in the areas of skin on the medial aspect of the shoulder blade on either side of the back. Notalgia means pain in the back, and paraesthetica refers to burning pain, tingling or itch. It is also called thoracic cutaneous nerve entrapment syndrome.    The nerves which supply sensation to the upper back emerge from the spinal cord (2nd to 6th thoracic segments) and run a long course up through the thick muscles of the back. They make a right-angled turn before reaching the skin. The nerves appear to be vulnerable to compression or traction. Partial compression or injury leads to the symptoms.   Initial injury to the nerves may include:  Back " "injury  Herniated or \"slipped\" disc  Herpes zoster (shingles)  Sunburn  Myelopathy (muscular disease)  Small fiber neuropathy    Notalgia paresthetica often starts after a period of intense exercise leading to muscular stiffness, or a period of inactivity. Sometimes, a specific injury may be recalled.       It is characterized by intense itch on the medial border of one scapula or both, ie, between the shoulder blades. This itch can be intermittent or continuous. It is unrelieved by scratching, although the scratching and rubbing may be pleasurable.   The affected area may spread to both shoulder blades and more widely over the back and shoulders.  In many patients, there are no visible signs.   Visible changes often arise from rubbing and scratching the affected area. These include:  Scratch marks  Hyperpigmentation (brown marks)  Hypopigmentation (white marks)  Lichen simplex (a type of eczema)  Scarring    There may be changed sensation in the affected area of skin in response to light touch, sharp objects, cold, and heat. There may be reduced or absent sweating in the affected area.    Radiology such as X-ray, CT scan or MRI may demonstrate a degenerative vertebra or prolapsed disc in the area that corresponds to the nerve supply to the affected skin. In many cases, no abnormality is revealed.     Treatment is not always necessary, and it is not always successful. Typical management may include the following:  Cooling lotions or creams as required (camphor and menthol)  Topical steroids to treat associated lichen simplex  Capsaicin cream - this depletes nerve endings of their chemical transmitters but requires frequent reapplication and causes unpleasant local side effects  Local anesthetic creams may provide temporary relief of symptoms  Amitriptyline or other oral tricyclic medications at night to help sleep and counteract neuropathic symptoms  Gabapentin, pregabalin or other anticonvulsants can relieve " unpleasant burning or tingling sensations  Transcutaneous electrical nerve stimulation (TENS)  Surgical decompression of vertebral nerve impingement    Physical therapy with repetitive exercises and stretches for the upper back has been reported to be effective.    While sitting, cross arms and bend forwards to stretch upper back  Arms at sides, raise shoulders and rotate them forwards and backwards  Arms straight, rotate forwards 360 degrees and backwards 360 degrees  Rotate upper body left and right until stretch is felt and hold  Massage the muscles besides the spine in the affected area

## 2024-03-26 NOTE — PROGRESS NOTES
"[facility] Note     Patient Name: Saloni Miles  Encounter Date: 03/26/2024     Have you been cared for by a [facility] Dermatologist in the last 3 years and, if so, which description applies to you?    NO.   I am considered a \"new\" patient and must complete all patient intake questions. I am FEMALE/of child-bearing potential.    REVIEW OF SYSTEMS:  Have you recently had or currently have any of the following? Recent fever or chills? No  Any non-healing wound? No  Are you pregnant or planning to become pregnant? No  Are you currently or planning to be nursing or breast feeding? No   PAST MEDICAL HISTORY:  Have you personally ever had or currently have any of the following?  If \"YES,\" then please provide more detail. Skin cancer (such as Melanoma, Basal Cell Carcinoma, Squamous Cell Carcinoma?  No  Tuberculosis, HIV/AIDS, Hepatitis B or C: No  Radiation Treatment No   HISTORY OF IMMUNOSUPPRESSION:   Do you have a history of any of the following:  Systemic Immunosuppression such as Diabetes, Biologic or Immunotherapy, Chemotherapy, Organ Transplantation, Bone Marrow Transplantation?  No    Answering \"YES\" requires the addition of the dotphrase \"IMMUNOSUPPRESSED\" as the first diagnosis of the patient's visit.   FAMILY HISTORY:  Any \"first degree relatives\" (parent, brother, sister, or child) with the following?    Skin Cancer, Pancreatic or Other Cancer? YES, mother Lymphoma   Brother Lymphoma     PATIENT EXPERIENCE:    Do you want the Dermatologist to perform a COMPLETE skin exam today including a clinical examination under the \"bra and underwear\" areas?  Yes  If necessary, do we have your permission to call and leave a detailed message on your Preferred Phone number that includes your specific medical information?  Yes      Allergies   Allergen Reactions    Sulfamethoxazole-Trimethoprim Rash      Current Outpatient Medications:     albuterol (PROVENTIL HFA,VENTOLIN HFA) 90 mcg/act inhaler, Inhale 2 " "puffs every 4 (four) hours as needed, Disp: , Rfl:     ALPRAZolam (XANAX) 0.25 mg tablet, Take 1 tablet (0.25 mg total) by mouth 3 (three) times a day as needed for anxiety 1/2-1 as needed tid, Disp: 30 tablet, Rfl: 0    ammonium lactate (LAC-HYDRIN) 12 % cream, Apply topically as needed for dry skin, Disp: 385 g, Rfl: 1    aspirin (ECOTRIN LOW STRENGTH) 81 mg EC tablet, Take 81 mg by mouth in the morning, Disp: , Rfl:     atorvastatin (LIPITOR) 40 mg tablet, TAKE 1 TABLET DAILY, Disp: 90 tablet, Rfl: 3    Biotin 32719 MCG TABS, , Disp: , Rfl:     clopidogrel (PLAVIX) 75 mg tablet, TAKE 1 TABLET DAILY, Disp: 90 tablet, Rfl: 3    fluticasone (FLONASE) 50 mcg/act nasal spray, 2 sprays into each nostril daily as needed, Disp: , Rfl:     fluticasone-salmeterol (Advair HFA) 230-21 MCG/ACT inhaler, Inhale 2 puffs 2 (two) times a day Rinse mouth after use., Disp: 36 g, Rfl: 1    levothyroxine 112 mcg tablet, TAKE 1 TABLET DAILY EARLY IN THE MORNING, Disp: 90 tablet, Rfl: 3    losartan-hydrochlorothiazide (HYZAAR) 100-25 MG per tablet, Take 1 tablet by mouth daily, Disp: , Rfl:     montelukast (SINGULAIR) 10 mg tablet, TAKE 1 TABLET DAILY AS NEEDED FOR ALLERGIES, Disp: 90 tablet, Rfl: 3    Multiple Vitamin (multivitamin) tablet, Take 1 tablet by mouth daily, Disp: 30 tablet, Rfl: 0    polyethylene glycol (MIRALAX) 17 g packet, Take 17 g by mouth daily, Disp: , Rfl:     sertraline (ZOLOFT) 50 mg tablet, TAKE 1 TABLET DAILY, Disp: 30 tablet, Rfl: 11    traZODone (DESYREL) 100 mg tablet, TAKE 1 TABLET DAILY AT BEDTIME, Disp: 90 tablet, Rfl: 3          Whom besides the patient is providing clinical information about today's encounter?   NO ADDITIONAL HISTORIAN (patient alone provided history)    Physical Exam and Assessment/Plan by Diagnosis:      Chief Complaint   Patient presents with    New Patient Visit     Skin exam, itchy back for few months.    MELANOCYTIC NEVI (\"Moles\")    Physical Exam:  Anatomic Location Affected: " "Mostly on sun-exposed areas of the body.  Morphological Description:  Scattered, 1-4mm round to ovoid, symmetrical-appearing, even bordered, skin colored to dark brown macules/papules, mostly in sun-exposed areas  Pertinent Positives:  Pertinent Negatives:    Additional History of Present Condition:  present on exam     Assessment and Plan:  Based on a thorough discussion of this condition and the management approach to it (including a comprehensive discussion of the known risks, side effects and potential benefits of treatment), the patient (family) agrees to implement the following specific plan:  Provided handout with information regarding the ABCDE's of moles   Recommend routine skin exams every year.   Sun avoidance, protective clothing (known as UPF clothing), and the use of at least SPF 30 sunscreens is advised. Sunscreen should be reapplied every two hours when outside.   SEBORRHEIC KERATOSIS; NON-INFLAMED    Physical Exam:  Anatomic Location Affected:  scattered across trunk, extremities,  face  Morphological Description:  Flat and raised, waxy, smooth to warty textured, yellow to brownish-grey to dark brown to blackish, discrete, \"stuck-on\" appearing papules.  Pertinent Positives:  Pertinent Negatives:    Additional History of Present Condition:  Patient reports new bumps on the skin.  Denies itch, burn, pain, bleeding or ulceration.  Present constantly; nothing seems to make it worse or better.  No prior treatment.      Assessment and Plan:  Based on a thorough discussion of this condition and the management approach to it (including a comprehensive discussion of the known risks, side effects and potential benefits of treatment), the patient (family) agrees to implement the following specific plan:  Reassured benign  ANGIOMA (\"CHERRY ANGIOMA\")    Physical Exam:  Anatomic Location: scattered across sun exposed areas of the trunk and extremities   Morphologic Description: Firm red to reddish-blue discrete " "papules  Pertinent Positives:  Pertinent Negatives:    Additional History of Present Condition:  Present on exam.     Assessment and Plan:  Reassured benign    NOTALGIA PARESTHETICA    Physical Exam:  Anatomic Location Affected: left upper  back   Morphological Description:  clear  Pertinent Positives:  Pertinent Negatives:    Additional History of Present Condition:  present for few months.     Assessment and Plan:  Based on a thorough discussion of this condition and the management approach to it (including a comprehensive discussion of the known risks, side effects and potential benefits of treatment), the patient (family) agrees to implement the following specific plan:  Benign reassurance      Assessment and Plan:  Notalgia paresthetica is a condition where itch and changed sensation arise in the areas of skin on the medial aspect of the shoulder blade on either side of the back. Notalgia means pain in the back, and paraesthetica refers to burning pain, tingling or itch. It is also called thoracic cutaneous nerve entrapment syndrome.    The nerves which supply sensation to the upper back emerge from the spinal cord (2nd to 6th thoracic segments) and run a long course up through the thick muscles of the back. They make a right-angled turn before reaching the skin. The nerves appear to be vulnerable to compression or traction. Partial compression or injury leads to the symptoms.   Initial injury to the nerves may include:  Back injury  Herniated or \"slipped\" disc  Herpes zoster (shingles)  Sunburn  Myelopathy (muscular disease)  Small fiber neuropathy    Notalgia paresthetica often starts after a period of intense exercise leading to muscular stiffness, or a period of inactivity. Sometimes, a specific injury may be recalled.       It is characterized by intense itch on the medial border of one scapula or both, ie, between the shoulder blades. This itch can be intermittent or continuous. It is unrelieved by " scratching, although the scratching and rubbing may be pleasurable.   The affected area may spread to both shoulder blades and more widely over the back and shoulders.  In many patients, there are no visible signs.   Visible changes often arise from rubbing and scratching the affected area. These include:  Scratch marks  Hyperpigmentation (brown marks)  Hypopigmentation (white marks)  Lichen simplex (a type of eczema)  Scarring    There may be changed sensation in the affected area of skin in response to light touch, sharp objects, cold, and heat. There may be reduced or absent sweating in the affected area.    Radiology such as X-ray, CT scan or MRI may demonstrate a degenerative vertebra or prolapsed disc in the area that corresponds to the nerve supply to the affected skin. In many cases, no abnormality is revealed.     Treatment is not always necessary, and it is not always successful. Typical management may include the following:  Cooling lotions or creams as required (camphor and menthol)  Topical steroids to treat associated lichen simplex  Capsaicin cream - this depletes nerve endings of their chemical transmitters but requires frequent reapplication and causes unpleasant local side effects  Local anesthetic creams may provide temporary relief of symptoms  Amitriptyline or other oral tricyclic medications at night to help sleep and counteract neuropathic symptoms  Gabapentin, pregabalin or other anticonvulsants can relieve unpleasant burning or tingling sensations  Transcutaneous electrical nerve stimulation (TENS)  Surgical decompression of vertebral nerve impingement    Physical therapy with repetitive exercises and stretches for the upper back has been reported to be effective.    While sitting, cross arms and bend forwards to stretch upper back  Arms at sides, raise shoulders and rotate them forwards and backwards  Arms straight, rotate forwards 360 degrees and backwards 360 degrees  Rotate upper body  left and right until stretch is felt and hold  Massage the muscles besides the spine in the affected area         Scribe Attestation      I,:  Zeke Park am acting as a scribe while in the presence of the attending physician.:       I,:  Bo Mccrary MD personally performed the services described in this documentation    as scribed in my presence.:

## 2024-04-18 ENCOUNTER — TELEPHONE (OUTPATIENT)
Dept: NEPHROLOGY | Facility: CLINIC | Age: 74
End: 2024-04-18

## 2024-04-18 DIAGNOSIS — N18.31 STAGE 3A CHRONIC KIDNEY DISEASE (HCC): Primary | ICD-10-CM

## 2024-04-18 NOTE — TELEPHONE ENCOUNTER
Called left voice message to remind patient to get non-fasting labs done 1 week prior to 04/30/24 follow up appointment with Dr.Adeem Juan Antonio MD.  lab orders in [facility].

## 2024-04-22 ENCOUNTER — LAB (OUTPATIENT)
Dept: LAB | Facility: CLINIC | Age: 74
End: 2024-04-22
Payer: MEDICARE

## 2024-04-22 DIAGNOSIS — N18.31 STAGE 3A CHRONIC KIDNEY DISEASE (HCC): ICD-10-CM

## 2024-04-22 LAB
25(OH)D3 SERPL-MCNC: 36.6 NG/ML (ref 30–100)
BACTERIA UR QL AUTO: ABNORMAL /HPF
BASOPHILS # BLD AUTO: 0.02 THOUSANDS/ÂΜL (ref 0–0.1)
BASOPHILS NFR BLD AUTO: 0 % (ref 0–1)
BILIRUB UR QL STRIP: NEGATIVE
CLARITY UR: CLEAR
COLOR UR: ABNORMAL
CREAT UR-MCNC: 48.4 MG/DL
EOSINOPHIL # BLD AUTO: 0.14 THOUSAND/ÂΜL (ref 0–0.61)
EOSINOPHIL NFR BLD AUTO: 3 % (ref 0–6)
ERYTHROCYTE [DISTWIDTH] IN BLOOD BY AUTOMATED COUNT: 13.6 % (ref 11.6–15.1)
GLUCOSE UR STRIP-MCNC: NEGATIVE MG/DL
HCT VFR BLD AUTO: 39.6 % (ref 34.8–46.1)
HGB BLD-MCNC: 12.8 G/DL (ref 11.5–15.4)
HGB UR QL STRIP.AUTO: NEGATIVE
IMM GRANULOCYTES # BLD AUTO: 0.01 THOUSAND/UL (ref 0–0.2)
IMM GRANULOCYTES NFR BLD AUTO: 0 % (ref 0–2)
KETONES UR STRIP-MCNC: NEGATIVE MG/DL
LEUKOCYTE ESTERASE UR QL STRIP: ABNORMAL
LYMPHOCYTES # BLD AUTO: 1.42 THOUSANDS/ÂΜL (ref 0.6–4.47)
LYMPHOCYTES NFR BLD AUTO: 31 % (ref 14–44)
MCH RBC QN AUTO: 31.8 PG (ref 26.8–34.3)
MCHC RBC AUTO-ENTMCNC: 32.3 G/DL (ref 31.4–37.4)
MCV RBC AUTO: 99 FL (ref 82–98)
MONOCYTES # BLD AUTO: 0.25 THOUSAND/ÂΜL (ref 0.17–1.22)
MONOCYTES NFR BLD AUTO: 5 % (ref 4–12)
NEUTROPHILS # BLD AUTO: 2.81 THOUSANDS/ÂΜL (ref 1.85–7.62)
NEUTS SEG NFR BLD AUTO: 61 % (ref 43–75)
NITRITE UR QL STRIP: NEGATIVE
NON-SQ EPI CELLS URNS QL MICRO: ABNORMAL /HPF
NRBC BLD AUTO-RTO: 0 /100 WBCS
PH UR STRIP.AUTO: 6.5 [PH]
PLATELET # BLD AUTO: 123 THOUSANDS/UL (ref 149–390)
PMV BLD AUTO: 10.8 FL (ref 8.9–12.7)
PROT UR STRIP-MCNC: NEGATIVE MG/DL
PROT UR-MCNC: 4 MG/DL
PROT/CREAT UR: 0.08 MG/G{CREAT} (ref 0–0.1)
PTH-INTACT SERPL-MCNC: 65 PG/ML (ref 12–88)
RBC # BLD AUTO: 4.02 MILLION/UL (ref 3.81–5.12)
RBC #/AREA URNS AUTO: ABNORMAL /HPF
SP GR UR STRIP.AUTO: 1.01 (ref 1–1.03)
UROBILINOGEN UR STRIP-ACNC: <2 MG/DL
WBC # BLD AUTO: 4.65 THOUSAND/UL (ref 4.31–10.16)
WBC #/AREA URNS AUTO: ABNORMAL /HPF

## 2024-04-22 PROCEDURE — 84156 ASSAY OF PROTEIN URINE: CPT

## 2024-04-22 PROCEDURE — 84100 ASSAY OF PHOSPHORUS: CPT

## 2024-04-22 PROCEDURE — 82306 VITAMIN D 25 HYDROXY: CPT

## 2024-04-22 PROCEDURE — 80053 COMPREHEN METABOLIC PANEL: CPT

## 2024-04-22 PROCEDURE — 36415 COLL VENOUS BLD VENIPUNCTURE: CPT

## 2024-04-22 PROCEDURE — 82570 ASSAY OF URINE CREATININE: CPT

## 2024-04-22 PROCEDURE — 85025 COMPLETE CBC W/AUTO DIFF WBC: CPT

## 2024-04-22 PROCEDURE — 83970 ASSAY OF PARATHORMONE: CPT

## 2024-04-23 DIAGNOSIS — R30.0 DYSURIA: Primary | ICD-10-CM

## 2024-04-23 LAB
ALBUMIN SERPL BCP-MCNC: 4.1 G/DL (ref 3.5–5)
ALP SERPL-CCNC: 56 U/L (ref 34–104)
ALT SERPL W P-5'-P-CCNC: 12 U/L (ref 7–52)
ANION GAP SERPL CALCULATED.3IONS-SCNC: 8 MMOL/L (ref 4–13)
AST SERPL W P-5'-P-CCNC: 16 U/L (ref 13–39)
BILIRUB SERPL-MCNC: 0.53 MG/DL (ref 0.2–1)
BUN SERPL-MCNC: 20 MG/DL (ref 5–25)
CALCIUM SERPL-MCNC: 9.2 MG/DL (ref 8.4–10.2)
CHLORIDE SERPL-SCNC: 105 MMOL/L (ref 96–108)
CO2 SERPL-SCNC: 26 MMOL/L (ref 21–32)
CREAT SERPL-MCNC: 1.03 MG/DL (ref 0.6–1.3)
GFR SERPL CREATININE-BSD FRML MDRD: 54 ML/MIN/1.73SQ M
GLUCOSE P FAST SERPL-MCNC: 107 MG/DL (ref 65–99)
PHOSPHATE SERPL-MCNC: 4.4 MG/DL (ref 2.3–4.1)
POTASSIUM SERPL-SCNC: 4 MMOL/L (ref 3.5–5.3)
PROT SERPL-MCNC: 6.5 G/DL (ref 6.4–8.4)
SODIUM SERPL-SCNC: 139 MMOL/L (ref 135–147)

## 2024-04-23 RX ORDER — CEPHALEXIN 500 MG/1
500 CAPSULE ORAL EVERY 12 HOURS SCHEDULED
Qty: 10 CAPSULE | Refills: 0 | Status: SHIPPED | OUTPATIENT
Start: 2024-04-23 | End: 2024-04-28

## 2024-04-30 ENCOUNTER — OFFICE VISIT (OUTPATIENT)
Dept: NEPHROLOGY | Facility: CLINIC | Age: 74
End: 2024-04-30
Payer: MEDICARE

## 2024-04-30 VITALS
DIASTOLIC BLOOD PRESSURE: 80 MMHG | TEMPERATURE: 97.5 F | BODY MASS INDEX: 33.52 KG/M2 | OXYGEN SATURATION: 96 % | HEART RATE: 78 BPM | SYSTOLIC BLOOD PRESSURE: 130 MMHG | RESPIRATION RATE: 18 BRPM | HEIGHT: 66 IN | WEIGHT: 208.6 LBS

## 2024-04-30 DIAGNOSIS — N18.31 STAGE 3A CHRONIC KIDNEY DISEASE (HCC): Primary | ICD-10-CM

## 2024-04-30 PROCEDURE — 99214 OFFICE O/P EST MOD 30 MIN: CPT | Performed by: INTERNAL MEDICINE

## 2024-04-30 PROCEDURE — G2211 COMPLEX E/M VISIT ADD ON: HCPCS | Performed by: INTERNAL MEDICINE

## 2024-04-30 NOTE — PROGRESS NOTES
NEPHROLOGY PROGRESS NOTE    Patient: Saloni Miles               Sex: female          DOA: No admission date for patient encounter.   YOB: 1950        Age:  74 y.o.        LOS: [unfilled]  4/30/2024        BACKGROUND     73 years old female with past medical history of chronic kidney disease stage III, hypertension, obesity, degenerative joint disease who has been following her renal office in September 2022.    SUBJECTIVE     Patient following up after 12 months. Offers no complaints.     REVIEW OF SYSTEMS     Review of Systems   Constitutional: Negative.    HENT: Negative.     Eyes: Negative.    Respiratory: Negative.     Cardiovascular: Negative.    Gastrointestinal: Negative.    Endocrine: Negative.    Genitourinary: Negative.    Musculoskeletal: Negative.    Skin: Negative.    Allergic/Immunologic: Negative.    Neurological: Negative.    Hematological: Negative.    All other systems reviewed and are negative.      OBJECTIVE     Current Weight: Weight - Scale: 94.6 kg (208 lb 9.6 oz)  Vitals:    04/30/24 1252   BP: 130/80   Pulse: 78   Resp: 18   Temp: 97.5 °F (36.4 °C)   SpO2: 96%     Body mass index is 33.67 kg/m².      CURRENT MEDICATIONS       Current Outpatient Medications:     albuterol (PROVENTIL HFA,VENTOLIN HFA) 90 mcg/act inhaler, Inhale 2 puffs every 4 (four) hours as needed, Disp: , Rfl:     ALPRAZolam (XANAX) 0.25 mg tablet, Take 1 tablet (0.25 mg total) by mouth 3 (three) times a day as needed for anxiety 1/2-1 as needed tid, Disp: 30 tablet, Rfl: 0    ammonium lactate (LAC-HYDRIN) 12 % cream, Apply topically as needed for dry skin, Disp: 385 g, Rfl: 1    aspirin (ECOTRIN LOW STRENGTH) 81 mg EC tablet, Take 81 mg by mouth in the morning, Disp: , Rfl:     atorvastatin (LIPITOR) 40 mg tablet, TAKE 1 TABLET DAILY, Disp: 90 tablet, Rfl: 3    Biotin 15059 MCG TABS, , Disp: , Rfl:     clopidogrel (PLAVIX) 75 mg tablet, TAKE 1 TABLET DAILY, Disp: 90 tablet, Rfl: 3    fluticasone (FLONASE) 50  mcg/act nasal spray, 2 sprays into each nostril daily as needed, Disp: , Rfl:     fluticasone-salmeterol (Advair HFA) 230-21 MCG/ACT inhaler, Inhale 2 puffs 2 (two) times a day Rinse mouth after use., Disp: 36 g, Rfl: 1    levothyroxine 112 mcg tablet, TAKE 1 TABLET DAILY EARLY IN THE MORNING, Disp: 90 tablet, Rfl: 3    losartan-hydrochlorothiazide (HYZAAR) 100-25 MG per tablet, Take 1 tablet by mouth daily, Disp: , Rfl:     montelukast (SINGULAIR) 10 mg tablet, TAKE 1 TABLET DAILY AS NEEDED FOR ALLERGIES, Disp: 90 tablet, Rfl: 3    Multiple Vitamin (multivitamin) tablet, Take 1 tablet by mouth daily, Disp: 30 tablet, Rfl: 0    polyethylene glycol (MIRALAX) 17 g packet, Take 17 g by mouth daily, Disp: , Rfl:     sertraline (ZOLOFT) 50 mg tablet, TAKE 1 TABLET DAILY, Disp: 30 tablet, Rfl: 11    traZODone (DESYREL) 100 mg tablet, TAKE 1 TABLET DAILY AT BEDTIME, Disp: 90 tablet, Rfl: 3      PHYSICAL EXAMINATION     Physical Exam  Constitutional:       Appearance: She is well-developed.   HENT:      Head: Normocephalic and atraumatic.   Eyes:      Pupils: Pupils are equal, round, and reactive to light.   Cardiovascular:      Rate and Rhythm: Normal rate and regular rhythm.      Heart sounds: Murmur heard.   Pulmonary:      Effort: Pulmonary effort is normal.   Abdominal:      General: Bowel sounds are normal.      Palpations: Abdomen is soft.   Musculoskeletal:         General: Normal range of motion.      Cervical back: Neck supple.   Skin:     General: Skin is warm.   Neurological:      Mental Status: She is alert and oriented to person, place, and time.           LAB RESULTS     Results from last 6 Months   Lab Units 04/22/24  1413   WBC Thousand/uL 4.65   HEMOGLOBIN g/dL 12.8   HEMATOCRIT % 39.6   PLATELETS Thousands/uL 123*   POTASSIUM mmol/L 4.0   CHLORIDE mmol/L 105   CO2 mmol/L 26   BUN mg/dL 20   CREATININE mg/dL 1.03   CALCIUM mg/dL 9.2   PHOSPHORUS mg/dL 4.4*             RADIOLOGY RESULTS    Renal  ultrasound     IMPRESSION:  Normal size kidneys  No hydronephrosis    ASSESSMENT/PLAN     73 years old female with past medical history of hypertension, obesity, degenerative joint disease, dyslipidemia, chronic NSAID use who presents to our facility for follow-up    1.  Chronic kidney disease stage IIIa: Baseline serum creatinine is 1.0-1.1.  Serum creatinine is 1.0 mg/dL and eGFR of 54 and acceptable  Ultrasound had revealed normal-sized kidneys  Avoidance of NSAIDs, contrast agents recommended.    2.  Hypertension due to CKD: Blood pressure within acceptable range while on Hyzaar and noted to be 130/80.    3.  Abnormal urinalysis: presence of pyuria. No bacteria noted.     4.  Secondary hyperparathyroidism of renal origin: Normal PTH intact, 25-hydroxy vitamin D and calcium levels. Phosphorus is mildly elevated.     5.  Anemia due to CKD: hemoglobin of 12 g/dl    6.  Nutrition: Low-salt, moderate potassium, moderate protein diet recommended.  Ingestion with please 64 ounces of fluids recommended as well.            Luz Romano MD  Nephrology  4/30/2024

## 2024-05-31 ENCOUNTER — OFFICE VISIT (OUTPATIENT)
Dept: FAMILY MEDICINE CLINIC | Facility: CLINIC | Age: 74
End: 2024-05-31
Payer: MEDICARE

## 2024-05-31 VITALS
DIASTOLIC BLOOD PRESSURE: 82 MMHG | SYSTOLIC BLOOD PRESSURE: 126 MMHG | BODY MASS INDEX: 33.91 KG/M2 | WEIGHT: 211 LBS | OXYGEN SATURATION: 97 % | HEART RATE: 88 BPM | TEMPERATURE: 99 F | HEIGHT: 66 IN

## 2024-05-31 DIAGNOSIS — K75.81 LIVER CIRRHOSIS SECONDARY TO NASH (NONALCOHOLIC STEATOHEPATITIS) (HCC): ICD-10-CM

## 2024-05-31 DIAGNOSIS — R73.01 ELEVATED FASTING GLUCOSE: ICD-10-CM

## 2024-05-31 DIAGNOSIS — E55.9 VITAMIN D DEFICIENCY: ICD-10-CM

## 2024-05-31 DIAGNOSIS — Z00.00 MEDICARE ANNUAL WELLNESS VISIT, SUBSEQUENT: ICD-10-CM

## 2024-05-31 DIAGNOSIS — I74.09 OTHER ARTERIAL EMBOLISM AND THROMBOSIS OF ABDOMINAL AORTA (HCC): ICD-10-CM

## 2024-05-31 DIAGNOSIS — N18.32 STAGE 3B CHRONIC KIDNEY DISEASE (HCC): Primary | ICD-10-CM

## 2024-05-31 DIAGNOSIS — Z23 ENCOUNTER FOR IMMUNIZATION: ICD-10-CM

## 2024-05-31 DIAGNOSIS — N39.41 URGE URINARY INCONTINENCE: ICD-10-CM

## 2024-05-31 DIAGNOSIS — F41.9 ANXIETY: ICD-10-CM

## 2024-05-31 DIAGNOSIS — E78.5 DYSLIPIDEMIA: ICD-10-CM

## 2024-05-31 DIAGNOSIS — Z98.890 PERIPHERAL ARTERIAL DISEASE WITH HISTORY OF REVASCULARIZATION  (HCC): ICD-10-CM

## 2024-05-31 DIAGNOSIS — I25.10 CORONARY ARTERY DISEASE INVOLVING NATIVE HEART WITHOUT ANGINA PECTORIS, UNSPECIFIED VESSEL OR LESION TYPE: ICD-10-CM

## 2024-05-31 DIAGNOSIS — F41.8 DEPRESSION WITH ANXIETY: ICD-10-CM

## 2024-05-31 DIAGNOSIS — K74.60 LIVER CIRRHOSIS SECONDARY TO NASH (NONALCOHOLIC STEATOHEPATITIS) (HCC): ICD-10-CM

## 2024-05-31 DIAGNOSIS — F33.9 DEPRESSION, RECURRENT (HCC): ICD-10-CM

## 2024-05-31 DIAGNOSIS — I73.9 PERIPHERAL ARTERIAL DISEASE WITH HISTORY OF REVASCULARIZATION  (HCC): ICD-10-CM

## 2024-05-31 DIAGNOSIS — N39.3 STRESS INCONTINENCE OF URINE: ICD-10-CM

## 2024-05-31 PROCEDURE — G0439 PPPS, SUBSEQ VISIT: HCPCS | Performed by: NURSE PRACTITIONER

## 2024-05-31 PROCEDURE — 90677 PCV20 VACCINE IM: CPT

## 2024-05-31 PROCEDURE — G0009 ADMIN PNEUMOCOCCAL VACCINE: HCPCS

## 2024-05-31 PROCEDURE — 99214 OFFICE O/P EST MOD 30 MIN: CPT | Performed by: NURSE PRACTITIONER

## 2024-05-31 NOTE — PROGRESS NOTES
Ambulatory Visit  Name: Saloni Miles      : 1950      MRN: [id number]  Encounter Provider: SLY Ledesma  Encounter Date: 2024   Encounter department: [facility]  Pt is a 74  yr old female   Presents in office for 6 month follow up and medicare wellness   Underlying history of depression and anxiety - stable   Currently on Zoloft and Trazodone at night - recently adjustment was made to her zoloft - increased to 50 mg - doing well- now she is trying to wean off the zoloft - discussed   HTN stable she has been on Hyzaar   Does have non alcoholic steato hepatitis - she is being monitored by GI - she is due for ultrasound of the liver   Sleep apnea - uses CPAP   PVD she has had stents to lower extremities and lost a toe to left lower extremity - take Plavix 75 mg daily and aspirin daily denies any issues currently.   Does have some seasonal allergies and bronchitis. - no issues currently   Hypothyroidism She is on Levothyroxine - stable   Also history of elevated fasting glucose --> will continue to monitor for diabetes   Discussed healthy diet   Discussed adequate hydration   Follow up with Nephrology for CKD monitoring   Follow up in 4-6 months   LIPID panel was done   Medicare wellness done and labs reviewed   She was treated with Keflex for urinary issues and feeling better        Assessment & Plan   1. Stage 3b chronic kidney disease (HCC)  Assessment & Plan:  Stable   Under care of nephology   BP managed   Discussed renal diet   Lab Results   Component Value Date    EGFR 54 2024    EGFR 56 10/18/2023    EGFR 46 2023    CREATININE 1.03 2024    CREATININE 1.01 10/18/2023    CREATININE 1.18 2023     Orders:  -     Comprehensive metabolic panel  -     CBC and differential  -     TSH, 3rd generation with Free T4 reflex  -     Lipid panel  -     Hemoglobin A1C  -     UA/M w/rflx Culture, Routine  2. 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 Encounter for immunization  -     Pneumococcal Conjugate Vaccine 20-valent (Pcv20)  4. Peripheral arterial disease with history of revascularization  (HCC)  -     Comprehensive metabolic panel  -     CBC and differential  -     TSH, 3rd generation with Free T4 reflex  -     Lipid panel  -     Hemoglobin A1C  -     UA/M w/rflx Culture, Routine  5. Liver cirrhosis secondary to JOSEPH (nonalcoholic steatohepatitis) (HCC)  Assessment & Plan:  Stable   Under care of GI   Due for Ultrasound follow up --> ordered   Orders:  -     US elastography/UGAP; Future; Expected date: 06/08/2024  6. Depression, recurrent (HCC)  -     Comprehensive metabolic panel  -     CBC and differential  -     TSH, 3rd generation with Free T4 reflex  -     Lipid panel  -     Hemoglobin A1C  -     UA/M w/rflx Culture, Routine  7. Depression with anxiety  -     Comprehensive metabolic panel  -     CBC and differential  -     TSH, 3rd generation with Free T4 reflex  -     Lipid panel  -     Hemoglobin A1C  -     UA/M w/rflx Culture, Routine  8. Anxiety  -     Comprehensive metabolic panel  -     CBC and differential  -     TSH, 3rd generation with Free T4 reflex  -     Lipid panel  -     Hemoglobin A1C  -     UA/M w/rflx Culture, Routine  9. Dyslipidemia  -     Comprehensive metabolic panel  -     CBC and differential  -     TSH, 3rd generation with Free T4 reflex  -     Lipid panel  -     Hemoglobin A1C  -     UA/M w/rflx Culture, Routine  10. Vitamin D deficiency  -     Vitamin D 25 hydroxy  11. Elevated fasting glucose  -     Hemoglobin A1C  12. Medicare annual wellness visit, subsequent  13. Stress incontinence of urine  14. Urge urinary incontinence  15. Coronary artery disease involving native heart without angina pectoris, unspecified vessel or lesion type  Assessment & Plan:  Continue Plavix and cholesterol management   Low fat low cholesterol diet   On Lipitor 40 mg daily        Depression Screening and Follow-up  Plan: Patient was screened for depression during today's encounter. They screened negative with a PHQ-9 score of 3.    Falls Plan of Care: balance, strength, and gait training instructions were provided. Recommended assistive device to help with gait and balance. Medications that increase falls were reviewed. Assessed feet and footwear. Vitamin D supplementation was recommended. Home safety education provided.     Urinary Incontinence Plan of Care: counseling topics discussed: practice Kegel (pelvic floor strengthening) exercises, use restroom every 2 hours, limit alcohol, caffeine, spicy foods, and acidic foods and preventing constipation.       Preventive health issues were discussed with patient, and age appropriate screening tests were ordered as noted in patient's After Visit Summary. Personalized health advice and appropriate referrals for health education or preventive services given if needed, as noted in patient's After Visit Summary.    History of Present Illness     Pt is a 74  yr old female   Presents in office for 6 month follow up and medicare wellness   Underlying history of depression and anxiety - stable   Currently on Zoloft and Trazodone at night - recently adjustment was made to her zoloft - increased to 50 mg - doing well- now she is trying to wean off the zoloft - discussed   HTN stable she has been on Hyzaar   Does have non alcoholic steato hepatitis - she is being monitored by GI - she is due for ultrasound of the liver   Sleep apnea - uses CPAP   PVD she has had stents to lower extremities and lost a toe to left lower extremity - take Plavix 75 mg daily and aspirin daily denies any issues currently.   Does have some seasonal allergies and bronchitis. - no issues currently   Hypothyroidism She is on Levothyroxine - stable   Also history of elevated fasting glucose --> will continue to monitor for diabetes        Patient Care Team:  SLY Ledesma as PCP - General (Nurse  Practitioner)  Luz Romano MD (Nephrology)    Review of Systems   Constitutional:  Negative for chills, fatigue, fever and unexpected weight change.   HENT:  Negative for congestion, postnasal drip, sinus pressure and voice change.    Eyes: Negative.    Respiratory:  Negative for cough and shortness of breath.    Cardiovascular:  Negative for chest pain and palpitations.        HTN  Stable   Hyperlipemia- stable   PVD     Gastrointestinal:  Negative for abdominal distention, abdominal pain, nausea and vomiting.        JOSEPH - fatty liver   Sees GI    Endocrine:        Hypothyroidism   Being monitored for elevated fasting glucose    Genitourinary:  Negative for difficulty urinating and flank pain.   Musculoskeletal:  Positive for arthralgias and myalgias.   Skin:  Negative for rash.   Allergic/Immunologic: Positive for environmental allergies.   Neurological:  Negative for headaches.   Hematological:  Negative for adenopathy.   Psychiatric/Behavioral:  Negative for sleep disturbance and suicidal ideas. The patient is not nervous/anxious.      Medical History Reviewed by provider this encounter:  Tobacco  Allergies  Meds  Problems  Med Hx  Surg Hx  Fam Hx       Annual Wellness Visit Questionnaire   Saloni is here for her Subsequent Wellness visit.     Health Risk Assessment:   Patient rates overall health as very good. Patient feels that their physical health rating is slightly better. Patient is satisfied with their life. Eyesight was rated as same. Hearing was rated as same. Patient feels that their emotional and mental health rating is slightly better. Patients states they are sometimes angry. Patient states they are sometimes unusually tired/fatigued. Pain experienced in the last 7 days has been some. Patient's pain rating has been 3/10. Patient states that she has experienced weight loss or gain in last 6 months.     Depression Screening:   PHQ-9 Score: 3      Fall Risk Screening:   In the past year,  patient has experienced: no history of falling in past year      Urinary Incontinence Screening:   Patient has leaked urine accidently in the last six months.     Home Safety:  Patient does not have trouble with stairs inside or outside of their home. Patient has working smoke alarms and has working carbon monoxide detector. Home safety hazards include: none.     Nutrition:   Current diet is Regular, Low Saturated Fat, No Added Salt and Limited junk food.     Medications:   Patient is currently taking over-the-counter supplements. OTC medications include: see medication list. Patient is able to manage medications.     Activities of Daily Living (ADLs)/Instrumental Activities of Daily Living (IADLs):   Walk and transfer into and out of bed and chair?: Yes  Dress and groom yourself?: Yes    Bathe or shower yourself?: Yes    Feed yourself? Yes  Do your laundry/housekeeping?: Yes  Manage your money, pay your bills and track your expenses?: Yes  Make your own meals?: Yes    Do your own shopping?: Yes    Previous Hospitalizations:   Any hospitalizations or ED visits within the last 12 months?: No      Advance Care Planning:   Living will: No    Durable POA for healthcare: No    Advanced directive: No    Advanced directive counseling given: Yes    ACP document given: Yes      Cognitive Screening:   Provider or family/friend/caregiver concerned regarding cognition?: No    PREVENTIVE SCREENINGS      Cardiovascular Screening:    General: Screening Current      Diabetes Screening:     General: Screening Current      Colorectal Cancer Screening:     General: Screening Current      Breast Cancer Screening:     General: Screening Current      Cervical Cancer Screening:    General: Screening Not Indicated      Osteoporosis Screening:      Due for: DXA Axial      Lung Cancer Screening:     General: Screening Not Indicated      Hepatitis C Screening:    General: Screening Current    Screening, Brief Intervention, and Referral to  Treatment (SBIRT)    Screening  Typical number of drinks in a day: 0  Typical number of drinks in a week: 0  Interpretation: Low risk drinking behavior.    AUDIT-C Screenin) How often did you have a drink containing alcohol in the past year? never  2) How many drinks did you have on a typical day when you were drinking in the past year? 0  3) How often did you have 6 or more drinks on one occasion in the past year? never    AUDIT-C Score: 0  Interpretation: Score 0-2 (female): Negative screen for alcohol misuse    Single Item Drug Screening:  How often have you used an illegal drug (including marijuana) or a prescription medication for non-medical reasons in the past year? never    Single Item Drug Screen Score: 0  Interpretation: Negative screen for possible drug use disorder    Time Spent  Time spent screening/evaluating the patient for alcohol misuse: 0 minutes. Time spent providing alcohol/substance abuse assessment and intervention services: 0 minutes.    Other Counseling Topics:   Car/seat belt/driving safety, skin self-exam, sunscreen and calcium and vitamin D intake and regular weightbearing exercise.     Social Determinants of Health     Financial Resource Strain: Low Risk  (2023)    Overall Financial Resource Strain (CARDIA)     Difficulty of Paying Living Expenses: Not very hard   Food Insecurity: No Food Insecurity (2024)    Hunger Vital Sign     Worried About Running Out of Food in the Last Year: Never true     Ran Out of Food in the Last Year: Never true   Transportation Needs: No Transportation Needs (2024)    PRAPARE - Transportation     Lack of Transportation (Medical): No     Lack of Transportation (Non-Medical): No   Housing Stability: Low Risk  (2024)    Housing Stability Vital Sign     Unable to Pay for Housing in the Last Year: No     Number of Times Moved in the Last Year: 0     Homeless in the Last Year: No   Utilities: Not At Risk (2024)    [facility] Utilities      "Threatened with loss of utilities: No     No results found.    Objective     /82 (BP Location: Right arm, Patient Position: Sitting, Cuff Size: Large)   Pulse 88   Temp 99 °F (37.2 °C)   Ht 5' 6\" (1.676 m)   Wt 95.7 kg (211 lb)   SpO2 97%   BMI 34.06 kg/m²     Physical Exam  Vitals and nursing note reviewed.   Constitutional:       Appearance: Normal appearance.      Comments: BMI 34.06   HENT:      Head: Atraumatic.      Nose: No congestion or rhinorrhea.   Eyes:      Extraocular Movements: Extraocular movements intact.   Cardiovascular:      Rate and Rhythm: Normal rate and regular rhythm.      Pulses: Normal pulses.      Heart sounds: Normal heart sounds.   Pulmonary:      Effort: Pulmonary effort is normal.      Breath sounds: Normal breath sounds.   Abdominal:      Palpations: Abdomen is soft.   Musculoskeletal:      Cervical back: Normal range of motion.      Right lower leg: No edema.      Left lower leg: No edema.   Skin:     General: Skin is warm.   Neurological:      Mental Status: She is alert and oriented to person, place, and time.   Psychiatric:         Mood and Affect: Mood normal.         Behavior: Behavior normal.         Contains abnormal data Urinalysis with microscopic  Order: [id number]   Status: Final result       Visible to patient: Yes (seen)       Next appt: 06/18/2024 at 11:15 AM in Neurology (Paulino Wellington MD)       Dx: Stage 3a chronic kidney disease (HCC)    3 Result Notes       2 Patient Communications            Component  Ref Range & Units 4/22/24  2:13 PM 10/18/23  9:46 AM 10/18/23  9:46 AM 10/18/23  9:46 AM 3/21/23  1:46 PM 12/14/22  3:25 PM 9/26/22  3:01 PM   Color, UA Light Yellow  Light Yellow Light Yellow Light Yellow Light Yellow Yellow   Clarity, UA Clear  Clear Clear Turbid Turbid Clear   Specific Gravity, UA  1.003 - 1.030 1.012  1.013 1.013 1.008 1.009 1.015   pH, UA  4.5, 5.0, 5.5, 6.0, 6.5, 7.0, 7.5, 8.0 6.5  7.0 7.0 7.0 7.0 6.0   Leukocytes, UA  Negative " Moderate Abnormal   Moderate Abnormal  Moderate Abnormal  Small Abnormal  Moderate Abnormal  Moderate Abnormal    Nitrite, UA  Negative Negative  Positive Abnormal  Positive Abnormal  Negative Positive Abnormal  Positive Abnormal    Protein, UA  Negative mg/dl Negative  Negative Negative Negative Negative Negative   Glucose, UA  Negative mg/dl Negative  Negative Negative Negative Negative Negative   Ketones, UA  Negative mg/dl Negative  Negative Negative Negative Negative Negative   Urobilinogen, UA  <2.0 mg/dl mg/dl <2.0  <2.0 <2.0 <2.0 <2.0    Bilirubin, UA  Negative Negative  Negative Negative Negative Negative Negative   Occult Blood, UA  Negative Negative  Negative Negative Negative Negative Negative   RBC, UA  None Seen, 1-2 /hpf 1-2 1-2 1-2  2-4 Abnormal  1-2 None Seen R   WBC, UA  None Seen, 1-2 /hpf 10-20 Abnormal  30-50 Abnormal  30-50 Abnormal   10-20 Abnormal  20-30 Abnormal  10-20 Abnormal  R   Epithelial Cells  None Seen, Occasional /hpf Occasional Occasional None Seen  Occasional Occasional None Seen   Bacteria, UA  None Seen, Occasional /hpf None Seen Moderate Abnormal  Moderate Abnormal   Innumerable Abnormal  Occasional Moderate Abnormal    Hyaline Casts, UA  0-3 Abnormal  R    0-3 Abnormal  R    Amorphous Crystals, UA  Occasional        MUCUS THREADS      Occasional Abnormal  R    Urobilinogen, UA       0.2 R                Specimen Collected: 04/22/24  2:13 PM Last Resulted: 04/22/24  6:32 PM        Lab Flowsheet        Order Details        View Encounter        Lab and Collection Details        Routing        Result History               Contains abnormal data CBC and differential  Order: [id number]   Status: Final result       Visible to patient: Yes (seen)       Next appt: 06/18/2024 at 11:15 AM in Neurology (Paulino Wellington MD)       Dx: Stage 3a chronic kidney disease (HCC)    1 Result Note       1 Patient Communication            Component  Ref Range & Units 4/22/24  2:13 PM 10/18/23  9:46 AM  6/2/23 10:54 AM 3/21/23  1:46 PM 12/13/22  1:59 PM 10/7/22  6:27 AM 10/6/22  6:20 AM   WBC  4.31 - 10.16 Thousand/uL 4.65 5.09 5.02 5.21 5.45 5.72 8.57   RBC  3.81 - 5.12 Million/uL 4.02 4.12 3.95 4.11 4.02 2.53 Low  3.05 Low  CM   Hemoglobin  11.5 - 15.4 g/dL 12.8 13.3 12.8 13.3 12.9 7.9 Low  9.6 Low    Hematocrit  34.8 - 46.1 % 39.6 41.1 39.6 39.6 40.0 24.5 Low  30.0 Low    MCV  82 - 98 fL 99 High  100 High  100 High  96 100 High  97 98   MCH  26.8 - 34.3 pg 31.8 32.3 32.4 32.4 32.1 31.2 31.5   MCHC  31.4 - 37.4 g/dL 32.3 32.4 32.3 33.6 32.3 32.2 32.0   RDW  11.6 - 15.1 % 13.6 13.5 14.4 13.2 13.3 15.1 14.7   MPV  8.9 - 12.7 fL 10.8 11.1 11.2 10.7 11.2 11.4 11.1   Platelets  149 - 390 Thousands/uL 123 Low  167 158 152 174 102 Low  138 Low  CM   nRBC  /100 WBCs 0 0 0 0 0     Segmented %  43 - 75 % 61 59 68 63 65     Immature Grans %  0 - 2 % 0 0 0 0 0     Lymphocytes %  14 - 44 % 31 32 24 28 26     Monocytes %  4 - 12 % 5 6 5 6 6     Eosinophils Relative  0 - 6 % 3 2 2 2 2     Basophils Relative  0 - 1 % 0 1 1 1 1     Absolute Neutrophils  1.85 - 7.62 Thousands/µL 2.81 3.01 3.41 3.33 3.61     Absolute Immature Grans  0.00 - 0.20 Thousand/uL 0.01 0.01 0.01 0.00 0.01     Absolute Lymphocytes  0.60 - 4.47 Thousands/µL 1.42 1.61 1.19 1.45 1.41     Absolute Monocytes  0.17 - 1.22 Thousand/µL 0.25 0.32 0.26 0.32 0.30     Eosinophils Absolute  0.00 - 0.61 Thousand/µL 0.14 0.11 0.11 0.08 0.09     Basophils Absolute  0.00 - 0.10 Thousands/µL 0.02 0.03 0.04 0.03 0.03                Specimen Collected: 04/22/24  2:13 PM Last Resulted: 04/22/24  7:15 PM          Contains abnormal data Comprehensive metabolic panel  Order: [id number]   Status: Final result       Visible to patient: Yes (seen)       Next appt: 06/18/2024 at 11:15 AM in Neurology (Paulino Wellington MD)       Dx: Stage 3a chronic kidney disease (HCC)    1 Result Note       1 Patient Communication            Component  Ref Range & Units 4/22/24  2:13 PM 10/18/23  9:46 AM  6/2/23 10:54 AM 3/21/23  1:46 PM 12/13/22  1:59 PM 10/7/22  6:27 AM 10/6/22  6:20 AM   Sodium  135 - 147 mmol/L 139 141 138 139 141 135 136   Potassium  3.5 - 5.3 mmol/L 4.0 4.1 4.1 3.9 3.9 3.9 4.1   Chloride  96 - 108 mmol/L 105 104 109 High  104 111 High  104 105   CO2  21 - 32 mmol/L 26 29 27 27 25 28 26   ANION GAP  4 - 13 mmol/L 8 8 R 2 Low  8 5 3 Low  5   BUN  5 - 25 mg/dL 20 22 22 24 13 17 18   Creatinine  0.60 - 1.30 mg/dL 1.03 1.01 CM 1.18 CM 1.00 CM 0.97 CM 0.92 CM 1.09 CM   Comment: Standardized to IDMS reference method   Glucose, Fasting  65 - 99 mg/dL 107 High  100 High   103 High  CM      Calcium  8.4 - 10.2 mg/dL 9.2 10.9 High  9.5 R 10.1 9.8 R 8.5 R 8.6 R   AST  13 - 39 U/L 16 19 21 R, CM 19 CM 19 R, CM     ALT  7 - 52 U/L 12 15 CM 24 R, CM 17 CM 21 R, CM     Comment: Specimen collection should occur prior to Sulfasalazine administration due to the potential for falsely depressed results.   Alkaline Phosphatase  34 - 104 U/L 56 59 65 R 57 74 R     Total Protein  6.4 - 8.4 g/dL 6.5 7.0 7.2 7.0 7.7     Albumin  3.5 - 5.0 g/dL 4.1 4.1 3.7 4.4 4.0     Total Bilirubin  0.20 - 1.00 mg/dL 0.53 0.73 CM 0.63 CM 0.74 0.71 CM     Comment: Use of this assay is not recommended for patients undergoing treatment with eltrombopag due to the potential for falsely elevated results.  N-acetyl-p-benzoquinone imine (metabolite of Acetaminophen) will generate erroneously low results in samples for patients that have taken an overdose of Acetaminophen.   eGFR  ml/min/1.73sq m 54 56 46 56 59 63 51      Contains abnormal data Lipid panel  Order: [id number]   Status: Final result       Visible to patient: Yes (seen)       Next appt: 06/18/2024 at 11:15 AM in Neurology (Paulino Wellington MD)       Dx: Atherosclerosis of autologous vein by...    1 Result Note       1 Patient Communication      Component  Ref Range & Units 3/7/24 10:11 AM   Cholesterol  See Comment mg/dL 134   Comment: Cholesterol:        Pediatric <18 Years         Desirable          <170 mg/dL      Borderline High    170-199 mg/dL      High               >=200 mg/dL        Adult >=18 Years           Desirable         <200 mg/dL      Borderline High   200-239 mg/dL      High             >239 mg/dL   Triglycerides  See Comment mg/dL 124   Comment: Triglyceride:     0-9Y            <75mg/dL     10Y-17Y         <90 mg/dL       >=18Y     Normal          <150 mg/dL     Borderline High 150-199 mg/dL     High            200-499 mg/dL       Very High       >499 mg/dL    Specimen collection should occur prior to Metamizole administration due to the potential for falsely depressed results.   HDL, Direct  >=50 mg/dL 49 Low    LDL Calculated  0 - 100 mg/dL 60   Comment: LDL Cholesterol:    Optimal           <100 mg/dl    Near Optimal      100-129 mg/dl    Above Optimal      Borderline High 130-159 mg/dl      High            160-189 mg/dl      Very High       >189 mg/dl         This screening LDL is a calculated result.  It does not have the accuracy of the Direct Measured LDL in the monitoring of patients with hyperlipidemia and/or statin therapy.  Direct Measure LDL (PNT022) must be ordered separately in these patients.   Non-HDL-Chol (CHOL-HDL)  mg/dl 85      HEMOGLOBIN A1C W/ EAG ESTIMATION  Order: [id number]   Status: Final result       Visible to patient: Yes (seen)       Next appt: 06/18/2024 at 11:15 AM in Neurology (Paulino Wellington MD)       Dx: Acquired hypothyroidism; Dyslipidemia...    0 Result Notes        Component  Ref Range & Units 10/18/23  9:46 AM 3/21/23  1:46 PM 9/9/22  3:45 PM   Hemoglobin A1C  Normal 4.0-5.6%; PreDiabetic 5.7-6.4%; Diabetic >=6.5%; Glycemic control for adults with diabetes <7.0% % 5.2 5.0 R 5.2 R   EAG  mg/dl 103 97 103              Specimen Collected: 10/18/23  9:46 AM Last Resulted: 10/18/23  4:13 PM           Administrative Statements

## 2024-05-31 NOTE — Clinical Note
I was reviewing her chart  I don't see ultrasound of the liver in the last few years - I see one ordered by GI but not done  I have ordered another US to be done to check on the liver and remind her to get Dexa scan done please - it has been ordered she just need to schedule

## 2024-06-09 NOTE — ASSESSMENT & PLAN NOTE
Continue Plavix and cholesterol management   Low fat low cholesterol diet   On Lipitor 40 mg daily

## 2024-06-09 NOTE — ASSESSMENT & PLAN NOTE
Stable   Under care of nephology   BP managed   Discussed renal diet   Lab Results   Component Value Date    EGFR 54 04/22/2024    EGFR 56 10/18/2023    EGFR 46 06/02/2023    CREATININE 1.03 04/22/2024    CREATININE 1.01 10/18/2023    CREATININE 1.18 06/02/2023

## 2024-06-11 DIAGNOSIS — I10 PRIMARY HYPERTENSION: Primary | ICD-10-CM

## 2024-06-12 RX ORDER — LOSARTAN POTASSIUM AND HYDROCHLOROTHIAZIDE 25; 100 MG/1; MG/1
1 TABLET ORAL DAILY
Qty: 90 TABLET | Refills: 0 | Status: SHIPPED | OUTPATIENT
Start: 2024-06-12 | End: 2024-09-10

## 2024-06-18 ENCOUNTER — PROCEDURE VISIT (OUTPATIENT)
Dept: NEUROLOGY | Facility: CLINIC | Age: 74
End: 2024-06-18
Payer: MEDICARE

## 2024-06-18 DIAGNOSIS — R20.2 PARESTHESIA OF HAND, BILATERAL: ICD-10-CM

## 2024-06-18 PROCEDURE — 95911 NRV CNDJ TEST 9-10 STUDIES: CPT | Performed by: PHYSICAL MEDICINE & REHABILITATION

## 2024-06-18 PROCEDURE — 95886 MUSC TEST DONE W/N TEST COMP: CPT | Performed by: PHYSICAL MEDICINE & REHABILITATION

## 2024-06-19 ENCOUNTER — HOSPITAL ENCOUNTER (OUTPATIENT)
Age: 74
Discharge: HOME | End: 2024-06-19
Payer: MEDICARE

## 2024-06-19 VITALS — BODY MASS INDEX: 34.32 KG/M2 | WEIGHT: 206 LBS | HEIGHT: 65 IN

## 2024-06-19 DIAGNOSIS — Z78.0 ENCOUNTER FOR OSTEOPOROSIS SCREENING IN ASYMPTOMATIC POSTMENOPAUSAL PATIENT: ICD-10-CM

## 2024-06-19 DIAGNOSIS — Z13.820 ENCOUNTER FOR OSTEOPOROSIS SCREENING IN ASYMPTOMATIC POSTMENOPAUSAL PATIENT: ICD-10-CM

## 2024-06-19 PROCEDURE — 77080 DXA BONE DENSITY AXIAL: CPT

## 2024-06-25 ENCOUNTER — TELEPHONE (OUTPATIENT)
Dept: OBGYN CLINIC | Facility: CLINIC | Age: 74
End: 2024-06-25

## 2024-06-25 DIAGNOSIS — R20.2 PARESTHESIA OF HAND, BILATERAL: Primary | ICD-10-CM

## 2024-06-25 NOTE — TELEPHONE ENCOUNTER
Call to this patient no answer left a message to contact our office to schedule and appointment with Dr Cobb to review her EMG Results. Call back Number [phone]

## 2024-06-26 ENCOUNTER — TELEPHONE (OUTPATIENT)
Dept: OBGYN CLINIC | Facility: CLINIC | Age: 74
End: 2024-06-26

## 2024-06-26 NOTE — TELEPHONE ENCOUNTER
This patient return call to schedule her EMG Review.  She would like to schedule it as a virtual visit, if ok with Dr Cobb.  It is a 50 Min ride for her to get in to the office.  Please advise if ok to schedule as a virtual visit.

## 2024-06-26 NOTE — TELEPHONE ENCOUNTER
Call to this patient no answer left a message. Per Dr Cobb it is ok to schedule a virtual visit to Discuss the EMG Results. Given phone number to call the office to schedule the virtual visit

## 2024-07-09 ENCOUNTER — TELEMEDICINE (OUTPATIENT)
Dept: OBGYN CLINIC | Facility: CLINIC | Age: 74
End: 2024-07-09
Payer: MEDICARE

## 2024-07-09 DIAGNOSIS — G56.03 BILATERAL CARPAL TUNNEL SYNDROME: Primary | ICD-10-CM

## 2024-07-09 PROCEDURE — 99213 OFFICE O/P EST LOW 20 MIN: CPT | Performed by: FAMILY MEDICINE

## 2024-07-09 NOTE — PROGRESS NOTES
Telemedicine consent    Patient: Saloni Miles  Provider: Farhat Cobb DO  Provider located at [facility]  [address]  [place] [address] [phone]    The patient was identified by name and date of birth. Saloni Miles was informed that this is a telemedicine visit and that the visit is being conducted through the Epic Embedded platform. She agrees to proceed..  My office door was closed. No one else was in the room.  She acknowledged consent and understanding of privacy and security of the video platform. The patient has agreed to participate and understands they can discontinue the visit at any time.    Patient is aware this is a billable service.     I spent 15 minutes with the patient during this visit.       Chief Complaint   Patient presents with    Left Wrist - Pain, Follow-up    Right Wrist - Pain, Follow-up        Subjective:  Saloni Miles is a 74 y.o. female presenting via virtual visit to discuss results of the EMG study for bilateral wrist.  EMG studies were consistent with moderate degree of carpal tunnel syndrome bilateral wrist.  Patient has been using cock up wrist braces throughout the day and states improvement.  Still feels that symptoms are not 100% improved.    The following portions of the patient's history were reviewed and updated as appropriate: allergies, current medications, past family history, past medical history, past social history, past surgical history and problem list.    Occupation:      Review of Systems   Constitutional: Negative for fever.   Musculoskeletal: Positive for wrist pain  Neuro: Positive for numbness or tingling in arm       Objective:  There were no vitals taken for this visit.    No examination- virtual visit    Imaging:         Assessment/Plan:    1. Bilateral carpal tunnel syndrome  EMG results were discussed with the patient and reviewed over TeleMed visit.  Consistent with  moderate degree of carpal tunnel syndrome bilateral wrist.  Patient was advised to continue with cock up wrist brace for nighttime use.  Referral was placed for physical therapy.  Finally we discussed role for corticosteroid injection therapy to help alleviate pain symptoms-patient would like to schedule for injection appointment.  Finally surgical options were discussed however patient would like to avoid surgery if possible.  Will be scheduled for injection appointment  - Ambulatory Referral to Physical Therapy; Future

## 2024-07-18 DIAGNOSIS — F41.8 DEPRESSION WITH ANXIETY: ICD-10-CM

## 2024-07-19 RX ORDER — ALPRAZOLAM 0.25 MG/1
0.25 TABLET ORAL 3 TIMES DAILY PRN
Qty: 30 TABLET | Refills: 0 | Status: SHIPPED | OUTPATIENT
Start: 2024-07-19

## 2024-07-22 ENCOUNTER — HOSPITAL ENCOUNTER (OUTPATIENT)
Dept: VASCULAR ULTRASOUND | Facility: HOSPITAL | Age: 74
Discharge: HOME | End: 2024-07-22
Payer: MEDICARE

## 2024-07-22 ENCOUNTER — PATIENT MESSAGE (OUTPATIENT)
Dept: FAMILY MEDICINE CLINIC | Facility: CLINIC | Age: 74
End: 2024-07-22

## 2024-07-22 DIAGNOSIS — I73.9 PERIPHERAL ARTERIAL DISEASE WITH HISTORY OF REVASCULARIZATION  (HCC): ICD-10-CM

## 2024-07-22 DIAGNOSIS — I70.402 ATHEROSCLEROSIS OF AUTOLOGOUS VEIN BYPASS GRAFT OF LEFT LOWER EXTREMITY, WITH UNSPECIFIED PRESENCE OF CLINICAL MANIFESTATION (HCC): ICD-10-CM

## 2024-07-22 DIAGNOSIS — Z98.890 PERIPHERAL ARTERIAL DISEASE WITH HISTORY OF REVASCULARIZATION  (HCC): ICD-10-CM

## 2024-07-22 PROCEDURE — 93978 VASCULAR STUDY: CPT

## 2024-07-22 PROCEDURE — 93978 VASCULAR STUDY: CPT | Performed by: SURGERY

## 2024-07-22 PROCEDURE — 93922 UPR/L XTREMITY ART 2 LEVELS: CPT | Performed by: SURGERY

## 2024-07-22 PROCEDURE — 93925 LOWER EXTREMITY STUDY: CPT

## 2024-07-22 PROCEDURE — 93925 LOWER EXTREMITY STUDY: CPT | Performed by: SURGERY

## 2024-07-22 PROCEDURE — 93923 UPR/LXTR ART STDY 3+ LVLS: CPT

## 2024-07-23 DIAGNOSIS — E03.9 ACQUIRED HYPOTHYROIDISM: ICD-10-CM

## 2024-07-23 DIAGNOSIS — F41.8 DEPRESSION WITH ANXIETY: ICD-10-CM

## 2024-07-23 RX ORDER — LEVOTHYROXINE SODIUM 112 UG/1
112 TABLET ORAL
Qty: 90 TABLET | Refills: 1 | Status: SHIPPED | OUTPATIENT
Start: 2024-07-23

## 2024-08-08 ENCOUNTER — OFFICE VISIT (OUTPATIENT)
Dept: OBGYN CLINIC | Facility: CLINIC | Age: 74
End: 2024-08-08
Payer: MEDICARE

## 2024-08-08 VITALS
SYSTOLIC BLOOD PRESSURE: 108 MMHG | DIASTOLIC BLOOD PRESSURE: 72 MMHG | HEIGHT: 66 IN | HEART RATE: 76 BPM | WEIGHT: 207 LBS | BODY MASS INDEX: 33.27 KG/M2 | TEMPERATURE: 97.6 F | RESPIRATION RATE: 18 BRPM | OXYGEN SATURATION: 98 %

## 2024-08-08 DIAGNOSIS — G56.03 BILATERAL CARPAL TUNNEL SYNDROME: Primary | ICD-10-CM

## 2024-08-08 PROCEDURE — 99213 OFFICE O/P EST LOW 20 MIN: CPT | Performed by: FAMILY MEDICINE

## 2024-08-08 PROCEDURE — 20526 THER INJECTION CARP TUNNEL: CPT | Performed by: FAMILY MEDICINE

## 2024-08-08 RX ORDER — TRIAMCINOLONE ACETONIDE 40 MG/ML
40 INJECTION, SUSPENSION INTRA-ARTICULAR; INTRAMUSCULAR
Status: COMPLETED | OUTPATIENT
Start: 2024-08-08 | End: 2024-08-08

## 2024-08-08 RX ORDER — LIDOCAINE HYDROCHLORIDE 10 MG/ML
1 INJECTION, SOLUTION INFILTRATION; PERINEURAL
Status: COMPLETED | OUTPATIENT
Start: 2024-08-08 | End: 2024-08-08

## 2024-08-08 RX ADMIN — TRIAMCINOLONE ACETONIDE 40 MG: 40 INJECTION, SUSPENSION INTRA-ARTICULAR; INTRAMUSCULAR at 11:15

## 2024-08-08 RX ADMIN — LIDOCAINE HYDROCHLORIDE 1 ML: 10 INJECTION, SOLUTION INFILTRATION; PERINEURAL at 11:15

## 2024-08-08 NOTE — PROGRESS NOTES
"   Subjective:  Chief Complaint   Patient presents with    Left Hand - Follow-up, Numbness, Tingling, Pain     Stiffness    Right Hand - Follow-up, Numbness, Pain, Tingling     stiffness       Saloni Miles is a 74 y.o. female presenting today for injection visit for right carpal tunnel syndrome.  Discussed confirmation of carpal tunnel syndrome on EMG study via telemedicine visit    The following portions of the patient's history were reviewed and updated as appropriate: allergies, current medications, past family history, past medical history, past social history, past surgical history and problem list.    Occupation:      Review of Systems   Constitutional: Negative for fever.   Musculoskeletal: positive for hand pain  Skin: Negative for rash and ulcer.   Neurological: Negative for numbness or tingling in hand       Objective:  Resp 18   Ht 5' 5.5\" (1.664 m)   Wt 93.9 kg (207 lb)   SpO2 98%   BMI 33.92 kg/m²     Skin: no rashes, lesions, skin discolorations, lacerations  Vasculature: normal radial and ulnar pulse, normal skin color, normal capillary refill in extremity, no upper extremity edema  Neurologic: Neurologic exam is normal throughout upper extremities, Awake, alert, and oriented x3, no apparent distress.   Musculoskeletal: Bilateralhand   inspection: no gross deformity.  Palpation: +TTP cmc   ROM:  FROM  Hurd test negative  TFCC grind negative  Fovea sign negative\  +tinels   +phalens  No thenar atrophy noted            Imaging:    No results found.     Assessment/Plan:    1. Bilateral carpal tunnel syndrome    Hand/upper extremity injection: R carpal tunnel  [place] Protocol:  Consent: Verbal consent obtained.  Risks and benefits: risks, benefits and alternatives were discussed  Consent given by: patient  Patient identity confirmed: verbally with patient  Supporting Documentation  Indications: pain   Procedure Details  Condition:carpal tunnel syndrome Site: R carpal tunnel   Preparation: Patient " was prepped and draped in the usual sterile fashion  Needle size: 25 G  Ultrasound guidance: no  Approach: volar  Medications administered: 1 mL lidocaine 1 %; 40 mg triamcinolone acetonide 40 mg/mL  Patient tolerance: patient tolerated the procedure well with no immediate complications    Risks and benefits of CSI were discussed with patient extensively. Risks were highlighted which included but were not limited to infection, pain, local site swelling, and chance that injection may not be effective. Patient was also counseled regarding glucose elevation days after receiving CSI and to be mindful of diet and check sugars daily. Patient agreeable to proceed with CSI after counseling.

## 2024-08-21 ENCOUNTER — OFFICE VISIT (OUTPATIENT)
Dept: FAMILY MEDICINE CLINIC | Facility: CLINIC | Age: 74
End: 2024-08-21
Payer: MEDICARE

## 2024-08-21 VITALS
DIASTOLIC BLOOD PRESSURE: 70 MMHG | OXYGEN SATURATION: 97 % | HEIGHT: 66 IN | SYSTOLIC BLOOD PRESSURE: 124 MMHG | TEMPERATURE: 98.4 F | BODY MASS INDEX: 32.95 KG/M2 | WEIGHT: 205 LBS | HEART RATE: 76 BPM

## 2024-08-21 DIAGNOSIS — K57.92 DIVERTICULITIS: ICD-10-CM

## 2024-08-21 DIAGNOSIS — S00.31XA ABRASION OF NOSE, INITIAL ENCOUNTER: ICD-10-CM

## 2024-08-21 DIAGNOSIS — F41.9 ANXIETY: Primary | ICD-10-CM

## 2024-08-21 DIAGNOSIS — F41.8 DEPRESSION WITH ANXIETY: ICD-10-CM

## 2024-08-21 PROBLEM — F33.9 DEPRESSION, RECURRENT (HCC): Status: RESOLVED | Noted: 2023-02-06 | Resolved: 2024-08-21

## 2024-08-21 PROCEDURE — G2211 COMPLEX E/M VISIT ADD ON: HCPCS | Performed by: NURSE PRACTITIONER

## 2024-08-21 PROCEDURE — 99214 OFFICE O/P EST MOD 30 MIN: CPT | Performed by: NURSE PRACTITIONER

## 2024-08-21 RX ORDER — CIPROFLOXACIN 500 MG/1
500 TABLET, FILM COATED ORAL EVERY 12 HOURS SCHEDULED
Qty: 20 TABLET | Refills: 0 | Status: SHIPPED | OUTPATIENT
Start: 2024-08-21 | End: 2024-08-31

## 2024-08-21 RX ORDER — MUPIROCIN 20 MG/G
OINTMENT TOPICAL 3 TIMES DAILY
Qty: 15 G | Refills: 1 | Status: SHIPPED | OUTPATIENT
Start: 2024-08-21 | End: 2024-08-26

## 2024-08-21 RX ORDER — METRONIDAZOLE 500 MG/1
500 TABLET ORAL EVERY 12 HOURS SCHEDULED
Qty: 20 TABLET | Refills: 0 | Status: SHIPPED | OUTPATIENT
Start: 2024-08-21 | End: 2024-08-31

## 2024-08-21 NOTE — PROGRESS NOTES
Ambulatory Visit  Name: Saloni Miles      : 1950      MRN: [id number]  Encounter Provider: SLY Ledesma  Encounter Date: 2024   Encounter department: [facility]    Pt is a 74 yr old female   Presents in office for follow up on anxiety / depression - doing much much better and tankful for starting her on the zoloft   Has a sore on her nose would like looked at   And would like to know if I can refill the antibiotics she has on stand by just in case of diverticulitis, they were given by GI but unable to get in touch with him - I will refill however she was informed if she has to use them she needs to let us know and let GI know if used as we need to be aware if any attacks. She has not used them in over a year currently and has been feeling ok .     Assessment & Plan   1. Anxiety  Comments:  doing well on sertraline  2. Abrasion of nose, initial encounter  -     mupirocin (BACTROBAN) 2 % ointment; Apply topically 3 (three) times a day for 5 days  3. Diverticulitis  -     metroNIDAZOLE (FLAGYL) 500 mg tablet; Take 1 tablet (500 mg total) by mouth every 12 (twelve) hours for 10 days  -     ciprofloxacin (CIPRO) 500 mg tablet; Take 1 tablet (500 mg total) by mouth every 12 (twelve) hours for 10 days  4. Depression with anxiety  -     sertraline (ZOLOFT) 50 mg tablet; Take 1 tablet (50 mg total) by mouth daily      Depression Screening and Follow-up Plan: Clincally patient does not have depression. No treatment is required. Patient advised to follow-up with PCP for further management. Doing well with the zoloft     Falls Plan of Care: balance, strength, and gait training instructions were provided. Recommended assistive device to help with gait and balance. Medications that increase falls were reviewed. Assessed feet and footwear. Vitamin D supplementation was recommended. Home safety education provided.       History of Present Illness     Pt is a 74 yr old female    Presents in office for follow up on anxiety / depression - doing much much better and tankful for starting her on the zoloft   Has a sore on her nose would like looked at   And would like to know if I can refill the antibiotics she has on stand by just in case of diverticulitis, they were given by GI but unable to get in touch with him - I will refill however she was informed if she has to use them she needs to let us know and let GI know if used as we need to be aware if any attacks. She has not used them in over a year currently and has been feeling ok .         Review of Systems   Constitutional:  Negative for chills, fatigue, fever and unexpected weight change.   HENT:  Negative for congestion, postnasal drip, sinus pressure and voice change.    Eyes: Negative.    Respiratory:  Negative for cough and shortness of breath.    Cardiovascular:  Negative for chest pain and palpitations.        HTN  Stable   Hyperlipemia  PVD     Gastrointestinal:  Negative for abdominal distention, abdominal pain, nausea and vomiting.        JOSEPH - fatty liver   Sees GI    Diverticulitis - stable    Endocrine:        Hypothyroidism    Genitourinary:  Negative for difficulty urinating and flank pain.   Musculoskeletal:  Positive for arthralgias and myalgias.   Skin:  Negative for rash.   Allergic/Immunologic: Positive for environmental allergies.   Neurological:  Negative for headaches.   Hematological:  Negative for adenopathy.   Psychiatric/Behavioral:  Positive for sleep disturbance (IMRPOVED ON ZOLOFT). Negative for suicidal ideas. The patient is not nervous/anxious (IMPROVED  ON ZOLOFT).      Pertinent Medical History   Reviewed       Medical History Reviewed by provider this encounter:  Tobacco  Allergies  Meds  Problems  Med Hx  Surg Hx  Fam Hx       Past Medical History   Past Medical History:   Diagnosis Date    Allergic Childhood    Anxiety 2019    Arthritis     Asthma Childhood    Chronic kidney disease     CPAP  (continuous positive airway pressure) dependence     pt states was tested for VILMA, and ruled that she doesnt have it, however oxygen levels drop during sleep so CPAP was recommended,is not yet using it    Disease of thyroid gland Approx     Diverticulitis of colon     Heart murmur     Hyperlipidemia     Hypertension  approx    Obesity Childhood    PAD (peripheral artery disease) (HCC)     Urinary tract infection      Past Surgical History:   Procedure Laterality Date    APPENDECTOMY  About      SECTION   &     COLON SURGERY  About     HYSTERECTOMY  About     JOINT REPLACEMENT  10/5/2022    Hip    NH ARTHRP ACETBLR/PROX FEM PROSTC AGRFT/ALGRFT Left 10/05/2022    Procedure: ARTHROPLASTY LEFT HIP TOTAL ANTERIOR;  Surgeon: Hakan Rojo MD;  Location: [facility];  Service: Orthopedics     Family History   Problem Relation Age of Onset    Depression Mother     Thrombosis Mother     Arthritis Mother     Cancer Mother     Anxiety disorder Mother     Heart disease Father         I    Cancer Brother     Dementia Brother     Heart disease Brother     Hypertension Brother     Cancer Brother      Current Outpatient Medications on File Prior to Visit   Medication Sig Dispense Refill    albuterol (PROVENTIL HFA,VENTOLIN HFA) 90 mcg/act inhaler Inhale 2 puffs every 4 (four) hours as needed      ALPRAZolam (XANAX) 0.25 mg tablet Take 1 tablet (0.25 mg total) by mouth 3 (three) times a day as needed for anxiety 1/2-1 as needed tid 30 tablet 0    ammonium lactate (LAC-HYDRIN) 12 % cream Apply topically as needed for dry skin 385 g 1    aspirin (ECOTRIN LOW STRENGTH) 81 mg EC tablet Take 81 mg by mouth in the morning      atorvastatin (LIPITOR) 40 mg tablet TAKE 1 TABLET DAILY 90 tablet 3    Biotin 78629 MCG TABS       clopidogrel (PLAVIX) 75 mg tablet TAKE 1 TABLET DAILY 90 tablet 3    fluticasone (FLONASE) 50 mcg/act nasal spray 2 sprays into each nostril daily as needed       levothyroxine 112 mcg tablet TAKE 1 TABLET DAILY EARLY IN THE MORNING 90 tablet 1    losartan-hydrochlorothiazide (HYZAAR) 100-25 MG per tablet Take 1 tablet by mouth daily 90 tablet 0    montelukast (SINGULAIR) 10 mg tablet TAKE 1 TABLET DAILY AS NEEDED FOR ALLERGIES 90 tablet 3    Multiple Vitamin (multivitamin) tablet Take 1 tablet by mouth daily 30 tablet 0    polyethylene glycol (MIRALAX) 17 g packet Take 17 g by mouth daily      traZODone (DESYREL) 100 mg tablet TAKE 1 TABLET DAILY AT BEDTIME 90 tablet 3    fluticasone-salmeterol (Advair HFA) 230-21 MCG/ACT inhaler Inhale 2 puffs 2 (two) times a day Rinse mouth after use. 36 g 1     No current facility-administered medications on file prior to visit.     Allergies   Allergen Reactions    Sulfamethoxazole-Trimethoprim Rash      Current Outpatient Medications on File Prior to Visit   Medication Sig Dispense Refill    albuterol (PROVENTIL HFA,VENTOLIN HFA) 90 mcg/act inhaler Inhale 2 puffs every 4 (four) hours as needed      ALPRAZolam (XANAX) 0.25 mg tablet Take 1 tablet (0.25 mg total) by mouth 3 (three) times a day as needed for anxiety 1/2-1 as needed tid 30 tablet 0    ammonium lactate (LAC-HYDRIN) 12 % cream Apply topically as needed for dry skin 385 g 1    aspirin (ECOTRIN LOW STRENGTH) 81 mg EC tablet Take 81 mg by mouth in the morning      atorvastatin (LIPITOR) 40 mg tablet TAKE 1 TABLET DAILY 90 tablet 3    Biotin 97506 MCG TABS       clopidogrel (PLAVIX) 75 mg tablet TAKE 1 TABLET DAILY 90 tablet 3    fluticasone (FLONASE) 50 mcg/act nasal spray 2 sprays into each nostril daily as needed      levothyroxine 112 mcg tablet TAKE 1 TABLET DAILY EARLY IN THE MORNING 90 tablet 1    losartan-hydrochlorothiazide (HYZAAR) 100-25 MG per tablet Take 1 tablet by mouth daily 90 tablet 0    montelukast (SINGULAIR) 10 mg tablet TAKE 1 TABLET DAILY AS NEEDED FOR ALLERGIES 90 tablet 3    Multiple Vitamin (multivitamin) tablet Take 1 tablet by mouth daily 30 tablet 0  "   polyethylene glycol (MIRALAX) 17 g packet Take 17 g by mouth daily      traZODone (DESYREL) 100 mg tablet TAKE 1 TABLET DAILY AT BEDTIME 90 tablet 3    fluticasone-salmeterol (Advair HFA) 230-21 MCG/ACT inhaler Inhale 2 puffs 2 (two) times a day Rinse mouth after use. 36 g 1     No current facility-administered medications on file prior to visit.      Social History     Tobacco Use    Smoking status: Never     Passive exposure: Never    Smokeless tobacco: Never   Vaping Use    Vaping status: Never Used   Substance and Sexual Activity    Alcohol use: Not Currently    Drug use: Never    Sexual activity: Not Currently     Partners: Male     Birth control/protection: None     Objective     /70 (BP Location: Right arm, Patient Position: Sitting, Cuff Size: Large)   Pulse 76   Temp 98.4 °F (36.9 °C)   Ht 5' 5.5\" (1.664 m)   Wt 93 kg (205 lb)   SpO2 97%   BMI 33.59 kg/m²     Physical Exam  Vitals and nursing note reviewed.   Constitutional:       Appearance: She is well-developed.   HENT:      Head: Atraumatic.   Cardiovascular:      Rate and Rhythm: Normal rate and regular rhythm.   Pulmonary:      Breath sounds: Normal breath sounds.   Abdominal:      General: Abdomen is flat. Bowel sounds are normal.      Palpations: Abdomen is soft.      Tenderness: There is abdominal tenderness (intermittent) in the left lower quadrant.   Skin:     General: Skin is warm.      Capillary Refill: Capillary refill takes less than 2 seconds.      Comments: Sore to left nares    Neurological:      Mental Status: She is alert and oriented to person, place, and time.   Psychiatric:         Mood and Affect: Mood normal.         Behavior: Behavior normal.     NEEDS LABS REMINDED HER TO GET THEM DONE   I WILL CALL HER WITH RESULTS   Administrative Statements   I have spent a total time of 45  minutes in caring for this patient on the day of the visit/encounter including Prognosis, Risks and benefits of tx options, Instructions for " management, Patient and family education, Importance of tx compliance, Risk factor reductions, Impressions, Counseling / Coordination of care, Documenting in the medical record, Reviewing / ordering tests, medicine, procedures  , Obtaining or reviewing history  , and Communicating with other healthcare professionals .

## 2024-08-22 ENCOUNTER — HOSPITAL ENCOUNTER (OUTPATIENT)
Dept: ULTRASOUND IMAGING | Facility: HOSPITAL | Age: 74
End: 2024-08-22
Payer: MEDICARE

## 2024-08-22 DIAGNOSIS — K74.60 LIVER CIRRHOSIS SECONDARY TO NASH (NONALCOHOLIC STEATOHEPATITIS) (HCC): ICD-10-CM

## 2024-08-22 DIAGNOSIS — K75.81 LIVER CIRRHOSIS SECONDARY TO NASH (NONALCOHOLIC STEATOHEPATITIS) (HCC): ICD-10-CM

## 2024-08-22 PROCEDURE — 76981 USE PARENCHYMA: CPT

## 2024-08-31 DIAGNOSIS — I10 PRIMARY HYPERTENSION: ICD-10-CM

## 2024-09-03 RX ORDER — LOSARTAN POTASSIUM AND HYDROCHLOROTHIAZIDE 25; 100 MG/1; MG/1
1 TABLET ORAL DAILY
Qty: 90 TABLET | Refills: 3 | Status: SHIPPED | OUTPATIENT
Start: 2024-09-03

## 2024-09-04 ENCOUNTER — OFFICE VISIT (OUTPATIENT)
Dept: FAMILY MEDICINE CLINIC | Facility: CLINIC | Age: 74
End: 2024-09-04
Payer: MEDICARE

## 2024-09-04 VITALS
WEIGHT: 205 LBS | OXYGEN SATURATION: 95 % | SYSTOLIC BLOOD PRESSURE: 134 MMHG | TEMPERATURE: 97.6 F | HEIGHT: 66 IN | DIASTOLIC BLOOD PRESSURE: 70 MMHG | BODY MASS INDEX: 32.95 KG/M2 | HEART RATE: 76 BPM

## 2024-09-04 DIAGNOSIS — F41.9 ANXIETY: ICD-10-CM

## 2024-09-04 DIAGNOSIS — R35.0 URINARY FREQUENCY: Primary | ICD-10-CM

## 2024-09-04 PROCEDURE — 99214 OFFICE O/P EST MOD 30 MIN: CPT | Performed by: NURSE PRACTITIONER

## 2024-09-04 PROCEDURE — G2211 COMPLEX E/M VISIT ADD ON: HCPCS | Performed by: NURSE PRACTITIONER

## 2024-09-04 RX ORDER — NITROFURANTOIN 25; 75 MG/1; MG/1
100 CAPSULE ORAL 2 TIMES DAILY
Qty: 10 CAPSULE | Refills: 0 | Status: SHIPPED | OUTPATIENT
Start: 2024-09-04 | End: 2024-09-09

## 2024-09-04 NOTE — PROGRESS NOTES
Ambulatory Visit  Name: Saloni Miles      : 1950      MRN: [id number]  Encounter Provider: SLY Ledesma  Encounter Date: 2024   Encounter department: [facility]  Presents with urinary frequency and brining with urination and pelvic pressure   Started taking over the counter modalities   Not better   Discussed that POCT testing are affected by OTC modalities so we will treat for symptoms and she will need to follow up with repeat urine in 2-4 weeks after treatment   No fevers or flank pain   Follow up on anxiety doing well Zoloft and trazodone for sleep   Assessment & Plan   1. Urinary frequency  -     nitrofurantoin (MACROBID) 100 mg capsule; Take 1 capsule (100 mg total) by mouth 2 (two) times a day for 5 days  2. Anxiety  Comments:  much improve dand thankful for treatment      Falls Plan of Care: balance, strength, and gait training instructions were provided. Recommended assistive device to help with gait and balance. Medications that increase falls were reviewed. Assessed feet and footwear. Vitamin D supplementation was recommended. Home safety education provided.       History of Present Illness     Pt is a 74 yr old female   Presents with urinary frequency and brining with urination and pelvic pressure   Started taking over the counter modalities   Not better   Discussed that POCT testing are affected by OTC modalities so we will treat for symptoms and she will need to follow up with repeat urine in 2-4 weeks after treatment   No fevers or flank pain   Also here for follow up on anxiety and insomnia   Doing much better         Review of Systems   Constitutional:  Negative for chills, fatigue, fever and unexpected weight change.   HENT:  Negative for congestion, postnasal drip, sinus pressure and voice change.    Eyes: Negative.    Respiratory:  Negative for cough and shortness of breath.    Cardiovascular:  Negative for chest pain and palpitations.        HTN   Stable   Hyperlipemia  PVD     Gastrointestinal:  Negative for abdominal distention, abdominal pain, nausea and vomiting.        JOSEPH - fatty liver   Sees GI    Diverticulitis - stable    Endocrine:        Hypothyroidism    Genitourinary:  Positive for difficulty urinating, dysuria, frequency, pelvic pain and urgency. Negative for flank pain.   Musculoskeletal:  Positive for arthralgias and myalgias.   Skin:  Negative for rash.   Allergic/Immunologic: Positive for environmental allergies.   Neurological:  Negative for headaches.   Hematological:  Negative for adenopathy.   Psychiatric/Behavioral:  Positive for sleep disturbance (IMRPOVED ON ZOLOFT). Negative for suicidal ideas. The patient is not nervous/anxious (IMPROVED  ON ZOLOFT).         Anxiety and sleep issues much improved      Pertinent Medical History     Reviewed       Medical History Reviewed by provider this encounter:  Tobacco  Allergies  Meds  Problems  Med Hx  Surg Hx  Fam Hx       Past Medical History   Past Medical History:   Diagnosis Date    Allergic Childhood    Anxiety     Arthritis     Asthma Childhood    Chronic kidney disease     CPAP (continuous positive airway pressure) dependence     pt states was tested for VILMA, and ruled that she doesnt have it, however oxygen levels drop during sleep so CPAP was recommended,is not yet using it    Disease of thyroid gland Approx     Diverticulitis of colon     Heart murmur     Hyperlipidemia     Hypertension  approx    Obesity Childhood    PAD (peripheral artery disease) (HCC)     Urinary tract infection      Past Surgical History:   Procedure Laterality Date    APPENDECTOMY  About      SECTION   &     COLON SURGERY  About     HYSTERECTOMY  About     JOINT REPLACEMENT  10/5/2022    Hip    RI ARTHRP ACETBLR/PROX FEM PROSTC AGRFT/ALGRFT Left 10/05/2022    Procedure: ARTHROPLASTY LEFT HIP TOTAL ANTERIOR;  Surgeon: Hakan Rojo MD;  [facility];  Service: Orthopedics     Family History   Problem Relation Age of Onset    Depression Mother     Thrombosis Mother     Arthritis Mother     Cancer Mother     Anxiety disorder Mother     Heart disease Father         I    Cancer Brother     Dementia Brother     Heart disease Brother     Hypertension Brother     Cancer Brother      Current Outpatient Medications on File Prior to Visit   Medication Sig Dispense Refill    albuterol (PROVENTIL HFA,VENTOLIN HFA) 90 mcg/act inhaler Inhale 2 puffs every 4 (four) hours as needed      ALPRAZolam (XANAX) 0.25 mg tablet Take 1 tablet (0.25 mg total) by mouth 3 (three) times a day as needed for anxiety 1/2-1 as needed tid 30 tablet 0    ammonium lactate (LAC-HYDRIN) 12 % cream Apply topically as needed for dry skin 385 g 1    aspirin (ECOTRIN LOW STRENGTH) 81 mg EC tablet Take 81 mg by mouth in the morning      atorvastatin (LIPITOR) 40 mg tablet TAKE 1 TABLET DAILY 90 tablet 3    Biotin 04277 MCG TABS       clopidogrel (PLAVIX) 75 mg tablet TAKE 1 TABLET DAILY 90 tablet 3    fluticasone (FLONASE) 50 mcg/act nasal spray 2 sprays into each nostril daily as needed      levothyroxine 112 mcg tablet TAKE 1 TABLET DAILY EARLY IN THE MORNING 90 tablet 1    losartan-hydrochlorothiazide (HYZAAR) 100-25 MG per tablet TAKE 1 TABLET DAILY 90 tablet 3    montelukast (SINGULAIR) 10 mg tablet TAKE 1 TABLET DAILY AS NEEDED FOR ALLERGIES 90 tablet 3    Multiple Vitamin (multivitamin) tablet Take 1 tablet by mouth daily 30 tablet 0    polyethylene glycol (MIRALAX) 17 g packet Take 17 g by mouth daily      sertraline (ZOLOFT) 50 mg tablet Take 1 tablet (50 mg total) by mouth daily 30 tablet 0    traZODone (DESYREL) 100 mg tablet TAKE 1 TABLET DAILY AT BEDTIME 90 tablet 3    fluticasone-salmeterol (Advair HFA) 230-21 MCG/ACT inhaler Inhale 2 puffs 2 (two) times a day Rinse mouth after use. 36 g 1    mupirocin (BACTROBAN) 2 % ointment Apply topically 3 (three) times a day for 5 days 15 g 1      No current facility-administered medications on file prior to visit.     Allergies   Allergen Reactions    Sulfamethoxazole-Trimethoprim Rash      Current Outpatient Medications on File Prior to Visit   Medication Sig Dispense Refill    albuterol (PROVENTIL HFA,VENTOLIN HFA) 90 mcg/act inhaler Inhale 2 puffs every 4 (four) hours as needed      ALPRAZolam (XANAX) 0.25 mg tablet Take 1 tablet (0.25 mg total) by mouth 3 (three) times a day as needed for anxiety 1/2-1 as needed tid 30 tablet 0    ammonium lactate (LAC-HYDRIN) 12 % cream Apply topically as needed for dry skin 385 g 1    aspirin (ECOTRIN LOW STRENGTH) 81 mg EC tablet Take 81 mg by mouth in the morning      atorvastatin (LIPITOR) 40 mg tablet TAKE 1 TABLET DAILY 90 tablet 3    Biotin 56313 MCG TABS       clopidogrel (PLAVIX) 75 mg tablet TAKE 1 TABLET DAILY 90 tablet 3    fluticasone (FLONASE) 50 mcg/act nasal spray 2 sprays into each nostril daily as needed      levothyroxine 112 mcg tablet TAKE 1 TABLET DAILY EARLY IN THE MORNING 90 tablet 1    losartan-hydrochlorothiazide (HYZAAR) 100-25 MG per tablet TAKE 1 TABLET DAILY 90 tablet 3    montelukast (SINGULAIR) 10 mg tablet TAKE 1 TABLET DAILY AS NEEDED FOR ALLERGIES 90 tablet 3    Multiple Vitamin (multivitamin) tablet Take 1 tablet by mouth daily 30 tablet 0    polyethylene glycol (MIRALAX) 17 g packet Take 17 g by mouth daily      sertraline (ZOLOFT) 50 mg tablet Take 1 tablet (50 mg total) by mouth daily 30 tablet 0    traZODone (DESYREL) 100 mg tablet TAKE 1 TABLET DAILY AT BEDTIME 90 tablet 3    fluticasone-salmeterol (Advair HFA) 230-21 MCG/ACT inhaler Inhale 2 puffs 2 (two) times a day Rinse mouth after use. 36 g 1    mupirocin (BACTROBAN) 2 % ointment Apply topically 3 (three) times a day for 5 days 15 g 1     No current facility-administered medications on file prior to visit.      Social History     Tobacco Use    Smoking status: Never     Passive exposure: Never    Smokeless tobacco: Never  "  Vaping Use    Vaping status: Never Used   Substance and Sexual Activity    Alcohol use: Not Currently    Drug use: Never    Sexual activity: Not Currently     Partners: Male     Birth control/protection: None     Objective     /70 (BP Location: Right arm, Patient Position: Sitting, Cuff Size: Large)   Pulse 76   Temp 97.6 °F (36.4 °C)   Ht 5' 5.5\" (1.664 m)   Wt 93 kg (205 lb)   SpO2 95%   BMI 33.59 kg/m²     Physical Exam  Vitals and nursing note reviewed.   Constitutional:       Appearance: Normal appearance.   HENT:      Head: Atraumatic.   Eyes:      Extraocular Movements: Extraocular movements intact.   Cardiovascular:      Rate and Rhythm: Normal rate and regular rhythm.      Pulses: Normal pulses.      Heart sounds: Normal heart sounds.   Pulmonary:      Effort: Pulmonary effort is normal.      Breath sounds: Normal breath sounds.   Abdominal:      Palpations: Abdomen is soft.   Musculoskeletal:      Cervical back: Normal range of motion.      Right lower leg: No edema.      Left lower leg: No edema.   Skin:     General: Skin is warm.      Capillary Refill: Capillary refill takes less than 2 seconds.   Neurological:      Mental Status: She is alert and oriented to person, place, and time.   Psychiatric:         Mood and Affect: Mood normal.         Behavior: Behavior normal.       Administrative Statements   I have spent a total time of 35  minutes in caring for this patient on the day of the visit/encounter including Diagnostic results, Prognosis, Risks and benefits of tx options, Instructions for management, Patient and family education, Importance of tx compliance, Risk factor reductions, Impressions, Counseling / Coordination of care, Documenting in the medical record, Reviewing / ordering tests, medicine, procedures  , and Obtaining or reviewing history  .      "

## 2024-09-19 DIAGNOSIS — F41.8 DEPRESSION WITH ANXIETY: ICD-10-CM

## 2024-10-01 DIAGNOSIS — L23.9 ALLERGIC CONTACT DERMATITIS, UNSPECIFIED TRIGGER: ICD-10-CM

## 2024-10-01 RX ORDER — AMMONIUM LACTATE 12 G/100G
CREAM TOPICAL AS NEEDED
Qty: 385 G | Refills: 1 | Status: SHIPPED | OUTPATIENT
Start: 2024-10-01

## 2024-10-03 DIAGNOSIS — J45.20 MILD INTERMITTENT ASTHMA WITHOUT COMPLICATION: ICD-10-CM

## 2024-10-03 RX ORDER — FLUTICASONE PROPIONATE AND SALMETEROL XINAFOATE 230; 21 UG/1; UG/1
2 AEROSOL, METERED RESPIRATORY (INHALATION) 2 TIMES DAILY
Qty: 36 G | Refills: 3 | Status: SHIPPED | OUTPATIENT
Start: 2024-10-03

## 2024-10-04 ENCOUNTER — OFFICE VISIT (OUTPATIENT)
Dept: FAMILY MEDICINE CLINIC | Facility: CLINIC | Age: 74
End: 2024-10-04
Payer: MEDICARE

## 2024-10-04 VITALS
OXYGEN SATURATION: 95 % | TEMPERATURE: 98.7 F | WEIGHT: 206 LBS | SYSTOLIC BLOOD PRESSURE: 122 MMHG | BODY MASS INDEX: 33.11 KG/M2 | HEIGHT: 66 IN | DIASTOLIC BLOOD PRESSURE: 70 MMHG | HEART RATE: 71 BPM

## 2024-10-04 DIAGNOSIS — Z87.440 HISTORY OF UTI: Primary | ICD-10-CM

## 2024-10-04 LAB
SL AMB  POCT GLUCOSE, UA: NORMAL
SL AMB LEUKOCYTE ESTERASE,UA: NORMAL
SL AMB POCT BILIRUBIN,UA: NORMAL
SL AMB POCT BLOOD,UA: NORMAL
SL AMB POCT CLARITY,UA: CLEAR
SL AMB POCT COLOR,UA: YELLOW
SL AMB POCT KETONES,UA: NORMAL
SL AMB POCT NITRITE,UA: NORMAL
SL AMB POCT PH,UA: 7
SL AMB POCT SPECIFIC GRAVITY,UA: 1.01
SL AMB POCT URINE PROTEIN: NORMAL
SL AMB POCT UROBILINOGEN: 0.2

## 2024-10-04 PROCEDURE — 99213 OFFICE O/P EST LOW 20 MIN: CPT | Performed by: NURSE PRACTITIONER

## 2024-10-04 PROCEDURE — 81002 URINALYSIS NONAUTO W/O SCOPE: CPT | Performed by: NURSE PRACTITIONER

## 2024-10-13 NOTE — PROGRESS NOTES
Ambulatory Visit  Name: Saloni Miles      : 1950      MRN: [id number]  Encounter Provider: SLY Ledesma  Encounter Date: 10/4/2024   Encounter department: [facility]    Pt is a 74 yr old female   Presents in office for follow up post UTI treatment   Feeling better   No urinary issues or flank pain   Urinalysis returned and improved   Assessment & Plan  History of UTI  Improved   Urinalysis negative   Discussed prevention and follow up as needed   Orders:    POCT urine dip      Falls Plan of Care: balance, strength, and gait training instructions were provided. Recommended assistive device to help with gait and balance. Medications that increase falls were reviewed. Assessed feet and footwear. Vitamin D supplementation was recommended. Home safety education provided.       History of Present Illness     Pt is a 74 yr old female   Presents in office for follow up post UTI treatment   Feeling better   No urinary issues or flank pain   Urinalysis returned and improved        History obtained from : patient  Review of Systems   Constitutional:  Negative for chills, fatigue, fever and unexpected weight change.   HENT:  Negative for congestion, postnasal drip, sinus pressure and voice change.    Eyes: Negative.    Respiratory:  Negative for cough and shortness of breath.    Cardiovascular:  Negative for chest pain and palpitations.        HTN  Stable   Hyperlipemia  PVD     Gastrointestinal:  Negative for abdominal distention, abdominal pain, nausea and vomiting.        JOSEPH - fatty liver   Sees GI    Diverticulitis - stable    Endocrine:        Hypothyroidism    Genitourinary:  Negative for difficulty urinating, dysuria, flank pain, frequency, pelvic pain and urgency.        Urinary issues much improved    Musculoskeletal:  Negative for arthralgias and myalgias.   Skin:  Negative for rash.   Allergic/Immunologic: Positive for environmental allergies.   Neurological:  Negative  for headaches.   Hematological:  Negative for adenopathy.   Psychiatric/Behavioral:  Positive for sleep disturbance (IMRPOVED ON ZOLOFT). Negative for suicidal ideas. The patient is not nervous/anxious (IMPROVED  ON ZOLOFT).         Anxiety and sleep issues much improved      Pertinent Medical History     Reviewed       Medical History Reviewed by provider this encounter:       Past Medical History   Past Medical History:   Diagnosis Date    Allergic Childhood    Anxiety 2019    Arthritis     Asthma Childhood    Chronic kidney disease     CPAP (continuous positive airway pressure) dependence     pt states was tested for VILMA, and ruled that she doesnt have it, however oxygen levels drop during sleep so CPAP was recommended,is not yet using it    Disease of thyroid gland Approx     Diverticulitis of colon     Heart murmur     Hyperlipidemia     Hypertension  approx    Obesity Childhood    PAD (peripheral artery disease) (HCC)     Urinary tract infection      Past Surgical History:   Procedure Laterality Date    APPENDECTOMY  About      SECTION   &     COLON SURGERY  About     HYSTERECTOMY  About     JOINT REPLACEMENT  10/5/2022    Hip    DE ARTHRP ACETBLR/PROX FEM PROSTC AGRFT/ALGRFT Left 10/05/2022    Procedure: ARTHROPLASTY LEFT HIP TOTAL ANTERIOR;  Surgeon: Hakan Rojo MD;  Location[facility];  Service: Orthopedics     Family History   Problem Relation Age of Onset    Depression Mother     Thrombosis Mother     Arthritis Mother     Cancer Mother     Anxiety disorder Mother     Heart disease Father         I    Cancer Brother     Dementia Brother     Heart disease Brother     Hypertension Brother     Cancer Brother      Current Outpatient Medications on File Prior to Visit   Medication Sig Dispense Refill    Advair -21 MCG/ACT inhaler USE 2 INHALATIONS TWICE A DAY (RINSE MOUTH AFTER USE) 36 g 3    albuterol (PROVENTIL HFA,VENTOLIN HFA) 90 mcg/act inhaler Inhale  2 puffs every 4 (four) hours as needed      ALPRAZolam (XANAX) 0.25 mg tablet Take 1 tablet (0.25 mg total) by mouth 3 (three) times a day as needed for anxiety 1/2-1 as needed tid 30 tablet 0    ammonium lactate (LAC-HYDRIN) 12 % cream APPLY TOPICALLY AS NEEDED FOR DRY SKIN. 385 g 1    aspirin (ECOTRIN LOW STRENGTH) 81 mg EC tablet Take 81 mg by mouth in the morning      atorvastatin (LIPITOR) 40 mg tablet TAKE 1 TABLET DAILY 90 tablet 3    Biotin 34487 MCG TABS       clopidogrel (PLAVIX) 75 mg tablet TAKE 1 TABLET DAILY 90 tablet 3    fluticasone (FLONASE) 50 mcg/act nasal spray 2 sprays into each nostril daily as needed      levothyroxine 112 mcg tablet TAKE 1 TABLET DAILY EARLY IN THE MORNING 90 tablet 1    losartan-hydrochlorothiazide (HYZAAR) 100-25 MG per tablet TAKE 1 TABLET DAILY 90 tablet 3    montelukast (SINGULAIR) 10 mg tablet TAKE 1 TABLET DAILY AS NEEDED FOR ALLERGIES 90 tablet 3    Multiple Vitamin (multivitamin) tablet Take 1 tablet by mouth daily 30 tablet 0    polyethylene glycol (MIRALAX) 17 g packet Take 17 g by mouth daily      sertraline (ZOLOFT) 50 mg tablet Take 1 tablet (50 mg total) by mouth daily 90 tablet 0    traZODone (DESYREL) 100 mg tablet TAKE 1 TABLET DAILY AT BEDTIME 90 tablet 3     No current facility-administered medications on file prior to visit.     Allergies   Allergen Reactions    Sulfamethoxazole-Trimethoprim Rash      Current Outpatient Medications on File Prior to Visit   Medication Sig Dispense Refill    Advair -21 MCG/ACT inhaler USE 2 INHALATIONS TWICE A DAY (RINSE MOUTH AFTER USE) 36 g 3    albuterol (PROVENTIL HFA,VENTOLIN HFA) 90 mcg/act inhaler Inhale 2 puffs every 4 (four) hours as needed      ALPRAZolam (XANAX) 0.25 mg tablet Take 1 tablet (0.25 mg total) by mouth 3 (three) times a day as needed for anxiety 1/2-1 as needed tid 30 tablet 0    ammonium lactate (LAC-HYDRIN) 12 % cream APPLY TOPICALLY AS NEEDED FOR DRY SKIN. 385 g 1    aspirin (ECOTRIN LOW  "STRENGTH) 81 mg EC tablet Take 81 mg by mouth in the morning      atorvastatin (LIPITOR) 40 mg tablet TAKE 1 TABLET DAILY 90 tablet 3    Biotin 26272 MCG TABS       clopidogrel (PLAVIX) 75 mg tablet TAKE 1 TABLET DAILY 90 tablet 3    fluticasone (FLONASE) 50 mcg/act nasal spray 2 sprays into each nostril daily as needed      levothyroxine 112 mcg tablet TAKE 1 TABLET DAILY EARLY IN THE MORNING 90 tablet 1    losartan-hydrochlorothiazide (HYZAAR) 100-25 MG per tablet TAKE 1 TABLET DAILY 90 tablet 3    montelukast (SINGULAIR) 10 mg tablet TAKE 1 TABLET DAILY AS NEEDED FOR ALLERGIES 90 tablet 3    Multiple Vitamin (multivitamin) tablet Take 1 tablet by mouth daily 30 tablet 0    polyethylene glycol (MIRALAX) 17 g packet Take 17 g by mouth daily      sertraline (ZOLOFT) 50 mg tablet Take 1 tablet (50 mg total) by mouth daily 90 tablet 0    traZODone (DESYREL) 100 mg tablet TAKE 1 TABLET DAILY AT BEDTIME 90 tablet 3     No current facility-administered medications on file prior to visit.      Social History     Tobacco Use    Smoking status: Never     Passive exposure: Never    Smokeless tobacco: Never   Vaping Use    Vaping status: Never Used   Substance and Sexual Activity    Alcohol use: Not Currently    Drug use: Never    Sexual activity: Not Currently     Partners: Male     Birth control/protection: None         Objective     /70 (BP Location: Right arm, Patient Position: Sitting, Cuff Size: Large)   Pulse 71   Temp 98.7 °F (37.1 °C)   Ht 5' 5.5\" (1.664 m)   Wt 93.4 kg (206 lb)   SpO2 95%   BMI 33.76 kg/m²     Physical Exam  Vitals and nursing note reviewed.   Constitutional:       Appearance: Normal appearance.   HENT:      Head: Atraumatic.   Eyes:      Extraocular Movements: Extraocular movements intact.   Cardiovascular:      Rate and Rhythm: Normal rate and regular rhythm.      Pulses: Normal pulses.      Heart sounds: Normal heart sounds.   Pulmonary:      Effort: Pulmonary effort is normal.      " Breath sounds: Normal breath sounds.   Abdominal:      Palpations: Abdomen is soft.   Genitourinary:     Comments: Urinalysis negative   Musculoskeletal:      Cervical back: Normal range of motion.      Right lower leg: No edema.      Left lower leg: No edema.   Skin:     General: Skin is warm.      Capillary Refill: Capillary refill takes less than 2 seconds.   Neurological:      Mental Status: She is alert and oriented to person, place, and time.   Psychiatric:         Mood and Affect: Mood normal.         Behavior: Behavior normal.       Administrative Statements   I have spent a total time of 25  minutes in caring for this patient on the day of the visit/encounter including Prognosis, Risks and benefits of tx options, Instructions for management, Patient and family education, Importance of tx compliance, Risk factor reductions, Impressions, Counseling / Coordination of care, Documenting in the medical record, and Reviewing / ordering tests, medicine, procedures  .

## 2024-11-26 ENCOUNTER — OFFICE VISIT (OUTPATIENT)
Dept: FAMILY MEDICINE CLINIC | Facility: CLINIC | Age: 74
End: 2024-11-26
Payer: MEDICARE

## 2024-11-26 ENCOUNTER — PATIENT MESSAGE (OUTPATIENT)
Dept: FAMILY MEDICINE CLINIC | Facility: CLINIC | Age: 74
End: 2024-11-26

## 2024-11-26 VITALS
WEIGHT: 207 LBS | SYSTOLIC BLOOD PRESSURE: 140 MMHG | HEART RATE: 76 BPM | BODY MASS INDEX: 33.27 KG/M2 | DIASTOLIC BLOOD PRESSURE: 76 MMHG | TEMPERATURE: 98.8 F | OXYGEN SATURATION: 96 % | HEIGHT: 66 IN

## 2024-11-26 DIAGNOSIS — S09.90XD INJURY OF HEAD, SUBSEQUENT ENCOUNTER: ICD-10-CM

## 2024-11-26 DIAGNOSIS — Z91.81 STATUS POST FALL: ICD-10-CM

## 2024-11-26 DIAGNOSIS — D62 ANEMIA DUE TO ACUTE BLOOD LOSS: Primary | ICD-10-CM

## 2024-11-26 DIAGNOSIS — Z48.02 VISIT FOR SUTURE REMOVAL: ICD-10-CM

## 2024-11-26 PROCEDURE — G2211 COMPLEX E/M VISIT ADD ON: HCPCS | Performed by: NURSE PRACTITIONER

## 2024-11-26 PROCEDURE — 99214 OFFICE O/P EST MOD 30 MIN: CPT | Performed by: NURSE PRACTITIONER

## 2024-11-26 RX ORDER — MUPIROCIN 20 MG/G
OINTMENT TOPICAL 3 TIMES DAILY
Qty: 30 G | Refills: 0 | Status: SHIPPED | OUTPATIENT
Start: 2024-11-26 | End: 2024-12-06

## 2024-11-26 NOTE — PROGRESS NOTES
Name: Saloni Miles      : 1950      MRN: [id number]  Encounter Provider: SLY Ledesma  Encounter Date: 2024   Encounter department: [facility]  :  Assessment & Plan  Anemia due to acute blood loss    Orders:    CBC and differential    Status post fall  Patient is a 74 female had a fall on  sustaining a scalp laceration with extensive acute blood loss where she required transfusion she was managed by trauma with the stapled wound she had 4 staples.  She went back to the ER had all the staples and sutures removed however presents in office today because she feels like there is an extra stitch noted on the scalp area.        Visit for suture removal  Upon exam picture taken it was noted to have a either a stitch or some kind of residual gauze sticking out of the incision line-I was able to remove it.  There may be some residual stitch left behind but I am worried to pull more as I do not want to reopen the incision line.  She is on return in a week did not reevaluate the area  I have ordered an antibiotic ointment for now and now she has been instructed if the bleeding resumes to go to the ER.        Media Information      Document Information    Clinical Image - Mobile Device   Stitch removed noted in incision   2024 11:50 AM   Attached To:   Office Visit on 24 with SLY Ledesma   Source Information    [facility]   Document History      Orders:    mupirocin (BACTROBAN) 2 % ointment; Apply topically 3 (three) times a day for 10 days    Injury of head, subsequent encounter  Denies any headaches denies any vision issues nausea vomiting  Reviewed reportable signs and symptoms and I will see her back in a week- here to evaluate for residual material left behind at stitch area - removed - picture removed after area cleaned out from what it looked like some type of gauze or stitch material   Tolerated well and  thankful   Back to ER if incision starts bleeding again          Media Information      Document Information    Clinical Image - Mobile Device   Scalp stitch noted after ER removed stitches and staples   11/26/2024 11:49 AM   Attached To:   Office Visit on 11/26/24 with SLY Ledesma   Source Information    SLY Ledesma  [facility]   Document History             History of Present Illness     Patient is a 74 female had a fall on November 5 sustaining a scalp laceration with extensive acute blood loss where she required transfusion she was managed by trauma with the stapled wound she had 4 staples.  She went back to the ER had all the staples and sutures removed however presents in office today because she feels like there is an extra stitch noted on the scalp area.     Fall      Review of Systems   Constitutional:  Negative for fatigue and unexpected weight change.   HENT:  Negative for congestion and postnasal drip.    Eyes: Negative.    Respiratory:  Negative for cough and shortness of breath.    Musculoskeletal:  Negative for arthralgias and neck pain.   Skin:         She had suture repair to scalp post fall   Presents to be evaluated to suture area - possible stitch    Neurological:  Negative for dizziness and weakness.        Post head injury    Psychiatric/Behavioral:  Negative for sleep disturbance and suicidal ideas. The patient is not nervous/anxious.      Reviewed  Reviewed   Reviewed       Medical History Reviewed by provider this encounter:  Tobacco  Allergies  Meds  Problems  Med Hx  Surg Hx  Fam Hx     .  Past Medical History   Past Medical History:   Diagnosis Date    Allergic Childhood    Anxiety 2019    Arthritis     Asthma Childhood    Chronic kidney disease     CPAP (continuous positive airway pressure) dependence     pt states was tested for VILMA, and ruled that she doesnt have it, however oxygen levels drop during sleep so CPAP was recommended,is not yet using  it    Disease of thyroid gland Approx     Diverticulitis of colon     Heart murmur     Hyperlipidemia     Hypertension  approx    Obesity Childhood    PAD (peripheral artery disease) (HCC)     Urinary tract infection      Past Surgical History:   Procedure Laterality Date    APPENDECTOMY  About      SECTION   &     COLON SURGERY  About     HYSTERECTOMY  About     JOINT REPLACEMENT  10/5/2022    Hip    AK ARTHRP ACETBLR/PROX FEM PROSTC AGRFT/ALGRFT Left 10/05/2022    Procedure: ARTHROPLASTY LEFT HIP TOTAL ANTERIOR;  Surgeon: Hakan Rojo MD;  Location: [facility];  Service: Orthopedics     Family History   Problem Relation Age of Onset    Depression Mother     Thrombosis Mother     Arthritis Mother     Cancer Mother     Anxiety disorder Mother     Heart disease Father         I    Cancer Brother     Dementia Brother     Heart disease Brother     Hypertension Brother     Cancer Brother       reports that she has never smoked. She has never been exposed to tobacco smoke. She has never used smokeless tobacco. She reports that she does not currently use alcohol. She reports that she does not use drugs.  Current Outpatient Medications on File Prior to Visit   Medication Sig Dispense Refill    Advair -21 MCG/ACT inhaler USE 2 INHALATIONS TWICE A DAY (RINSE MOUTH AFTER USE) 36 g 3    albuterol (PROVENTIL HFA,VENTOLIN HFA) 90 mcg/act inhaler Inhale 2 puffs every 4 (four) hours as needed      ALPRAZolam (XANAX) 0.25 mg tablet Take 1 tablet (0.25 mg total) by mouth 3 (three) times a day as needed for anxiety 1/2-1 as needed tid 30 tablet 0    ammonium lactate (LAC-HYDRIN) 12 % cream APPLY TOPICALLY AS NEEDED FOR DRY SKIN. 385 g 1    aspirin (ECOTRIN LOW STRENGTH) 81 mg EC tablet Take 81 mg by mouth in the morning      atorvastatin (LIPITOR) 40 mg tablet TAKE 1 TABLET DAILY 90 tablet 3    Biotin 79948 MCG TABS       clopidogrel (PLAVIX) 75 mg tablet TAKE 1 TABLET DAILY 90  tablet 3    fluticasone (FLONASE) 50 mcg/act nasal spray 2 sprays into each nostril daily as needed      levothyroxine 112 mcg tablet TAKE 1 TABLET DAILY EARLY IN THE MORNING 90 tablet 1    losartan-hydrochlorothiazide (HYZAAR) 100-25 MG per tablet TAKE 1 TABLET DAILY 90 tablet 3    montelukast (SINGULAIR) 10 mg tablet TAKE 1 TABLET DAILY AS NEEDED FOR ALLERGIES 90 tablet 3    Multiple Vitamin (multivitamin) tablet Take 1 tablet by mouth daily 30 tablet 0    polyethylene glycol (MIRALAX) 17 g packet Take 17 g by mouth daily      sertraline (ZOLOFT) 50 mg tablet Take 1 tablet (50 mg total) by mouth daily 90 tablet 0    traZODone (DESYREL) 100 mg tablet TAKE 1 TABLET DAILY AT BEDTIME 90 tablet 3     No current facility-administered medications on file prior to visit.     Allergies   Allergen Reactions    Sulfamethoxazole-Trimethoprim Rash      Current Outpatient Medications on File Prior to Visit   Medication Sig Dispense Refill    Advair -21 MCG/ACT inhaler USE 2 INHALATIONS TWICE A DAY (RINSE MOUTH AFTER USE) 36 g 3    albuterol (PROVENTIL HFA,VENTOLIN HFA) 90 mcg/act inhaler Inhale 2 puffs every 4 (four) hours as needed      ALPRAZolam (XANAX) 0.25 mg tablet Take 1 tablet (0.25 mg total) by mouth 3 (three) times a day as needed for anxiety 1/2-1 as needed tid 30 tablet 0    ammonium lactate (LAC-HYDRIN) 12 % cream APPLY TOPICALLY AS NEEDED FOR DRY SKIN. 385 g 1    aspirin (ECOTRIN LOW STRENGTH) 81 mg EC tablet Take 81 mg by mouth in the morning      atorvastatin (LIPITOR) 40 mg tablet TAKE 1 TABLET DAILY 90 tablet 3    Biotin 18008 MCG TABS       clopidogrel (PLAVIX) 75 mg tablet TAKE 1 TABLET DAILY 90 tablet 3    fluticasone (FLONASE) 50 mcg/act nasal spray 2 sprays into each nostril daily as needed      levothyroxine 112 mcg tablet TAKE 1 TABLET DAILY EARLY IN THE MORNING 90 tablet 1    losartan-hydrochlorothiazide (HYZAAR) 100-25 MG per tablet TAKE 1 TABLET DAILY 90 tablet 3    montelukast (SINGULAIR) 10  "mg tablet TAKE 1 TABLET DAILY AS NEEDED FOR ALLERGIES 90 tablet 3    Multiple Vitamin (multivitamin) tablet Take 1 tablet by mouth daily 30 tablet 0    polyethylene glycol (MIRALAX) 17 g packet Take 17 g by mouth daily      sertraline (ZOLOFT) 50 mg tablet Take 1 tablet (50 mg total) by mouth daily 90 tablet 0    traZODone (DESYREL) 100 mg tablet TAKE 1 TABLET DAILY AT BEDTIME 90 tablet 3     No current facility-administered medications on file prior to visit.      Social History     Tobacco Use    Smoking status: Never     Passive exposure: Never    Smokeless tobacco: Never   Vaping Use    Vaping status: Never Used   Substance and Sexual Activity    Alcohol use: Not Currently    Drug use: Never    Sexual activity: Not Currently     Partners: Male     Birth control/protection: None        Objective   /76 (BP Location: Right arm, Patient Position: Sitting, Cuff Size: Large)   Pulse 76   Temp 98.8 °F (37.1 °C)   Ht 5' 5.5\" (1.664 m)   Wt 93.9 kg (207 lb)   SpO2 96%   BMI 33.92 kg/m²      Physical Exam  Vitals and nursing note reviewed.   Constitutional:       Appearance: Normal appearance.   HENT:      Head: Atraumatic.   Pulmonary:      Effort: Pulmonary effort is normal.      Breath sounds: Normal breath sounds.   Abdominal:      Palpations: Abdomen is soft.   Musculoskeletal:      Right lower leg: No edema.      Left lower leg: No edema.   Skin:     Comments: See note for pictures / also under media   Neurological:      Mental Status: She is alert and oriented to person, place, and time.   Psychiatric:         Mood and Affect: Mood normal.         Behavior: Behavior normal.     CT head wo contrast  Order: [id number]  Impression    IMPRESSION  1. No acute intracranial abnormality.  2. No acute cervical spine fracture.  Narrative    EXAM  CT HEAD BRAIN WO CONTRAST/CT C SPINE WO CONTRAST    HISTORY  trauma, fall    TECHNIQUE  CT scan of the head and cervical spine was performed without " contrast.    COMPARISON  None    FINDINGS  HEAD:    No evidence of acute intracranial hemorrhage, abnormal extra-axial collection, midline shift, or mass effect. Ventricles and sulci are within normal limits.    Trace paranasal sinus mucosal thickening is present.  Left posterior scalp soft tissue swelling with skin staples is present.  No depressed calvarial fracture.    CERVICAL SPINE:    There is no evidence of acute fracture. Straightening of the cervical lordosis is likely positional.  The vertebral body heights are preserved.    Multilevel degenerative changes are present, most advanced at C5-C6 and C6-C7 where there is at least moderate central spinal stenosis.  Exam End: 11/05/24  6:27 PM    Specimen Collected: 11/05/24  6:41 PM Last Resulted: 11/05/24  6:39 PM   CT chest abdomen pelvis w contrast  Order: [id number]  Impression    IMPRESSION  No acute traumatic findings in the chest, abdomen, or pelvis.    Colonic diverticulosis without diverticulitis.    Cholelithiasis.  Narrative    EXAM  CT CHEST/ABDOMEN/PELVIS WITH IV CONTRAST WITHOUT ORAL CONTRAST    HISTORY  trauma, fall    COMPARISON  None    TECHNIQUE  CT Chest, Abdomen, and Pelvis was performed with intravenous contrast.    FINDINGS  CHEST:    AIRWAY/LUNGS:Patent central airways. lungs are clear.  PLEURA: No pleural effusion.  MEDIASTINUM AND BRODIE: No lymphadenopathy.  VESSELS: No aortic aneurysm or dissection.  Central pulmonary artery is clear.  Vascular calcifications are present.  HEART: Normal heart size. No pericardial effusion.  CHEST WALL/SOFT TISSUES: Within normal limits.    ABDOMEN/PELVIS:    LIVER: Within normal limits.  BILE DUCTS: Within normal limits.  GALLBLADDER: Radiopaque gallbladder calculi are identified. The gallbladder is physiologically distended. There is no wall thickening or pericholecystic fluid.  PANCREAS: Within normal limits.  SPLEEN: Within normal limits.  ADRENALS: Within normal limits.  KIDNEYS/URETERS: Within normal  limits.    BLADDER: Within normal limits.  REPRODUCTIVE ORGANS: Uterus and adnexa within normal limits.    GI TRACT: No bowel obstruction. Appendix is not visualized. No inflammatory changes in the right lower quadrant to suggest appendicitis.  Colonic diverticulosis without diverticulitis.  PERITONEUM: No ascites.  VESSELS: Within normal limits.  LYMPHADENOPATHY:No lymphadenopathy.  ABDOMINAL WALL: Within normal limits.  BONES: Intact left hip arthroplasty.  Degenerative changes.  No acute fractures or malalignment.  Exam End: 11/05/24  6:29 PM    Specimen Collected: 11/05/24  7:20 PM Last Resulted: 11/05/24  7:18 PM   Received From: [facility]  Result Received: 11/25/24 11:24 AM   CBC AND PLATELET  Order: [id number]  Component  Ref Range & Units 11/7/24  6:34 AM   WBC  4.00 - 10.80 K/uL 2.98 Low    RBC  3.85 - 5.15 M/uL 2.87   HGB  12.0 - 15.3 g/dL 9.5 Low    HCT  36.0 - 45.2 % 27.7 Low    MCV  81.5 - 97.5 fL 96.5   MCH  27.0 - 34.0 pg 33.1   MCHC  32.0 - 36.0 g/dL 34.3   RDW  11.5 - 15.5 % 13.9   PLT  140 - 400 K/uL 110 Low    MPV  6.6 - 11.1 fL 10.3   nRBCs  <=0 /100 WBCs 0   BASIC METABOLIC PANEL  Order: [id number]   Status: Final result       Next appt: 12/02/2024 at 11:00 AM in Family Medicine (SLY Ledesma)              Component  Ref Range & Units (maurice) 11/7/24  6:34 AM 11/6/24  7:09 AM 11/5/24  6:09 PM 4/22/24  2:13 PM 10/18/23  9:46 AM 6/2/23 10:54 AM 3/21/23  1:46 PM   BUN 15 18 20 20 R 22 R 22 R 24 R   Creatinine 1 0.9 1.1 High  1.03 R, CM 1.01 R, CM 1.18 R, CM 1.00 R, CM   EGFR 63 66 CM 56 Low  CM 54 R 56 R 46 R 56 R   Comment: eGFR is calculated based on the CKD-EPI 2021 equation.   Sodium 135 133 Low  134 Low  139 R 141 R 138 R 139 R   Potassium 4 3.8 4.3 4.0 R 4.1 R 4.1 R 3.9 R   Chloride 102 101 102 105 R 104 R 109 High  R 104 R   Carbon Dioxide 26 24 21 Low  26 R 29 R 27 R 27 R   Anion Gap 7 8 11 8 R 8 R 2 Low  R 8 R   Glucose 95 95 140 High    86 R, CM    Calcium 8.5 8.4 8.9 9.2 10.9 High   9.5 R 10.1               Specimen Collected: 11/07/24  6:34 AM       Specimen Collected: 11/07/24  6:34 AM     Administrative Statements   I have spent a total time of 35  minutes in caring for this patient on the day of the visit/encounter including Diagnostic results, Prognosis, Risks and benefits of tx options, Instructions for management, Patient and family education, Importance of tx compliance, Risk factor reductions, Impressions, Counseling / Coordination of care, Documenting in the medical record, Reviewing / ordering tests, medicine, procedures  , Obtaining or reviewing history  , and Communicating with other healthcare professionals . Topics discussed with the patient / family include symptom assessment and management, medication review, and goals of care.

## 2024-12-02 NOTE — ASSESSMENT & PLAN NOTE
"Peripheral arterial disease s/p L below knee popliteal bypass - distal PT artery with known >75% stenosis at the distal anastomosis  B iliac artery ectasias    -No new leg symptoms or concerns. No leg pain, claudication, ischemic rest pain or tissue loss    -AOIL 7/22/24:     Patent Ao 2.3 cm. R LIN patent and ectatic 1.7 cm. L LIN patent and ectatic 1.6 cm.     B EIA patent and normal. 70% celiac (265) stenosis. SMA and renals patent.    -KALE 7/22/24:     R 0.95/54/38; No significant LE arterial fem-pop occlusive disease. PT artery occluded.    L Deferred due to distal graft placement/ 113/78; BK popliteal to distal posterior tibial bypass graft with > 75% stenosis at distal graft    No change    -KALE 7/21/2023    R 1.02/136/36; no significant LE arterial occlusive disease    L MICHELE deferred/metatarsal 148/great toe 106 (prior 63); Patent BK poplteal -PT bypass graft with >  75% distal anastomosis    Exam:     R foot no easily palp pulses. Doppler but \"hollow\" AT/DP/PT signals. Foot warm and well-perfused. Normal color / temp. 2+ cap refill.     L foot 2 + DP pulse. Excellent triphasic PT / bypass signal at the medial malleolus which is heard in the medial distal calf.      Plan:  -Overall stable, asymptomatic PAD status post left popliteal to distal PT bypass with known distal anastomotic stenosis in the graft >75% as previously seen.  -Apparently her prior vascular surgeon told her that we will try to angioplasty this area but there was scar tissue  -AOIL shows no inflow disease. KALE is essentially unchanged, though R met pressure has fallen which correlates to physical exam. The right MICHELE 0.95 with metatarsal and toe pressures 54 and 38 mmHg respectively with no significant arterial disease in the femoral-popliteal arteries and known PT artery occlusion. On the left, metatarsal and toe pressures 113 and 78 mmHg respectively.  Below-knee popliteal to distal PT bypass again redemonstrates greater than 75% stenosis " in the distal graft as previously seen.   -Bypass is clinically patent with triphasic doppler signal at the ankle/ PT.  -Discussed the pathophysiology and treatment options of PAD, including indications for surgical/endovascular procedures.  -Continue with aspirin, clopidogrel 75 and atorvastatin 40  -Maintain good blood pressure and cholesterol control  -Discussed the importance of good footcare, avoidance of foot wounds and checking feet daily for any changes  -Follow-up lower extremity arterial duplex study in 1 year (Jul '25) with office visit, call sooner if needed  Orders:    VAS ARTERIAL DUPLEX- LOWER LIMB BILATERAL; Future

## 2024-12-02 NOTE — ASSESSMENT & PLAN NOTE
-Maintain good blood pressure and cholesterol control per primary care  -LDL 60 at goal; continue statin therapy and monitoring.

## 2024-12-02 NOTE — PATIENT INSTRUCTIONS
"  Saloni,   Nice to see you in the office today. Your leg study looks ok. Bypass is open. We will see you back next year.     PAD  -Overall stable, asymptomatic PAD s/p Left leg bypass  -Arterial study unchanged  -Evidence of graft stenosis seen since 2022 which we can monitor  -Continue with aspirin, clopidogrel 75 and atorvastatin 40  -Discussed the importance of good footcare, avoidance of foot wounds and checking feet daily for any changes  -Follow-up lower extremity arterial duplex study in 1 year with office visit, call sooner if needed                 obesity (BMI 42.5), liver cirrhosis/JOSEPH, diverticulitis, Hx colonic polyps, asthma, Htn, HLD, hypothyroidism, peripheral arterial disease s/p L popliteal to distal PT artery bypass graft (7/31/2019) s/p bypass graft angioplasty x 2 (1/20/21, 8/12/20) who presents to  vascular for initial consultation. She has been followed in [place] and has now relocated to the [facility] and she is looking to transfer her care to [facility].     Mrs. Miles gives history of left second toe ulceration in 2019 which developed osteo and was amputated.  In that setting, she required left lower extremity bypass.  Since bypass she is had 2 interventions for recurrent stenosis to the graft and was told that there is \"scar tissue.\" Last duplex in [place] was 01/03/2022.  There was noted to be recurrent area of stenosis of the distal popliteal to PT bypass graft with good flow proximally.  There was also noted to be anterior tibial artery disease. She has been maintained on aspirin, clopidogrel and high-intensity statin therapy since her bypass.      9/19/23:   Patient reports that she has had some right foot tendinitis which is holding her back a bit.  She is otherwise doing well.  She has no complaints.  She is walking is much as she wants to walk without any claudication.  She has no ischemic rest pain or wounds.      12/3/24:      :Medical therapy includes aspirin 81, clopidogrel 75, " atorvastatin 40, etc.      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  Patient is  asymptomatic at this time.  Operative History:  Left AK popliteal artery to distal posterior tibial artery bypass graft  Risk Factors  The patient has history of HTN, Hyperlipidemia and PAD.  Clinical  Right Pressure:  102/ mm Hg, Left Pressure:  108/ mm Hg.     FINDINGS:     Unilateral     Impression  PSV (cm/s)  EDV (cm/s)  AP (cm)    Sup-Didi Ao                         88          22      2.4    Px Inf-Ricki Ao                     115          22      1.9    Ds Inf-Ricki Ao                      60           1      2.1    Celiac         >70%               265          60             Prox. SMA                         173          42                Right       Impression  PSV (cm/s)  AP (cm)    Prox LIN                        69      1.5    Dist LIN    Ectatic             48      1.7    Prox. EIA                      118             Dist EIA                       121      1.1       Left        Impression  PSV (cm/s)  EDV (cm/s)  AP (cm)   RAR    Prox LIN    Ectatic            148                  1.6          Dist LIN                        79                               Prox. EIA                      109                               Dist EIA                        76                  1.0          Prox Renal                     116          13           1.32             CONCLUSION:     Impression  The aorta is patent and normal in caliber measuring 2.3 cm in its greatest  diameter.  The right common iliac artery is patent and ectatic measuring 1.7 cm.  The left common iliac artery is patent and ectatic measuring 1.6 cm.  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  The superior mesenteric and b/l renal arteries are patent.     Ankle/Brachial indices are: Rt -  0.95, normal range (Prior: 1.14) and Lt -  deferred due to distal graft placement, Prior: the same.  Great toe pressures are RT - 38 mmHg below the healing range, LT - 78 mmHg  within the healing range.     In comparison to the study on 3/21/2023, the right LIN ectatic and celiac  stenosis are the new findings.         KALE 7/22/24  FINDINGS:     Right                                   PSV (cm/s)  EDV    Common Femoral Artery                           79         Prox Profunda                                   70         Prox SFA                                        43         Mid SFA                                         63         Dist SFA                                        75         Proximal Pop                                    51         Distal Pop                                      57         Tibioperoneal                                   48         Dist Post Tibial                                39   18    Prox. Ant. Tibial                               22         Dist. Ant. Tibial                               55   20    Prox Peroneal                                  114   28       Left                                    Impression  PSV (cm/s)  EDV    Inflow Anastomosis (Pop-Distal Graft)                       48         Proximal Graft (Pop-Distal Graft)                           50         Middle Graft (Pop-Distal Graft)                             65         Distal Graft (Pop-Distal Graft)                             81         Outflow Anastomosis (Pop-Distal Graft)  >75%               209   33    Common Femoral Artery                                       79   10    Prox Profunda                                               41         Prox SFA                                                    54    0    Mid SFA                                                     76    8    Dist SFA                                                    57    3    Proximal Pop                                                 50   10    Dist Post Tibial                                            89   12    Dist. Ant. Tibial                                           25    5             CONCLUSION:     Impression:  RIGHT LOWER LIMB:  No evidence of significant lower extremity arterial occlusive disease in the  femoral and popliteal arteries.  The distal posterior tibial artery is occluded.  Ankle/Brachial index: 0.95 which is in the normal disease category (Prior 1.02)  PVR/ PPG tracings are slightly dampened.  Metatarsal pressure of 54 mmHg  Great toe pressure of 38 mmHg, within the healing range, Prior 36 mmHg.     LEFT LOWER LIMB:  The below knee popliteal artery to distal posterior tibial artery bypass graft  is patent with a >75% stenosis of the distal anastomosis.  Ankle/Brachial index:  Deferred due to distal graft placement (Prior Deferred)  PVR/ PPG tracings are normal.  Metatarsal pressure of 113 mmHg  Great toe pressure of 78 mmHg, within the healing range, Prior 106 mmHg.     Compared to previous study on 7/21/2023, there is no significant change noted.

## 2024-12-02 NOTE — PROGRESS NOTES
"Name: Saloni Miles      : 1950      MRN: [id number]  Encounter Provider: Josefina Walters PA-C  Encounter Date: 12/3/2024   Encounter department: [facility]  :  Assessment & Plan  Peripheral arterial disease with history of revascularization  (HCC)  Peripheral arterial disease s/p L below knee popliteal bypass - distal PT artery with known >75% stenosis at the distal anastomosis  B iliac artery ectasias    -No new leg symptoms or concerns. No leg pain, claudication, ischemic rest pain or tissue loss    -AOIL 24:     Patent Ao 2.3 cm. R LIN patent and ectatic 1.7 cm. L LIN patent and ectatic 1.6 cm.     B EIA patent and normal. 70% celiac (265) stenosis. SMA and renals patent.    -KALE 24:     R 0.95/54/38; No significant LE arterial fem-pop occlusive disease. PT artery occluded.    L Deferred due to distal graft placement/ 113/78; BK popliteal to distal posterior tibial bypass graft with > 75% stenosis at distal graft    No change    -KALE 2023    R 1.02/136/36; no significant LE arterial occlusive disease    L MICHELE deferred/metatarsal 148/great toe 106 (prior 63); Patent BK poplteal -PT bypass graft with >  75% distal anastomosis    Exam:     R foot no easily palp pulses. Doppler but \"hollow\" AT/DP/PT signals. Foot warm and well-perfused. Normal color / temp. 2+ cap refill.     L foot 2 + DP pulse. Excellent triphasic PT / bypass signal at the medial malleolus which is heard in the medial distal calf.      Plan:  -Overall stable, asymptomatic PAD status post left popliteal to distal PT bypass with known distal anastomotic stenosis in the graft >75% as previously seen.  -Apparently her prior vascular surgeon told her that we will try to angioplasty this area but there was scar tissue  -AOIL shows no inflow disease. KALE is essentially unchanged, though R met pressure has fallen which correlates to physical exam. The right MICHELE 0.95 with metatarsal and toe pressures 54 and " 38 mmHg respectively with no significant arterial disease in the femoral-popliteal arteries and known PT artery occlusion. On the left, metatarsal and toe pressures 113 and 78 mmHg respectively.  Below-knee popliteal to distal PT bypass again redemonstrates greater than 75% stenosis in the distal graft as previously seen.   -Bypass is clinically patent with triphasic doppler signal at the ankle/ PT.  -Discussed the pathophysiology and treatment options of PAD, including indications for surgical/endovascular procedures.  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         Stage 3b chronic kidney disease (HCC)  Lab Results   Component Value Date    EGFR 63 11/07/2024    EGFR 66 11/06/2024    EGFR 56 (L) 11/05/2024    CREATININE 1 11/07/2024    CREATININE 0.9 11/06/2024    CREATININE 1.1 (H) 11/05/2024            Celiac artery stenosis (HCC)  -Asymptomatic incidentally noted celiac artery stenosis  -Will monitor clinically       Acquired absence of other left toe(s) (HCC)           History of Present Illness   CC: Patient presents today to review KALE and AOIL done 7/22/24. Walking is much as she wants to walk without any leg pain or claudication.     DRAKE Guallpa  "Ginger is a 74 y.o. female obesity (BMI 42.5), liver cirrhosis/JOSEPH, diverticulitis, Hx colonic polyps, asthma, Htn, HLD, hypothyroidism, peripheral arterial disease s/p L popliteal to distal PT artery bypass graft (7/31/2019) s/p bypass graft angioplasty x 2 (1/20/21, 8/12/20) who presents to  vascular for initial consultation. She has been followed by [place] and has now relocated to the [facility] and she is looking to transfer her care to [facility].     Mrs. Miles gives history of left second toe ulceration in 2019 which developed osteo and was amputated.  In that setting, she required left lower extremity bypass.  Since bypass she is had 2 interventions for recurrent stenosis to the graft and was told that there is \"scar tissue.\" Last duplex in [place] was 01/03/2022.  There was noted to be recurrent area of stenosis of the distal popliteal to PT bypass graft with good flow proximally.  There was also noted to be anterior tibial artery disease. She has been maintained on aspirin, clopidogrel and high-intensity statin therapy since her bypass.      12/3/24:  Ms. Miles reports that she is doing well.  She offers no complaints.  She is walking is much as she wants to walk without any leg pain.  In fact, she went to [place] and was able to enjoy her vacation without any leg pain.  She has no foot pain or tissue loss.  She is already aware, but counseled again on the importance of good footcare and prevention of wounds.  We discussed that should she develop any leg or foot pain or wounds, she should contact the office right away.    Controlled asthma. No chest pain or ARMAS.     We reviewed her vascular studies and recent labs.      Her Doppler redemonstrates normal right MICHELE. On the left, there is a distal bypass artery anastomosis stenosis greater than 75% with  peak systolic velocity is 209 cm/s which is consistent with prior exam. The left metatarsal and great toe pressures are stable metatarsal pressure of 113 mmHg and " great toe pressure of 78 mmHg. There is a good bypass pulse/ signal over the L PT and 2+ L DP pulse.  We will plan to repeat her lower extremity arterial duplex study 1 year (July '25) with office visit, but discussed reasons to be seen sooner.    We discussed the importance and indications for medical therapy. Patient confirms she is taking aspirin, clopidogrel 75 and atorvastatin 40.      LDL 60        KALE 7/22/24  [facility]  CLINICAL:  Indications:  PVD, Unspecified [I73.9].  Yearly surveillance for progression of disease.   Patient reports having no leg  pain after walking.  Operative History:  Left AK popliteal artery to distal posterior tibial artery bypass graft  Risk Factors  The patient has history of HTN, Hyperlipidemia and PAD.  Clinical  Right Pressure:  102/ mm Hg, Left Pressure:  /108 mm Hg.     FINDINGS:     Right                                   PSV (cm/s)  EDV    Common Femoral Artery                           79         Prox Profunda                                   70         Prox SFA                                        43         Mid SFA                                         63         Dist SFA                                        75         Proximal Pop                                    51         Distal Pop                                      57         Tibioperoneal                                   48         Dist Post Tibial                                39   18    Prox. Ant. Tibial                               22         Dist. Ant. Tibial                               55   20    Prox Peroneal                                  114   28       Left                                    Impression  PSV (cm/s)  EDV    Inflow Anastomosis (Pop-Distal Graft)                       48         Proximal Graft (Pop-Distal Graft)                           50         Middle Graft (Pop-Distal Graft)                             65         Distal Graft (Pop-Distal Graft)                              81         Outflow Anastomosis (Pop-Distal Graft)  >75%               209   33    Common Femoral Artery                                       79   10    Prox Profunda                                               41         Prox SFA                                                    54    0    Mid SFA                                                     76    8    Dist SFA                                                    57    3    Proximal Pop                                                50   10    Dist Post Tibial                                            89   12    Dist. Ant. Tibial                                           25    5             CONCLUSION:     Impression:  RIGHT LOWER LIMB:  No evidence of significant lower extremity arterial occlusive disease in the  femoral and popliteal arteries.  The distal posterior tibial artery is occluded.  Ankle/Brachial index: 0.95 which is in the normal disease category (Prior 1.02)  PVR/ PPG tracings are slightly dampened.  Metatarsal pressure of 54 mmHg  Great toe pressure of 38 mmHg, within the healing range, Prior 36 mmHg.     LEFT LOWER LIMB:  The below knee popliteal artery to distal posterior tibial artery bypass graft  is patent with a >75% stenosis of the distal anastomosis.  Ankle/Brachial index:  Deferred due to distal graft placement (Prior Deferred)  PVR/ PPG tracings are normal.  Metatarsal pressure of 113 mmHg  Great toe pressure of 78 mmHg, within the healing range, Prior 106 mmHg.     Compared to previous study on 7/21/2023, there is no significant change noted.      AOIL 7/22/24  FINDINGS:     Unilateral     Impression  PSV (cm/s)  EDV (cm/s)  AP (cm)    Sup-Didi Ao                         88          22      2.4    Px Inf-Ricki Ao                     115          22      1.9    Ds Inf-Ricki Ao                      60           1      2.1    Celiac         >70%               265          60             Prox. SMA                          173          42                Right       Impression  PSV (cm/s)  AP (cm)    Prox LIN                        69      1.5    Dist LIN    Ectatic             48      1.7    Prox. EIA                      118             Dist EIA                       121      1.1       Left        Impression  PSV (cm/s)  EDV (cm/s)  AP (cm)   RAR    Prox LIN    Ectatic            148                  1.6          Dist LIN                        79                               Prox. EIA                      109                               Dist EIA                        76                  1.0          Prox Renal                     116          13           1.32             CONCLUSION:     Impression  The aorta is patent and normal in caliber measuring 2.3 cm in its greatest  diameter.  The right common iliac artery is patent and ectatic measuring 1.7 cm.  The left common iliac artery is patent and ectatic measuring 1.6 cm.  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  The superior mesenteric and b/l renal arteries are patent.     Ankle/Brachial indices are: Rt - 0.95, normal range (Prior: 1.14) and Lt -  deferred due to distal graft placement, Prior: the same.  Great toe pressures are RT - 38 mmHg below the healing range, LT - 78 mmHg  within the healing range.     In comparison to the study on 3/21/2023, the right LIN ectatic and celiac  stenosis are the new findings.      Review of Systems   Constitutional: Negative.    HENT: Negative.     Eyes: Negative.    Respiratory: Negative.     Cardiovascular: Negative.    Gastrointestinal: Negative.    Endocrine: Negative.    Genitourinary: Negative.    Musculoskeletal: Negative.    Skin: Negative.    Allergic/Immunologic: Negative.    Neurological: Negative.    Hematological: Negative.    Psychiatric/Behavioral: Negative.            Objective   /70 (BP Location: Left arm, Patient Position: Sitting)   Pulse 92   " Ht 5' 5.5\" (1.664 m)   Wt 95.3 kg (210 lb)   BMI 34.41 kg/m²      Feet warm and well-perfused.  Normal color and temperature.  Right foot nonpalpable.  Left foot 2+ DP pulse and palpable left distal DP/bypass.    Physical Exam  Vitals and nursing note reviewed.   Constitutional:       Appearance: She is well-developed.   HENT:      Head: Normocephalic and atraumatic.   Eyes:      Pupils: Pupils are equal, round, and reactive to light.   Neck:      Thyroid: No thyromegaly.      Vascular: No JVD.      Trachea: Trachea normal.   Cardiovascular:      Rate and Rhythm: Normal rate and regular rhythm.      Pulses:           Carotid pulses are 2+ on the right side and 2+ on the left side.       Radial pulses are 2+ on the right side and 2+ on the left side.        Dorsalis pedis pulses are detected w/ Doppler on the right side and 2+ on the left side.        Posterior tibial pulses are detected w/ Doppler on the right side and 2+ on the left side.      Heart sounds: S1 normal and S2 normal. Murmur heard.      Crescendo systolic murmur is present with a grade of 2/6.      No friction rub. No gallop.   Pulmonary:      Effort: Pulmonary effort is normal. No accessory muscle usage or respiratory distress.      Breath sounds: Normal breath sounds. No wheezing or rales.   Abdominal:      General: Bowel sounds are normal. There is no distension.      Palpations: Abdomen is soft.      Tenderness: There is no abdominal tenderness.   Musculoskeletal:         General: No deformity. Normal range of motion.      Cervical back: Neck supple.      Right lower leg: No edema.      Left lower leg: No edema.   Skin:     General: Skin is warm and dry.      Findings: No lesion or rash.      Nails: There is no clubbing.   Neurological:      Mental Status: She is alert and oriented to person, place, and time.      Comments: Grossly normal    Psychiatric:         Behavior: Behavior is cooperative.                   I have reviewed and made " "appropriate changes to the review of systems input by the medical assistant.    Vitals:    24 1406   BP: 118/70   BP Location: Left arm   Patient Position: Sitting   Pulse: 92   Weight: 95.3 kg (210 lb)   Height: 5' 5.5\" (1.664 m)       Patient Active Problem List   Diagnosis    Primary osteoarthritis of one hip, left    Peripheral arterial disease with history of revascularization  (HCC)    Hypertension    Acquired absence of other left toe(s) (HCC)    Liver cirrhosis secondary to JOSEPH (nonalcoholic steatohepatitis) (HCC)    Acquired hypothyroidism    Diverticulitis    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)    Stage 3b chronic kidney disease (HCC)    Anxiety    Depression with anxiety    Other insomnia    Coronary artery disease involving native heart without angina pectoris    Aortic valve stenosis    Mild intermittent asthma without complication    Liver cyst    History of colonic polyps    Contact dermatitis    Secondary hyperparathyroidism of renal origin (HCC)    Thrombocytopenia, unspecified (HCC)    Primary osteoarthritis of left knee    S/P total left hip arthroplasty    Peripheral nerve disease    Dyslipidemia    Counseling about travel    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)    Paresthesia of hand, bilateral       Past Surgical History:   Procedure Laterality Date    APPENDECTOMY  About      SECTION   &     COLON SURGERY  About     HYSTERECTOMY  About     JOINT REPLACEMENT  10/5/2022    Hip    [place] ARTHRP ACETBLR/PROX FEM PROSTC AGRFT/ALGRFT Left 10/05/2022    Procedure: ARTHROPLASTY LEFT HIP TOTAL ANTERIOR;  Surgeon: Haakn Rojo MD;  Location: [facility];  Service: Orthopedics       Family History   Problem Relation Age of Onset    Depression Mother     Thrombosis Mother     Arthritis Mother     Cancer Mother     Anxiety disorder Mother     Heart disease Father         I    Cancer Brother     Dementia Brother  "    Heart disease Brother     Hypertension Brother     Cancer Brother        Social History     Socioeconomic History    Marital status:      Spouse name: Not on file    Number of children: Not on file    Years of education: Not on file    Highest education level: Not on file   Occupational History    Not on file   Tobacco Use    Smoking status: Never     Passive exposure: Never    Smokeless tobacco: Never   Vaping Use    Vaping status: Never Used   Substance and Sexual Activity    Alcohol use: Not Currently    Drug use: Never    Sexual activity: Not Currently     Partners: Male     Birth control/protection: None   Other Topics Concern    Not on file   Social History Narrative    Not on file     Social Drivers of Health     Financial Resource Strain: Low Risk  (2/6/2023)    Overall Financial Resource Strain (CARDIA)     Difficulty of Paying Living Expenses: Not very hard   Food Insecurity: No Food Insecurity (5/30/2024)    Nursing - Inadequate Food Risk Classification     Worried About Running Out of Food in the Last Year: Never true     Ran Out of Food in the Last Year: Never true     Ran Out of Food in the Last Year: Not on file   Transportation Needs: No Transportation Needs (5/30/2024)    PRAPARE - Transportation     Lack of Transportation (Medical): No     Lack of Transportation (Non-Medical): No   Physical Activity: Not on file   Stress: Not on file   Social Connections: Not on file   Intimate Partner Violence: Not on file   Housing Stability: Low Risk  (5/30/2024)    Housing Stability Vital Sign     Unable to Pay for Housing in the Last Year: No     Number of Times Moved in the Last Year: 0     Homeless in the Last Year: No       Allergies   Allergen Reactions    Sulfamethoxazole-Trimethoprim Rash         Current Outpatient Medications:     Advair -21 MCG/ACT inhaler, USE 2 INHALATIONS TWICE A DAY (RINSE MOUTH AFTER USE), Disp: 36 g, Rfl: 3    ALPRAZolam (XANAX) 0.25 mg tablet, Take 1 tablet  (0.25 mg total) by mouth 3 (three) times a day as needed for anxiety 1/2-1 as needed tid, Disp: 30 tablet, Rfl: 0    ammonium lactate (LAC-HYDRIN) 12 % cream, APPLY TOPICALLY AS NEEDED FOR DRY SKIN., Disp: 385 g, Rfl: 1    aspirin (ECOTRIN LOW STRENGTH) 81 mg EC tablet, Take 81 mg by mouth in the morning, Disp: , Rfl:     atorvastatin (LIPITOR) 40 mg tablet, TAKE 1 TABLET DAILY, Disp: 90 tablet, Rfl: 3    Biotin 84420 MCG TABS, , Disp: , Rfl:     clopidogrel (PLAVIX) 75 mg tablet, TAKE 1 TABLET DAILY, Disp: 90 tablet, Rfl: 3    fluticasone (FLONASE) 50 mcg/act nasal spray, 2 sprays into each nostril daily as needed, Disp: , Rfl:     levothyroxine 112 mcg tablet, TAKE 1 TABLET DAILY EARLY IN THE MORNING, Disp: 90 tablet, Rfl: 1    losartan-hydrochlorothiazide (HYZAAR) 100-25 MG per tablet, TAKE 1 TABLET DAILY, Disp: 90 tablet, Rfl: 3    montelukast (SINGULAIR) 10 mg tablet, TAKE 1 TABLET DAILY AS NEEDED FOR ALLERGIES, Disp: 90 tablet, Rfl: 3    Multiple Vitamin (multivitamin) tablet, Take 1 tablet by mouth daily, Disp: 30 tablet, Rfl: 0    mupirocin (BACTROBAN) 2 % ointment, Apply topically 3 (three) times a day for 10 days, Disp: 30 g, Rfl: 0    polyethylene glycol (MIRALAX) 17 g packet, Take 17 g by mouth daily, Disp: , Rfl:     sertraline (ZOLOFT) 50 mg tablet, Take 1 tablet (50 mg total) by mouth daily, Disp: 90 tablet, Rfl: 0    traZODone (DESYREL) 100 mg tablet, TAKE 1 TABLET DAILY AT BEDTIME, Disp: 90 tablet, Rfl: 3    albuterol (PROVENTIL HFA,VENTOLIN HFA) 90 mcg/act inhaler, Inhale 2 puffs every 4 (four) hours as needed (Patient not taking: Reported on 12/3/2024), Disp: , Rfl:

## 2024-12-03 ENCOUNTER — OFFICE VISIT (OUTPATIENT)
Dept: VASCULAR SURGERY | Facility: CLINIC | Age: 74
End: 2024-12-03
Payer: MEDICARE

## 2024-12-03 VITALS
HEART RATE: 92 BPM | WEIGHT: 210 LBS | DIASTOLIC BLOOD PRESSURE: 70 MMHG | BODY MASS INDEX: 33.75 KG/M2 | HEIGHT: 66 IN | SYSTOLIC BLOOD PRESSURE: 118 MMHG

## 2024-12-03 DIAGNOSIS — I73.9 PERIPHERAL ARTERIAL DISEASE WITH HISTORY OF REVASCULARIZATION  (HCC): Primary | ICD-10-CM

## 2024-12-03 DIAGNOSIS — I77.4 CELIAC ARTERY STENOSIS (HCC): ICD-10-CM

## 2024-12-03 DIAGNOSIS — I15.9 SECONDARY HYPERTENSION: ICD-10-CM

## 2024-12-03 DIAGNOSIS — N18.32 STAGE 3B CHRONIC KIDNEY DISEASE (HCC): ICD-10-CM

## 2024-12-03 DIAGNOSIS — I70.402 ATHEROSCLEROSIS OF AUTOLOGOUS VEIN BYPASS GRAFT OF LEFT LOWER EXTREMITY, WITH UNSPECIFIED PRESENCE OF CLINICAL MANIFESTATION (HCC): ICD-10-CM

## 2024-12-03 DIAGNOSIS — Z98.890 PERIPHERAL ARTERIAL DISEASE WITH HISTORY OF REVASCULARIZATION  (HCC): Primary | ICD-10-CM

## 2024-12-03 DIAGNOSIS — Z89.422 ACQUIRED ABSENCE OF OTHER LEFT TOE(S) (HCC): ICD-10-CM

## 2024-12-03 DIAGNOSIS — E78.5 DYSLIPIDEMIA: ICD-10-CM

## 2024-12-03 PROCEDURE — 99213 OFFICE O/P EST LOW 20 MIN: CPT | Performed by: PHYSICIAN ASSISTANT

## 2024-12-03 NOTE — ASSESSMENT & PLAN NOTE
Lab Results   Component Value Date    EGFR 63 11/07/2024    EGFR 66 11/06/2024    EGFR 56 (L) 11/05/2024    CREATININE 1 11/07/2024    CREATININE 0.9 11/06/2024    CREATININE 1.1 (H) 11/05/2024

## 2024-12-06 ENCOUNTER — APPOINTMENT (OUTPATIENT)
Dept: LAB | Facility: CLINIC | Age: 74
End: 2024-12-06
Payer: MEDICARE

## 2024-12-06 DIAGNOSIS — N18.31 STAGE 3A CHRONIC KIDNEY DISEASE (HCC): ICD-10-CM

## 2024-12-06 LAB
25(OH)D3 SERPL-MCNC: 34 NG/ML (ref 30–100)
ALBUMIN SERPL BCG-MCNC: 4.4 G/DL (ref 3.5–5)
ALP SERPL-CCNC: 67 U/L (ref 34–104)
ALT SERPL W P-5'-P-CCNC: 13 U/L (ref 7–52)
ANION GAP SERPL CALCULATED.3IONS-SCNC: 7 MMOL/L (ref 4–13)
AST SERPL W P-5'-P-CCNC: 19 U/L (ref 13–39)
BACTERIA UR QL AUTO: ABNORMAL /HPF
BASOPHILS # BLD AUTO: 0.04 THOUSANDS/ΜL (ref 0–0.1)
BASOPHILS NFR BLD AUTO: 1 % (ref 0–1)
BILIRUB SERPL-MCNC: 0.89 MG/DL (ref 0.2–1)
BILIRUB UR QL STRIP: NEGATIVE
BILIRUB UR QL STRIP: NEGATIVE
BUN SERPL-MCNC: 19 MG/DL (ref 5–25)
CALCIUM SERPL-MCNC: 9.9 MG/DL (ref 8.4–10.2)
CHLORIDE SERPL-SCNC: 102 MMOL/L (ref 96–108)
CHOLEST SERPL-MCNC: 131 MG/DL (ref ?–200)
CLARITY UR: CLEAR
CLARITY UR: CLEAR
CO2 SERPL-SCNC: 28 MMOL/L (ref 21–32)
COLOR UR: ABNORMAL
COLOR UR: NORMAL
CREAT SERPL-MCNC: 1.04 MG/DL (ref 0.6–1.3)
EOSINOPHIL # BLD AUTO: 0.16 THOUSAND/ΜL (ref 0–0.61)
EOSINOPHIL NFR BLD AUTO: 5 % (ref 0–6)
ERYTHROCYTE [DISTWIDTH] IN BLOOD BY AUTOMATED COUNT: 13.5 % (ref 11.6–15.1)
GFR SERPL CREATININE-BSD FRML MDRD: 53 ML/MIN/1.73SQ M
GLUCOSE P FAST SERPL-MCNC: 85 MG/DL (ref 65–99)
GLUCOSE UR STRIP-MCNC: NEGATIVE MG/DL
GLUCOSE UR STRIP-MCNC: NEGATIVE MG/DL
HCT VFR BLD AUTO: 39 % (ref 34.8–46.1)
HDLC SERPL-MCNC: 46 MG/DL
HGB BLD-MCNC: 12.5 G/DL (ref 11.5–15.4)
HGB UR QL STRIP.AUTO: NEGATIVE
HGB UR QL STRIP.AUTO: NEGATIVE
IMM GRANULOCYTES # BLD AUTO: 0.01 THOUSAND/UL (ref 0–0.2)
IMM GRANULOCYTES NFR BLD AUTO: 0 % (ref 0–2)
KETONES UR STRIP-MCNC: NEGATIVE MG/DL
KETONES UR STRIP-MCNC: NEGATIVE MG/DL
LDLC SERPL CALC-MCNC: 60 MG/DL (ref 0–100)
LEUKOCYTE ESTERASE UR QL STRIP: NEGATIVE
LEUKOCYTE ESTERASE UR QL STRIP: NEGATIVE
LYMPHOCYTES # BLD AUTO: 1.22 THOUSANDS/ΜL (ref 0.6–4.47)
LYMPHOCYTES NFR BLD AUTO: 36 % (ref 14–44)
MCH RBC QN AUTO: 32.9 PG (ref 26.8–34.3)
MCHC RBC AUTO-ENTMCNC: 32.1 G/DL (ref 31.4–37.4)
MCV RBC AUTO: 103 FL (ref 82–98)
MONOCYTES # BLD AUTO: 0.24 THOUSAND/ΜL (ref 0.17–1.22)
MONOCYTES NFR BLD AUTO: 7 % (ref 4–12)
NEUTROPHILS # BLD AUTO: 1.7 THOUSANDS/ΜL (ref 1.85–7.62)
NEUTS SEG NFR BLD AUTO: 51 % (ref 43–75)
NITRITE UR QL STRIP: NEGATIVE
NITRITE UR QL STRIP: NEGATIVE
NON-SQ EPI CELLS URNS QL MICRO: ABNORMAL /HPF
NONHDLC SERPL-MCNC: 85 MG/DL
NRBC BLD AUTO-RTO: 0 /100 WBCS
PH UR STRIP.AUTO: 8 [PH]
PH UR STRIP.AUTO: 8 [PH]
PLATELET # BLD AUTO: 138 THOUSANDS/UL (ref 149–390)
PMV BLD AUTO: 10.3 FL (ref 8.9–12.7)
POTASSIUM SERPL-SCNC: 3.7 MMOL/L (ref 3.5–5.3)
PROT SERPL-MCNC: 7.1 G/DL (ref 6.4–8.4)
PROT UR STRIP-MCNC: NEGATIVE MG/DL
PROT UR STRIP-MCNC: NEGATIVE MG/DL
PTH-INTACT SERPL-MCNC: 41.5 PG/ML (ref 12–88)
RBC # BLD AUTO: 3.8 MILLION/UL (ref 3.81–5.12)
RBC #/AREA URNS AUTO: ABNORMAL /HPF
SODIUM SERPL-SCNC: 137 MMOL/L (ref 135–147)
SP GR UR STRIP.AUTO: 1.01 (ref 1–1.03)
SP GR UR STRIP.AUTO: 1.01 (ref 1–1.03)
TRIGL SERPL-MCNC: 127 MG/DL (ref ?–150)
TSH SERPL DL<=0.05 MIU/L-ACNC: 0.87 UIU/ML (ref 0.45–4.5)
UROBILINOGEN UR STRIP-ACNC: <2 MG/DL
UROBILINOGEN UR STRIP-ACNC: <2 MG/DL
WBC # BLD AUTO: 3.37 THOUSAND/UL (ref 4.31–10.16)
WBC #/AREA URNS AUTO: ABNORMAL /HPF

## 2024-12-06 PROCEDURE — 81001 URINALYSIS AUTO W/SCOPE: CPT

## 2024-12-06 PROCEDURE — 83970 ASSAY OF PARATHORMONE: CPT

## 2024-12-06 PROCEDURE — 81003 URINALYSIS AUTO W/O SCOPE: CPT

## 2024-12-07 ENCOUNTER — RESULTS FOLLOW-UP (OUTPATIENT)
Dept: FAMILY MEDICINE CLINIC | Facility: CLINIC | Age: 74
End: 2024-12-07

## 2024-12-07 LAB
EST. AVERAGE GLUCOSE BLD GHB EST-MCNC: 85 MG/DL
HBA1C MFR BLD: 4.6 %

## 2024-12-07 NOTE — RESULT ENCOUNTER NOTE
WBC a bit low will discuss at follow up   Check on pt and see if she has been feeling ok   If all ok we will discuss at follow up and repeat   Rest of the labs look ok

## 2024-12-09 ENCOUNTER — OFFICE VISIT (OUTPATIENT)
Dept: FAMILY MEDICINE CLINIC | Facility: CLINIC | Age: 74
End: 2024-12-09
Payer: MEDICARE

## 2024-12-09 VITALS
OXYGEN SATURATION: 96 % | BODY MASS INDEX: 33.75 KG/M2 | WEIGHT: 210 LBS | DIASTOLIC BLOOD PRESSURE: 72 MMHG | SYSTOLIC BLOOD PRESSURE: 110 MMHG | HEIGHT: 66 IN | TEMPERATURE: 98.7 F | HEART RATE: 85 BPM

## 2024-12-09 DIAGNOSIS — I10 PRIMARY HYPERTENSION: ICD-10-CM

## 2024-12-09 DIAGNOSIS — K74.60 LIVER CIRRHOSIS SECONDARY TO NASH (NONALCOHOLIC STEATOHEPATITIS) (HCC): ICD-10-CM

## 2024-12-09 DIAGNOSIS — S06.0X9S CONCUSSION WITH LOSS OF CONSCIOUSNESS, SEQUELA (HCC): ICD-10-CM

## 2024-12-09 DIAGNOSIS — E78.5 DYSLIPIDEMIA: ICD-10-CM

## 2024-12-09 DIAGNOSIS — N18.30 STAGE 3 CHRONIC KIDNEY DISEASE, UNSPECIFIED WHETHER STAGE 3A OR 3B CKD (HCC): ICD-10-CM

## 2024-12-09 DIAGNOSIS — E03.9 ACQUIRED HYPOTHYROIDISM: ICD-10-CM

## 2024-12-09 DIAGNOSIS — D50.8 OTHER IRON DEFICIENCY ANEMIA: ICD-10-CM

## 2024-12-09 DIAGNOSIS — D72.818 OTHER DECREASED WHITE BLOOD CELL (WBC) COUNT: Primary | ICD-10-CM

## 2024-12-09 DIAGNOSIS — K75.81 LIVER CIRRHOSIS SECONDARY TO NASH (NONALCOHOLIC STEATOHEPATITIS) (HCC): ICD-10-CM

## 2024-12-09 PROCEDURE — G2211 COMPLEX E/M VISIT ADD ON: HCPCS | Performed by: NURSE PRACTITIONER

## 2024-12-09 PROCEDURE — 99214 OFFICE O/P EST MOD 30 MIN: CPT | Performed by: NURSE PRACTITIONER

## 2024-12-09 NOTE — PROGRESS NOTES
Name: Saloni Miles      : 1950      MRN: [id number]  Encounter Provider: SLY Ledesma  Encounter Date: 2024   Encounter department: [facility]  :  Assessment & Plan  Other decreased white blood cell (WBC) count  Decreased white blood cells could be secondary to recent blood loss with scalp laceration post fall she had to get some blood transfusions in the ER.  She is feeling much better we will keep an eye on the white blood cells and repeated in about 4 to 6 weeks  Orders:    CBC and differential    Iron Panel (Includes Ferritin, Iron Sat%, Iron, and TIBC)    Vitamin B12/Folate, Serum Panel    Comprehensive metabolic panel    Other iron deficiency anemia  Anemia is much improved continue iron intake hydrate well increase green vegetables we will continue to monitor.   Orders:    CBC and differential    Iron Panel (Includes Ferritin, Iron Sat%, Iron, and TIBC)    Vitamin B12/Folate, Serum Panel    Comprehensive metabolic panel    Stage 3 chronic kidney disease, unspecified whether stage 3a or 3b CKD (HCC)  Lab Results   Component Value Date    EGFR 53 2024    EGFR 63 2024    EGFR 66 2024    CREATININE 1.04 2024    CREATININE 1 2024    CREATININE 0.9 2024   Kidney function improved we will keep an eye on it GFR 53 at the last blood work we will repeat it again in about 4 to 6 weeks she is to hydrate well in the meantime    Orders:    Comprehensive metabolic panel    Concussion with loss of consciousness, sequela (HCC)  Denies any headaches no blurred vision feels well the laceration on her scalp looks okay.        Acquired hypothyroidism  Stable        Liver cirrhosis secondary to JOSEPH (nonalcoholic steatohepatitis) (HCC)  Stable        Primary hypertension  Stable   HTN assessed for stability and discussed plan of care   Estimated Creatinine Clearance: 56.3 mL/min (by C-G formula based on SCr of 1.04 mg/dL).   Reviewed labs   No  changes   We will continue to monitor         Dyslipidemia  Hyperlipidemia diagnoses assessed and discussed   Reviewed medications and plan of care   Labs reviewed   12/06/2024   Discussed low fat low cholesterol diet   Discussed exercise and adequate hydration   Will continue to monitor every 4 months                Depression Screening and Follow-up Plan: Patient was screened for depression during today's encounter. They screened negative with a PHQ-9 score of 1.    Falls Plan of Care: balance, strength, and gait training instructions were provided. Recommended assistive device to help with gait and balance. Medications that increase falls were reviewed. Assessed feet and footwear. Vitamin D supplementation was recommended. Home safety education provided.       History of Present Illness     Pt is a 74 yr old female   Presents in office for follow   up labs and follow up head injury - feels well   Denies any headaches or dizziness   Will discuss labs   CKD stable   HTN stable   Anemia has improved   Denies any major issues today       Review of Systems   Constitutional:  Negative for fatigue and unexpected weight change.   HENT:  Negative for congestion and postnasal drip.    Eyes: Negative.    Respiratory:  Negative for cough and shortness of breath.    Musculoskeletal:  Negative for arthralgias and neck pain.   Skin:         She had suture repair to scalp post fall   Presents to be evaluated to suture area - possible stitch - still has residual scab in the are we will monitor denies any issues    Neurological:  Negative for dizziness, weakness and headaches.        Post head injury - stable  denies any issues    Hematological:         Anemia improved    Psychiatric/Behavioral:  Negative for sleep disturbance and suicidal ideas. The patient is not nervous/anxious.           Objective   /72 (BP Location: Left arm, Patient Position: Sitting, Cuff Size: Large)   Pulse 85   Temp 98.7 °F (37.1 °C)   Ht 5'  "5.5\" (1.664 m)   Wt 95.3 kg (210 lb)   SpO2 96%   BMI 34.41 kg/m²      Physical Exam  Vitals and nursing note reviewed.   Constitutional:       Appearance: Normal appearance.   Eyes:      Extraocular Movements: Extraocular movements intact.   Cardiovascular:      Rate and Rhythm: Normal rate and regular rhythm.      Pulses: Normal pulses.      Heart sounds: Normal heart sounds.   Pulmonary:      Effort: Pulmonary effort is normal.      Breath sounds: Normal breath sounds.   Abdominal:      Palpations: Abdomen is soft.   Musculoskeletal:      Right lower leg: No edema.      Left lower leg: No edema.   Skin:     General: Skin is warm.   Neurological:      Mental Status: She is alert and oriented to person, place, and time.   Psychiatric:         Mood and Affect: Mood normal.         Behavior: Behavior normal.      Contains abnormal data CBC and differential  Order: [id number]   Status: Final result       Next appt: 01/23/2025 at 02:30 PM in Gastroenterology (Kenzie Lau PA-C)       Dx: Stage 3b chronic kidney disease (HCC)...    Test Result Released: Yes (seen)       Messages: Seen    1 Result Note       1 Patient Communication       View Follow-Up Encounter            Component  Ref Range & Units (hover) 12/6/24  9:49 AM 4/22/24  2:13 PM 10/18/23  9:46 AM 6/2/23 10:54 AM 3/21/23  1:46 PM 12/13/22  1:59 PM 10/7/22  6:27 AM   WBC 3.37 Low  4.65 5.09 5.02 5.21 5.45 5.72   RBC 3.80 Low  4.02 4.12 3.95 4.11 4.02 2.53 Low    Hemoglobin 12.5 12.8 13.3 12.8 13.3 12.9 7.9 Low    Hematocrit 39.0 39.6 41.1 39.6 39.6 40.0 24.5 Low     High  99 High  100 High  100 High  96 100 High  97   MCH 32.9 31.8 32.3 32.4 32.4 32.1 31.2   MCHC 32.1 32.3 32.4 32.3 33.6 32.3 32.2   RDW 13.5 13.6 13.5 14.4 13.2 13.3 15.1   MPV 10.3 10.8 11.1 11.2 10.7 11.2 11.4   Platelets 138 Low  123 Low  167 158 152 174 102 Low    nRBC 0 0 0 0 0 0    Segmented % 51 61 59 68 63 65    Immature Grans % 0 0 0 0 0 0    Lymphocytes % 36 31 32 24 28 26 "    Monocytes % 7 5 6 5 6 6    Eosinophils Relative 5 3 2 2 2 2    Basophils Relative 1 0 1 1 1 1    Absolute Neutrophils 1.70 Low  2.81 3.01 3.41 3.33 3.61    Absolute Immature Grans 0.01 0.01 0.01 0.01 0.00 0.01    Absolute Lymphocytes 1.22 1.42 1.61 1.19 1.45 1.41    Absolute Monocytes 0.24 0.25 0.32 0.26 0.32 0.30    Eosinophils Absolute 0.16 0.14 0.11 0.11 0.08 0.09    Basophils Absolute 0.04 0.02 0.03 0.04 0.03 0.03              Specimen Collected: 12/06/24  9:49   Comprehensive metabolic panel  Order: [id number]   Status: Final result       Next appt: 01/23/2025 at 02:30 PM in Gastroenterology (Kenzie Lau PA-C)       Dx: Stage 3b chronic kidney disease (HCC)...    Test Result Released: Yes (seen)    0 Result Notes       View Follow-Up Encounter              Component  Ref Range & Units (hover) 12/6/24  9:49 AM 11/7/24  6:34 AM 11/6/24  7:09 AM 11/5/24  6:09 PM 4/22/24  2:13 PM 10/18/23  9:46 AM 6/2/23 10:54 AM   Sodium 137 135 R 133 Low  R 134 Low  R 139 141 138   Potassium 3.7 4 R 3.8 R 4.3 R 4.0 4.1 4.1   Chloride 102 102 R 101 R 102 R 105 104 109 High    CO2 28 26 R 24 R 21 Low  R 26 29 27   ANION GAP 7 7 R 8 R 11 R 8 8 R 2 Low    BUN 19 15 R 18 R 20 R 20 22 22   Creatinine 1.04 1 R 0.9 R 1.1 High  R 1.03 CM 1.01 CM 1.18 CM   Comment: Standardized to IDMS reference method   Glucose, Fasting 85    107 High  100 High     Calcium 9.9 8.5 8.4 8.9 9.2 10.9 High  9.5 R   AST 19    16 19 21 R, CM   ALT 13    12 CM 15 CM 24 R, CM   Comment: Specimen collection should occur prior to Sulfasalazine administration due to the potential for falsely depressed results.   Alkaline Phosphatase 67    56 59 65 R   Total Protein 7.1    6.5 7.0 7.2   Albumin 4.4    4.1 4.1 3.7   Total Bilirubin 0.89            TSH, 3rd generation with Free T4 reflex  Order: [id number]   Status: Final result       Next appt: 01/23/2025 at 02:30 PM in Gastroenterology (Kenzie Lau PA-C)       Dx: Stage 3b chronic kidney disease (HCC)...     Test Result Released: Yes (seen)    0 Result Notes       View Follow-Up Encounter            Component  Ref Range & Units (hover) 12/6/24  9:49 AM 10/18/23  9:46 AM 6/2/23 10:54 AM 3/21/23  1:46 PM 12/13/22  1:59 PM 9/9/22  3:45 PM 7/26/22  4:33 PM   TSH 3RD GENERATON 0.874 0.568 CM 0.469 CM           Contains abnormal data Lipid panel  Order: [id number]   Status: Final result       Next appt: 01/23/2025 at 02:30 PM in Gastroenterology (Kenzie Lau PA-C)       Dx: Stage 3b chronic kidney disease (HCC)...    Test Result Released: Yes (seen)       Messages: Seen    1 Result Note       1 Patient Communication       View Follow-Up Encounter       Component  Ref Range & Units (hover) 12/6/24  9:49 AM 3/7/24 10:11 AM   Cholesterol 131 134 CM   Comment: Cholesterol:        Pediatric <18 Years        Desirable          <170 mg/dL      Borderline High    170-199 mg/dL      High               >=200 mg/dL        Adult >=18 Years           Desirable         <200 mg/dL      Borderline High   200-239 mg/dL      High             >239 mg/dL   Triglycerides 127 124 CM   Comment: Triglyceride:     0-9Y            <75mg/dL     10Y-17Y         <90 mg/dL       >=18Y     Normal          <150 mg/dL     Borderline High 150-199 mg/dL     High            200-499 mg/dL       Very High       >499 mg/dL    Specimen collection should occur prior to Metamizole administration due to the potential for falsely depressed results.   HDL, Direct 46 Low  49 Low    LDL Calculated 60 60 CM   Comment: LDL Cholesterol:    Optimal           <100 mg/dl    Near Optimal      100-129 mg/dl    Above Optimal      Borderline High 130-159 mg/dl      High            160-189 mg/dl      Very High       >189 mg/dl         Hemoglobin A1C  Order: [id number]   Status: Final result       Next appt: 01/23/2025 at 02:30 PM in Gastroenterology (Kenzie Lau PA-C)       Dx: Stage 3b chronic kidney disease (HCC)...    Test Result Released: Yes (seen)    0 Result Notes        View Follow-Up Encounter         Component  Ref Range & Units (hover) 12/6/24  9:49 AM 10/18/23  9:46 AM 3/21/23  1:46 PM 9/9/22  3:45 PM   Hemoglobin A1C 4.6 5.2 5.0 R 5.2 R   EAG 85 103 97 103             Specimen Collected: 12/06/24  9:49 AM   Vitamin D 25 hydroxy  Order: [id number]   Status: Final result       Next appt: 01/23/2025 at 02:30 PM in Gastroenterology (Kenzie Lau PA-C)       Dx: Vitamin D deficiency    Test Result Released: Yes (seen)    0 Result Notes       View Follow-Up Encounter            Component  Ref Range & Units (hover) 12/6/24  9:49 AM 4/22/24  2:13 PM 10/18/23  9:46 AM 6/2/23 10:54 AM 3/21/23  1:46 PM 12/13/22  1:59 PM 9/26/22  3:01 PM   Vit D, 25-Hydroxy 34.0 36.6 CM 38.5 CM 38.2 CM         PTH, intact  Order: [id number]   Status: Final result       Next appt: 01/23/2025 at 02:30 PM in Gastroenterology (Kenzie Lau PA-C)       Dx: Stage 3a chronic kidney disease (HCC)    Test Result Released: Yes (seen)    0 Result Notes            Component  Ref Range & Units (hover) 12/6/24  9:49 AM 4/22/24  2:13 PM 10/18/23  9:46 AM 6/2/23 10:54 AM 3/21/23  1:46 PM 12/13/22  1:59 PM 9/26/22  3:01 PM   PTH 41.5 65.0 45.9 42.5 32.4 R 49.9 R 51.1 R             Specimen Collected: 12/06/24  9:49 AM

## 2024-12-11 PROBLEM — Z79.02 ANTIPLATELET OR ANTITHROMBOTIC LONG-TERM USE: Status: ACTIVE | Noted: 2024-11-05

## 2024-12-11 PROBLEM — S06.0XAA CONCUSSION: Status: ACTIVE | Noted: 2024-11-05

## 2024-12-11 NOTE — ASSESSMENT & PLAN NOTE
Stable   HTN assessed for stability and discussed plan of care   Estimated Creatinine Clearance: 56.3 mL/min (by C-G formula based on SCr of 1.04 mg/dL).   Reviewed labs   No changes   We will continue to monitor

## 2024-12-13 DIAGNOSIS — I73.9 PERIPHERAL ARTERIAL DISEASE WITH HISTORY OF REVASCULARIZATION  (HCC): ICD-10-CM

## 2024-12-13 DIAGNOSIS — Z98.890 PERIPHERAL ARTERIAL DISEASE WITH HISTORY OF REVASCULARIZATION  (HCC): ICD-10-CM

## 2024-12-15 NOTE — ASSESSMENT & PLAN NOTE
Hyperlipidemia diagnoses assessed and discussed   Reviewed medications and plan of care   Labs reviewed   12/06/2024   Discussed low fat low cholesterol diet   Discussed exercise and adequate hydration   Will continue to monitor every 4 months

## 2024-12-16 RX ORDER — CLOPIDOGREL BISULFATE 75 MG/1
75 TABLET ORAL DAILY
Qty: 90 TABLET | Refills: 1 | Status: SHIPPED | OUTPATIENT
Start: 2024-12-16

## 2024-12-27 DIAGNOSIS — J30.2 SEASONAL ALLERGIES: ICD-10-CM

## 2024-12-27 DIAGNOSIS — E03.9 ACQUIRED HYPOTHYROIDISM: ICD-10-CM

## 2024-12-27 DIAGNOSIS — F41.8 DEPRESSION WITH ANXIETY: ICD-10-CM

## 2024-12-27 RX ORDER — MONTELUKAST SODIUM 10 MG/1
10 TABLET ORAL DAILY PRN
Qty: 90 TABLET | Refills: 3 | Status: SHIPPED | OUTPATIENT
Start: 2024-12-27

## 2024-12-27 RX ORDER — LEVOTHYROXINE SODIUM 112 UG/1
112 TABLET ORAL
Qty: 90 TABLET | Refills: 3 | Status: SHIPPED | OUTPATIENT
Start: 2024-12-27

## 2025-01-24 DIAGNOSIS — Z98.890 PERIPHERAL ARTERIAL DISEASE WITH HISTORY OF REVASCULARIZATION  (HCC): ICD-10-CM

## 2025-01-24 DIAGNOSIS — G47.09 OTHER INSOMNIA: ICD-10-CM

## 2025-01-24 DIAGNOSIS — I73.9 PERIPHERAL ARTERIAL DISEASE WITH HISTORY OF REVASCULARIZATION  (HCC): ICD-10-CM

## 2025-01-24 RX ORDER — ATORVASTATIN CALCIUM 40 MG/1
40 TABLET, FILM COATED ORAL DAILY
Qty: 90 TABLET | Refills: 1 | Status: SHIPPED | OUTPATIENT
Start: 2025-01-24

## 2025-01-24 RX ORDER — TRAZODONE HYDROCHLORIDE 100 MG/1
100 TABLET ORAL
Qty: 90 TABLET | Refills: 1 | Status: SHIPPED | OUTPATIENT
Start: 2025-01-24

## 2025-02-27 DIAGNOSIS — J30.5 ALLERGIC RHINITIS DUE TO FOOD: Primary | ICD-10-CM

## 2025-02-27 DIAGNOSIS — J30.1 SEASONAL ALLERGIC RHINITIS DUE TO POLLEN: ICD-10-CM

## 2025-02-27 NOTE — TELEPHONE ENCOUNTER
Medication: Fluticasone 50 mcg/act nasal spray    Dose/Frequency: 2 sprays each nostril daily as needed    Quantity:     [facility]    Office:   [] PCP/Provider -   [x] Speciality/Provider -  Historical provider    Does the patient have enough for 3 days?   [x] Yes   [] No - Send as HP to POD

## 2025-02-28 RX ORDER — FLUTICASONE PROPIONATE 50 MCG
2 SPRAY, SUSPENSION (ML) NASAL DAILY PRN
Qty: 18 ML | Refills: 1 | Status: SHIPPED | OUTPATIENT
Start: 2025-02-28 | End: 2025-03-30

## 2025-03-06 ENCOUNTER — OFFICE VISIT (OUTPATIENT)
Dept: GASTROENTEROLOGY | Facility: CLINIC | Age: 75
End: 2025-03-06
Payer: MEDICARE

## 2025-03-06 VITALS
DIASTOLIC BLOOD PRESSURE: 77 MMHG | BODY MASS INDEX: 35.65 KG/M2 | HEART RATE: 85 BPM | OXYGEN SATURATION: 97 % | WEIGHT: 214 LBS | HEIGHT: 65 IN | TEMPERATURE: 97.7 F | SYSTOLIC BLOOD PRESSURE: 116 MMHG

## 2025-03-06 DIAGNOSIS — K75.81 LIVER CIRRHOSIS SECONDARY TO NASH (NONALCOHOLIC STEATOHEPATITIS) (HCC): Primary | ICD-10-CM

## 2025-03-06 DIAGNOSIS — K74.60 LIVER CIRRHOSIS SECONDARY TO NASH (NONALCOHOLIC STEATOHEPATITIS) (HCC): Primary | ICD-10-CM

## 2025-03-06 PROCEDURE — 99213 OFFICE O/P EST LOW 20 MIN: CPT | Performed by: PHYSICIAN ASSISTANT

## 2025-03-06 NOTE — PROGRESS NOTES
Name: Saloni Miles      : 1950      MRN: [id number]  Encounter Provider: Kenzie Lau PA-C  Encounter Date: 3/6/2025   Encounter department: [facility] [place]  :  Assessment & Plan  Liver cirrhosis secondary to JOSEPH (nonalcoholic steatohepatitis) (HCC)  - She was previously diagnosed with cirrhosis based on imaging in [place] but had an elastography in  showing absent or mild fibrosis without cirrhosis but S2 steatosis and otherwise has no recent imaging showing concerning for portal hypertension, no history of varices, and has intermittent thrombocytopenia  - Previously MELD score was 6  - We will repeat her elastography to see if we can produce consistent results  - We will hold off on any routine cirrhosis care such as the EGD given she may not have cirrhosis; will await repeat elastography results prior to further testing  Orders:    US elastography/UGAP; Future        History of Present Illness   HPI  Saloni Miles is a 75 y.o. female who presents for routine follow-up of cirrhosis.  She was last seen in .  She really has no complaints or concerns today.  She is feeling great without any problems such as abdominal pain, nausea, vomiting, reflux, dysphagia, or irregular bowel habits.  There is no black or bloody bowel movements.  Review of her chart showed that she had an elastography done with her PCP last year which showed mild or absent fibrosis but did show steatosis present.  She did come to us initially from [place] with the diagnosis of cirrhosis.  She has never had a liver biopsy in the past.  She has not had varices in the past.  She has had some thrombocytopenia that has been inconsistent over the years.  Recent imaging has not shown any obvious cirrhosis on CT scan, she reports that this is initially how she was diagnosed with the cirrhosis while in [place].  History obtained from: patient    Review of Systems  Medical History Reviewed by provider this  encounter:  Tobacco  Allergies  Meds  Problems  Med Hx  Surg Hx  Fam Hx     .  Current Outpatient Medications on File Prior to Visit   Medication Sig Dispense Refill    Advair -21 MCG/ACT inhaler USE 2 INHALATIONS TWICE A DAY (RINSE MOUTH AFTER USE) 36 g 3    albuterol (PROVENTIL HFA,VENTOLIN HFA) 90 mcg/act inhaler Inhale 2 puffs every 4 (four) hours as needed      ALPRAZolam (XANAX) 0.25 mg tablet Take 1 tablet (0.25 mg total) by mouth 3 (three) times a day as needed for anxiety 1/2-1 as needed tid 30 tablet 0    ammonium lactate (LAC-HYDRIN) 12 % cream APPLY TOPICALLY AS NEEDED FOR DRY SKIN. 385 g 1    aspirin (ECOTRIN LOW STRENGTH) 81 mg EC tablet Take 81 mg by mouth in the morning      atorvastatin (LIPITOR) 40 mg tablet TAKE 1 TABLET DAILY 90 tablet 1    Biotin 90482 MCG TABS       clopidogrel (PLAVIX) 75 mg tablet TAKE 1 TABLET DAILY 90 tablet 1    fluticasone (FLONASE) 50 mcg/act nasal spray 2 sprays into each nostril daily as needed for rhinitis 18 mL 1    levothyroxine 112 mcg tablet TAKE 1 TABLET DAILY EARLY IN THE MORNING 90 tablet 3    losartan-hydrochlorothiazide (HYZAAR) 100-25 MG per tablet TAKE 1 TABLET DAILY 90 tablet 3    montelukast (SINGULAIR) 10 mg tablet TAKE 1 TABLET DAILY AS NEEDED FOR ALLERGIES 90 tablet 3    Multiple Vitamin (multivitamin) tablet Take 1 tablet by mouth daily 30 tablet 0    polyethylene glycol (MIRALAX) 17 g packet Take 17 g by mouth daily      sertraline (ZOLOFT) 50 mg tablet TAKE 1 TABLET BY MOUTH EVERY DAY 90 tablet 0    traZODone (DESYREL) 100 mg tablet TAKE 1 TABLET DAILY AT BEDTIME 90 tablet 1    [DISCONTINUED] mupirocin (BACTROBAN) 2 % ointment Apply topically 3 (three) times a day for 10 days (Patient not taking: Reported on 3/6/2025) 30 g 0     No current facility-administered medications on file prior to visit.         Objective   /77 (BP Location: Right arm, Patient Position: Sitting, Cuff Size: Standard)   Pulse 85   Temp 97.7 °F (36.5 °C)  "(Tympanic)   Ht 5' 5\" (1.651 m)   Wt 97.1 kg (214 lb)   SpO2 97%   BMI 35.61 kg/m²      Physical Exam  General- Appears well, answers questions appropriately    "

## 2025-03-06 NOTE — ASSESSMENT & PLAN NOTE
- She was previously diagnosed with cirrhosis based on imaging in [place] but had an elastography in 2024 showing absent or mild fibrosis without cirrhosis but S2 steatosis and otherwise has no recent imaging showing concerning for portal hypertension, no history of varices, and has intermittent thrombocytopenia  - Previously MELD score was 6  - We will repeat her elastography to see if we can produce consistent results  - We will hold off on any routine cirrhosis care such as the EGD given she may not have cirrhosis; will await repeat elastography results prior to further testing  Orders:    US elastography/UGAP; Future

## 2025-03-10 ENCOUNTER — RA CDI HCC (OUTPATIENT)
Dept: OTHER | Facility: HOSPITAL | Age: 75
End: 2025-03-10

## 2025-03-10 ENCOUNTER — RESULTS FOLLOW-UP (OUTPATIENT)
Dept: FAMILY MEDICINE CLINIC | Facility: CLINIC | Age: 75
End: 2025-03-10

## 2025-03-10 ENCOUNTER — APPOINTMENT (OUTPATIENT)
Dept: LAB | Facility: CLINIC | Age: 75
End: 2025-03-10
Payer: MEDICARE

## 2025-03-10 DIAGNOSIS — D50.8 OTHER IRON DEFICIENCY ANEMIA: Primary | ICD-10-CM

## 2025-03-10 DIAGNOSIS — D69.6 LOW PLATELET COUNT (HCC): Primary | ICD-10-CM

## 2025-03-10 LAB
ALBUMIN SERPL BCG-MCNC: 4.2 G/DL (ref 3.5–5)
ALP SERPL-CCNC: 61 U/L (ref 34–104)
ALT SERPL W P-5'-P-CCNC: 12 U/L (ref 7–52)
ANION GAP SERPL CALCULATED.3IONS-SCNC: 8 MMOL/L (ref 4–13)
AST SERPL W P-5'-P-CCNC: 20 U/L (ref 13–39)
BASOPHILS # BLD AUTO: 0.05 THOUSANDS/ÂΜL (ref 0–0.1)
BASOPHILS NFR BLD AUTO: 1 % (ref 0–1)
BILIRUB SERPL-MCNC: 0.74 MG/DL (ref 0.2–1)
BUN SERPL-MCNC: 25 MG/DL (ref 5–25)
CALCIUM SERPL-MCNC: 9.9 MG/DL (ref 8.4–10.2)
CHLORIDE SERPL-SCNC: 102 MMOL/L (ref 96–108)
CO2 SERPL-SCNC: 26 MMOL/L (ref 21–32)
CREAT SERPL-MCNC: 1.12 MG/DL (ref 0.6–1.3)
EOSINOPHIL # BLD AUTO: 0.14 THOUSAND/ÂΜL (ref 0–0.61)
EOSINOPHIL NFR BLD AUTO: 2 % (ref 0–6)
ERYTHROCYTE [DISTWIDTH] IN BLOOD BY AUTOMATED COUNT: 14.3 % (ref 11.6–15.1)
FERRITIN SERPL-MCNC: 51 NG/ML (ref 11–307)
FOLATE SERPL-MCNC: >22.3 NG/ML
GFR SERPL CREATININE-BSD FRML MDRD: 48 ML/MIN/1.73SQ M
GLUCOSE P FAST SERPL-MCNC: 104 MG/DL (ref 65–99)
HCT VFR BLD AUTO: 41.2 % (ref 34.8–46.1)
HGB BLD-MCNC: 13.7 G/DL (ref 11.5–15.4)
IMM GRANULOCYTES # BLD AUTO: 0.01 THOUSAND/UL (ref 0–0.2)
IMM GRANULOCYTES NFR BLD AUTO: 0 % (ref 0–2)
IRON SATN MFR SERPL: 28 % (ref 15–50)
IRON SERPL-MCNC: 101 UG/DL (ref 50–212)
LYMPHOCYTES # BLD AUTO: 1.7 THOUSANDS/ÂΜL (ref 0.6–4.47)
LYMPHOCYTES NFR BLD AUTO: 30 % (ref 14–44)
MCH RBC QN AUTO: 31.6 PG (ref 26.8–34.3)
MCHC RBC AUTO-ENTMCNC: 33.3 G/DL (ref 31.4–37.4)
MCV RBC AUTO: 95 FL (ref 82–98)
MONOCYTES # BLD AUTO: 0.35 THOUSAND/ÂΜL (ref 0.17–1.22)
MONOCYTES NFR BLD AUTO: 6 % (ref 4–12)
NEUTROPHILS # BLD AUTO: 3.5 THOUSANDS/ÂΜL (ref 1.85–7.62)
NEUTS SEG NFR BLD AUTO: 61 % (ref 43–75)
NRBC BLD AUTO-RTO: 0 /100 WBCS
PLATELET # BLD AUTO: 140 THOUSANDS/UL (ref 149–390)
PMV BLD AUTO: 10.5 FL (ref 8.9–12.7)
POTASSIUM SERPL-SCNC: 4.1 MMOL/L (ref 3.5–5.3)
PROT SERPL-MCNC: 7 G/DL (ref 6.4–8.4)
RBC # BLD AUTO: 4.34 MILLION/UL (ref 3.81–5.12)
SODIUM SERPL-SCNC: 136 MMOL/L (ref 135–147)
TIBC SERPL-MCNC: 361.2 UG/DL (ref 250–450)
TRANSFERRIN SERPL-MCNC: 258 MG/DL (ref 203–362)
UIBC SERPL-MCNC: 260 UG/DL (ref 155–355)
VIT B12 SERPL-MCNC: 996 PG/ML (ref 180–914)
WBC # BLD AUTO: 5.75 THOUSAND/UL (ref 4.31–10.16)

## 2025-03-10 PROCEDURE — 82746 ASSAY OF FOLIC ACID SERUM: CPT

## 2025-03-10 PROCEDURE — 82607 VITAMIN B-12: CPT

## 2025-03-11 ENCOUNTER — E-CONSULT (OUTPATIENT)
Dept: HEMATOLOGY ONCOLOGY | Facility: CLINIC | Age: 75
End: 2025-03-11
Payer: MEDICARE

## 2025-03-11 DIAGNOSIS — D69.6 THROMBOCYTOPENIA, UNSPECIFIED (HCC): Primary | ICD-10-CM

## 2025-03-11 PROCEDURE — 99446 NTRPROF PH1/NTRNET/EHR 5-10: CPT | Performed by: PHYSICIAN ASSISTANT

## 2025-03-11 NOTE — PROGRESS NOTES
E-Consult  Saloni Miles 75 y.o. female MRN: [id number]  Encounter Date: 03/11/25      Reason for Consult / Principal Problem: thrombocytopenia     Consulting Provider: Karlee Will PA-C    Requesting Provider: Britt Santoyo CRNP       ASSESSMENT:      RECOMMENDATIONS:  Thrombocytopenia is very mild. No anemia/leukopenia.   No concerns at this time.   Continue routine monitoring of CBC-D.     Total time spent 5-10 minutes, >50% of the total time devoted to medical consultative verbal/EMR discussion between providers. Written report will be generated in the EMR. .

## 2025-03-11 NOTE — RESULT ENCOUNTER NOTE
Will do a virtual consult to hematology to see what they say about the low platelet count - they are improving but just to make sure we are not missing anything

## 2025-03-11 NOTE — RESULT ENCOUNTER NOTE
Will discuss at follow up b12 is pretty high it could be do to supplements   If on any supplements please take every other day

## 2025-03-12 ENCOUNTER — TELEPHONE (OUTPATIENT)
Dept: CARDIOLOGY CLINIC | Facility: CLINIC | Age: 75
End: 2025-03-12

## 2025-03-12 NOTE — TELEPHONE ENCOUNTER
----- Message from Julia RICHARDS sent at 3/12/2025 12:56 PM EDT -----  Hi Dr. Nugent,    As previously announced, [facility]'s Structural Heart and Valve team, has embarked upon a new paradigm in patient identification, using [employer]. Our records indicate that you have evaluated the attached patient for moderate to severe aortic stenosis in the past. Based on AHA guidelines, they are now due or overdue for repeat imaging.     We would appreciate it if you could order an echocardiogram. Once ordered, our office will make arrangements to schedule the study.     Your collaboration is greatly appreciated.      Structural Heart and Valve Program

## 2025-03-14 ENCOUNTER — OFFICE VISIT (OUTPATIENT)
Dept: FAMILY MEDICINE CLINIC | Facility: CLINIC | Age: 75
End: 2025-03-14
Payer: MEDICARE

## 2025-03-14 VITALS
SYSTOLIC BLOOD PRESSURE: 120 MMHG | TEMPERATURE: 98.4 F | HEIGHT: 65 IN | WEIGHT: 214 LBS | BODY MASS INDEX: 35.65 KG/M2 | OXYGEN SATURATION: 96 % | HEART RATE: 94 BPM | DIASTOLIC BLOOD PRESSURE: 80 MMHG

## 2025-03-14 DIAGNOSIS — G47.09 OTHER INSOMNIA: ICD-10-CM

## 2025-03-14 DIAGNOSIS — I73.9 PERIPHERAL ARTERIAL DISEASE WITH HISTORY OF REVASCULARIZATION  (HCC): ICD-10-CM

## 2025-03-14 DIAGNOSIS — I10 PRIMARY HYPERTENSION: ICD-10-CM

## 2025-03-14 DIAGNOSIS — I74.09 OTHER ARTERIAL EMBOLISM AND THROMBOSIS OF ABDOMINAL AORTA (HCC): ICD-10-CM

## 2025-03-14 DIAGNOSIS — J30.5 ALLERGIC RHINITIS DUE TO FOOD: ICD-10-CM

## 2025-03-14 DIAGNOSIS — E03.9 ACQUIRED HYPOTHYROIDISM: ICD-10-CM

## 2025-03-14 DIAGNOSIS — N18.32 STAGE 3B CHRONIC KIDNEY DISEASE (HCC): ICD-10-CM

## 2025-03-14 DIAGNOSIS — F41.8 DEPRESSION WITH ANXIETY: ICD-10-CM

## 2025-03-14 DIAGNOSIS — J30.2 SEASONAL ALLERGIES: ICD-10-CM

## 2025-03-14 DIAGNOSIS — J30.1 SEASONAL ALLERGIC RHINITIS DUE TO POLLEN: ICD-10-CM

## 2025-03-14 DIAGNOSIS — J45.20 MILD INTERMITTENT ASTHMA WITHOUT COMPLICATION: ICD-10-CM

## 2025-03-14 DIAGNOSIS — K59.09 OTHER CONSTIPATION: Primary | ICD-10-CM

## 2025-03-14 DIAGNOSIS — Z98.890 PERIPHERAL ARTERIAL DISEASE WITH HISTORY OF REVASCULARIZATION  (HCC): ICD-10-CM

## 2025-03-14 PROCEDURE — G2211 COMPLEX E/M VISIT ADD ON: HCPCS | Performed by: NURSE PRACTITIONER

## 2025-03-14 PROCEDURE — 99214 OFFICE O/P EST MOD 30 MIN: CPT | Performed by: NURSE PRACTITIONER

## 2025-03-14 RX ORDER — ALBUTEROL SULFATE 90 UG/1
2 INHALANT RESPIRATORY (INHALATION) EVERY 4 HOURS PRN
Qty: 18 G | Refills: 0 | Status: SHIPPED | OUTPATIENT
Start: 2025-03-14 | End: 2025-03-19 | Stop reason: SDUPTHER

## 2025-03-14 RX ORDER — CALCIUM CARBONATE 300MG(750)
TABLET,CHEWABLE ORAL
COMMUNITY

## 2025-03-14 RX ORDER — METHYLPREDNISOLONE 4 MG/1
TABLET ORAL
Qty: 21 EACH | Refills: 0 | Status: SHIPPED | OUTPATIENT
Start: 2025-03-14

## 2025-03-14 RX ORDER — ASPIRIN 81 MG/1
81 TABLET ORAL DAILY
Qty: 90 TABLET | Refills: 0 | Status: SHIPPED | OUTPATIENT
Start: 2025-03-14 | End: 2025-06-12

## 2025-03-14 RX ORDER — LEVOTHYROXINE SODIUM 112 UG/1
112 TABLET ORAL
Qty: 90 TABLET | Refills: 0 | Status: SHIPPED | OUTPATIENT
Start: 2025-03-14 | End: 2025-06-12

## 2025-03-14 RX ORDER — POLYETHYLENE GLYCOL 3350 17 G/17G
17 POWDER, FOR SOLUTION ORAL DAILY
Qty: 30 EACH | Refills: 0 | Status: SHIPPED | OUTPATIENT
Start: 2025-03-14 | End: 2025-03-28

## 2025-03-14 RX ORDER — ALPRAZOLAM 0.25 MG
0.25 TABLET ORAL 3 TIMES DAILY PRN
Qty: 30 TABLET | Refills: 0 | Status: SHIPPED | OUTPATIENT
Start: 2025-03-14

## 2025-03-14 RX ORDER — CLOPIDOGREL BISULFATE 75 MG/1
75 TABLET ORAL DAILY
Qty: 90 TABLET | Refills: 1 | Status: SHIPPED | OUTPATIENT
Start: 2025-03-14

## 2025-03-14 RX ORDER — MONTELUKAST SODIUM 10 MG/1
10 TABLET ORAL DAILY PRN
Qty: 90 TABLET | Refills: 3 | Status: SHIPPED | OUTPATIENT
Start: 2025-03-14 | End: 2025-06-12

## 2025-03-14 RX ORDER — FLUTICASONE PROPIONATE 50 MCG
2 SPRAY, SUSPENSION (ML) NASAL DAILY PRN
Qty: 18 ML | Refills: 1 | Status: SHIPPED | OUTPATIENT
Start: 2025-03-14 | End: 2025-04-13

## 2025-03-14 RX ORDER — ATORVASTATIN CALCIUM 40 MG/1
40 TABLET, FILM COATED ORAL DAILY
Qty: 90 TABLET | Refills: 1 | Status: SHIPPED | OUTPATIENT
Start: 2025-03-14

## 2025-03-14 RX ORDER — LOSARTAN POTASSIUM AND HYDROCHLOROTHIAZIDE 25; 100 MG/1; MG/1
1 TABLET ORAL DAILY
Qty: 90 TABLET | Refills: 3 | Status: SHIPPED | OUTPATIENT
Start: 2025-03-14

## 2025-03-14 RX ORDER — AMOXICILLIN 875 MG/1
875 TABLET, COATED ORAL 2 TIMES DAILY
Qty: 10 TABLET | Refills: 0 | Status: SHIPPED | OUTPATIENT
Start: 2025-03-14 | End: 2025-03-19

## 2025-03-14 RX ORDER — FLUTICASONE PROPIONATE AND SALMETEROL XINAFOATE 230; 21 UG/1; UG/1
2 AEROSOL, METERED RESPIRATORY (INHALATION) 2 TIMES DAILY
Qty: 36 G | Refills: 3 | Status: SHIPPED | OUTPATIENT
Start: 2025-03-14 | End: 2025-03-19

## 2025-03-14 RX ORDER — ONDANSETRON 4 MG/1
4 TABLET, ORALLY DISINTEGRATING ORAL EVERY 8 HOURS PRN
Qty: 9 TABLET | Refills: 0 | Status: SHIPPED | OUTPATIENT
Start: 2025-03-14 | End: 2025-03-17

## 2025-03-14 RX ORDER — TRAZODONE HYDROCHLORIDE 100 MG/1
100 TABLET ORAL
Qty: 90 TABLET | Refills: 0 | Status: SHIPPED | OUTPATIENT
Start: 2025-03-14 | End: 2025-06-12

## 2025-03-14 NOTE — PROGRESS NOTES
Name: Saloni Miles      : 1950      MRN: [id number]  Encounter Provider: SLY Ledesma  Encounter Date: 3/14/2025   Encounter department: [facility]  :  Assessment & Plan  Acquired hypothyroidism  Stable   Orders:    levothyroxine 112 mcg tablet; Take 1 tablet (112 mcg total) by mouth daily in the early morning    Allergic rhinitis due to food  Discussed symptoms management Tylenol and Motrin as needed for headaches or fevers   Needed nasal saline lavages for nasal congestion  Discussed over-the-counter modalities  Discussed watchful waiting and if not better within 3 to 4 days they are supposed to call me   If any excessive shortness of breath difficulty breathing please go to the ER   Watchful waiting for now and amoxicillin if not getting better as she is traveling soon   Orders:    fluticasone (FLONASE) 50 mcg/act nasal spray; 2 sprays into each nostril daily as needed for rhinitis    amoxicillin (AMOXIL) 875 mg tablet; Take 1 tablet (875 mg total) by mouth 2 (two) times a day for 5 days Antibiotic if needed in travel    Seasonal allergic rhinitis due to pollen    Orders:    fluticasone (FLONASE) 50 mcg/act nasal spray; 2 sprays into each nostril daily as needed for rhinitis    Depression with anxiety  Stable on the zoloft   DEPRESSION/anxiety  assessed and stable   Reviewed medications and plan of care   Denies any suicidal or homicidal ideations   Continue follow up every 4 months   Reportable s/s and discussed      Orders:    sertraline (ZOLOFT) 50 mg tablet; Take 1 tablet (50 mg total) by mouth daily    ALPRAZolam (XANAX) 0.25 mg tablet; Take 1 tablet (0.25 mg total) by mouth 3 (three) times a day as needed for anxiety 1/2-1 as needed tid    Mild intermittent asthma without complication    Orders:    fluticasone-salmeterol (Advair HFA) 230-21 MCG/ACT inhaler; Inhale 2 puffs 2 (two) times a day Rinse mouth after use    albuterol (PROVENTIL HFA,VENTOLIN HFA) 90  mcg/act inhaler; Inhale 2 puffs every 4 (four) hours as needed for wheezing    methylPREDNISolone 4 MG tablet therapy pack; Use as directed on package In case of asthma flair    Other insomnia  Discussed safety with sleep medications   Discussed sleep hygiene   Orders:    traZODone (DESYREL) 100 mg tablet; Take 1 tablet (100 mg total) by mouth daily at bedtime    Peripheral arterial disease with history of revascularization  (HCC)    Orders:    atorvastatin (LIPITOR) 40 mg tablet; Take 1 tablet (40 mg total) by mouth daily    clopidogrel (PLAVIX) 75 mg tablet; Take 1 tablet (75 mg total) by mouth daily    aspirin (ECOTRIN LOW STRENGTH) 81 mg EC tablet; Take 1 tablet (81 mg total) by mouth daily    Primary hypertension  HTN assessed for stability and discussed plan of care   CrCl cannot be calculated (Patient's most recent lab result is older than the maximum 7 days allowed.).   Reviewed labs   Low na diet   No changes   We will continue to monitor    Orders:    losartan-hydrochlorothiazide (HYZAAR) 100-25 MG per tablet; Take 1 tablet by mouth daily    Seasonal allergies    Orders:    montelukast (SINGULAIR) 10 mg tablet; Take 1 tablet (10 mg total) by mouth daily as needed (cough)    Other constipation    Orders:    polyethylene glycol (MIRALAX) 17 g packet; Take 17 g by mouth daily for 14 days    ondansetron (ZOFRAN-ODT) 4 mg disintegrating tablet; Take 1 tablet (4 mg total) by mouth every 8 (eight) hours as needed for vomiting for up to 3 days As needed in travel    Stage 3b chronic kidney disease (HCC)  Lab Results   Component Value Date    EGFR 48 03/10/2025    EGFR 53 12/06/2024    EGFR 63 11/07/2024    CREATININE 1.12 03/10/2025    CREATININE 1.04 12/06/2024    CREATININE 1 11/07/2024   Stable                Depression Screening and Follow-up Plan: Patient was screened for depression during today's encounter. They screened negative with a PHQ-9 score of 1.      Falls Plan of Care: balance, strength, and gait  "training instructions were provided. Recommended assistive device to help with gait and balance. Medications that increase falls were reviewed. Assessed feet and footwear. Vitamin D supplementation was recommended. Home safety education provided.       History of Present Illness   Pt is a 75 yr old female   Presents in office for follow up on multiple diagnoses   Hypothyroidism -stable   Asthma stable   Depression anxiety stable   Constipation continues to be an issue   PVD - sees Vascular for monitoring             Review of Systems   Constitutional:  Negative for fatigue and unexpected weight change.   HENT:  Negative for congestion and postnasal drip.    Eyes: Negative.    Respiratory:  Negative for cough and shortness of breath.    Cardiovascular:  Negative for chest pain and palpitations.   Gastrointestinal:  Positive for constipation. Negative for abdominal distention, abdominal pain and vomiting.   Endocrine: Negative.    Genitourinary:  Negative for difficulty urinating and flank pain.   Musculoskeletal:  Negative for arthralgias and neck pain.   Skin:  Negative for rash.   Neurological:  Negative for dizziness, weakness and headaches.        Post head injury - stable  denies any issues    Hematological:         Anemia improved    Psychiatric/Behavioral:  Negative for sleep disturbance and suicidal ideas. The patient is not nervous/anxious.        Objective   /80 (BP Location: Left arm, Patient Position: Sitting, Cuff Size: Large)   Pulse 94   Temp 98.4 °F (36.9 °C)   Ht 5' 5\" (1.651 m)   Wt 97.1 kg (214 lb)   SpO2 96%   BMI 35.61 kg/m²      Physical Exam  Vitals and nursing note reviewed.   Constitutional:       Appearance: Normal appearance.   HENT:      Head: Atraumatic.   Eyes:      Extraocular Movements: Extraocular movements intact.   Cardiovascular:      Rate and Rhythm: Normal rate and regular rhythm.      Pulses: Normal pulses.      Heart sounds: Normal heart sounds.   Pulmonary:      " Effort: Pulmonary effort is normal.      Breath sounds: Normal breath sounds.   Abdominal:      Palpations: Abdomen is soft.   Musculoskeletal:      Cervical back: Normal range of motion.      Right lower leg: No edema.      Left lower leg: No edema.   Skin:     General: Skin is warm.   Neurological:      General: No focal deficit present.      Mental Status: She is alert and oriented to person, place, and time.   Psychiatric:         Mood and Affect: Mood normal.         Behavior: Behavior normal.       Administrative Statements   I have spent a total time of 45  minutes in caring for this patient on the day of the visit/encounter including Diagnostic results, Prognosis, Risks and benefits of tx options, Instructions for management, Patient and family education, Importance of tx compliance, Risk factor reductions, Impressions, Counseling / Coordination of care, Documenting in the medical record, Reviewing/placing orders in the medical record (including tests, medications, and/or procedures), Obtaining or reviewing history  , and Communicating with other healthcare professionals .

## 2025-03-14 NOTE — ASSESSMENT & PLAN NOTE
Stable on the zoloft   DEPRESSION/anxiety  assessed and stable   Reviewed medications and plan of care   Denies any suicidal or homicidal ideations   Continue follow up every 4 months   Reportable s/s and discussed      Orders:    sertraline (ZOLOFT) 50 mg tablet; Take 1 tablet (50 mg total) by mouth daily    ALPRAZolam (XANAX) 0.25 mg tablet; Take 1 tablet (0.25 mg total) by mouth 3 (three) times a day as needed for anxiety 1/2-1 as needed tid

## 2025-03-14 NOTE — ASSESSMENT & PLAN NOTE
Orders:    fluticasone-salmeterol (Advair HFA) 230-21 MCG/ACT inhaler; Inhale 2 puffs 2 (two) times a day Rinse mouth after use    albuterol (PROVENTIL HFA,VENTOLIN HFA) 90 mcg/act inhaler; Inhale 2 puffs every 4 (four) hours as needed for wheezing    methylPREDNISolone 4 MG tablet therapy pack; Use as directed on package In case of asthma flair

## 2025-03-14 NOTE — ASSESSMENT & PLAN NOTE
Stable   Orders:    levothyroxine 112 mcg tablet; Take 1 tablet (112 mcg total) by mouth daily in the early morning

## 2025-03-14 NOTE — ASSESSMENT & PLAN NOTE
Orders:    atorvastatin (LIPITOR) 40 mg tablet; Take 1 tablet (40 mg total) by mouth daily    clopidogrel (PLAVIX) 75 mg tablet; Take 1 tablet (75 mg total) by mouth daily    aspirin (ECOTRIN LOW STRENGTH) 81 mg EC tablet; Take 1 tablet (81 mg total) by mouth daily

## 2025-03-14 NOTE — ASSESSMENT & PLAN NOTE
Discussed safety with sleep medications   Discussed sleep hygiene   Orders:    traZODone (DESYREL) 100 mg tablet; Take 1 tablet (100 mg total) by mouth daily at bedtime

## 2025-03-14 NOTE — ASSESSMENT & PLAN NOTE
HTN assessed for stability and discussed plan of care   CrCl cannot be calculated (Patient's most recent lab result is older than the maximum 7 days allowed.).   Reviewed labs   Low na diet   No changes   We will continue to monitor    Orders:    losartan-hydrochlorothiazide (HYZAAR) 100-25 MG per tablet; Take 1 tablet by mouth daily

## 2025-03-14 NOTE — ASSESSMENT & PLAN NOTE
Lab Results   Component Value Date    EGFR 48 03/10/2025    EGFR 53 12/06/2024    EGFR 63 11/07/2024    CREATININE 1.12 03/10/2025    CREATININE 1.04 12/06/2024    CREATININE 1 11/07/2024   Stable

## 2025-03-18 ENCOUNTER — TELEPHONE (OUTPATIENT)
Dept: FAMILY MEDICINE CLINIC | Facility: CLINIC | Age: 75
End: 2025-03-18

## 2025-03-19 DIAGNOSIS — J45.20 MILD INTERMITTENT ASTHMA WITHOUT COMPLICATION: ICD-10-CM

## 2025-03-19 RX ORDER — ALBUTEROL SULFATE 90 UG/1
2 INHALANT RESPIRATORY (INHALATION) EVERY 4 HOURS PRN
Qty: 18 G | Refills: 0 | Status: SHIPPED | OUTPATIENT
Start: 2025-03-19 | End: 2025-04-18

## 2025-03-19 RX ORDER — FLUTICASONE PROPIONATE AND SALMETEROL XINAFOATE 230; 21 UG/1; UG/1
2 AEROSOL, METERED RESPIRATORY (INHALATION) 2 TIMES DAILY
Qty: 36 G | Refills: 3 | OUTPATIENT
Start: 2025-03-19

## 2025-03-19 RX ORDER — BUDESONIDE AND FORMOTEROL FUMARATE DIHYDRATE 80; 4.5 UG/1; UG/1
2 AEROSOL RESPIRATORY (INHALATION) 2 TIMES DAILY
Qty: 30 G | Refills: 1 | Status: SHIPPED | OUTPATIENT
Start: 2025-03-19 | End: 2025-06-17

## 2025-03-19 NOTE — TELEPHONE ENCOUNTER
Please let her know I had to change her long acting inhaler to another generic one   So no longer advair  she is getting Breyna 80 mg- please make sure she calls and make sure the copay is not to high as I can not tell and I did refill the albuterol

## 2025-03-19 NOTE — TELEPHONE ENCOUNTER
PA for Advair HFA) 230-21 MCG/ACT inhaler  EXCLUDED from plan       Reason:(Screenshot if applicable)        Message sent to office clinical pool Yes

## 2025-03-19 NOTE — TELEPHONE ENCOUNTER
Patient reached out that we canceled her inhaler. I do not show us canceling it. [facility] - they need us to send over a new prescription as it shows canceled in their system. They could not take a verbal

## 2025-03-19 NOTE — TELEPHONE ENCOUNTER
PA for Advair HFA) 230-21 MCG/ACT inhaler SUBMITTED to     via    [x][facility]-KEY: [id number]  []Surescripts-Case ID #   []Availity-Auth ID # NDC #   []Faxed to plan   []Other website   []Phone call Case ID #     [x]PA sent as URGENT    All office notes, labs and other pertaining documents and studies sent. Clinical questions answered. Awaiting determination from insurance company.     Turnaround time for your insurance to make a decision on your Prior Authorization can take 7-21 business days.

## 2025-03-30 ENCOUNTER — HOSPITAL ENCOUNTER (OUTPATIENT)
Dept: ULTRASOUND IMAGING | Facility: HOSPITAL | Age: 75
Discharge: HOME | End: 2025-03-30
Payer: MEDICARE

## 2025-03-30 DIAGNOSIS — K75.81 LIVER CIRRHOSIS SECONDARY TO NASH (NONALCOHOLIC STEATOHEPATITIS) (HCC): ICD-10-CM

## 2025-03-30 DIAGNOSIS — K74.60 LIVER CIRRHOSIS SECONDARY TO NASH (NONALCOHOLIC STEATOHEPATITIS) (HCC): ICD-10-CM

## 2025-03-30 PROCEDURE — 76981 USE PARENCHYMA: CPT

## 2025-04-02 ENCOUNTER — RESULTS FOLLOW-UP (OUTPATIENT)
Dept: FAMILY MEDICINE CLINIC | Facility: CLINIC | Age: 75
End: 2025-04-02

## 2025-04-02 NOTE — RESULT ENCOUNTER NOTE
Metavir score: F0 - F1, Absent or Mild Fibrosis    According to the updated SRU consensus statement, a liver stiffness of 7.89 kPa, which in the absence of other known clinical signs *, rules out compensated advanced chronic liver disease (cACLD). If there are known clinical signs, may need further test

## 2025-04-09 ENCOUNTER — OFFICE VISIT (OUTPATIENT)
Dept: FAMILY MEDICINE CLINIC | Facility: CLINIC | Age: 75
End: 2025-04-09
Payer: MEDICARE

## 2025-04-09 VITALS
HEIGHT: 65 IN | OXYGEN SATURATION: 95 % | BODY MASS INDEX: 35.49 KG/M2 | TEMPERATURE: 98.5 F | SYSTOLIC BLOOD PRESSURE: 122 MMHG | WEIGHT: 213 LBS | HEART RATE: 85 BPM | DIASTOLIC BLOOD PRESSURE: 74 MMHG

## 2025-04-09 DIAGNOSIS — Z87.19 HISTORY OF DIVERTICULITIS: ICD-10-CM

## 2025-04-09 DIAGNOSIS — R10.31 RLQ ABDOMINAL PAIN: Primary | ICD-10-CM

## 2025-04-09 PROCEDURE — G2211 COMPLEX E/M VISIT ADD ON: HCPCS | Performed by: NURSE PRACTITIONER

## 2025-04-09 PROCEDURE — 99214 OFFICE O/P EST MOD 30 MIN: CPT | Performed by: NURSE PRACTITIONER

## 2025-04-09 RX ORDER — CIPROFLOXACIN 500 MG/1
500 TABLET, FILM COATED ORAL EVERY 12 HOURS SCHEDULED
COMMUNITY
End: 2025-04-09 | Stop reason: SDUPTHER

## 2025-04-09 RX ORDER — METRONIDAZOLE 500 MG/1
500 TABLET ORAL EVERY 12 HOURS SCHEDULED
COMMUNITY
End: 2025-04-09 | Stop reason: SDUPTHER

## 2025-04-09 RX ORDER — CIPROFLOXACIN 500 MG/1
500 TABLET, FILM COATED ORAL EVERY 12 HOURS SCHEDULED
Qty: 20 TABLET | Refills: 0 | Status: SHIPPED | OUTPATIENT
Start: 2025-04-09 | End: 2025-04-19

## 2025-04-09 RX ORDER — METRONIDAZOLE 500 MG/1
500 TABLET ORAL EVERY 12 HOURS SCHEDULED
Qty: 20 TABLET | Refills: 0 | Status: SHIPPED | OUTPATIENT
Start: 2025-04-09 | End: 2025-04-19

## 2025-04-09 NOTE — PROGRESS NOTES
Name: Saloni Miles      : 1950      MRN: [id number]  Encounter Provider: SLY Ledesma  Encounter Date: 2025   Encounter department: [facility]  :  Assessment & Plan  RLQ abdominal pain  Offered CT scan would like to hold off for now   Discussed risks vs benefits   Unable to get in touch with her GI to efren refills but usually this is what he orders for her   Has not had any episodes in a long time she states     Orders:    metroNIDAZOLE (FLAGYL) 500 mg tablet; Take 1 tablet (500 mg total) by mouth every 12 (twelve) hours for 10 days    ciprofloxacin (CIPRO) 500 mg tablet; Take 1 tablet (500 mg total) by mouth every 12 (twelve) hours for 10 days    History of diverticulitis  Discussed concerns and risks and benefits   CT scan offered would like to hold off for now   Will let me know if few days   She is aware if any fevers or increased pain --> ER   Orders:    metroNIDAZOLE (FLAGYL) 500 mg tablet; Take 1 tablet (500 mg total) by mouth every 12 (twelve) hours for 10 days    ciprofloxacin (CIPRO) 500 mg tablet; Take 1 tablet (500 mg total) by mouth every 12 (twelve) hours for 10 days          Falls Plan of Care: balance, strength, and gait training instructions were provided. Recommended assistive device to help with gait and balance. Medications that increase falls were reviewed. Assessed feet and footwear. Vitamin D supplementation was recommended. Home safety education provided.       History of Present Illness   Pt is a 75 yr old female   Presents in office for abdominal discomfort and bloating for few days with history of diverticulitis   She is getting ready to travel and would like to get refills on antibiotics that her GI have her keep on standby    Denies any fevers nausea or vomiting        Abdominal Pain  This is a recurrent problem. The current episode started yesterday. The onset quality is gradual. The problem occurs rarely. The most recent episode lasted 3  "days. The problem has been gradually improving. The pain is located in the RLQ. The pain is at a severity of 3/10. The quality of the pain is cramping and a sensation of fullness. The abdominal pain does not radiate. Pertinent negatives include no anorexia, arthralgias, belching, constipation, diarrhea, dysuria, fever, flatus, frequency, headaches, hematochezia, hematuria, melena, myalgias, nausea, vomiting or weight loss. The pain is aggravated by coughing and palpation. The pain is relieved by Being still.     Review of Systems   Constitutional:  Negative for fever and weight loss.   Gastrointestinal:  Positive for abdominal pain. Negative for anorexia, constipation, diarrhea, flatus, hematochezia, melena, nausea and vomiting.   Genitourinary:  Negative for dysuria, frequency and hematuria.   Musculoskeletal:  Negative for arthralgias and myalgias.   Neurological:  Negative for headaches.   Psychiatric/Behavioral:  Negative for sleep disturbance and suicidal ideas. The patient is not nervous/anxious.        Objective   /74 (BP Location: Right arm, Patient Position: Sitting, Cuff Size: Large)   Pulse 85   Temp 98.5 °F (36.9 °C)   Ht 5' 5\" (1.651 m)   Wt 96.6 kg (213 lb)   SpO2 95%   BMI 35.45 kg/m²      Physical Exam  Vitals and nursing note reviewed.   Constitutional:       Appearance: She is well-developed.   HENT:      Head: Atraumatic.   Pulmonary:      Breath sounds: Normal breath sounds.   Abdominal:      General: Abdomen is flat. Bowel sounds are increased.      Palpations: Abdomen is soft.      Tenderness: There is abdominal tenderness.      Hernia: No hernia is present.   Skin:     General: Skin is warm.   Neurological:      Mental Status: She is alert and oriented to person, place, and time.   Psychiatric:         Mood and Affect: Mood normal.         Behavior: Behavior normal.       Component  Ref Range & Units (hover) 3/10/25  7:52 PM 12/6/24  9:49 AM 4/22/24  2:13 PM 10/18/23  9:46 AM " 6/2/23 10:54 AM 3/21/23  1:46 PM 12/13/22  1:59 PM   WBC 5.75 3.37 Low  4.65 5.09 5.02 5.21 5.45   RBC 4.34 3.80 Low  4.02 4.12 3.95 4.11 4.02   Hemoglobin 13.7 12.5 12.8 13.3 12.8 13.3 12.9   Hematocrit 41.2 39.0 39.6 41.1 39.6 39.6 40.0   MCV 95 103 High  99 High  100 High  100 High  96 100 High    MCH 31.6 32.9 31.8 32.3 32.4 32.4 32.1   MCHC 33.3 32.1 32.3 32.4 32.3 33.6 32.3   RDW 14.3 13.5 13.6 13.5 14.4 13.2 13.3   MPV 10.5 10.3 10.8 11.1 11.2 10.7 11.2   Platelets 140 Low  138 Low  123 Low  167 158 152 174   nRBC 0 0 0 0 0 0 0   Segmented % 61 51 61 59 68 63 65   Immature Grans % 0 0 0 0 0 0 0   Lymphocytes % 30 36 31 32 24 28 26   Monocytes % 6 7 5 6 5 6 6   Eosinophils Relative 2 5 3 2 2 2 2   Basophils Relative 1 1 0 1 1 1 1   Absolute Neutrophils 3.50 1.70 Low  2.81 3.01 3.41 3.33 3.61   Absolute Immature Grans 0.01 0.01 0.01 0.01 0.01 0.00 0.01   Absolute Lymphocytes 1.70 1.22 1.42 1.61 1.19 1.45 1.41   Absolute Monocytes 0.35 0.24 0.25 0.32 0.26 0.32 0.30   Eosinophils Absolute 0.14 0.16 0.14 0.11 0.11 0.08 0.09   Basophils Absolute 0.05 0.04 0.02 0.03 0.04 0.03 0.03              Narrative    This is an appended report.  These results have been appended to a previously verified report.     Component  Ref Range & Units (hover) 3/10/25  8:19 PM 12/6/24  9:49 AM 11/7/24  6:34 AM 11/6/24  7:09 AM 11/5/24  6:09 PM 4/22/24  2:13 PM 10/18/23  9:46 AM   Sodium 136 137 135 R 133 Low  R 134 Low  R 139 141   Potassium 4.1 3.7 4 R 3.8 R 4.3 R 4.0 4.1   Chloride 102 102 102 R 101 R 102 R 105 104   CO2 26 28 26 R 24 R 21 Low  R 26 29   ANION GAP 8 7 7 R 8 R 11 R 8 8 R   BUN 25 19 15 R 18 R 20 R 20 22   Creatinine 1.12 1.04 CM 1 R 0.9 R 1.1 High  R 1.03 CM 1.01 CM   Comment: Standardized to IDMS reference method   Glucose, Fasting 104 High  85    107 High  100 High    Calcium 9.9 9.9 8.5 8.4 8.9 9.2 10.9 High    AST 20 19    16 19   ALT 12 13 CM    12 CM 15 CM   Comment: Specimen collection should occur prior to  Sulfasalazine administration due to the potential for falsely depressed results.   Alkaline Phosphatase 61 67    56 59   Total Protein 7.0 7.1    6.5 7.0   Albumin 4.2 4.4    4.1 4.1   Total Bilirubin 0.74 0.89 CM    0.53 CM 0.73 CM   Comment: Use of this assay is not recommended for patients undergoing treatment with eltrombopag due to the potential for falsely elevated results.  N-acetyl-p-benzoquinone imine (metabolite of Acetaminophen) will generate erroneously low results in samples for patients that have taken an overdose of Acetaminophen.   eGFR 48 53 63 R, CM 66 R, CM 56 Low  R, CM 54 56               Administrative Statements   I have spent a total time of 35  minutes in caring for this patient on the day of the visit/encounter including Diagnostic results, Prognosis, Risks and benefits of tx options, Instructions for management, Patient and family education, Importance of tx compliance, Risk factor reductions, Impressions, Counseling / Coordination of care, Documenting in the medical record, Reviewing/placing orders in the medical record (including tests, medications, and/or procedures), Obtaining or reviewing history  , and Communicating with other healthcare professionals .

## 2025-06-20 DIAGNOSIS — F41.8 DEPRESSION WITH ANXIETY: ICD-10-CM

## 2025-06-23 ENCOUNTER — TELEPHONE (OUTPATIENT)
Dept: NEPHROLOGY | Facility: CLINIC | Age: 75
End: 2025-06-23

## 2025-06-23 NOTE — TELEPHONE ENCOUNTER
I called and left a message on machine for patient to return our call about rescheduling her 12 month nephrology follow up appointment with Dr. KEDAR Romano because of Dr. KEDAR Romano not in the office.

## 2025-06-24 RX ORDER — ALPRAZOLAM 0.25 MG
TABLET ORAL
Qty: 30 TABLET | Refills: 0 | OUTPATIENT
Start: 2025-06-24

## 2025-06-26 ENCOUNTER — TELEPHONE (OUTPATIENT)
Dept: OTHER | Facility: HOSPITAL | Age: 75
End: 2025-06-26

## 2025-06-26 DIAGNOSIS — F41.8 DEPRESSION WITH ANXIETY: ICD-10-CM

## 2025-06-26 RX ORDER — ALPRAZOLAM 0.25 MG
0.25 TABLET ORAL
Qty: 30 TABLET | Refills: 0 | Status: SHIPPED | OUTPATIENT
Start: 2025-06-26 | End: 2025-07-26

## 2025-06-26 NOTE — TELEPHONE ENCOUNTER
[place] from [facility] called in reference to ALPRAZolam (XANAX) 0.25 mg tablet . The pharmacist said the directions are unclear.  Take 1 tablet (0.25 mg total) by mouth daily at bedtime as needed for anxiety 1/2-1 as needed tid   Please resend a new script or have someone call. Thank you kindly...

## 2025-06-30 DIAGNOSIS — F41.8 DEPRESSION WITH ANXIETY: ICD-10-CM

## 2025-07-03 ENCOUNTER — HOSPITAL ENCOUNTER (OUTPATIENT)
Dept: MAMMOGRAPHY | Facility: CLINIC | Age: 75
End: 2025-07-03
Attending: NURSE PRACTITIONER
Payer: MEDICARE

## 2025-07-03 VITALS — BODY MASS INDEX: 35.49 KG/M2 | HEIGHT: 65 IN | WEIGHT: 213 LBS

## 2025-07-03 DIAGNOSIS — Z12.31 ENCOUNTER FOR SCREENING MAMMOGRAM FOR MALIGNANT NEOPLASM OF BREAST: ICD-10-CM

## 2025-07-03 PROCEDURE — 77067 SCR MAMMO BI INCL CAD: CPT

## 2025-07-03 PROCEDURE — 77063 BREAST TOMOSYNTHESIS BI: CPT

## 2025-07-10 ENCOUNTER — OFFICE VISIT (OUTPATIENT)
Dept: CARDIOLOGY CLINIC | Facility: CLINIC | Age: 75
End: 2025-07-10
Payer: MEDICARE

## 2025-07-10 VITALS
BODY MASS INDEX: 36.49 KG/M2 | OXYGEN SATURATION: 98 % | RESPIRATION RATE: 16 BRPM | WEIGHT: 219 LBS | HEIGHT: 65 IN | HEART RATE: 85 BPM | SYSTOLIC BLOOD PRESSURE: 110 MMHG | DIASTOLIC BLOOD PRESSURE: 70 MMHG

## 2025-07-10 DIAGNOSIS — I73.9 PERIPHERAL ARTERIAL DISEASE WITH HISTORY OF REVASCULARIZATION  (HCC): ICD-10-CM

## 2025-07-10 DIAGNOSIS — I10 PRIMARY HYPERTENSION: ICD-10-CM

## 2025-07-10 DIAGNOSIS — Z98.890 PERIPHERAL ARTERIAL DISEASE WITH HISTORY OF REVASCULARIZATION  (HCC): ICD-10-CM

## 2025-07-10 DIAGNOSIS — I35.0 AORTIC VALVE STENOSIS, ETIOLOGY OF CARDIAC VALVE DISEASE UNSPECIFIED: Primary | ICD-10-CM

## 2025-07-10 DIAGNOSIS — I25.10 CORONARY ARTERY DISEASE INVOLVING NATIVE HEART WITHOUT ANGINA PECTORIS, UNSPECIFIED VESSEL OR LESION TYPE: ICD-10-CM

## 2025-07-10 PROCEDURE — 99214 OFFICE O/P EST MOD 30 MIN: CPT | Performed by: INTERNAL MEDICINE

## 2025-07-10 NOTE — ASSESSMENT & PLAN NOTE
PAD is clinically stable, no leg claudication. Continue DAPT using ASA with Clopidogrel and high intensity statin along with ASCVD risk factor modification.

## 2025-07-10 NOTE — ASSESSMENT & PLAN NOTE
Transthoracic echo (TTE) on 07/05/23  showed moderate to severe stenosis. Mean aortic valve gradient of 36 mmHg, SHANT of 1.1 cm2, aortic root is mildly dilated at 41 mm. A repeat TTE is requested for follow up.     At present, she claims to be asymptomatic. However, if AS has progressed to severe, an exercise treadmill stress echo will be considered. Otherwise, TAVR referral will be recommended if AS is critical, peak AV velocity of >5 m/sec and/or severe AS with presence of LV dysfunction on TTE.

## 2025-07-10 NOTE — PATIENT INSTRUCTIONS
Schedule to have echo   Continue current cardiac medications.   Report if to develop aortic stenosis-related symptoms.

## 2025-07-10 NOTE — PROGRESS NOTES
Cardiology Follow Up    Saloni Miles  1950  [id number]  [facility] [place]  [address]  [facility] [address] [phone]    Assessment & Plan  Aortic valve stenosis, etiology of cardiac valve disease unspecified  Transthoracic echo (TTE) on 07/05/23  showed moderate to severe stenosis. Mean aortic valve gradient of 36 mmHg, SHANT of 1.1 cm2, aortic root is mildly dilated at 41 mm. A repeat TTE is requested for follow up.     At present, she claims to be asymptomatic. However, if AS has progressed to severe, an exercise treadmill stress echo will be considered. Otherwise, TAVR referral will be recommended if AS is critical, peak AV velocity of >5 m/sec and/or severe AS with presence of LV dysfunction on TTE.   Primary hypertension  BP goal of <130/80. Continue current therapy since normotensive.   Coronary artery disease involving native heart without angina pectoris, unspecified vessel or lesion type  CAD is clinically stable, no angina. Continue OMT for CAD.   Peripheral arterial disease with history of revascularization  (HCC)  PAD is clinically stable, no leg claudication. Continue DAPT using ASA with Clopidogrel and high intensity statin along with ASCVD risk factor modification.      Interval History:   This is a 75-year-old female with known PAD, aortic stenosis, history of liver cirrhosis secondary to JOSEPH, acquired hypothyroidism.  TTE on 07/05/23  showed moderate to severe stenosis. Mean aortic valve gradient of 36 mmHg, aortic root is mildly dilated at 41 mm.     Patient came today for follow up . Since last office visit, patient denies any cardiac complaints. No clinical heart failure. No angina. No palpitations. No reduction in baseline activity tolerance. No syncope or near syncope. No leg claudication. Patient is tolerating cardiac medications.     Problem List[1]  Past Medical  History[2]  Social History     Socioeconomic History   • Marital status:      Spouse name: Not on file   • Number of children: Not on file   • Years of education: Not on file   • Highest education level: Not on file   Occupational History   • Not on file   Tobacco Use   • Smoking status: Never     Passive exposure: Never   • Smokeless tobacco: Never   Vaping Use   • Vaping status: Never Used   Substance and Sexual Activity   • Alcohol use: Not Currently   • Drug use: Never   • Sexual activity: Not Currently     Partners: Male     Birth control/protection: None   Other Topics Concern   • Not on file   Social History Narrative   • Not on file     Social Drivers of Health     Financial Resource Strain: Low Risk  (2/6/2023)    Overall Financial Resource Strain (CARDIA)    • Difficulty of Paying Living Expenses: Not very hard   Food Insecurity: No Food Insecurity (5/30/2024)    Nursing - Inadequate Food Risk Classification    • Worried About Running Out of Food in the Last Year: Never true    • Ran Out of Food in the Last Year: Never true    • Ran Out of Food in the Last Year: Not on file   Transportation Needs: No Transportation Needs (5/30/2024)    PRAPARE - Transportation    • Lack of Transportation (Medical): No    • Lack of Transportation (Non-Medical): No   Physical Activity: Not on file   Stress: Not on file   Social Connections: Not on file   Intimate Partner Violence: Not on file   Housing Stability: Low Risk  (5/30/2024)    Housing Stability Vital Sign    • Unable to Pay for Housing in the Last Year: No    • Number of Times Moved in the Last Year: 0    • Homeless in the Last Year: No      Family History[3]  Past Surgical History[4]  Current Medications[5]  Allergies   Allergen Reactions   • Sulfamethoxazole-Trimethoprim Rash       Labs:  Lab Results   Component Value Date    K 4.1 03/10/2025    K 4 11/07/2024     03/10/2025     11/07/2024    CO2 26 03/10/2025    CO2 26 11/07/2024    BUN 25  03/10/2025    BUN 15 11/07/2024    CREATININE 1.12 03/10/2025    CREATININE 1 11/07/2024    CALCIUM 9.9 03/10/2025    CALCIUM 8.5 11/07/2024     Lab Results   Component Value Date    WBC 5.75 03/10/2025    HGB 13.7 03/10/2025    HCT 41.2 03/10/2025    MCV 95 03/10/2025     (L) 03/10/2025     Lab Results   Component Value Date    TRIG 127 12/06/2024    HDL 46 (L) 12/06/2024     Imaging:   Mammo screening bilateral w 3d and cad  Result Date: 7/8/2025  Impression: No mammographic evidence of malignancy. ASSESSMENT/BI-RADS CATEGORY: Left: 2 - Benign Right: 2 - Benign Overall: 2 - Benign RECOMMENDATION:      - Routine screening mammogram in 1 year for both breasts. Workstation ID: [id number] Signed by:  Bradley Landon Kocher, MD     Review of Systems:  Review of Systems   Constitutional:  Negative for activity change.   Respiratory:  Negative for shortness of breath.    Cardiovascular:  Negative for chest pain, palpitations and leg swelling.   All other systems reviewed and are negative.    Physical Exam:  Vitals and nursing note reviewed.   Constitutional:       General: not in acute distress.     Appearance: well-developed.   HENT:      Head: Normocephalic and atraumatic.   Neck:     Supple, no JVD, no carotid bruit.  Cardiovascular:      Rate and Rhythm: Normal rate. Rhythm regular.     Heart sounds: 3/6 JOAN murmur. Normal S1 soft S2, No gallops. No rubs.      No pedal edema.   Pulmonary:      Effort: Pulmonary effort is normal. No respiratory distress.      Breath sounds: Normal breath sounds.   Abdominal:      Palpations: Abdomen is soft, no distention.     Tenderness: There is no abdominal tenderness.   Neurological:      Mental Status: awake, alert, oriented x 3.            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)   • Stage 3b chronic kidney disease (HCC)   • Anxiety   • Depression with anxiety   • Other insomnia   • Coronary artery disease involving native heart without angina pectoris   • Aortic valve stenosis   • Mild intermittent asthma without complication   • Liver cyst   • History of colonic polyps   • Contact dermatitis   • Secondary hyperparathyroidism of renal origin (HCC)   • Thrombocytopenia, unspecified (HCC)   • Primary osteoarthritis of left knee   • S/P total left hip arthroplasty   • Peripheral nerve disease   • Dyslipidemia   • Counseling about travel   • 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)   • Paresthesia of hand, bilateral   • Celiac artery stenosis (HCC)   • Concussion   • Antiplatelet or antithrombotic long-term use   [2]  Past Medical History:  Diagnosis Date   • Actinic keratosis    • Allergic Childhood   • Anxiety 2019   • Arthritis    • Asthma Childhood   • Chronic kidney disease    • CPAP (continuous positive airway pressure) dependence     pt states was tested for VILMA, and ruled that she doesnt have it, however oxygen levels drop during sleep so CPAP was recommended,is not yet using it   • Disease of thyroid gland Approx 2000   • Diverticulitis of colon 2000   • Fibrocystic breast    • Heart murmur    • Hyperlipidemia    • Hypertension 1995 approx   • Liver disease    • Obesity Childhood   • PAD (peripheral artery disease) (HCC)    • Skin tag    • Tinea corporis 2023   • Urinary tract infection    • Varicella    [3]  Family History  Problem Relation Name Age of Onset   • Depression Mother Andreea Zaragoza    • Thrombosis Mother Andreealuann Zaragoza    • Arthritis Mother Andreea Zaragoza    • Cancer Mother Andreea Zaragoza    • Anxiety disorder Mother Andreea Zaragoza    • Varicose Veins Mother Andreea Zaragoza     • Heart disease Father Georges Zaragoza         I   • Cancer Brother Georges Killian    • Dementia Brother Georges Killian    • Heart disease Brother Geogres Killian    • Hypertension Brother Georges Killian    • Cancer Brother Georges    • Heart disease Brother Georges Zaragoza    • Hypertension Brother Georges Zaragoza    [4]  Past Surgical History:  Procedure Laterality Date   • APPENDECTOMY  About    • ARTERIAL BYPASS SURGERY     •  SECTION   &    • COLON SURGERY  About    • HYSTERECTOMY  About    • JOINT REPLACEMENT  10/5/2022    Hip   • [place] ARTHRP ACETBLR/PROX FEM PROSTC AGRFT/ALGRFT Left 10/05/2022    Procedure: ARTHROPLASTY LEFT HIP TOTAL ANTERIOR;  Surgeon: Hakan Rojo MD;  Location: [facility];  Service: Orthopedics   • UPPER GASTROINTESTINAL ENDOSCOPY     • VASCULAR SURGERY     [5]    Current Outpatient Medications:   •  ALPRAZolam (XANAX) 0.25 mg tablet, Take 1 tablet (0.25 mg total) by mouth daily at bedtime as needed for anxiety, Disp: 30 tablet, Rfl: 0  •  ammonium lactate (LAC-HYDRIN) 12 % cream, APPLY TOPICALLY AS NEEDED FOR DRY SKIN., Disp: 385 g, Rfl: 1  •  aspirin (ECOTRIN LOW STRENGTH) 81 mg EC tablet, Take 1 tablet (81 mg total) by mouth daily, Disp: 90 tablet, Rfl: 0  •  atorvastatin (LIPITOR) 40 mg tablet, Take 1 tablet (40 mg total) by mouth daily, Disp: 90 tablet, Rfl: 1  •  Biotin 68684 MCG TABS, , Disp: , Rfl:   •  budesonide-formoterol (Breyna) 80-4.5 MCG/ACT inhaler, Inhale 2 puffs 2 (two) times a day Rinse mouth after use., Disp: 30 g, Rfl: 1  •  clopidogrel (PLAVIX) 75 mg tablet, Take 1 tablet (75 mg total) by mouth daily, Disp: 90 tablet, Rfl: 1  •  fluticasone (FLONASE) 50 mcg/act nasal spray, 2 sprays into each nostril daily as needed for rhinitis, Disp: 18 mL, Rfl: 1  •  levothyroxine 112 mcg tablet, Take 1 tablet (112 mcg total) by mouth daily in the early morning, Disp: 90 tablet, Rfl: 0  •  losartan-hydrochlorothiazide (HYZAAR) 100-25 MG per tablet, Take 1 tablet by mouth  daily, Disp: 90 tablet, Rfl: 3  •  Magnesium 400 MG TABS, Take by mouth, Disp: , Rfl:   •  methylPREDNISolone 4 MG tablet therapy pack, Use as directed on package In case of asthma flair, Disp: 21 each, Rfl: 0  •  montelukast (SINGULAIR) 10 mg tablet, Take 1 tablet (10 mg total) by mouth daily as needed (cough), Disp: 90 tablet, Rfl: 3  •  Multiple Vitamin (multivitamin) tablet, Take 1 tablet by mouth daily, Disp: 30 tablet, Rfl: 0  •  ondansetron (ZOFRAN-ODT) 4 mg disintegrating tablet, Take 1 tablet (4 mg total) by mouth every 8 (eight) hours as needed for vomiting for up to 3 days As needed in travel, Disp: 9 tablet, Rfl: 0  •  polyethylene glycol (MIRALAX) 17 g packet, Take 17 g by mouth daily for 14 days, Disp: 30 each, Rfl: 0  •  sertraline (ZOLOFT) 50 mg tablet, TAKE 1 TABLET DAILY, Disp: 90 tablet, Rfl: 3  •  traZODone (DESYREL) 100 mg tablet, Take 1 tablet (100 mg total) by mouth daily at bedtime, Disp: 90 tablet, Rfl: 0

## 2025-07-23 ENCOUNTER — HOSPITAL ENCOUNTER (OUTPATIENT)
Dept: VASCULAR ULTRASOUND | Facility: HOSPITAL | Age: 75
Discharge: HOME | End: 2025-07-23
Payer: MEDICARE

## 2025-07-23 DIAGNOSIS — Z98.890 PERIPHERAL ARTERIAL DISEASE WITH HISTORY OF REVASCULARIZATION  (HCC): ICD-10-CM

## 2025-07-23 DIAGNOSIS — I73.9 PERIPHERAL ARTERIAL DISEASE WITH HISTORY OF REVASCULARIZATION  (HCC): ICD-10-CM

## 2025-07-23 PROCEDURE — 93923 UPR/LXTR ART STDY 3+ LVLS: CPT

## 2025-07-23 PROCEDURE — 93925 LOWER EXTREMITY STUDY: CPT | Performed by: SURGERY

## 2025-07-23 PROCEDURE — 93922 UPR/L XTREMITY ART 2 LEVELS: CPT | Performed by: SURGERY

## 2025-07-23 PROCEDURE — 93925 LOWER EXTREMITY STUDY: CPT

## 2025-07-24 ENCOUNTER — TELEPHONE (OUTPATIENT)
Dept: VASCULAR SURGERY | Facility: CLINIC | Age: 75
End: 2025-07-24

## 2025-07-24 NOTE — TELEPHONE ENCOUNTER
Attempted to contact patient to schedule appointment(s) listed below.  Requested patient call [phone] to schedule appointment(s).    Patient's appointment(s) are due now.    Dopplers  [] Abdominal Aorta Iliac (AOIL)  [] Carotid (CV)   [] Celiac and/or Mesenteric  [] Endovascular Aortic Repair (EVAR)   [] Hemodialysis Access (HD)   [x] Lower Limb Arterial (KALE)  [] Lower Limb Venous (LEV)  [] Lower Limb Venous Duplex with Reflux (LEVDR)  [] Renal Artery  [] Upper Limb Arterial (UEA)    [] Upper Limb Venous (UEV)              [] MICHELE and Waveform analysis     Advanced Imaging   [] CTA head/neck    [] CTA abdomen    [] CTA abdomen & pelvis    [] CT abdomen with/ without contrast  [] CT abdomen with contrast  [] CT abdomen without contrast    [] CT abdomen & pelvis with/ without contrast  [] CT abdomen & pelvis with contrast  [] CT abdomen & pelvis without contrast    Office Visit   [] New patient, patient last seen over 3 years ago  [] Risk factor modification (RFM)   [x] Follow up   [] Lost to follow up (LTFU)   Called patient & LMOM to schedule ov w/Vascular Surgeon per consensus/RR KALE 7/23/25

## 2025-07-28 ENCOUNTER — RESULTS FOLLOW-UP (OUTPATIENT)
Dept: CARDIOLOGY CLINIC | Facility: CLINIC | Age: 75
End: 2025-07-28

## 2025-07-28 ENCOUNTER — HOSPITAL ENCOUNTER (OUTPATIENT)
Dept: NON INVASIVE DIAGNOSTICS | Facility: CLINIC | Age: 75
Discharge: HOME | End: 2025-07-28
Payer: MEDICARE

## 2025-07-28 VITALS
HEART RATE: 65 BPM | WEIGHT: 218.92 LBS | DIASTOLIC BLOOD PRESSURE: 70 MMHG | BODY MASS INDEX: 36.47 KG/M2 | HEIGHT: 65 IN | SYSTOLIC BLOOD PRESSURE: 110 MMHG

## 2025-07-28 DIAGNOSIS — I35.0 AORTIC VALVE STENOSIS, ETIOLOGY OF CARDIAC VALVE DISEASE UNSPECIFIED: ICD-10-CM

## 2025-07-28 LAB
AORTIC ROOT: 3.8 CM
AORTIC VALVE MEAN VELOCITY: 34.5 M/S
ASCENDING AORTA: 3.8 CM
AV AREA BY CONTINUOUS VTI: 0.9 CM2
AV AREA PEAK VELOCITY: 0.9 CM2
AV LVOT MEAN GRADIENT: 3 MMHG
AV LVOT PEAK GRADIENT: 6 MMHG
AV MEAN PRESS GRAD SYS DOP V1V2: 50 MMHG
AV ORIFICE AREA US: 0.88 CM2
AV PEAK GRADIENT: 72 MMHG
AV VELOCITY RATIO: 0.28
AV VMAX SYS DOP: 4.23 M/S
BSA FOR ECHO PROCEDURE: 2.06 M2
DOP CALC AO VTI: 98.6 CM
DOP CALC LVOT AREA: 3.14 CM2
DOP CALC LVOT CARDIAC INDEX: 2.62 L/MIN/M2
DOP CALC LVOT CARDIAC OUTPUT: 5.39 L/MIN
DOP CALC LVOT DIAMETER: 2 CM
DOP CALC LVOT PEAK VEL VTI: 27.6 CM
DOP CALC LVOT PEAK VEL: 1.27 M/S
DOP CALC LVOT STROKE INDEX: 43.7 ML/M2
DOP CALC LVOT STROKE VOLUME: 86.66
DOP CALC MV VTI: 57.52 CM
E WAVE DECELERATION TIME: 669 MS
E/A RATIO: 0.57
FRACTIONAL SHORTENING: 37 (ref 28–44)
INTERVENTRICULAR SEPTUM IN DIASTOLE (PARASTERNAL SHORT AXIS VIEW): 1.2 CM
INTERVENTRICULAR SEPTUM: 1.2 CM (ref 0.6–1.1)
LAAS-AP2: 22.6 CM2
LAAS-AP4: 17.6 CM2
LEFT ATRIUM AREA SYSTOLE SINGLE PLANE A4C: 15.4 CM2
LEFT ATRIUM SIZE: 4.3 CM
LEFT INTERNAL DIMENSION IN SYSTOLE: 3.2 CM (ref 2.1–4)
LEFT VENTRICULAR INTERNAL DIMENSION IN DIASTOLE: 5.1 CM (ref 3.5–6)
LEFT VENTRICULAR POSTERIOR WALL IN END DIASTOLE: 1.2 CM
LEFT VENTRICULAR STROKE VOLUME: 84 ML
LV EF US.2D.A4C+ESTIMATED: 60 %
LVSV (TEICH): 84 ML
MV E'TISSUE VEL-LAT: 6 CM/S
MV E'TISSUE VEL-SEP: 5 CM/S
MV MEAN GRADIENT: 5 MMHG
MV PEAK A VEL: 1.85 M/S
MV PEAK E VEL: 106 CM/S
MV PEAK GRADIENT: 14 MMHG
MV STENOSIS PRESSURE HALF TIME: 194 MS
MV VALVE AREA BY CONTINUITY EQUATION: 1.51 CM2
MV VALVE AREA P 1/2 METHOD: 1.13
RIGHT ATRIUM AREA SYSTOLE A4C: 11.6 CM2
SL CV LEFT ATRIUM LENGTH A2C: 6.9 CM
SL CV LV EF: 70
SL CV PED ECHO LEFT VENTRICLE DIASTOLIC VOLUME (MOD BIPLANE) 2D: 124 ML
SL CV PED ECHO LEFT VENTRICLE SYSTOLIC VOLUME (MOD BIPLANE) 2D: 40 ML
TR MAX PG: 20 MMHG
TR PEAK VELOCITY: 2.2 M/S
TRICUSPID ANNULAR PLANE SYSTOLIC EXCURSION: 2.1 CM
TRICUSPID VALVE PEAK REGURGITATION VELOCITY: 2.22 M/S

## 2025-07-28 PROCEDURE — 93306 TTE W/DOPPLER COMPLETE: CPT | Performed by: INTERNAL MEDICINE

## 2025-07-28 PROCEDURE — 93306 TTE W/DOPPLER COMPLETE: CPT

## 2025-08-07 ENCOUNTER — OFFICE VISIT (OUTPATIENT)
Dept: OBGYN CLINIC | Facility: CLINIC | Age: 75
End: 2025-08-07
Payer: MEDICARE

## 2025-08-07 ENCOUNTER — APPOINTMENT (OUTPATIENT)
Dept: RADIOLOGY | Facility: CLINIC | Age: 75
End: 2025-08-07
Attending: FAMILY MEDICINE
Payer: MEDICARE

## 2025-08-07 VITALS — WEIGHT: 216 LBS | HEIGHT: 65 IN | BODY MASS INDEX: 35.99 KG/M2

## 2025-08-07 DIAGNOSIS — M70.62 TROCHANTERIC BURSITIS OF LEFT HIP: Primary | ICD-10-CM

## 2025-08-07 DIAGNOSIS — M51.360 DEGENERATION OF INTERVERTEBRAL DISC OF LUMBAR REGION WITH DISCOGENIC BACK PAIN: ICD-10-CM

## 2025-08-07 PROCEDURE — 99214 OFFICE O/P EST MOD 30 MIN: CPT | Performed by: FAMILY MEDICINE

## 2025-08-20 ENCOUNTER — HOSPITAL ENCOUNTER (OUTPATIENT)
Dept: NON INVASIVE DIAGNOSTICS | Facility: CLINIC | Age: 75
Discharge: HOME | End: 2025-08-20
Attending: PHYSICIAN ASSISTANT

## 2025-08-21 ENCOUNTER — EVALUATION (OUTPATIENT)
Dept: PHYSICAL THERAPY | Facility: CLINIC | Age: 75
End: 2025-08-21
Attending: FAMILY MEDICINE
Payer: MEDICARE

## 2025-08-21 DIAGNOSIS — M70.62 TROCHANTERIC BURSITIS OF LEFT HIP: Primary | ICD-10-CM

## 2025-08-21 DIAGNOSIS — M51.360 DEGENERATION OF INTERVERTEBRAL DISC OF LUMBAR REGION WITH DISCOGENIC BACK PAIN: ICD-10-CM

## 2025-08-21 PROCEDURE — 97161 PT EVAL LOW COMPLEX 20 MIN: CPT

## 2025-08-21 PROCEDURE — 97110 THERAPEUTIC EXERCISES: CPT

## 2025-08-21 PROCEDURE — 97112 NEUROMUSCULAR REEDUCATION: CPT

## (undated) DEVICE — 3M™ STERI-STRIP™ REINFORCED ADHESIVE SKIN CLOSURES, R1547, 1/2 IN X 4 IN (12 MM X 100 MM), 6 STRIPS/ENVELOPE: Brand: 3M™ STERI-STRIP™

## (undated) DEVICE — CAPIT HIP MOP - ACTIS ONLY

## (undated) DEVICE — SUT STRATAFIX SPIRAL PDS PLUS 1 CTX 18 IN SXPP1A400

## (undated) DEVICE — DRESSING MEPILEX AG BORDER 4 X 10 IN

## (undated) DEVICE — DRESSING MEPILEX AG BORDER 4 X 12 IN

## (undated) DEVICE — GLOVE SRG BIOGEL ORTHOPEDIC 8

## (undated) DEVICE — HOOD: Brand: FLYTE, SURGICOOL

## (undated) DEVICE — WEBRIL 6 IN UNSTERILE

## (undated) DEVICE — ELECTRODE BLADE E-Z CLEAN 4IN -0014A

## (undated) DEVICE — SUT VICRYL 2-0 CTB-1 36 IN JB945

## (undated) DEVICE — TRAY FOLEY 16FR URIMETER SURESTEP

## (undated) DEVICE — THE SIMPULSE SOLO SYSTEM WITH ULTREX RETRACTABLE SPLASH SHIELD TIP: Brand: SIMPULSE SOLO

## (undated) DEVICE — SUT MONOCRYL 3-0 PS-2 27 IN Y427H

## (undated) DEVICE — DUAL CUT SAGITTAL BLADE

## (undated) DEVICE — 6617 IOBAN II PATIENT ISOLATION DRAPE 5/BX,4BX/CS: Brand: STERI-DRAPE™ IOBAN™ 2

## (undated) DEVICE — DRAPE SHEET THREE QUARTER

## (undated) DEVICE — PACK MAJOR ORTHO W/SPLITS PBDS

## (undated) DEVICE — 3M™ STERI-DRAPE™ U-DRAPE 1015: Brand: STERI-DRAPE™

## (undated) DEVICE — SUT ETHIBOND 1 CTX 30 IN X865H

## (undated) DEVICE — 3M™ IOBAN™ 2 ANTIMICROBIAL INCISE DRAPE 6650EZ: Brand: IOBAN™ 2

## (undated) DEVICE — COBAN 4 IN STERILE

## (undated) DEVICE — MEDI-VAC YANK SUCT HNDL W/TPRD BULBOUS TIP: Brand: CARDINAL HEALTH

## (undated) DEVICE — DRAPE SHEET X-LG

## (undated) DEVICE — POSITIONER HANA TABLE PACK

## (undated) DEVICE — CHLORAPREP HI-LITE 26ML ORANGE

## (undated) DEVICE — HEAVY DUTY TABLE COVER: Brand: CONVERTORS

## (undated) DEVICE — 3000CC GUARDIAN II: Brand: GUARDIAN

## (undated) DEVICE — DRAPE EQUIPMENT RF WAND

## (undated) DEVICE — LIGHT HANDLE COVER SLEEVE DISP BLUE STELLAR

## (undated) DEVICE — ADHESIVE SKIN HIGH VISCOSITY EXOFIN 1ML

## (undated) DEVICE — GLOVE INDICATOR PI UNDERGLOVE SZ 8 BLUE

## (undated) DEVICE — DRAPE C-ARM X-RAY

## (undated) DEVICE — PENCIL ELECTROSURG E-Z CLEAN -0035H

## (undated) DEVICE — ELECTRODE BLADE MOD E-Z CLEAN 2.5IN 6.4CM -0012M

## (undated) DEVICE — 450 ML BOTTLE OF 0.05% CHLORHEXIDINE GLUCONATE IN 99.95% STERILE WATER FOR IRRIGATION, USP AND APPLICATOR.: Brand: IRRISEPT ANTIMICROBIAL WOUND LAVAGE